# Patient Record
Sex: FEMALE | Race: WHITE | Employment: OTHER | URBAN - METROPOLITAN AREA
[De-identification: names, ages, dates, MRNs, and addresses within clinical notes are randomized per-mention and may not be internally consistent; named-entity substitution may affect disease eponyms.]

---

## 2017-01-18 ENCOUNTER — OFFICE VISIT (OUTPATIENT)
Dept: INTERNAL MEDICINE CLINIC | Age: 82
End: 2017-01-18

## 2017-01-18 VITALS
HEIGHT: 63 IN | DIASTOLIC BLOOD PRESSURE: 79 MMHG | HEART RATE: 82 BPM | BODY MASS INDEX: 30.26 KG/M2 | TEMPERATURE: 97.8 F | WEIGHT: 170.8 LBS | RESPIRATION RATE: 16 BRPM | SYSTOLIC BLOOD PRESSURE: 152 MMHG | OXYGEN SATURATION: 96 %

## 2017-01-18 DIAGNOSIS — I10 ESSENTIAL HYPERTENSION: ICD-10-CM

## 2017-01-18 DIAGNOSIS — M17.9 OSTEOARTHRITIS OF KNEE, UNSPECIFIED LATERALITY, UNSPECIFIED OSTEOARTHRITIS TYPE: ICD-10-CM

## 2017-01-18 DIAGNOSIS — K21.9 GASTROESOPHAGEAL REFLUX DISEASE WITHOUT ESOPHAGITIS: ICD-10-CM

## 2017-01-18 DIAGNOSIS — E78.5 HYPERLIPIDEMIA, UNSPECIFIED HYPERLIPIDEMIA TYPE: ICD-10-CM

## 2017-01-18 DIAGNOSIS — R73.02 IGT (IMPAIRED GLUCOSE TOLERANCE): ICD-10-CM

## 2017-01-18 DIAGNOSIS — J20.9 ACUTE BRONCHITIS, UNSPECIFIED ORGANISM: Primary | ICD-10-CM

## 2017-01-18 DIAGNOSIS — Z23 ENCOUNTER FOR IMMUNIZATION: ICD-10-CM

## 2017-01-18 DIAGNOSIS — J44.9 ASTHMA WITH COPD (CHRONIC OBSTRUCTIVE PULMONARY DISEASE) (HCC): ICD-10-CM

## 2017-01-18 RX ORDER — ACETAMINOPHEN 160 MG/5ML
200 SUSPENSION, ORAL (FINAL DOSE FORM) ORAL DAILY
Qty: 90 CAP | Refills: 3 | Status: ON HOLD | OUTPATIENT
Start: 2017-01-18 | End: 2017-08-01 | Stop reason: CLARIF

## 2017-01-18 RX ORDER — DICLOFENAC SODIUM 10 MG/G
2 GEL TOPICAL
Qty: 100 G | Refills: 5 | Status: ON HOLD | OUTPATIENT
Start: 2017-01-18 | End: 2017-08-01 | Stop reason: CLARIF

## 2017-01-18 RX ORDER — ROSUVASTATIN CALCIUM 5 MG/1
5 TABLET, COATED ORAL
Qty: 90 TAB | Refills: 1 | Status: SHIPPED | OUTPATIENT
Start: 2017-01-18 | End: 2022-04-22 | Stop reason: DRUGHIGH

## 2017-01-18 RX ORDER — ALBUTEROL SULFATE 90 UG/1
2 AEROSOL, METERED RESPIRATORY (INHALATION)
Qty: 1 INHALER | Refills: 2 | Status: SHIPPED | OUTPATIENT
Start: 2017-01-18

## 2017-01-18 RX ORDER — AMLODIPINE BESYLATE 10 MG/1
10 TABLET ORAL DAILY
Qty: 90 TAB | Refills: 1 | Status: SHIPPED | OUTPATIENT
Start: 2017-01-18 | End: 2018-10-09 | Stop reason: ALTCHOICE

## 2017-01-18 RX ORDER — PREDNISONE 20 MG/1
40 TABLET ORAL DAILY
Qty: 10 TAB | Refills: 0 | Status: SHIPPED | OUTPATIENT
Start: 2017-01-18 | End: 2017-01-23

## 2017-01-18 RX ORDER — AZITHROMYCIN 250 MG/1
TABLET, FILM COATED ORAL
Qty: 6 TAB | Refills: 0 | Status: SHIPPED | OUTPATIENT
Start: 2017-01-18 | End: 2017-01-23

## 2017-01-18 NOTE — PROGRESS NOTES
HISTORY OF PRESENT ILLNESS  Vicente Morillo is a 80 y.o. female. HPI  Presents for acute care    C/o weakness, fatigue with assoc URI sx x 1 week  Some use of albuterol x 1 day  \"I stay cold all of the time\"  But not fever or chills  +sputum production  +chest tightness without wheeze    Pt concerned that meds may be causing fatigue  Tramadol 1-2 per day    Pt is no longer taking crestor due to leg cramping  Pt reports feeling better off crestor  But, not taking Co Q10 with it    Past medical, Social, and Family history reviewed  Medications reviewed and updated. ROS  Complete ROS reviewed and negative or stable except as noted in HPI. Physical Exam   Constitutional: She is oriented to person, place, and time. She appears well-nourished. No distress. HENT:   Head: Normocephalic and atraumatic. Eyes: EOM are normal. Pupils are equal, round, and reactive to light. No scleral icterus. Neck: Normal range of motion. Neck supple. No JVD present. Cardiovascular: Normal rate, regular rhythm and normal heart sounds. Exam reveals no gallop and no friction rub. No murmur heard. Pulmonary/Chest: Effort normal and breath sounds normal. No respiratory distress. She has no wheezes. She has no rales. Prolonged exp phase   Abdominal: Soft. She exhibits no distension. There is no tenderness. Musculoskeletal: Normal range of motion. She exhibits no edema. Lymphadenopathy:     She has no cervical adenopathy. Neurological: She is alert and oriented to person, place, and time. She exhibits normal muscle tone. Skin: Skin is warm. No rash noted. Psychiatric: She has a normal mood and affect. Nursing note and vitals reviewed. Prior labs reviewed. ASSESSMENT and PLAN    ICD-10-CM ICD-9-CM    1.  Acute bronchitis, unspecified organism J20.9 466.0 predniSONE (DELTASONE) 20 mg tablet      azithromycin (ZITHROMAX) 250 mg tablet      albuterol (PROVENTIL HFA, VENTOLIN HFA, PROAIR HFA) 90 mcg/actuation inhaler   2. Hyperlipidemia, unspecified hyperlipidemia type E78.5 272.4 coenzyme Q-10 (CO Q-10) 200 mg capsule      rosuvastatin (CRESTOR) 5 mg tablet   3. 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 Gastroesophageal reflux disease without esophagitis K21.9 530.81    5. IGT (impaired glucose tolerance) R73.02 790.22    6. Essential hypertension I10 401.9 amLODIPine (NORVASC) 10 mg tablet   7. Osteoarthritis of knee, unspecified laterality, unspecified osteoarthritis type M17.9 715.36 diclofenac (VOLTAREN) 1 % gel   8. Encounter for immunization Z23 V03.89 INFLUENZA VIRUS VACCINE, HIGH DOSE SEASONAL, PRESERVATIVE FREE     Follow-up Disposition:  Return in about 2 months (around 3/18/2017), or if symptoms worsen or fail to improve, for cholesterol, blood pressure.   results and schedule of future studies reviewed with patient  reviewed diet  and weight    cardiovascular risk and specific lipid/LDL goals reviewed  reviewed medications and side effects in detail   Resume low dose crestor + be sure to take Co Q10 - pt agrees  pred burst  azithro  Increase norvasc to 10 mg daily  Trial of voltaren gel  If helpful, may be able to decrease tramadol or even oral diclofenac use

## 2017-01-18 NOTE — PROGRESS NOTES
RM 13  Pt states she feels tired and having trouble sleeping  Pt needs refill on neb solution    Chief Complaint   Patient presents with    Cough     nasal congestion,, chest tightness , fatigue, restless sleep       1. Have you been to the ER, urgent care clinic since your last visit? Hospitalized since your last visit? No    2. Have you seen or consulted any other health care providers outside of the [address] [place] [address] since your last visit? Include any pap smears or colon screening.  No    Health Maintenance Due   Topic Date Due    DTaP/Tdap/Td series (1 - Tdap) 08/09/1956    GLAUCOMA SCREENING Q2Y  08/09/2000    INFLUENZA AGE 9 TO ADULT  08/01/2016    Pneumococcal 65+ Low/Medium Risk (2 of 2 - PPSV23) 08/30/2016     Living will sent to pt AVS

## 2017-01-18 NOTE — MR AVS SNAPSHOT
Visit Information Date & Time Provider Department Dept. Phone Encounter #  
 1/18/2017  4:15 PM Aguila Lyn, [address] [place] [address] and Internal Medicine [phone] [id number] Follow-up Instructions Return in about 2 months (around 3/18/2017), or if symptoms worsen or fail to improve, for cholesterol, blood pressure. Upcoming Health Maintenance Date Due DTaP/Tdap/Td series (1 - Tdap) 8/9/1956 GLAUCOMA SCREENING Q2Y 8/9/2000 INFLUENZA AGE 9 TO ADULT 8/1/2016 Pneumococcal 65+ Low/Medium Risk (2 of 2 - PPSV23) 8/30/2016 MEDICARE YEARLY EXAM 3/5/2017 Allergies as of 1/18/2017  Review Complete On: 1/18/2017 By: Aguila Lyn MD  
  
 Severity Noted Reaction Type Reactions Arletha Dice  02/12/2016    Other (comments) Blood testing Cat Dander  02/12/2016    Other (comments) Blood testing Dog Dander  02/12/2016    Other (comments) Blood testing Elavil  03/04/2016    Other (comments)  
 imbalance Washburn  02/12/2016    Other (comments) Blood testing Current Immunizations  Reviewed on 1/18/2017 Name Date Influenza High Dose Vaccine PF  Incomplete Pneumococcal Conjugate (PCV-13) 8/30/2015, 4/9/2015 Zoster Vaccine, Live 7/2/2014 Reviewed by Aguila Lyn MD on 1/18/2017 at  5:31 PM  
You Were Diagnosed With   
  
 Codes Comments Acute bronchitis, unspecified organism    -  Primary ICD-10-CM: J20.9 ICD-9-CM: 466.0 Hyperlipidemia, unspecified hyperlipidemia type     ICD-10-CM: E78.5 ICD-9-CM: 272.4 Asthma with COPD (chronic obstructive pulmonary disease) [facility]     ICD-10-CM: J44.9, J45.909 ICD-9-CM: 493.20 Gastroesophageal reflux disease without esophagitis     ICD-10-CM: K21.9 ICD-9-CM: 530.81 IGT (impaired glucose tolerance)     ICD-10-CM: R73.02 
ICD-9-CM: 790.22 Essential hypertension     ICD-10-CM: I10 
ICD-9-CM: 401.9  Osteoarthritis of knee, unspecified laterality, unspecified osteoarthritis type     ICD-10-CM: M17.9 ICD-9-CM: 715.36 Encounter for immunization     ICD-10-CM: J67 ICD-9-CM: V03.89 Vitals BP Pulse Temp Resp Height(growth percentile) Weight(growth percentile) 152/79 82 97.8 °F (36.6 °C) (Oral) 16 5' 3\" (1.6 m) 170 lb 12.8 oz (77.5 kg) SpO2 BMI OB Status Smoking Status 96% 30.26 kg/m2 Postmenopausal Former Smoker BMI and BSA Data Body Mass Index Body Surface Area  
 30.26 kg/m 2 1.86 m 2 Preferred Pharmacy Pharmacy Name Phone [facility] [phone] [phone] Your Updated Medication List  
  
   
This list is accurate as of: 1/18/17  5:37 PM.  Always use your most recent med list.  
  
  
  
  
 * albuterol 2.5 mg /3 mL (0.083 %) nebulizer solution Commonly known as:  PROVENTIL VENTOLIN  
3 mL by Nebulization route every four (4) hours as needed for Wheezing. * albuterol 90 mcg/actuation inhaler Commonly known as:  PROVENTIL HFA, VENTOLIN HFA, PROAIR HFA Take 2 Puffs by inhalation every four (4) hours as needed for Wheezing. albuterol 90 mcg/actuation inhaler Commonly known as:  S[place]ula Bridges Take 2-4 Puffs by inhalation every four (4) hours as needed. * amLODIPine 5 mg tablet Commonly known as:  Arlyss Richland Take 1.5 Tabs by mouth daily. * amLODIPine 10 mg tablet Commonly known as:  Arlyss Richland Take 1 Tab by mouth daily. azithromycin 250 mg tablet Commonly known as:  [place] Missy Take 2 tablets today, then take 1 tablet daily CENTRUM 0.4-162-18 mg Tab Generic drug:  TN-QO-NG-Fe-Min-Lycopen-Lutein Take  by mouth. * clonazePAM 2 mg tablet Commonly known as:  Rhetta Bob Take 0.5 Tabs by mouth nightly as needed. Max Daily Amount: 1 mg.  
  
 * clonazePAM 0.5 mg tablet Commonly known as:  Rhetta Bob Take 1 Tab by mouth nightly as needed. coenzyme Q-10 200 mg capsule Commonly known as:  CO Q-10 Take 1 Cap by mouth daily. * diclofenac EC 75 mg EC tablet Commonly known as:  VOLTAREN Take  by mouth. * diclofenac 1 % Gel Commonly known as:  VOLTAREN Apply 2 g to affected area four (4) times daily as needed. diphenoxylate-atropine 2.5-0.025 mg per tablet Commonly known as:  LOMOTIL Take 1 Tab by mouth four (4) times daily as needed for Diarrhea. Max Daily Amount: 4 Tabs. fluticasone-salmeterol 500-50 mcg/dose diskus inhaler Commonly known as:  ADVAIR Take 1 Puff by inhalation two (2) times a day. HOMEOPATHIC (CA-BELLADONNA-CM) PO Take  by mouth. For diarrhea  
  
 lamoTRIgine 25 mg tablet Commonly known as: LaMICtal  
Take  by mouth daily. montelukast 10 mg tablet Commonly known as:  SINGULAIR Take 1 Tab by mouth daily. ondansetron 8 mg disintegrating tablet Commonly known as:  ZOFRAN ODT Take 1 Tab by mouth every eight (8) hours as needed for Nausea. polyethylene glycol 17 gram/dose powder Commonly known as:  Nu  Take 17 g by mouth daily. For constipation  
  
 predniSONE 20 mg tablet Commonly known as:  Alinda Althea Take 2 Tabs by mouth daily for 5 days. PROBIOTIC PO Take  by mouth. Takes one po daily. rosuvastatin 5 mg tablet Commonly known as:  CRESTOR Take 1 Tab by mouth nightly. tiotropium bromide 1.25 mcg/actuation inhaler Commonly known as:  Jovanni Lizandro Take 2 Puffs by inhalation daily. traMADol 50 mg tablet Commonly known as:  ULTRAM  
Take 1 Tab by mouth every eight (8) hours as needed for Pain. Max Daily Amount: 150 mg.  
  
 TUMS PO Take  by mouth as needed. valsartan 160 mg tablet Commonly known as:  DIOVAN Take 1 Tab by mouth daily. VITAMIN D3 1,000 unit Cap Generic drug:  cholecalciferol Take 2,000 Units by mouth.  
  
 vitamin E 400 unit capsule Commonly known as:  [address] Take 400 Units by mouth daily. * Notice: This list has 8 medication(s) that are the same as other medications prescribed for you. Read the directions carefully, and ask your doctor or other care provider to review them with you. Prescriptions Sent to Pharmacy Refills  
 coenzyme Q-10 (CO Q-10) 200 mg capsule 3 Sig: Take 1 Cap by mouth daily. Class: Normal  
 Pharmacy: [address] Ph #: [phone] Route: Oral  
 rosuvastatin (CRESTOR) 5 mg tablet 1 Sig: Take 1 Tab by mouth nightly. Class: Normal  
 Pharmacy: [address] Ph #: [phone] Route: Oral  
 diclofenac (VOLTAREN) 1 % gel 5 Sig: Apply 2 g to affected area four (4) times daily as needed. Class: Normal  
 Pharmacy: [address] Ph #: [phone] Route: Topical  
 amLODIPine (NORVASC) 10 mg tablet 1 Sig: Take 1 Tab by mouth daily. Class: Normal  
 Pharmacy: [address] Ph #: [phone] Route: Oral  
 predniSONE (DELTASONE) 20 mg tablet 0 Sig: Take 2 Tabs by mouth daily for 5 days. Class: Normal  
 Pharmacy: [address] Ph #: [phone] Route: Oral  
 azithromycin (ZITHROMAX) 250 mg tablet 0 Sig: Take 2 tablets today, then take 1 tablet daily Class: Normal  
 Pharmacy: [address] Ph #: [phone] We Performed the Following INFLUENZA VIRUS VACCINE, HIGH DOSE SEASONAL, PRESERVATIVE FREE [91419 CPT(R)] Follow-up Instructions Return in about 2 months (around 3/18/2017), or if symptoms worsen or fail to improve, for cholesterol, blood pressure. Patient Instructions Tito Malagon [address] What is a living will? A living will is a legal form you use to write down the kind of care you want at the end of your life. It is used by the health professionals who will treat you if you aren't able to decide for yourself. If you put your wishes in writing, your loved ones and others will know what kind of care you want. They won't need to guess. This can ease your mind and be helpful to others. A living will is not the same as an estate or property will. An estate will explains what you want to happen with your money and property after you die. Is a living will a legal document? A living will is a legal document. Each state has its own laws about living christensen. If you move to another state, make sure that your living will is legal in the state where you now live. Or you might use a universal form that has been approved by many states. This kind of form can sometimes be completed and stored online. Your electronic copy will then be available wherever you have a connection to the Internet. In most cases, doctors will respect your wishes even if you have a form from a different state. · You don't need an  to complete a living will. But legal advice can be helpful if your state's laws are unclear, your health history is complicated, or your family can't agree on what should be in your living will. · You can change your living will at any time. Some people find that their wishes about end-of-life care change as their health changes. · In addition to making a living will, think about completing a medical power of  form. This form lets you name the person you want to make end-of-life treatment decisions for you (your \"health care agent\") if you're not able to. Many hospitals and nursing homes will give you the forms you need to complete a living will and a medical power of .  
· Your living will is used only if you can't make or communicate decisions for yourself anymore. If you become able to make decisions again, you can accept or refuse any treatment, no matter what you wrote in your living will. · Your state may offer an online registry. This is a place where you can store your living will online so the doctors and nurses who need to treat you can find it right away. What should you think about when creating a living will? Talk about your end-of-life wishes with your family members and your doctor. Let them know what you want. That way the people making decisions for you won't be surprised by your choices. Think about these questions as you make your living will: · Do you know enough about life support methods that might be used? If not, talk to your doctor so you know what might be done if you can't breathe on your own, your heart stops, or you're unable to swallow. · What things would you still want to be able to do after you receive life-support methods? Would you want to be able to walk? To speak? To eat on your own? To live without the help of machines? · If you have a choice, where do you want to be cared for? In your home? At a hospital or nursing home? · Do you want certain Pentecostal practices performed if you become very ill? · If you have a choice at the end of your life, where would you prefer to die? At home? In a hospital or nursing home? Somewhere else? · Would you prefer to be buried or cremated? · Do you want your organs to be donated after you die? What should you do with your living will? · Make sure that your family members and your health care agent have copies of your living will. · Give your doctor a copy of your living will to keep in your medical record. If you have more than one doctor, make sure that each one has a copy. · You may want to put a copy of your living will where it can be easily found. Where can you learn more? Go to http://anastasiia-alejandro.info/. Enter L987 in the search box to learn more about \"Learning About Living Permariana. \" Current as of: February 24, 2016 Content Version: 11.1 © 4580-3916 [employer]. Care instructions adapted under license by [employer] (which disclaims liability or warranty for this information). If you have questions about a medical condition or this instruction, always ask your healthcare professional. [address] any warranty or liability for your use of this information. Introducing [facility]! Felix Chan introduces [employer] patient portal. Now you can access parts of your medical record, email your doctor's office, and request medication refills online. 1. In your internet browser, go to [email or web address] 2. Click on the First Time User? Click Here link in the Sign In box. You will see the New Member Sign Up page. 3. Enter your [employer] Access Code exactly as it appears below. You will not need to use this code after youve completed the sign-up process. If you do not sign up before the expiration date, you must request a new code. · [employer] Access Code: [email or web address] Expires: 4/18/2017  4:03 PM 
 
4. Enter the last four digits of your Social Security Number (xxxx) and Date of Birth (mm/dd/yyyy) as indicated and click Submit. You will be taken to the next sign-up page. 5. Create a [employer] ID. This will be your [employer] login ID and cannot be changed, so think of one that is secure and easy to remember. 6. Create a [employer] password. You can change your password at any time. 7. Enter your Password Reset Question and Answer. This can be used at a later time if you forget your password. 8. Enter your e-mail address. You will receive e-mail notification when new information is available in [address] 9. Click Sign Up. You can now view and download portions of your medical record. 10. Click the Download Summary menu link to download a portable copy of your medical information. If you have questions, please visit the Frequently Asked Questions section of the [employer] website. Remember, [employer] is NOT to be used for urgent needs. For medical emergencies, dial 911. Now available from your iPhone and Android! Please provide this summary of care documentation to your next provider. Your primary care clinician is listed as [address] If you have any questions after today's visit, please call [phone].

## 2017-01-18 NOTE — PATIENT INSTRUCTIONS
Learning About Living Kory  What is a living will? A living will is a legal form you use to write down the kind of care you want at the end of your life. It is used by the health professionals who will treat you if you aren't able to decide for yourself. If you put your wishes in writing, your loved ones and others will know what kind of care you want. They won't need to guess. This can ease your mind and be helpful to others. A living will is not the same as an estate or property will. An estate will explains what you want to happen with your money and property after you die. Is a living will a legal document? A living will is a legal document. Each state has its own laws about living christensen. If you move to another state, make sure that your living will is legal in the state where you now live. Or you might use a universal form that has been approved by many states. This kind of form can sometimes be completed and stored online. Your electronic copy will then be available wherever you have a connection to the Internet. In most cases, doctors will respect your wishes even if you have a form from a different state. · You don't need an  to complete a living will. But legal advice can be helpful if your state's laws are unclear, your health history is complicated, or your family can't agree on what should be in your living will. · You can change your living will at any time. Some people find that their wishes about end-of-life care change as their health changes. · In addition to making a living will, think about completing a medical power of  form. This form lets you name the person you want to make end-of-life treatment decisions for you (your \"health care agent\") if you're not able to. Many hospitals and nursing homes will give you the forms you need to complete a living will and a medical power of .   · Your living will is used only if you can't make or communicate decisions for yourself anymore. If you become able to make decisions again, you can accept or refuse any treatment, no matter what you wrote in your living will. · Your state may offer an online registry. This is a place where you can store your living will online so the doctors and nurses who need to treat you can find it right away. What should you think about when creating a living will? Talk about your end-of-life wishes with your family members and your doctor. Let them know what you want. That way the people making decisions for you won't be surprised by your choices. Think about these questions as you make your living will:  · Do you know enough about life support methods that might be used? If not, talk to your doctor so you know what might be done if you can't breathe on your own, your heart stops, or you're unable to swallow. · What things would you still want to be able to do after you receive life-support methods? Would you want to be able to walk? To speak? To eat on your own? To live without the help of machines? · If you have a choice, where do you want to be cared for? In your home? At a hospital or nursing home? · Do you want certain Christianity practices performed if you become very ill? · If you have a choice at the end of your life, where would you prefer to die? At home? In a hospital or nursing home? Somewhere else? · Would you prefer to be buried or cremated? · Do you want your organs to be donated after you die? What should you do with your living will? · Make sure that your family members and your health care agent have copies of your living will. · Give your doctor a copy of your living will to keep in your medical record. If you have more than one doctor, make sure that each one has a copy. · You may want to put a copy of your living will where it can be easily found. Where can you learn more? Go to http://anastasiia-alejandro.info/.   Enter X679 in the search box to learn more about \"Learning About Living Perroy. \"  Current as of: February 24, 2016  Content Version: 11.1  © 3116-2261 [employer], Inc[employer]. Care instructions adapted under license by [employer] (which disclaims liability or warranty for this information). If you have questions about a medical condition or this instruction, always ask your healthcare professional. [address] any warranty or liability for your use of this information.

## 2017-01-23 DIAGNOSIS — J20.9 ACUTE BRONCHITIS, UNSPECIFIED ORGANISM: ICD-10-CM

## 2017-01-23 NOTE — TELEPHONE ENCOUNTER
Patient called requesting for a refill request for prednisone and another prescription ( per pt she does not know the name). When I stated that we have to have the name of the medication in order to know exactly what to call in, patient yelled and was rude stating that she was not going to give the name of the prescription.      Please call back at # [phone]

## 2017-01-23 NOTE — TELEPHONE ENCOUNTER
----- Message from Matthew Baron sent at 1/23/2017  9:00 AM EST -----  Regarding: Dr. Seema Muniz: [phone]  Patient needs Rx refill Pradazone and ZPAK, please check medication on file. Patient request callback at  [phone].     Thank You

## 2017-01-24 RX ORDER — PREDNISONE 20 MG/1
40 TABLET ORAL DAILY
Qty: 10 TAB | Refills: 0 | Status: CANCELLED | OUTPATIENT
Start: 2017-01-24 | End: 2017-01-29

## 2017-01-24 NOTE — TELEPHONE ENCOUNTER
Patient called again requesting medications for her Prednisone and a refill on her Zpack. The patient would like a call back today to discuss theses medications. She feels like her messages are being ignored. Please contact patient at [phone]. Thanks!

## 2017-01-25 NOTE — TELEPHONE ENCOUNTER
The antibiotic is still working and she needs to be seen for re-eval if no better from prior Rx. Please encourage follow up for office visit appt.

## 2017-01-25 NOTE — TELEPHONE ENCOUNTER
Informed pt she needed to make another appt with  to be re-evaluated pt state's that she is not going to make an appt at this time. Pt also feel's it was very unprofessional that no one had called her to inform her to make a follow-up.

## 2017-01-25 NOTE — TELEPHONE ENCOUNTER
Spoke to patient. She voiced her concerns about not receiving a call back and initial call on the 23rd. .  Informed her that Dr. Sangeeta Bush reviewed her message and recommends that she return for OV if she is not feeling better. She wants to wait a day or two and will call back if no better.

## 2017-02-09 ENCOUNTER — TELEPHONE (OUTPATIENT)
Dept: INTERNAL MEDICINE CLINIC | Age: 82
End: 2017-02-09

## 2017-02-09 NOTE — TELEPHONE ENCOUNTER
Prednisone 20 MG Tablet   Qty: 10  SIG: take 2 tablets by mouth once daily for 5 days    Last seen 1-18-17

## 2017-02-10 NOTE — TELEPHONE ENCOUNTER
If recurrent or persistent respiratory symptoms, then she needs to be seen     Please encourage follow up for office visit appt or urgent care as indicated.

## 2017-02-13 NOTE — TELEPHONE ENCOUNTER
Pt states she has already been seen at \"[facility] inside of the [facility]. They put her on another round of steroids and abx. Pt states she is feeling better.

## 2017-02-13 NOTE — TELEPHONE ENCOUNTER
Per patient, a nurse called and left a message, she is returning a nurse call. Please Advise.  #[phone]

## 2017-02-13 NOTE — TELEPHONE ENCOUNTER
Pt states she wants to switch providers because Dr. Anthony Zapata is normally always busy and hardly ever has any appointments when she is sick. She does not want to switch practices because she has been coming here for so long. She would like to see KAREN Rangel for future visits. She also wanted to make it clear that she loves the care Dr. Anthony Zapata provides, but he is always busy. Advised no explanation needed and would be okay to switch PCP. She requested follow up appt for Wednesday with NP Marisela Rangel for respiratory sx.

## 2017-03-10 RX ORDER — FLUTICASONE PROPIONATE AND SALMETEROL 500; 50 UG/1; UG/1
1 POWDER RESPIRATORY (INHALATION) 2 TIMES DAILY
Qty: 3 INHALER | Refills: 1 | Status: SHIPPED | OUTPATIENT
Start: 2017-03-10

## 2017-03-30 ENCOUNTER — TELEPHONE (OUTPATIENT)
Dept: INTERNAL MEDICINE CLINIC | Age: 82
End: 2017-03-30

## 2017-03-30 DIAGNOSIS — M25.569 KNEE PAIN, UNSPECIFIED CHRONICITY, UNSPECIFIED LATERALITY: ICD-10-CM

## 2017-03-30 DIAGNOSIS — M17.9 OSTEOARTHRITIS OF KNEE, UNSPECIFIED LATERALITY, UNSPECIFIED OSTEOARTHRITIS TYPE: Primary | ICD-10-CM

## 2017-03-30 NOTE — TELEPHONE ENCOUNTER
Agree that if pt is having significant symptoms or has progression of her symptoms, then she should be seen and evaluated acutely, either in ER or UC, then follow up here for re-evaluation. Referral to orthopedics entered.

## 2017-03-30 NOTE — TELEPHONE ENCOUNTER
Left voicemail for patient, advised where I am calling from and that I am following up. Left office telephone number for return call.
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
## 2017-03-31 ENCOUNTER — DOCUMENTATION ONLY (OUTPATIENT)
Dept: INTERNAL MEDICINE CLINIC | Age: 82
End: 2017-03-31

## 2017-03-31 NOTE — PROGRESS NOTES
Pt called stating that she thought her wait time was too long on 3/30/17 and that she is thinking of leaving the practice. Patient will callback sometime to speak with the .

## 2017-04-12 DIAGNOSIS — R19.7 DIARRHEA, UNSPECIFIED TYPE: ICD-10-CM

## 2017-04-12 RX ORDER — DIPHENOXYLATE HYDROCHLORIDE AND ATROPINE SULFATE 2.5; .025 MG/1; MG/1
1 TABLET ORAL
Qty: 30 TAB | Refills: 0 | Status: ON HOLD | OUTPATIENT
Start: 2017-04-12 | End: 2017-08-01 | Stop reason: CLARIF

## 2017-04-12 NOTE — TELEPHONE ENCOUNTER
Patient called stated she is having Diarrhea for 3 days has tried over the counter not working,  She does have a script fot Lomolil 2.5-0.025 mg that she states does work. Called the pharmacy they stated she had refills but it just went out of date. Please send new order to pharmacy.   Patient states she has an appt Friday here    Please advise

## 2017-04-12 NOTE — TELEPHONE ENCOUNTER
Please advised patient she is at risk for dehydration due to diarrhea. She needs to increase fluids, avoid dairy, eat saltine crackers, bananas, toast, only bland foods.  Keep appointment

## 2017-04-14 ENCOUNTER — OFFICE VISIT (OUTPATIENT)
Dept: INTERNAL MEDICINE CLINIC | Age: 82
End: 2017-04-14

## 2017-04-14 ENCOUNTER — TELEPHONE (OUTPATIENT)
Dept: INTERNAL MEDICINE CLINIC | Age: 82
End: 2017-04-14

## 2017-04-14 VITALS
OXYGEN SATURATION: 93 % | WEIGHT: 172 LBS | SYSTOLIC BLOOD PRESSURE: 139 MMHG | DIASTOLIC BLOOD PRESSURE: 69 MMHG | RESPIRATION RATE: 18 BRPM | HEIGHT: 63 IN | HEART RATE: 75 BPM | TEMPERATURE: 97.9 F | BODY MASS INDEX: 30.48 KG/M2

## 2017-04-14 DIAGNOSIS — K58.0 IRRITABLE BOWEL SYNDROME WITH DIARRHEA: ICD-10-CM

## 2017-04-14 DIAGNOSIS — J44.9 ASTHMA WITH COPD (CHRONIC OBSTRUCTIVE PULMONARY DISEASE) (HCC): ICD-10-CM

## 2017-04-14 DIAGNOSIS — R82.90 ABNORMAL URINE FINDINGS: ICD-10-CM

## 2017-04-14 DIAGNOSIS — R53.82 CHRONIC FATIGUE: ICD-10-CM

## 2017-04-14 DIAGNOSIS — I10 ESSENTIAL HYPERTENSION: Primary | ICD-10-CM

## 2017-04-14 DIAGNOSIS — M79.89 LEFT LEG SWELLING: ICD-10-CM

## 2017-04-14 LAB
BILIRUB UR QL STRIP: NEGATIVE
GLUCOSE UR-MCNC: NEGATIVE MG/DL
KETONES P FAST UR STRIP-MCNC: NEGATIVE MG/DL
PH UR STRIP: 5.5 [PH] (ref 4.6–8)
PROT UR QL STRIP: NORMAL MG/DL
SP GR UR STRIP: 1.02 (ref 1–1.03)
UA UROBILINOGEN AMB POC: NORMAL (ref 0.2–1)
URINALYSIS CLARITY POC: CLEAR
URINALYSIS COLOR POC: YELLOW
URINE BLOOD POC: NORMAL
URINE LEUKOCYTES POC: NORMAL
URINE NITRITES POC: NEGATIVE

## 2017-04-14 NOTE — MR AVS SNAPSHOT
Visit Information Date & Time Provider Department Dept. Phone Encounter #  
 4/14/2017  9:00 AM Nathen Sears [id number] [facility] [phone] [id number] Follow-up Instructions Return in about 1 month (around 5/14/2017) for htn, medicare wellness. Upcoming Health Maintenance Date Due DTaP/Tdap/Td series (1 - Tdap) 8/9/1956 GLAUCOMA SCREENING Q2Y 8/9/2000 Pneumococcal 65+ Low/Medium Risk (2 of 2 - PPSV23) 8/30/2016 MEDICARE YEARLY EXAM 3/5/2017 Allergies as of 4/14/2017  Review Complete On: 4/14/2017 By: Clara Elmore NP Severity Noted Reaction Type Reactions Lewis Leach  02/12/2016    Other (comments) Blood testing Cat Dander  02/12/2016    Other (comments) Blood testing Dog Dander  02/12/2016    Other (comments) Blood testing Elavil  03/04/2016    Other (comments)  
 imbalance [place]  02/12/2016    Other (comments) Blood testing Current Immunizations  Reviewed on 1/18/2017 Name Date Influenza High Dose Vaccine PF 1/19/2017 Pneumococcal Conjugate (PCV-13) 8/30/2015, 4/9/2015 Zoster Vaccine, Live 7/2/2014 Not reviewed this visit You Were Diagnosed With   
  
 Codes Comments Diarrhea, unspecified type    -  Primary ICD-10-CM: R19.7 ICD-9-CM: 787.91 Essential hypertension     ICD-10-CM: I10 
ICD-9-CM: 401.9 Chronic fatigue     ICD-10-CM: R53.82 
ICD-9-CM: 780.79 Vitals BP Pulse Temp Resp Height(growth percentile) Weight(growth percentile) 139/69 (BP 1 Location: Right arm, BP Patient Position: Sitting) 75 97.9 °F (36.6 °C) (Oral) 18 5' 2.99\" (1.6 m) 172 lb (78 kg) SpO2 BMI OB Status Smoking Status 93% 30.48 kg/m2 Postmenopausal Former Smoker BMI and BSA Data Body Mass Index Body Surface Area  
 30.48 kg/m 2 1.86 m 2 Preferred Pharmacy Pharmacy Name Phone  [id number] [address] [phone] Your Updated Medication List  
  
   
This list is accurate as of: 4/14/17 10:04 AM.  Always use your most recent med list.  
  
  
  
  
 * albuterol 2.5 mg /3 mL (0.083 %) nebulizer solution Commonly known as:  PROVENTIL VENTOLIN  
3 mL by Nebulization route every four (4) hours as needed for Wheezing. * albuterol 90 mcg/actuation inhaler Commonly known as:  PROVENTIL HFA, VENTOLIN HFA, PROAIR HFA Take 2 Puffs by inhalation every four (4) hours as needed for Wheezing. albuterol 90 mcg/actuation inhaler Commonly known as:  Ruben Buras Take 2-4 Puffs by inhalation every four (4) hours as needed. * amLODIPine 5 mg tablet Commonly known as:  Rosa Marsh Take 1.5 Tabs by mouth daily. * amLODIPine 10 mg tablet Commonly known as:  Rosa  Take 1 Tab by mouth daily. CENTRUM 0.4-162-18 mg Tab Generic drug:  XF-DZ-VZ-Fe-Min-Lycopen-Lutein Take  by mouth. * clonazePAM 2 mg tablet Commonly known as:  Emily Medici Take 0.5 Tabs by mouth nightly as needed. Max Daily Amount: 1 mg.  
  
 * clonazePAM 0.5 mg tablet Commonly known as:  Emily Medici Take 1 Tab by mouth nightly as needed. coenzyme Q-10 200 mg capsule Commonly known as:  CO Q-10 Take 1 Cap by mouth daily. * diclofenac EC 75 mg EC tablet Commonly known as:  VOLTAREN Take  by mouth. * diclofenac 1 % Gel Commonly known as:  VOLTAREN Apply 2 g to affected area four (4) times daily as needed. diphenoxylate-atropine 2.5-0.025 mg per tablet Commonly known as:  LOMOTIL Take 1 Tab by mouth four (4) times daily as needed for Diarrhea. Max Daily Amount: 4 Tabs. fluticasone-salmeterol 500-50 mcg/dose diskus inhaler Commonly known as:  ADVAIR Take 1 Puff by inhalation two (2) times a day. HOMEOPATHIC (CA-BELLADONNA-CM) PO Take  by mouth. For diarrhea  
  
 lamoTRIgine 25 mg tablet Commonly known as: LaMICtal  
Take  by mouth daily. montelukast 10 mg tablet Commonly known as:  SINGULAIR Take 1 Tab by mouth daily. ondansetron 8 mg disintegrating tablet Commonly known as:  ZOFRAN ODT Take 1 Tab by mouth every eight (8) hours as needed for Nausea. polyethylene glycol 17 gram/dose powder Commonly known as:  Bonnita Amass Take 17 g by mouth daily. For constipation PROBIOTIC PO Take  by mouth. Takes one po daily. rosuvastatin 5 mg tablet Commonly known as:  CRESTOR Take 1 Tab by mouth nightly. tiotropium bromide 1.25 mcg/actuation inhaler Commonly known as:  Kulwinder Dally Take 2 Puffs by inhalation daily. traMADol 50 mg tablet Commonly known as:  ULTRAM  
Take 1 Tab by mouth every eight (8) hours as needed for Pain. Max Daily Amount: 150 mg.  
  
 TUMS PO Take  by mouth as needed. valsartan 160 mg tablet Commonly known as:  DIOVAN Take 1 Tab by mouth daily. VITAMIN D3 1,000 unit Cap Generic drug:  cholecalciferol Take 2,000 Units by mouth.  
  
 vitamin E 400 unit capsule Commonly known as:  [address] Take 400 Units by mouth daily. * Notice: This list has 8 medication(s) that are the same as other medications prescribed for you. Read the directions carefully, and ask your doctor or other care provider to review them with you. We Performed the Following AMB POC URINALYSIS DIP STICK AUTO W/O MICRO [23081 CPT(R)] CBC WITH AUTOMATED DIFF [49107 CPT(R)] METABOLIC PANEL, COMPREHENSIVE [03249 CPT(R)] Follow-up Instructions Return in about 1 month (around 5/14/2017) for htn, medicare wellness. Patient Instructions Diarrhea: Care Instructions Your Care Instructions Diarrhea is loose, watery stools (bowel movements). The exact cause is often hard to find. Sometimes diarrhea is your body's way of getting rid of what caused an upset stomach.  Viruses, food poisoning, and many medicines can cause diarrhea. Some people get diarrhea in response to emotional stress, anxiety, or certain foods. Almost everyone has diarrhea now and then. It usually isn't serious, and your stools will return to normal soon. The important thing to do is replace the fluids you have lost, so you can prevent dehydration. The doctor has checked you carefully, but problems can develop later. If you notice any problems or new symptoms, get medical treatment right away. Follow-up care is a key part of your treatment and safety. Be sure to make and go to all appointments, and call your doctor if you are having problems. It's also a good idea to know your test results and keep a list of the medicines you take. How can you care for yourself at home? · Watch for signs of dehydration, which means your body has lost too much water. Dehydration is a serious condition and should be treated right away. Signs of dehydration are: 
¨ Increasing thirst and dry eyes and mouth. ¨ Feeling faint or lightheaded. ¨ Darker urine, and a smaller amount of urine than normal. 
· To prevent dehydration, drink plenty of fluids, enough so that your urine is light yellow or clear like water. Choose water and other caffeine-free clear liquids until you feel better. If you have kidney, heart, or liver disease and have to limit fluids, talk with your doctor before you increase the amount of fluids you drink. · Begin eating small amounts of mild foods the next day, if you feel like it. ¨ Try yogurt that has live cultures of Lactobacillus. (Check the label.) ¨ Avoid spicy foods, fruits, alcohol, and caffeine until 48 hours after all symptoms are gone. ¨ Avoid chewing gum that contains sorbitol. ¨ Avoid dairy products (except for yogurt with Lactobacillus) while you have diarrhea and for 3 days after symptoms are gone.  
· The doctor may recommend that you take over-the-counter medicine, such as loperamide (Imodium), if you still have diarrhea after 6 hours. Read and follow all instructions on the label. Do not use this medicine if you have bloody diarrhea, a high fever, or other signs of serious illness. Call your doctor if you think you are having a problem with your medicine. When should you call for help? Call 911 anytime you think you may need emergency care. For example, call if: 
· You passed out (lost consciousness). · Your stools are maroon or very bloody. Call your doctor now or seek immediate medical care if: 
· You are dizzy or lightheaded, or you feel like you may faint. · Your stools are black and look like tar, or they have streaks of blood. · You have new or worse belly pain. · You have symptoms of dehydration, such as: ¨ Dry eyes and a dry mouth. ¨ Passing only a little dark urine. ¨ Feeling thirstier than usual. 
· You have a new or higher fever. Watch closely for changes in your health, and be sure to contact your doctor if: 
· Your diarrhea is getting worse. · You see pus in the diarrhea. · You are not getting better after 2 days (48 hours). Where can you learn more? Go to http://anastasiia-alejandro.info/. Enter E735 in the search box to learn more about \"Diarrhea: Care Instructions. \" Current as of: May 27, 2016 Content Version: 11.2 © 0985-5953 [employer]. Care instructions adapted under license by [employer] (which disclaims liability or warranty for this information). If you have questions about a medical condition or this instruction, always ask your healthcare professional. [place] [address] any warranty or liability for your use of this information. Introducing [facility]! Wood Rao introduces [employer] patient portal. Now you can access parts of your medical record, email your doctor's office, and request medication refills online.    
 
1. In your internet browser, go to [email or web address] 2. Click on the First Time User? Click Here link in the Sign In box. You will see the New Member Sign Up page. 3. Enter your Jolicloud Access Code exactly as it appears below. You will not need to use this code after youve completed the sign-up process. If you do not sign up before the expiration date, you must request a new code. · Jolicloud Access Code: [email or web address] Expires: 4/18/2017  5:03 PM 
 
4. Enter the last four digits of your Social Security Number (xxxx) and Date of Birth (mm/dd/yyyy) as indicated and click Submit. You will be taken to the next sign-up page. 5. Create a Fidbackst ID. This will be your Jolicloud login ID and cannot be changed, so think of one that is secure and easy to remember. 6. Create a Jolicloud password. You can change your password at any time. 7. Enter your Password Reset Question and Answer. This can be used at a later time if you forget your password. 8. Enter your e-mail address. You will receive e-mail notification when new information is available in [address] 9. Click Sign Up. You can now view and download portions of your medical record. 10. Click the Download Summary menu link to download a portable copy of your medical information. If you have questions, please visit the Frequently Asked Questions section of the Jolicloud website. Remember, Jolicloud is NOT to be used for urgent needs. For medical emergencies, dial 911. Now available from your iPhone and Android! Please provide this summary of care documentation to your next provider. Your primary care clinician is listed as Jimmy Guillen. If you have any questions after today's visit, please call [phone].

## 2017-04-14 NOTE — PATIENT INSTRUCTIONS
Diarrhea: Care Instructions  Your Care Instructions    Diarrhea is loose, watery stools (bowel movements). The exact cause is often hard to find. Sometimes diarrhea is your body's way of getting rid of what caused an upset stomach. Viruses, food poisoning, and many medicines can cause diarrhea. Some people get diarrhea in response to emotional stress, anxiety, or certain foods. Almost everyone has diarrhea now and then. It usually isn't serious, and your stools will return to normal soon. The important thing to do is replace the fluids you have lost, so you can prevent dehydration. The doctor has checked you carefully, but problems can develop later. If you notice any problems or new symptoms, get medical treatment right away. Follow-up care is a key part of your treatment and safety. Be sure to make and go to all appointments, and call your doctor if you are having problems. It's also a good idea to know your test results and keep a list of the medicines you take. How can you care for yourself at home? · Watch for signs of dehydration, which means your body has lost too much water. Dehydration is a serious condition and should be treated right away. Signs of dehydration are:  ¨ Increasing thirst and dry eyes and mouth. ¨ Feeling faint or lightheaded. ¨ Darker urine, and a smaller amount of urine than normal.  · To prevent dehydration, drink plenty of fluids, enough so that your urine is light yellow or clear like water. Choose water and other caffeine-free clear liquids until you feel better. If you have kidney, heart, or liver disease and have to limit fluids, talk with your doctor before you increase the amount of fluids you drink. · Begin eating small amounts of mild foods the next day, if you feel like it. ¨ Try yogurt that has live cultures of Lactobacillus. (Check the label.)  ¨ Avoid spicy foods, fruits, alcohol, and caffeine until 48 hours after all symptoms are gone.   ¨ Avoid chewing gum that contains sorbitol. ¨ Avoid dairy products (except for yogurt with Lactobacillus) while you have diarrhea and for 3 days after symptoms are gone. · The doctor may recommend that you take over-the-counter medicine, such as loperamide (Imodium), if you still have diarrhea after 6 hours. Read and follow all instructions on the label. Do not use this medicine if you have bloody diarrhea, a high fever, or other signs of serious illness. Call your doctor if you think you are having a problem with your medicine. When should you call for help? Call 911 anytime you think you may need emergency care. For example, call if:  · You passed out (lost consciousness). · Your stools are maroon or very bloody. Call your doctor now or seek immediate medical care if:  · You are dizzy or lightheaded, or you feel like you may faint. · Your stools are black and look like tar, or they have streaks of blood. · You have new or worse belly pain. · You have symptoms of dehydration, such as:  ¨ Dry eyes and a dry mouth. ¨ Passing only a little dark urine. ¨ Feeling thirstier than usual.  · You have a new or higher fever. Watch closely for changes in your health, and be sure to contact your doctor if:  · Your diarrhea is getting worse. · You see pus in the diarrhea. · You are not getting better after 2 days (48 hours). Where can you learn more? Go to http://anastasiia-alejandro.info/. Enter D443 in the search box to learn more about \"Diarrhea: Care Instructions. \"  Current as of: May 27, 2016  Content Version: 11.2  © 3124-9428 [employer]. Care instructions adapted under license by [employer] (which disclaims liability or warranty for this information). If you have questions about a medical condition or this instruction, always ask your healthcare professional. [address] any warranty or liability for your use of this information.
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
## 2017-04-14 NOTE — PROGRESS NOTES
HISTORY OF PRESENT ILLNESS  Liliana Simmons is a [facility] y.o. female presents for evaluation of the following  HPI   Got back from [place] Saturday     Had chest tightness and productive cough. Appointment her on 3/30, but could wait to be seen. Was seen by [facility] Pulmonary on March 31 started on Amoxicillin and Prednisone also started on diuretic for 5 days for left leg swelling and advised to see PCP       Saturday has more swelling  left leg, was seen by [facility] ED provide. Reports  Doppler, labs, EKG normal    Acute IBS-D symptoms, last episode of diarrhea was yesterday. Followed by Dr. Violetta Rankin    Two years ago was seen by Cardiology, systolic blood pressure was elevated Dr Karthikeyan Garcia retired. Will now see Dr. Ethan maxwell going down steps. Though it was coming from bad left knee    Past Medical History:   Diagnosis Date    Arthritis of knee, left     Asthma     Asthma with COPD (chronic obstructive pulmonary disease) ([facility])     FEV1 - 0.99, IgE high    Atherosclerosis of both carotid arteries     Bipolar affective disorder (HCC)     Cataract, left eye     cataract - left    Closed fracture of lateral portion of left tibial plateau     Depression with anxiety     depression/anxiety    Encephalopathy     encephalopathy    Esophageal ulcer     esophageal ulcer    Gastroparesis     GERD (gastroesophageal reflux disease)     Hepatic steatosis     History of fall     hx falls    Hyperlipidemia     increased cholesterol    Hyperlipidemia     Hypertension     IBS (irritable bowel syndrome)     IBS    IGT (impaired glucose tolerance) 9/15/2015    Liver disease     elevated liver enzymes - being evaluated/US done x 2 - inflammed liver/\"fat on liver\"    Neurological disorder     delayed thinking process? ??    OA (osteoarthritis) of knee     Osteoporosis     Seizures ([facility] [address]     Unspecified adverse effect of anesthesia     was told by neurologist not to use anesthesia unless needed due to encephalopathy    Unspecified adverse effect of anesthesia     hallucinations after MRI with sedation    Vertigo     vertigo       Current Outpatient Prescriptions on File Prior to Visit   Medication Sig Dispense Refill    diphenoxylate-atropine (LOMOTIL) 2.5-0.025 mg per tablet Take 1 Tab by mouth four (4) times daily as needed for Diarrhea. Max Daily Amount: 4 Tabs. 30 Tab 0    fluticasone-salmeterol (ADVAIR) 500-50 mcg/dose diskus inhaler Take 1 Puff by inhalation two (2) times a day. 3 Inhaler 1    rosuvastatin (CRESTOR) 5 mg tablet Take 1 Tab by mouth nightly. 90 Tab 1    diclofenac (VOLTAREN) 1 % gel Apply 2 g to affected area four (4) times daily as needed. 100 g 5    amLODIPine (NORVASC) 10 mg tablet Take 1 Tab by mouth daily. 90 Tab 1    albuterol (PROVENTIL HFA, VENTOLIN HFA, PROAIR HFA) 90 mcg/actuation inhaler Take 2 Puffs by inhalation every four (4) hours as needed for Wheezing. 1 Inhaler 2    amLODIPine (NORVASC) 5 mg tablet Take 1.5 Tabs by mouth daily. 135 Tab 1    montelukast (SINGULAIR) 10 mg tablet Take 1 Tab by mouth daily. 90 Tab 1    tiotropium bromide (SPIRIVA RESPIMAT) 1.25 mcg/actuation inhaler Take 2 Puffs by inhalation daily. 3 Inhaler 1    valsartan (DIOVAN) 160 mg tablet Take 1 Tab by mouth daily. 90 Tab 1    clonazePAM (KLONOPIN) 0.5 mg tablet Take 1 Tab by mouth nightly as needed.  albuterol (PROVENTIL VENTOLIN) 2.5 mg /3 mL (0.083 %) nebulizer solution 3 mL by Nebulization route every four (4) hours as needed for Wheezing. 1 Each 4    diclofenac EC (VOLTAREN) 75 mg EC tablet Take  by mouth.  lamoTRIgine (LAMICTAL) 25 mg tablet Take  by mouth daily.  clonazePAM (KLONOPIN) 2 mg tablet Take 0.5 Tabs by mouth nightly as needed. Max Daily Amount: 1 mg. (Patient taking differently: Take 0.5 mg by mouth nightly as needed.) 20 Tab 0    JOSAFAT PHOS/BELLAD ALK/BAILEY FLW (HOMEOPATHIC, CA-BELLADONNA-CM, PO) Take  by mouth.  For diarrhea      HW-QI-EQ-Fe-Min-Lycopen-Lutein (CENTRUM) 0.4-162-18 mg Tab Take  by mouth.  Cholecalciferol, Vitamin D3, (VITAMIN D3) 1,000 unit cap Take 2,000 Units by mouth.  vitamin E (AQUA GEMS) 400 unit capsule Take 400 Units by mouth daily.  LACTOBACILLUS ACIDOPHILUS (PROBIOTIC PO) Take  by mouth. Takes one po daily.  CALCIUM CARBONATE (TUMS PO) Take  by mouth as needed.  albuterol (PROVENTIL, VENTOLIN) 90 mcg/Actuation inhaler Take 2-4 Puffs by inhalation every four (4) hours as needed.  coenzyme Q-10 (CO Q-10) 200 mg capsule Take 1 Cap by mouth daily. 90 Cap 3    polyethylene glycol (MIRALAX) 17 gram/dose powder Take 17 g by mouth daily. For constipation 595 g 2    ondansetron (ZOFRAN ODT) 8 mg disintegrating tablet Take 1 Tab by mouth every eight (8) hours as needed for Nausea. 12 Tab 0    traMADol (ULTRAM) 50 mg tablet Take 1 Tab by mouth every eight (8) hours as needed for Pain. Max Daily Amount: 150 mg. 30 Tab 0     No current facility-administered medications on file prior to visit. Review of Systems   Constitutional: Positive for malaise/fatigue. Respiratory: Positive for cough and sputum production. Cardiovascular: Positive for leg swelling. Negative for chest pain. Gastrointestinal: Positive for diarrhea. Negative for abdominal pain, blood in stool, constipation, heartburn, melena, nausea and vomiting. Musculoskeletal: Positive for joint pain. Negative for falls. Neurological: Negative for dizziness and headaches. Physical Exam   Constitutional: She is oriented to person, place, and time. HENT:   Left Ear: External ear normal.   Neck: No JVD present. Carotid bruit is not present. Cardiovascular: Normal rate and regular rhythm. Pulmonary/Chest: Effort normal and breath sounds normal. No respiratory distress. She has no wheezes. She has no rales. She exhibits no tenderness. Abdominal: Soft. Bowel sounds are normal. She exhibits no distension and no mass. There is no tenderness. There is no rebound and no guarding. Musculoskeletal: She exhibits edema (mild left ankle). She exhibits no tenderness or deformity. Neurological: She is alert and oriented to person, place, and time. No cranial nerve deficit. Coordination normal.   Skin: Skin is warm and dry. Psychiatric: Judgment and thought content normal. Her mood appears anxious. Her speech is rapid and/or pressured and tangential. Cognition and memory are normal.       ASSESSMENT and PLAN    ICD-10-CM ICD-9-CM    1. Essential hypertension I81 640.0 METABOLIC PANEL, COMPREHENSIVE   2. Irritable bowel syndrome with diarrhea C62.0 368.4 METABOLIC PANEL, COMPREHENSIVE   3. Chronic fatigue R53.82 780.79 CBC WITH AUTOMATED DIFF      AMB POC URINALYSIS DIP STICK AUTO W/O MICRO   4. Abnormal urine findings R82.90 791.9 CULTURE, URINE   5. Left leg swelling M79.89 729.81 furosemide (LASIX) 20 mg tablet   6. [facility] with [facility]) ([facility] J44.9 493.20     J45.909       Follow-up Disposition:  Return in about 1 month (around 5/14/2017) for htn, medicare wellness. lab results and schedule of future lab studies reviewed with patient  reviewed diet, exercise and weight control  reviewed medications and side effects in detail    Request medical records from ED evaluation    Continue diuretic.  Encouraged adequate potasium intake    Encouraged follow up with specialists; GI and Pulmonary

## 2017-04-14 NOTE — TELEPHONE ENCOUNTER
Pt called stating that she would like a medication called in for her leg pain. [facility] [phone].

## 2017-04-15 LAB
ALBUMIN SERPL-MCNC: 4.5 G/DL (ref 3.5–4.7)
ALBUMIN/GLOB SERPL: 1.6 {RATIO} (ref 1.2–2.2)
ALP SERPL-CCNC: 83 IU/L (ref 39–117)
ALT SERPL-CCNC: 51 IU/L (ref 0–32)
AST SERPL-CCNC: 27 IU/L (ref 0–40)
BACTERIA UR CULT: NO GROWTH
BASOPHILS # BLD AUTO: 0 X10E3/UL (ref 0–0.2)
BASOPHILS NFR BLD AUTO: 0 %
BILIRUB SERPL-MCNC: 0.4 MG/DL (ref 0–1.2)
BUN SERPL-MCNC: 15 MG/DL (ref 8–27)
BUN/CREAT SERPL: 16 (ref 12–28)
CALCIUM SERPL-MCNC: 9.3 MG/DL (ref 8.7–10.3)
CHLORIDE SERPL-SCNC: 101 MMOL/L (ref 96–106)
CO2 SERPL-SCNC: 17 MMOL/L (ref 18–29)
CREAT SERPL-MCNC: 0.94 MG/DL (ref 0.57–1)
EOSINOPHIL # BLD AUTO: 0.2 X10E3/UL (ref 0–0.4)
EOSINOPHIL NFR BLD AUTO: 3 %
ERYTHROCYTE [DISTWIDTH] IN BLOOD BY AUTOMATED COUNT: 14.9 % (ref 12.3–15.4)
GLOBULIN SER CALC-MCNC: 2.8 G/DL (ref 1.5–4.5)
GLUCOSE SERPL-MCNC: 112 MG/DL (ref 65–99)
HCT VFR BLD AUTO: 39.3 % (ref 34–46.6)
HGB BLD-MCNC: 13.2 G/DL (ref 11.1–15.9)
IMM GRANULOCYTES # BLD: 0.1 X10E3/UL (ref 0–0.1)
IMM GRANULOCYTES NFR BLD: 1 %
LYMPHOCYTES # BLD AUTO: 2.1 X10E3/UL (ref 0.7–3.1)
LYMPHOCYTES NFR BLD AUTO: 26 %
MCH RBC QN AUTO: 28.3 PG (ref 26.6–33)
MCHC RBC AUTO-ENTMCNC: 33.6 G/DL (ref 31.5–35.7)
MCV RBC AUTO: 84 FL (ref 79–97)
MONOCYTES # BLD AUTO: 0.6 X10E3/UL (ref 0.1–0.9)
MONOCYTES NFR BLD AUTO: 7 %
NEUTROPHILS # BLD AUTO: 5 X10E3/UL (ref 1.4–7)
NEUTROPHILS NFR BLD AUTO: 63 %
PLATELET # BLD AUTO: 198 X10E3/UL (ref 150–379)
POTASSIUM SERPL-SCNC: 4 MMOL/L (ref 3.5–5.2)
PROT SERPL-MCNC: 7.3 G/DL (ref 6–8.5)
RBC # BLD AUTO: 4.66 X10E6/UL (ref 3.77–5.28)
SODIUM SERPL-SCNC: 139 MMOL/L (ref 134–144)
WBC # BLD AUTO: 7.8 X10E3/UL (ref 3.4–10.8)

## 2017-04-17 RX ORDER — FUROSEMIDE 20 MG/1
20 TABLET ORAL DAILY
Qty: 30 TAB | Refills: 0 | OUTPATIENT
Start: 2017-04-17 | End: 2018-10-09 | Stop reason: ALTCHOICE

## 2017-04-17 NOTE — TELEPHONE ENCOUNTER
Patient stated she called this morning that her legs are now very swollen ,  Stated she saw saw Excell Grad Friday and assumed she was going to send a medication in .   Patient stated she would like something called into her pharmacy for the fluid on her legs /  She stated she did not want to go to the ER

## 2017-04-17 NOTE — TELEPHONE ENCOUNTER
Patient called back regarding earlier message. Waiting for call back about medication for leg swelling. She tried to schedule Echo as recommended by Ms. Betina Sorensen (see office notes from 4/14) but was told that an order is needed.  Please call back @ [phone]

## 2017-04-17 NOTE — TELEPHONE ENCOUNTER
Pt called back stating that her leg is still swollen and would like a call from the nurse as to if NP Giselle Adler is going to call her in something for the swelling. Pt state's that she is going to call the Cardiologist office to get an appt for an Echocardiogram.

## 2017-04-17 NOTE — TELEPHONE ENCOUNTER
Returned call. Advised Furosemide 20 mg called to pharmacy today. Lab results discussed with patient    She would like echocardiogram for evaluation of LE edema ordered and not wait to see cardiology    Strongly encouraged to follow up with cardiology.  Will order echo

## 2017-04-18 ENCOUNTER — HOSPITAL ENCOUNTER (OUTPATIENT)
Dept: GENERAL RADIOLOGY | Age: 82
Discharge: HOME | End: 2017-04-18

## 2017-04-18 DIAGNOSIS — I50.9 HEART FAILURE, UNSPECIFIED (HCC): ICD-10-CM

## 2017-04-18 PROCEDURE — 71020 XR CHEST PA LAT: CPT

## 2017-04-25 RX ORDER — MONTELUKAST SODIUM 10 MG/1
10 TABLET ORAL DAILY
Qty: 90 TAB | Refills: 1 | Status: SHIPPED | OUTPATIENT
Start: 2017-04-25 | End: 2017-10-25 | Stop reason: SDUPTHER

## 2017-05-10 PROBLEM — G47.33 OSA (OBSTRUCTIVE SLEEP APNEA): Status: ACTIVE | Noted: 2017-05-10

## 2017-08-01 ENCOUNTER — ANESTHESIA (OUTPATIENT)
Dept: ENDOSCOPY | Age: 82
End: 2017-08-01
Payer: MEDICARE

## 2017-08-01 ENCOUNTER — ANESTHESIA EVENT (OUTPATIENT)
Dept: ENDOSCOPY | Age: 82
End: 2017-08-01
Payer: MEDICARE

## 2017-08-01 ENCOUNTER — HOSPITAL ENCOUNTER (OUTPATIENT)
Age: 82
Setting detail: OUTPATIENT SURGERY
Discharge: HOME | End: 2017-08-01
Attending: INTERNAL MEDICINE | Admitting: INTERNAL MEDICINE
Payer: MEDICARE

## 2017-08-01 VITALS
OXYGEN SATURATION: 99 % | SYSTOLIC BLOOD PRESSURE: 135 MMHG | TEMPERATURE: 97.5 F | RESPIRATION RATE: 15 BRPM | BODY MASS INDEX: 29.09 KG/M2 | WEIGHT: 164.19 LBS | HEIGHT: 63 IN | DIASTOLIC BLOOD PRESSURE: 69 MMHG | HEART RATE: 79 BPM

## 2017-08-01 LAB
H PYLORI FROM TISSUE: NEGATIVE
KIT LOT NO., HCLOLOT: NORMAL
NEGATIVE CONTROL: NEGATIVE
POSITIVE CONTROL: POSITIVE

## 2017-08-01 PROCEDURE — 74011250636 HC RX REV CODE- 250/636

## 2017-08-01 PROCEDURE — 76040000019: Performed by: INTERNAL MEDICINE

## 2017-08-01 PROCEDURE — 74011250636 HC RX REV CODE- 250/636: Performed by: INTERNAL MEDICINE

## 2017-08-01 PROCEDURE — 88305 TISSUE EXAM BY PATHOLOGIST: CPT | Performed by: INTERNAL MEDICINE

## 2017-08-01 PROCEDURE — 77030009426 HC FCPS BIOP ENDOSC BSC -B: Performed by: INTERNAL MEDICINE

## 2017-08-01 PROCEDURE — 74011000250 HC RX REV CODE- 250

## 2017-08-01 PROCEDURE — 76060000031 HC ANESTHESIA FIRST 0.5 HR: Performed by: INTERNAL MEDICINE

## 2017-08-01 PROCEDURE — 77030027957 HC TBNG IRR ENDOGTR BUSS -B: Performed by: INTERNAL MEDICINE

## 2017-08-01 PROCEDURE — 87077 CULTURE AEROBIC IDENTIFY: CPT | Performed by: INTERNAL MEDICINE

## 2017-08-01 RX ORDER — PROPOFOL 10 MG/ML
INJECTION, EMULSION INTRAVENOUS AS NEEDED
Status: DISCONTINUED | OUTPATIENT
Start: 2017-08-01 | End: 2017-08-01 | Stop reason: HOSPADM

## 2017-08-01 RX ORDER — NALOXONE HYDROCHLORIDE 0.4 MG/ML
0.4 INJECTION, SOLUTION INTRAMUSCULAR; INTRAVENOUS; SUBCUTANEOUS
Status: DISCONTINUED | OUTPATIENT
Start: 2017-08-01 | End: 2017-08-01 | Stop reason: HOSPADM

## 2017-08-01 RX ORDER — EPINEPHRINE 0.1 MG/ML
1 INJECTION INTRACARDIAC; INTRAVENOUS
Status: DISCONTINUED | OUTPATIENT
Start: 2017-08-01 | End: 2017-08-01 | Stop reason: HOSPADM

## 2017-08-01 RX ORDER — LIDOCAINE HYDROCHLORIDE 20 MG/ML
INJECTION, SOLUTION EPIDURAL; INFILTRATION; INTRACAUDAL; PERINEURAL AS NEEDED
Status: DISCONTINUED | OUTPATIENT
Start: 2017-08-01 | End: 2017-08-01 | Stop reason: HOSPADM

## 2017-08-01 RX ORDER — SODIUM CHLORIDE 9 MG/ML
50 INJECTION, SOLUTION INTRAVENOUS CONTINUOUS
Status: DISCONTINUED | OUTPATIENT
Start: 2017-08-01 | End: 2017-08-01 | Stop reason: HOSPADM

## 2017-08-01 RX ORDER — GLYCOPYRROLATE 0.2 MG/ML
INJECTION INTRAMUSCULAR; INTRAVENOUS AS NEEDED
Status: DISCONTINUED | OUTPATIENT
Start: 2017-08-01 | End: 2017-08-01 | Stop reason: HOSPADM

## 2017-08-01 RX ORDER — SODIUM CHLORIDE 0.9 % (FLUSH) 0.9 %
5-10 SYRINGE (ML) INJECTION AS NEEDED
Status: DISCONTINUED | OUTPATIENT
Start: 2017-08-01 | End: 2017-08-01 | Stop reason: HOSPADM

## 2017-08-01 RX ORDER — MIDAZOLAM HYDROCHLORIDE 1 MG/ML
.25-1 INJECTION, SOLUTION INTRAMUSCULAR; INTRAVENOUS
Status: DISCONTINUED | OUTPATIENT
Start: 2017-08-01 | End: 2017-08-01 | Stop reason: HOSPADM

## 2017-08-01 RX ORDER — ATROPINE SULFATE 0.1 MG/ML
0.5 INJECTION INTRAVENOUS
Status: DISCONTINUED | OUTPATIENT
Start: 2017-08-01 | End: 2017-08-01 | Stop reason: HOSPADM

## 2017-08-01 RX ORDER — DEXTROMETHORPHAN/PSEUDOEPHED 2.5-7.5/.8
1.2 DROPS ORAL
Status: DISCONTINUED | OUTPATIENT
Start: 2017-08-01 | End: 2017-08-01 | Stop reason: HOSPADM

## 2017-08-01 RX ORDER — SODIUM CHLORIDE 0.9 % (FLUSH) 0.9 %
5-10 SYRINGE (ML) INJECTION EVERY 8 HOURS
Status: DISCONTINUED | OUTPATIENT
Start: 2017-08-01 | End: 2017-08-01 | Stop reason: HOSPADM

## 2017-08-01 RX ORDER — SODIUM CHLORIDE 9 MG/ML
INJECTION, SOLUTION INTRAVENOUS
Status: DISCONTINUED | OUTPATIENT
Start: 2017-08-01 | End: 2017-08-01 | Stop reason: HOSPADM

## 2017-08-01 RX ORDER — FLUMAZENIL 0.1 MG/ML
0.2 INJECTION INTRAVENOUS
Status: DISCONTINUED | OUTPATIENT
Start: 2017-08-01 | End: 2017-08-01 | Stop reason: HOSPADM

## 2017-08-01 RX ORDER — FENTANYL CITRATE 50 UG/ML
200 INJECTION, SOLUTION INTRAMUSCULAR; INTRAVENOUS
Status: DISCONTINUED | OUTPATIENT
Start: 2017-08-01 | End: 2017-08-01 | Stop reason: HOSPADM

## 2017-08-01 RX ADMIN — PROPOFOL 30 MG: 10 INJECTION, EMULSION INTRAVENOUS at 10:09

## 2017-08-01 RX ADMIN — PROPOFOL 20 MG: 10 INJECTION, EMULSION INTRAVENOUS at 10:04

## 2017-08-01 RX ADMIN — PROPOFOL 10 MG: 10 INJECTION, EMULSION INTRAVENOUS at 10:11

## 2017-08-01 RX ADMIN — SODIUM CHLORIDE 500 ML: 900 INJECTION, SOLUTION INTRAVENOUS at 10:42

## 2017-08-01 RX ADMIN — SODIUM CHLORIDE: 9 INJECTION, SOLUTION INTRAVENOUS at 09:52

## 2017-08-01 RX ADMIN — PROPOFOL 20 MG: 10 INJECTION, EMULSION INTRAVENOUS at 10:01

## 2017-08-01 RX ADMIN — PROPOFOL 20 MG: 10 INJECTION, EMULSION INTRAVENOUS at 10:03

## 2017-08-01 RX ADMIN — PROPOFOL 80 MG: 10 INJECTION, EMULSION INTRAVENOUS at 10:00

## 2017-08-01 RX ADMIN — PROPOFOL 30 MG: 10 INJECTION, EMULSION INTRAVENOUS at 10:22

## 2017-08-01 RX ADMIN — SODIUM CHLORIDE 500 ML: 900 INJECTION, SOLUTION INTRAVENOUS at 09:45

## 2017-08-01 RX ADMIN — PROPOFOL 30 MG: 10 INJECTION, EMULSION INTRAVENOUS at 10:18

## 2017-08-01 RX ADMIN — PROPOFOL 30 MG: 10 INJECTION, EMULSION INTRAVENOUS at 10:20

## 2017-08-01 RX ADMIN — PROPOFOL 30 MG: 10 INJECTION, EMULSION INTRAVENOUS at 10:15

## 2017-08-01 RX ADMIN — PROPOFOL 40 MG: 10 INJECTION, EMULSION INTRAVENOUS at 10:16

## 2017-08-01 RX ADMIN — GLYCOPYRROLATE 0.2 MG: 0.2 INJECTION INTRAMUSCULAR; INTRAVENOUS at 10:00

## 2017-08-01 RX ADMIN — PROPOFOL 20 MG: 10 INJECTION, EMULSION INTRAVENOUS at 10:24

## 2017-08-01 RX ADMIN — PROPOFOL 30 MG: 10 INJECTION, EMULSION INTRAVENOUS at 10:07

## 2017-08-01 RX ADMIN — LIDOCAINE HYDROCHLORIDE 100 MG: 20 INJECTION, SOLUTION EPIDURAL; INFILTRATION; INTRACAUDAL; PERINEURAL at 10:00

## 2017-08-01 RX ADMIN — PROPOFOL 30 MG: 10 INJECTION, EMULSION INTRAVENOUS at 10:12

## 2017-08-01 RX ADMIN — PROPOFOL 20 MG: 10 INJECTION, EMULSION INTRAVENOUS at 10:02

## 2017-08-01 RX ADMIN — PROPOFOL 40 MG: 10 INJECTION, EMULSION INTRAVENOUS at 10:14

## 2017-08-01 NOTE — IP AVS SNAPSHOT
[address] [facility] [address] [facility] 
[phone] Patient: Chemo Ross MRN: [id number] CQM:0/5/6361 You are allergic to the following Allergen Reactions Ana Peasant Other (comments) Blood testing Cat Dander Other (comments) Blood testing Dog Dander Other (comments) Blood testing Elavil Other (comments)  
 imbalance Clayton Other (comments) Blood testing Recent Documentation Height Weight Breastfeeding? BMI OB Status Smoking Status 1.6 m 74.5 kg No 29.08 kg/m2 Postmenopausal Former Smoker Emergency Contacts Name Discharge Info Relation Home Work Mobile Anastasia Henderson [2] [phone] About your hospitalization You were admitted on:  August 1, 2017 [facility] You were discharged on:  August 1, 2017 Unit phone number:  [phone] Why you were hospitalized Your primary diagnosis was:  Not on File Providers Seen During Your Hospitalizations Provider Role Specialty Primary office phone Gilson Delatorre MD Attending Provider Gastroenterology [phone] Your Primary Care Physician (PCP) Primary Care Physician Office Phone Office Fax Cintia Bal [phone] Follow-up Information Follow up With Details Comments Contact Info Kehinde Huff MD   [address] [place] [address] 
[phone] Current Discharge Medication List  
  
CONTINUE these medications which have CHANGED Dose & Instructions Dispensing Information Comments Morning Noon Evening Bedtime * clonazePAM 2 mg tablet Commonly known as:  Milo Manzanilla What changed:  how much to take Your last dose was: Your next dose is:    
   
   
 Dose:  1 mg Take 0.5 Tabs by mouth nightly as needed. Max Daily Amount: 1 mg. Quantity:  20 Tab Refills:  0 * clonazePAM 0.5 mg tablet Commonly known as:  Wally Ferrell What changed:  Another medication with the same name was changed. Make sure you understand how and when to take each. Your last dose was: Your next dose is:    
   
   
 Dose:  1 Tab Take 1 Tab by mouth nightly as needed. Refills:  0  
     
   
   
   
  
 * Notice: This list has 2 medication(s) that are the same as other medications prescribed for you. Read the directions carefully, and ask your doctor or other care provider to review them with you. CONTINUE these medications which have NOT CHANGED Dose & Instructions Dispensing Information Comments Morning Noon Evening Bedtime * albuterol 2.5 mg /3 mL (0.083 %) nebulizer solution Commonly known as:  PROVENTIL VENTOLIN Your last dose was: Your next dose is:    
   
   
 Dose:  2.5 mg  
3 mL by Nebulization route every four (4) hours as needed for Wheezing. Quantity:  1 Each Refills:  4  
     
   
   
   
  
 * albuterol 90 mcg/actuation inhaler Commonly known as:  PROVENTIL HFA, VENTOLIN HFA, PROAIR HFA Your last dose was: Your next dose is:    
   
   
 Dose:  2 Puff Take 2 Puffs by inhalation every four (4) hours as needed for Wheezing. Quantity:  1 Inhaler Refills:  2  
     
   
   
   
  
 albuterol 90 mcg/actuation inhaler Commonly known as:  Sangita Johnson Your last dose was: Your next dose is:    
   
   
 Dose:  2-4 Puff Take 2-4 Puffs by inhalation every four (4) hours as needed. Refills:  0  
     
   
   
   
  
 * amLODIPine 5 mg tablet Commonly known as:  Grant Zelaya Your last dose was: Your next dose is:    
   
   
 Dose:  7.5 mg Take 1.5 Tabs by mouth daily. Quantity:  135 Tab Refills:  1  
     
   
   
   
  
 * amLODIPine 10 mg tablet Commonly known as:  Grant Zelaya Your last dose was: Your next dose is: Dose:  10 mg Take 1 Tab by mouth daily. Quantity:  90 Tab Refills:  1  
     
   
   
   
  
 fluticasone-salmeterol 500-50 mcg/dose diskus inhaler Commonly known as:  ADVAIR Your last dose was: Your next dose is:    
   
   
 Dose:  1 Puff Take 1 Puff by inhalation two (2) times a day. Quantity:  3 Inhaler Refills:  1  
     
   
   
   
  
 furosemide 20 mg tablet Commonly known as:  LASIX Your last dose was: Your next dose is:    
   
   
 Dose:  20 mg Take 1 Tab by mouth daily. Indications: Edema Quantity:  30 Tab Refills:  0  
     
   
   
   
  
 lamoTRIgine 25 mg tablet Commonly known as: LaMICtal  
   
Your last dose was: Your next dose is: Take  by mouth daily. Refills:  0  
     
   
   
   
  
 montelukast 10 mg tablet Commonly known as:  SINGULAIR Your last dose was: Your next dose is:    
   
   
 Dose:  10 mg Take 1 Tab by mouth daily. Quantity:  90 Tab Refills:  1 PROBIOTIC PO Your last dose was: Your next dose is: Take  by mouth. Takes one po daily. Refills:  0  
     
   
   
   
  
 rosuvastatin 5 mg tablet Commonly known as:  CRESTOR Your last dose was: Your next dose is:    
   
   
 Dose:  5 mg Take 1 Tab by mouth nightly. Quantity:  90 Tab Refills:  1  
     
   
   
   
  
 traMADol 50 mg tablet Commonly known as:  ULTRAM  
   
Your last dose was: Your next dose is:    
   
   
 Dose:  50 mg Take 1 Tab by mouth every eight (8) hours as needed for Pain. Max Daily Amount: 150 mg.  
 Quantity:  30 Tab Refills:  0  
     
   
   
   
  
 TUMS PO Your last dose was: Your next dose is: Take  by mouth as needed. Refills:  0  
     
   
   
   
  
 valsartan 160 mg tablet Commonly known as:  DIOVAN Your last dose was: Your next dose is: Dose:  160 mg Take 1 Tab by mouth daily. Quantity:  90 Tab Refills:  1 VITAMIN D3 1,000 unit Cap Generic drug:  cholecalciferol Your last dose was: Your next dose is:    
   
   
 Dose:  2000 Units Take 2,000 Units by mouth. Refills:  0  
     
   
   
   
  
 vitamin E 400 unit capsule Commonly known as:  [address] Your last dose was: Your next dose is:    
   
   
 Dose:  400 Units Take 400 Units by mouth daily. Refills:  0  
     
   
   
   
  
 * Notice: This list has 4 medication(s) that are the same as other medications prescribed for you. Read the directions carefully, and ask your doctor or other care provider to review them with you. Discharge Instructions Dr Anca Ortiz [facility] [address]. [address] [place] [id number] 
[phone] Felix Yee [id number] 
1935 COLONOSCOPY DISCHARGE INSTRUCTIONS Discomfort: 
Redness at IV site- apply warm compress to area; if redness or soreness persist- contact your physician There may be a slight amount of blood passed from the rectum Gaseous discomfort- walking, belching will help relieve any discomfort You may not operate a vehicle for 12 hours You may not engage in an occupation involving machinery or appliances for rest of today You may not drink alcoholic beverages for at least 12 hours Avoid making any critical decisions for at least 24 hour DIET: 
 Regular diet.  however -  remember your colon is empty and a heavy meal will produce gas. Avoid these foods:  vegetables, fried / greasy foods, carbonated drinks for today ACTIVITY: 
You may resume your normal daily activities it is recommended that you spend the remainder of the day resting -  avoid any strenuous activity. CALL M.D. ANY SIGN OF: Increasing pain, nausea, vomiting Abdominal distension (swelling) New increased bleeding (oral or rectal) Fever (chills) Pain in chest area Bloody discharge from nose or mouth Shortness of breath Follow-up Instructions: 
 Call Dr. Moe Prasad for any questions or problems. Telephone # [phone] Biopsy results will be available in  5 to 7 days Should have a repeat colonoscopy in 10 years Impression:  gastritis, duodenal ulcer, normal colon Discharge Orders None Introducing [facility]! Vignesh Christy introduces [employer] patient portal. Now you can access parts of your medical record, email your doctor's office, and request medication refills online. 1. In your internet browser, go to [email or web address] 2. Click on the First Time User? Click Here link in the Sign In box. You will see the New Member Sign Up page. 3. Enter your [employer] Access Code exactly as it appears below. You will not need to use this code after youve completed the sign-up process. If you do not sign up before the expiration date, you must request a new code. · [employer] Access Code: [email or web address] Expires: 10/30/2017 11:01 AM 
 
4. Enter the last four digits of your Social Security Number (xxxx) and Date of Birth (mm/dd/yyyy) as indicated and click Submit. You will be taken to the next sign-up page. 5. Create a [employer] ID. This will be your [employer] login ID and cannot be changed, so think of one that is secure and easy to remember. 6. Create a [employer] password. You can change your password at any time. 7. Enter your Password Reset Question and Answer. This can be used at a later time if you forget your password. 8. Enter your e-mail address. You will receive e-mail notification when new information is available in [address] 9. Click Sign Up. You can now view and download portions of your medical record. 10. Click the Download Summary menu link to download a portable copy of your medical information. If you have questions, please visit the Frequently Asked Questions section of the MyChart website. Remember, MyChart is NOT to be used for urgent needs. For medical emergencies, dial 911. Now available from your iPhone and Android! General Information Please provide this summary of care documentation to your next provider. Patient Signature:  ____________________________________________________________ Date:  ____________________________________________________________  
  
Connye Brod Provider Signature:  ____________________________________________________________ Date:  ____________________________________________________________

## 2017-08-01 NOTE — ANESTHESIA PREPROCEDURE EVALUATION
Anesthetic History   No history of anesthetic complications            Review of Systems / Medical History  Patient summary reviewed, nursing notes reviewed and pertinent labs reviewed    Pulmonary            Asthma        Neuro/Psych   Within defined limits           Cardiovascular    Hypertension: well controlled                   GI/Hepatic/Renal     GERD: well controlled      PUD     Endo/Other  Within defined limits           Other Findings              Physical Exam    Airway  Mallampati: II  TM Distance: > 6 cm  Neck ROM: normal range of motion   Mouth opening: Normal     Cardiovascular  Regular rate and rhythm,  S1 and S2 normal,  no murmur, click, rub, or gallop             Dental  No notable dental hx       Pulmonary  Breath sounds clear to auscultation               Abdominal  GI exam deferred       Other Findings            Anesthetic Plan    ASA: 2  Anesthesia type: MAC          Induction: Intravenous  Anesthetic plan and risks discussed with: Patient

## 2017-08-01 NOTE — ROUTINE PROCESS
Erika Whitley  1935  [id number]    Situation:  Verbal report received from: Sree Galvan RN  Procedure: Procedure(s):  ESOPHAGOGASTRODUODENOSCOPY (EGD), COLONOSCOPY  COLONOSCOPY  ESOPHAGOGASTRODUODENAL (EGD) BIOPSY    Background:    Preoperative diagnosis: ABDOMINAL PAIN  Postoperative diagnosis: gastritis, duodenal ulcer, normal colon    :  Dr. Adela Koroma  Assistant(s): Endoscopy Technician-1: Saray Khan  Endoscopy RN-1: Jacqueline Snell RN    Specimens:   ID Type Source Tests Collected by Time Destination   1 : pathology Preservative Duodenum  Romana Martínez MD 8/1/2017 1005 Pathology     H. Pylori  yes    Assessment:  Intra-procedure medications           Anesthesia gave intra-procedure sedation and medications, see anesthesia flow sheet yes    Intravenous fluids: NS@ KVO     Vital signs stable     Abdominal assessment: round and soft     Recommendation:  Discharge patient per MD order  Family or Friend  Instructions will be given to pt friend  Permission to share finding with family or friend yes

## 2017-08-01 NOTE — PROCEDURES
Dr Samia Colon [address] [facility]  [phone]      Miryam Brantley  [id number]  1935    Colonoscopy Operative Report    Procedure Type:   Colonoscopy --diagnostic     Indications:  Abdominal Pain     Pre-operative Diagnosis: see indication above    Post-operative Diagnosis:  See findings below    :  Oren Han MD    Referring Provider: Shubham Greer MD      Sedation:  MAC anesthesia Propofol    Procedure Details:  After informed consent was obtained with all risks and benefits of procedure explained and preoperative exam completed, the patient was taken to the endoscopy suite and placed in the left lateral decubitus position. Upon sequential sedation as per above, a digital rectal exam was performed demonstrating no hemorrhoids. The Olympus videocolonoscope  was inserted in the rectum and carefully advanced to the cecum, which was identified by the ileocecal valve. The cecum was identified by the ileocecal valve and appendiceal orifice. The quality of preparation was excellent. The colonoscope was slowly withdrawn with careful evaluation between folds. Retroflexion in the rectum was completed demonstrating no hemorrhoids. Findings:   Rectum: normal  Sigmoid: normal  Descending Colon: normal  Transverse Colon: normal  Ascending Colon: normal  Cecum: Well seen. Pictures taken  Terminal Ileum: nnot seen      Specimen Removed:  none    Complications: None. EBL:  None. Impression:    normal colonic mucosa throughout    Recommendations: --Follow up with me. Regular diet. Resume normal medication(s). Discharge Disposition:  Home in the company of a  when able to ambulate.

## 2017-08-01 NOTE — PROCEDURES
[facility]    Endoscopic Gastroduodenoscopy Procedure Note    Kwaku Sarah  1935  [id number]    Procedure: Endoscopic Gastroduodenoscopy --diagnostic    Indication: Epigastric Pain     Pre-operative Diagnosis: see indication above    Post-operative Diagnosis: see findings below    : Galindo Veronica MD    Referring Provider:  Jeffrey Peterson MD      Anesthesia/Sedation:  MAC    Airway assessment: No airway problems anticipated    Procedure Details     After infomed consent was obtained for the procedure, with all risks and benefits of procedure explained the patient was taken to the endoscopy suite and placed in the left lateral decubitus position. Following sequential administration of sedation as per above, the endoscope was inserted into the mouth and advanced under direct vision to second portion of the duodenum. A careful inspection was made as the gastroscope was withdrawn, including a retroflexed view of the proximal stomach; findings and interventions are described below. Findings:   Esophagus:normal  Stomach: Gastritis  Duodenum/jejunum: Large ulcer in the bulb with a sentinal clot. No active bleeding      Therapies:  none    Specimens: Gastric and duodenal biopsies           EBL:  None. Complications:   None; patient tolerated the procedure well. Impression:    -Gastritis. Duodenal  Ulcer    Pt. was Alert. Left the endoscopy suite in good general condition. Recommendations:  -Follow up with me.     Galindo Veronica MD

## 2017-08-01 NOTE — DISCHARGE INSTRUCTIONS
Dr Asuncion Schreiber [address], [address] [facility]  [phone]    Ashleigh Deleon  [id number]  1935    COLONOSCOPY DISCHARGE INSTRUCTIONS  Discomfort:  Redness at IV site- apply warm compress to area; if redness or soreness persist- contact your physician  There may be a slight amount of blood passed from the rectum  Gaseous discomfort- walking, belching will help relieve any discomfort  You may not operate a vehicle for 12 hours  You may not engage in an occupation involving machinery or appliances for rest of today  You may not drink alcoholic beverages for at least 12 hours  Avoid making any critical decisions for at least 24 hour  DIET:   Regular diet. - however -  remember your colon is empty and a heavy meal will produce gas. Avoid these foods:  vegetables, fried / greasy foods, carbonated drinks for today         ACTIVITY:  You may resume your normal daily activities it is recommended that you spend the remainder of the day resting -  avoid any strenuous activity. CALL M.D. ANY SIGN OF:   Increasing pain, nausea, vomiting  Abdominal distension (swelling)  New increased bleeding (oral or rectal)  Fever (chills)  Pain in chest area  Bloody discharge from nose or mouth  Shortness of breath     Follow-up Instructions:   Call Dr. Karlie Casper for any questions or problems.    Telephone # [phone]  Biopsy results will be available in  5 to 7 days  Should have a repeat colonoscopy in 10 years    Impression:  gastritis, duodenal ulcer, normal colon

## 2017-08-01 NOTE — ANESTHESIA POSTPROCEDURE EVALUATION
Post-Anesthesia Evaluation and Assessment    Patient: Nereida Ledezma MRN: [id number]  SSN: [id number]    YOB: 1935  Age: 80 y.o. Sex: female       Cardiovascular Function/Vital Signs  Visit Vitals    /69    Pulse 79    Temp 36.4 °C (97.5 °F)    Resp 15    Ht 5' 3\" (1.6 m)    Wt 74.5 kg (164 lb 3 oz)    SpO2 99%    Breastfeeding No    BMI 29.08 kg/m2       Patient is status post MAC anesthesia for Procedure(s):  ESOPHAGOGASTRODUODENOSCOPY (EGD), COLONOSCOPY  COLONOSCOPY  ESOPHAGOGASTRODUODENAL (EGD) BIOPSY. Nausea/Vomiting: None    Postoperative hydration reviewed and adequate. Pain:  Pain Scale 1: Numeric (0 - 10) (08/01/17 1113)  Pain Intensity 1: 0 (08/01/17 1113)   Managed    Neurological Status: At baseline    Mental Status and Level of Consciousness: Arousable    Pulmonary Status:   O2 Device: Room air (08/01/17 1113)   Adequate oxygenation and airway patent    Complications related to anesthesia: None    Post-anesthesia assessment completed.  No concerns    Signed By: Markos Carbajal MD     August 1, 2017

## 2017-08-30 ENCOUNTER — TELEPHONE (OUTPATIENT)
Dept: DERMATOLOGY | Facility: AMBULATORY SURGERY CENTER | Age: 82
End: 2017-08-30

## 2017-09-05 ENCOUNTER — OFFICE VISIT (OUTPATIENT)
Dept: DERMATOLOGY | Facility: AMBULATORY SURGERY CENTER | Age: 82
End: 2017-09-05

## 2017-09-05 VITALS
OXYGEN SATURATION: 98 % | SYSTOLIC BLOOD PRESSURE: 142 MMHG | TEMPERATURE: 98.1 F | BODY MASS INDEX: 29.06 KG/M2 | DIASTOLIC BLOOD PRESSURE: 88 MMHG | WEIGHT: 164 LBS | HEIGHT: 63 IN | HEART RATE: 80 BPM

## 2017-09-05 DIAGNOSIS — D49.2 NEOPLASM OF SKIN OF FOREHEAD: ICD-10-CM

## 2017-09-05 DIAGNOSIS — L82.1 OTHER SEBORRHEIC KERATOSIS: ICD-10-CM

## 2017-09-05 DIAGNOSIS — C44.311 BASAL CELL CARCINOMA OF LEFT NASAL SIDEWALL: Primary | ICD-10-CM

## 2017-09-05 RX ORDER — LIDOCAINE HYDROCHLORIDE AND EPINEPHRINE 20; 5 MG/ML; UG/ML
1.5 INJECTION, SOLUTION EPIDURAL; INFILTRATION; INTRACAUDAL; PERINEURAL ONCE
Qty: 1.5 ML | Refills: 0 | Status: SHIPPED | OUTPATIENT
Start: 2017-09-05 | End: 2017-09-05

## 2017-09-05 RX ORDER — BUPIVACAINE HYDROCHLORIDE AND EPINEPHRINE 2.5; 5 MG/ML; UG/ML
1.5 INJECTION, SOLUTION EPIDURAL; INFILTRATION; INTRACAUDAL; PERINEURAL ONCE
Qty: 1.5 ML | Refills: 0
Start: 2017-09-05 | End: 2017-09-05

## 2017-09-05 NOTE — PATIENT INSTRUCTIONS
WOUND CARE INSTRUCTIONS    1. Keep the dressing clean and dry and do not remove for 48 hours. 2. Then change the dressing once a day as follows:  a. Wash hands before and after each dressing change. b. Remove dressing and wash site gently with mild soap and water, rinse, and pat dry.  c. Apply an ointment (Bacitracin, Polysporin, Neosporin, Petroleum jelly or Aquaphor). d. Apply a non-stick (Telfa) dressing or Band-Aid to cover the wound. Remove pressure bandage Thursday, then wash gently and apply Vaseline and a band-aid to site daily for 1 week. 3. Watch for:  BLEEDING: A small amount of drainage may occur. If bleeding occurs, elevate and rest the surgery site. Apply gauze and steady pressure for 15 minutes. If bleeding continues, call this office. INFECTION: Signs of infection include increased redness, pain, warmth, drainage of pus, and fever. If this occurs, call this office. 4. Special Instructions (follow any that are checked):  · [] You have stitches that need to be removed in 0 days  · [x] Avoid bending at the waist and heavy lifting for two days. · [x] Sleep with your head elevated for the next two nights. · [x] Rest the surgery site and keep it elevated as much as possible for two days. · [x] You may apply an ice-pack for 10-15 minutes every waking hour for the rest of the day. · [] Eat a soft diet and avoid hot food and hot drinks for the rest of the day. · [] Other instructions: Follow up as directedd  Take Tylenol for pain as needed. Once the site is healed with no remaining bandages or open areas, protect your surgical site and scar from the sun, as this area will be more sensitive. Use a broad spectrum sunscreen SPF 30 or higher daily, and a chemical free product (one containing zinc oxide or titanium dioxide) is a good choice if the area is sensitive. You may begin to gently massage the surgical site in 2-3 weeks, rubbing in a circular motion along the scar.  This can help reduce swelling and thickness of a scar. A scar cream may be used beginnning 1 month after the surgery. If you have any questions or concerns, please call our office Monday through Friday at [phone].

## 2017-09-05 NOTE — MR AVS SNAPSHOT
Visit Information Date & Time Provider Department Dept. Phone Encounter #  
 9/5/2017  9:30 AM MD Elisha Wise 8057 [phone] [id number] Upcoming Health Maintenance Date Due DTaP/Tdap/Td series (1 - Tdap) 8/9/1956 GLAUCOMA SCREENING Q2Y 8/9/2000 Pneumococcal 65+ Low/Medium Risk (2 of 2 - PPSV23) 8/30/2016 MEDICARE YEARLY EXAM 3/5/2017 INFLUENZA AGE 9 TO ADULT 8/1/2017 Allergies as of 9/5/2017  Review Complete On: 9/5/2017 By: Virginia Rogers Severity Noted Reaction Type Reactions Pervis Burrs  02/12/2016    Other (comments) Blood testing Cat Dander  02/12/2016    Other (comments) Blood testing Dog Dander  02/12/2016    Other (comments) Blood testing Elavil  03/04/2016    Other (comments)  
 imbalance [place]  02/12/2016    Other (comments) Blood testing Current Immunizations  Reviewed on 1/18/2017 Name Date Influenza High Dose Vaccine PF 1/19/2017 Pneumococcal Conjugate (PCV-13) 8/30/2015, 4/9/2015 Zoster Vaccine, Live 7/2/2014 Not reviewed this visit You Were Diagnosed With   
  
 Codes Comments Basal cell carcinoma of left nasal sidewall    -  Primary ICD-10-CM: C44.311 ICD-9-CM: 173.31 Vitals BP Pulse Temp Height(growth percentile) Weight(growth percentile) SpO2  
 142/88 (BP 1 Location: Left arm, BP Patient Position: Sitting) 80 98.1 °F (36.7 °C) (Oral) 5' 3\" (1.6 m) 164 lb (74.4 kg) 98% BMI OB Status Smoking Status 29.05 kg/m2 Postmenopausal Former Smoker BMI and BSA Data Body Mass Index Body Surface Area 29.05 kg/m 2 1.82 m 2 Preferred Pharmacy Pharmacy Name Phone [facility], [address] [phone] Your Updated Medication List  
  
   
This list is accurate as of: 9/5/17 10:27 AM.  Always use your most recent med list.  
  
  
  
  
 * albuterol 2.5 mg /3 mL (0.083 %) nebulizer solution Commonly known as:  PROVENTIL VENTOLIN  
3 mL by Nebulization route every four (4) hours as needed for Wheezing. * albuterol 90 mcg/actuation inhaler Commonly known as:  PROVENTIL HFA, VENTOLIN HFA, PROAIR HFA Take 2 Puffs by inhalation every four (4) hours as needed for Wheezing. albuterol 90 mcg/actuation inhaler Commonly known as:  Brian Rattler Take 2-4 Puffs by inhalation every four (4) hours as needed. * amLODIPine 5 mg tablet Commonly known as:  Thuy Ellitot Take 1.5 Tabs by mouth daily. * amLODIPine 10 mg tablet Commonly known as:  Thuy Elliott Take 1 Tab by mouth daily. * clonazePAM 2 mg tablet Commonly known as:  Padmaja Whitewood Take 0.5 Tabs by mouth nightly as needed. Max Daily Amount: 1 mg.  
  
 * clonazePAM 0.5 mg tablet Commonly known as:  Padmaja Whitewood Take 1 Tab by mouth nightly as needed. fluticasone-salmeterol 500-50 mcg/dose diskus inhaler Commonly known as:  ADVAIR Take 1 Puff by inhalation two (2) times a day. furosemide 20 mg tablet Commonly known as:  LASIX Take 1 Tab by mouth daily. Indications: Edema  
  
 lamoTRIgine 25 mg tablet Commonly known as: LaMICtal  
Take  by mouth daily. montelukast 10 mg tablet Commonly known as:  SINGULAIR Take 1 Tab by mouth daily. PROBIOTIC PO Take  by mouth. Takes one po daily. rosuvastatin 5 mg tablet Commonly known as:  CRESTOR Take 1 Tab by mouth nightly. traMADol 50 mg tablet Commonly known as:  ULTRAM  
Take 1 Tab by mouth every eight (8) hours as needed for Pain. Max Daily Amount: 150 mg.  
  
 TUMS PO Take  by mouth as needed. valsartan 160 mg tablet Commonly known as:  DIOVAN Take 1 Tab by mouth daily. VITAMIN D3 1,000 unit Cap Generic drug:  cholecalciferol Take 2,000 Units by mouth.  
  
 vitamin E 400 unit capsule Commonly known as:  [address]  
 Take 400 Units by mouth daily. * Notice: This list has 6 medication(s) that are the same as other medications prescribed for you. Read the directions carefully, and ask your doctor or other care provider to review them with you. Patient Instructions WOUND CARE INSTRUCTIONS 1. Keep the dressing clean and dry and do not remove for 48 hours. 2. Then change the dressing once a day as follows: 
a. Wash hands before and after each dressing change. b. Remove dressing and wash site gently with mild soap and water, rinse, and pat dry. 
c. Apply an ointment (Bacitracin, Polysporin, Neosporin, Petroleum jelly or Aquaphor). d. Apply a non-stick (Telfa) dressing or Band-Aid to cover the wound. Remove pressure bandage Thursday, then wash gently and apply Vaseline and a band-aid to site daily for 1 week. 3. Watch for: BLEEDING: A small amount of drainage may occur. If bleeding occurs, elevate and rest the surgery site. Apply gauze and steady pressure for 15 minutes. If bleeding continues, call this office. INFECTION: Signs of infection include increased redness, pain, warmth, drainage of pus, and fever. If this occurs, call this office. 4. Special Instructions (follow any that are checked): ·  You have stitches that need to be removed in 0 days ·  Avoid bending at the waist and heavy lifting for two days. ·  Sleep with your head elevated for the next two nights. ·  Rest the surgery site and keep it elevated as much as possible for two days. ·  You may apply an ice-pack for 10-15 minutes every waking hour for the rest of the day. ·  Eat a soft diet and avoid hot food and hot drinks for the rest of the day. ·  Other instructions: Follow up as directedd Take Tylenol for pain as needed.  
 
 
Once the site is healed with no remaining bandages or open areas, protect your surgical site and scar from the sun, as this area will be more sensitive. Use a broad spectrum sunscreen SPF 30 or higher daily, and a chemical free product (one containing zinc oxide or titanium dioxide) is a good choice if the area is sensitive. You may begin to gently massage the surgical site in 2-3 weeks, rubbing in a circular motion along the scar. This can help reduce swelling and thickness of a scar. A scar cream may be used beginnning 1 month after the surgery. If you have any questions or concerns, please call our office Monday through Friday at [phone]. Introducing [facility] [place] [facility]! [facility] introduces [employer] patient portal. Now you can access parts of your medical record, email your doctor's office, and request medication refills online. 1. In your internet browser, go to [email or web address] 2. Click on the First Time User? Click Here link in the Sign In box. You will see the New Member Sign Up page. 3. Enter your [employer] Access Code exactly as it appears below. You will not need to use this code after youve completed the sign-up process. If you do not sign up before the expiration date, you must request a new code. · [employer] Access Code: [email or web address] Expires: 10/30/2017 11:01 AM 
 
4. Enter the last four digits of your Social Security Number (xxxx) and Date of Birth (mm/dd/yyyy) as indicated and click Submit. You will be taken to the next sign-up page. 5. Create a [employer] F[employer] ID. This will be your [employer] login ID and cannot be changed, so think of one that is secure and easy to remember. 6. Create a [employer] password. You can change your password at any time. 7. Enter your Password Reset Question and Answer. This can be used at a later time if you forget your password. 8. Enter your e-mail address. You will receive e-mail notification when new information is available in [address] 9. Click Sign Up. You can now view and download portions of your medical record. 10. Click the Download Summary menu link to download a portable copy of your medical information. If you have questions, please visit the Frequently Asked Questions section of the [employer] website. Remember, [employer] is NOT to be used for urgent needs. For medical emergencies, dial 911. Now available from your iPhone and Android! Please provide this summary of care documentation to your next provider. Your primary care clinician is listed as Anastacio Stanford. If you have any questions after today's visit, please call [phone].

## 2017-09-05 NOTE — PROGRESS NOTES
This note is written by Rubens Arguelles, as dictated by Ryan Dodd. Sally Frias MD.    CC: Basal cell carcinoma on the left nasal sidewall     History of present illness:     Eduardo Mckeon is a 80 y.o. female referred by Dr. Claudy Brian. She has a biopsy-proven nodular basal cell carcinoma on the left nasal sidewall. This is a new basal cell carcinoma present for more than six months described as a dry, bleeding lesion with no prior treatment. Biopsy confirmed the diagnosis of basal cell carcinoma, and I reviewed the written pathology. She is feeling well and in her usual state of health today. She has no pain, no current illnesses, no other skin concerns. Her allergies, medications, medical, and social history are reviewed by me today. Exam:     She is an awake, alert, and oriented 80 y.o. female who appears well and in no distress. There is no preauricular, submandibular, or cervical lymphadenopathy. I examined her nose. She has an 11 x 7 mm shiny pink patch on her left nasal sidewall. She confirms location. She has seborrheic keratoses on her facial perimeter and a gray/brown shiny papule on her right forehead. Assessment/plan:    1. Basal cell carcinoma, left nasal sidewall. I discussed the diagnosis of basal cell carcinoma and summarized the pathology report. Mohs surgery is indicated by site and size. The procedure was discussed, verbal and written consent were obtained. I performed the procedure. Two stages were required to reach a tumor free plane. The surgical defect was managed with complex repair. There were no complications. She will follow up as needed as the site heals. Indications, risks, and options were discussed with Eduardo Mckeon preoperatively. Risks including, but not limited to: pain, bleeding, infection, tumor recurrence, scarring and damage to motor and/or sensory nerves, were discussed. Eduardo Mckeon chose Mohs surgery.  Eduardo Mckeon was an acceptable surgery candidate. Delaney Littlejohn was placed in the appropriate position on the operating table in the Mohs surgery procedure room. The area was prepped and draped in the standard manner. Gentian violet was used to outline the clinical margins of the tumor. Local anesthesia was then obtained. The grossly visible tumor was then removed, an underlying layer was excised and mapped according to the Mohs technique, and the individual specimens examined microscopically. The process was repeated until microscopic examination of the tissue specimens confirmed a tumor-free plane. Hemostasis was obtained with electrosurgery and pressure. The wound was covered between stages with moist saline gauze. The wound management options of second intent healing, layered closure, local flap, and/or full thickness skin graft were discussed. Delaney Littlejohn understands the aims, risks, alternatives, and possible complications and elects to proceed with a complex layered closure. Wound margins were made vertical, edges undermined in the muscular plane, standing cones corrected at both poles followed by layered closure. The wound was closed with buried 6-0 polysorb suture in the muscle and deep subcutis to reduce width of the wound, a second layer in the dermis to reduce tension on the skin edges, and skin edges were approximated with 6-0 fast gut suture. The final closure length was 33 mm. The wound was bandaged with Vaseline, Telfa, gauze and Coverroll. Wound care instructions (written and verbal) and a follow up appointment were given to Delaney Littlejohn before discharge. Delaney Littlejohn was discharged in good condition. 2. History of non melanoma skin cancer. I discussed the diagnosis and recommend routine examinations with Dr. Hossein Ochoa for surveillance. 3. Seborrheic keratoses. She is reassured, no treatment needed. 4.  Neoplasm, right forehead. This could represent a new small BCC.   I advised she contact Dr. Chelita Johansen for her evaluation. The documentation recorded by the scribe accurately reflects the service I personally performed and the decisions made by me. [facility]   OFFICE PROCEDURE PROGRESS NOTE     Chart reviewed for the following:     Adrian Moralez MD, have reviewed the History, Physical and updated the Allergic reactions for Mary Jo Zhou performed immediately prior to start of procedure:     Adrian Moralez MD, have performed the following reviews on [place] prior to the start of the procedure:     * Patient was identified by name and date of birth   * Agreement on procedure being performed was verified   * Risks and Benefits explained to the patient   * Procedure site verified and marked as necessary   * Patient was positioned for comfort   * Consent was signed and verified     Time: 9:50 AM   Date of procedure: 9/5/2017  Procedure performed by: Henry Horvath.  Hao Moralez MD   Provider assisted by: LPN   Patient assisted by: self   How tolerated by patient: tolerated the procedure well with no complications   Comments: none

## 2017-09-08 ENCOUNTER — TELEPHONE (OUTPATIENT)
Dept: INTERNAL MEDICINE CLINIC | Age: 82
End: 2017-09-08

## 2017-09-08 NOTE — TELEPHONE ENCOUNTER
Spoke with patient after verifying name and  regarding post-op. Patient stated she's fine. Patient given an opportunity to ask questions, repeated information, and verbalized understanding.

## 2017-10-19 ENCOUNTER — ANESTHESIA (OUTPATIENT)
Dept: ENDOSCOPY | Age: 82
End: 2017-10-19
Payer: MEDICARE

## 2017-10-19 ENCOUNTER — ANESTHESIA EVENT (OUTPATIENT)
Dept: ENDOSCOPY | Age: 82
End: 2017-10-19
Payer: MEDICARE

## 2017-10-19 ENCOUNTER — HOSPITAL ENCOUNTER (OUTPATIENT)
Age: 82
Setting detail: OUTPATIENT SURGERY
Discharge: HOME | End: 2017-10-19
Attending: INTERNAL MEDICINE | Admitting: INTERNAL MEDICINE
Payer: MEDICARE

## 2017-10-19 VITALS
RESPIRATION RATE: 19 BRPM | HEART RATE: 67 BPM | DIASTOLIC BLOOD PRESSURE: 72 MMHG | HEIGHT: 63 IN | WEIGHT: 156 LBS | BODY MASS INDEX: 27.64 KG/M2 | TEMPERATURE: 97 F | OXYGEN SATURATION: 98 % | SYSTOLIC BLOOD PRESSURE: 150 MMHG

## 2017-10-19 PROCEDURE — 74011250636 HC RX REV CODE- 250/636

## 2017-10-19 PROCEDURE — 88305 TISSUE EXAM BY PATHOLOGIST: CPT | Performed by: INTERNAL MEDICINE

## 2017-10-19 PROCEDURE — 74011000250 HC RX REV CODE- 250

## 2017-10-19 PROCEDURE — 76040000019: Performed by: INTERNAL MEDICINE

## 2017-10-19 PROCEDURE — 77030009426 HC FCPS BIOP ENDOSC BSC -B: Performed by: INTERNAL MEDICINE

## 2017-10-19 PROCEDURE — 87077 CULTURE AEROBIC IDENTIFY: CPT | Performed by: INTERNAL MEDICINE

## 2017-10-19 PROCEDURE — 76060000031 HC ANESTHESIA FIRST 0.5 HR: Performed by: INTERNAL MEDICINE

## 2017-10-19 RX ORDER — EPINEPHRINE 0.1 MG/ML
1 INJECTION INTRACARDIAC; INTRAVENOUS
Status: DISCONTINUED | OUTPATIENT
Start: 2017-10-19 | End: 2017-10-19 | Stop reason: HOSPADM

## 2017-10-19 RX ORDER — SODIUM CHLORIDE 9 MG/ML
INJECTION, SOLUTION INTRAVENOUS
Status: DISCONTINUED | OUTPATIENT
Start: 2017-10-19 | End: 2017-10-19 | Stop reason: HOSPADM

## 2017-10-19 RX ORDER — SODIUM CHLORIDE 0.9 % (FLUSH) 0.9 %
5-10 SYRINGE (ML) INJECTION AS NEEDED
Status: DISCONTINUED | OUTPATIENT
Start: 2017-10-19 | End: 2017-10-19 | Stop reason: HOSPADM

## 2017-10-19 RX ORDER — SODIUM CHLORIDE 9 MG/ML
50 INJECTION, SOLUTION INTRAVENOUS CONTINUOUS
Status: DISCONTINUED | OUTPATIENT
Start: 2017-10-19 | End: 2017-10-19 | Stop reason: HOSPADM

## 2017-10-19 RX ORDER — SODIUM CHLORIDE 0.9 % (FLUSH) 0.9 %
5-10 SYRINGE (ML) INJECTION EVERY 8 HOURS
Status: DISCONTINUED | OUTPATIENT
Start: 2017-10-19 | End: 2017-10-19 | Stop reason: HOSPADM

## 2017-10-19 RX ORDER — NALOXONE HYDROCHLORIDE 0.4 MG/ML
0.4 INJECTION, SOLUTION INTRAMUSCULAR; INTRAVENOUS; SUBCUTANEOUS
Status: DISCONTINUED | OUTPATIENT
Start: 2017-10-19 | End: 2017-10-19 | Stop reason: HOSPADM

## 2017-10-19 RX ORDER — MIDAZOLAM HYDROCHLORIDE 1 MG/ML
.25-1 INJECTION, SOLUTION INTRAMUSCULAR; INTRAVENOUS
Status: DISCONTINUED | OUTPATIENT
Start: 2017-10-19 | End: 2017-10-19 | Stop reason: HOSPADM

## 2017-10-19 RX ORDER — DEXTROMETHORPHAN/PSEUDOEPHED 2.5-7.5/.8
1.2 DROPS ORAL
Status: DISCONTINUED | OUTPATIENT
Start: 2017-10-19 | End: 2017-10-19 | Stop reason: HOSPADM

## 2017-10-19 RX ORDER — FLUMAZENIL 0.1 MG/ML
0.2 INJECTION INTRAVENOUS
Status: DISCONTINUED | OUTPATIENT
Start: 2017-10-19 | End: 2017-10-19 | Stop reason: HOSPADM

## 2017-10-19 RX ORDER — ATROPINE SULFATE 0.1 MG/ML
0.5 INJECTION INTRAVENOUS
Status: DISCONTINUED | OUTPATIENT
Start: 2017-10-19 | End: 2017-10-19 | Stop reason: HOSPADM

## 2017-10-19 RX ORDER — LIDOCAINE HYDROCHLORIDE 20 MG/ML
INJECTION, SOLUTION EPIDURAL; INFILTRATION; INTRACAUDAL; PERINEURAL AS NEEDED
Status: DISCONTINUED | OUTPATIENT
Start: 2017-10-19 | End: 2017-10-19 | Stop reason: HOSPADM

## 2017-10-19 RX ORDER — PROPOFOL 10 MG/ML
INJECTION, EMULSION INTRAVENOUS AS NEEDED
Status: DISCONTINUED | OUTPATIENT
Start: 2017-10-19 | End: 2017-10-19 | Stop reason: HOSPADM

## 2017-10-19 RX ORDER — FENTANYL CITRATE 50 UG/ML
200 INJECTION, SOLUTION INTRAMUSCULAR; INTRAVENOUS
Status: DISCONTINUED | OUTPATIENT
Start: 2017-10-19 | End: 2017-10-19 | Stop reason: HOSPADM

## 2017-10-19 RX ADMIN — PROPOFOL 50 MG: 10 INJECTION, EMULSION INTRAVENOUS at 10:22

## 2017-10-19 RX ADMIN — PROPOFOL 50 MG: 10 INJECTION, EMULSION INTRAVENOUS at 10:25

## 2017-10-19 RX ADMIN — PROPOFOL 50 MG: 10 INJECTION, EMULSION INTRAVENOUS at 10:18

## 2017-10-19 RX ADMIN — LIDOCAINE HYDROCHLORIDE 40 MG: 20 INJECTION, SOLUTION EPIDURAL; INFILTRATION; INTRACAUDAL; PERINEURAL at 10:15

## 2017-10-19 RX ADMIN — SODIUM CHLORIDE: 9 INJECTION, SOLUTION INTRAVENOUS at 10:16

## 2017-10-19 NOTE — PROGRESS NOTES

## 2017-10-19 NOTE — DISCHARGE INSTRUCTIONS
Dr Meghan Phillips of [facility]. [address], [address] [facility]  [phone]    Leonard Sandhu  [id number]  1935    EGD DISCHARGE INSTRUCTIONS  Discomfort:  Sore throat- throat lozenges or warm salt water gargle  redness at IV site- apply warm compress to area; if redness or soreness persist- contact your physician  Gaseous discomfort- walking, belching will help relieve any discomfort  You may not operate a vehicle for 12 hours  You may not engage in an occupation involving machinery or appliances for rest of today  You may not drink alcoholic beverages for at least 12 hours  Avoid making any critical decisions for at least 24 hour  DIET  You may have anything by mouth- do not eat or drink for two hours. You may eat and drink after you leave. You may resume your regular diet - however -  remember your colon is empty and a heavy meal will produce gas. Avoid these foods:  vegetables, fried / greasy foods, carbonated drinks      ACTIVITY  You may resume your normal daily activities   Spend the remainder of the day resting -  avoid any strenuous activity. CALL M.D.  IF ANY SIGN OF   Increasing pain, nausea, vomiting  Abdominal distension (swelling)  New increased bleeding (oral or rectal)  Fever (chills)  Pain in chest area  Bloody discharge from nose or mouth  Shortness of breath    FOLLOW-UP INSTRUCTIONS:  Call Dr. Benita Colón for any questions or problems. Telephone # [phone]  Biopsy results available  in  5 to 7 days. We will see you in the office in 2 weeks. Continue same medications. ENDOSCOPY FINDINGS:   Your endoscopy showed a complete healing of Peptic Ulcer.

## 2017-10-19 NOTE — ANESTHESIA PREPROCEDURE EVALUATION
Anesthetic History   No history of anesthetic complications            Review of Systems / Medical History  Patient summary reviewed, nursing notes reviewed and pertinent labs reviewed    Pulmonary  Within defined limits  COPD    Sleep apnea           Neuro/Psych   Within defined limits  seizures        Comments: encephalopathy Cardiovascular  Within defined limits  Hypertension                   GI/Hepatic/Renal  Within defined limits   GERD      Liver disease     Endo/Other  Within defined limits      Arthritis     Other Findings              Physical Exam    Airway  Mallampati: II  TM Distance: > 6 cm  Neck ROM: normal range of motion   Mouth opening: Normal     Cardiovascular  Regular rate and rhythm,  S1 and S2 normal,  no murmur, click, rub, or gallop             Dental  No notable dental hx       Pulmonary  Breath sounds clear to auscultation               Abdominal  GI exam deferred       Other Findings            Anesthetic Plan    ASA: 3  Anesthesia type: MAC          Induction: Intravenous  Anesthetic plan and risks discussed with: Patient

## 2017-10-19 NOTE — PROCEDURES
[address]     Endoscopic Gastroduodenoscopy Procedure Note    Toshia Cabral  1935  [id number]    Procedure: Endoscopic Gastroduodenoscopy --diagnostic    Indication: Previous Duodenal ulcer. This a follow up     Pre-operative Diagnosis: see indication above    Post-operative Diagnosis: see findings below    : Brendan Schwartz MD    Referring Provider:  Olivia Duane, MD      Anesthesia/Sedation:  MAC anesthesia Propofol    Airway assessment: No airway problems anticipated    Procedure Details     After infomed consent was obtained for the procedure, with all risks and benefits of procedure explained the patient was taken to the endoscopy suite and placed in the left lateral decubitus position. Following sequential administration of sedation as per above, the endoscope was inserted into the mouth and advanced under direct vision to second portion of the duodenum. A careful inspection was made as the gastroscope was withdrawn, including a retroflexed view of the proximal stomach; findings and interventions are described below. Findings:   Esophagus:normal  Stomach: normal   Duodenum/jejunum: normal      Therapies:  none    Specimens: Gastric and duodenal biopsies taken           EBL:  None. Complications:   None; patient tolerated the procedure well. Impression:    -Complete Healing of Peptic Ulcer    Pt. was Alert. Left the endoscopy suite in good general condition. Recommendations:  -Follow up with me.     Brendan Schwartz MD

## 2017-10-19 NOTE — IP AVS SNAPSHOT
[address] 
[phone] Patient: Coco Berumen MRN: [id number]:[id number] You are allergic to the following Allergen Reactions Lizy Do Other (comments) Blood testing Cat Dander Other (comments) Blood testing Dog Dander Other (comments) Blood testing Elavil Other (comments)  
 imbalance [place] Other (comments) Blood testing Recent Documentation Height Weight BMI OB Status Smoking Status 1.6 m 70.8 kg 27.63 kg/m2 Postmenopausal Former Smoker Emergency Contacts Name Discharge Info Relation Home Work Mobile Carlyn Sanchez  Child [2] [phone] About your hospitalization You were admitted on:  October 19, 2017 You last received care in the:  [facility] ENDOSCOPY You were discharged on:  October 19, 2017 Unit phone number:  [phone] Why you were hospitalized Your primary diagnosis was:  Not on File Providers Seen During Your Hospitalizations Provider Role Specialty Primary office phone Fredo Turner MD Attending Provider Gastroenterology [phone] Your Primary Care Physician (PCP) Primary Care Physician Office Phone Office Fax [facility] [employer] [phone] Follow-up Information Follow up With Details Comments Contact Info Gilmer Balbuena MD   [address] [address] [phone] Current Discharge Medication List  
  
CONTINUE these medications which have CHANGED Dose & Instructions Dispensing Information Comments Morning Noon Evening Bedtime * clonazePAM 2 mg tablet Commonly known as:  Eva Blaze What changed:  how much to take Your last dose was: Your next dose is:    
   
   
 Dose:  1 mg Take 0.5 Tabs by mouth nightly as needed. Max Daily Amount: 1 mg. Quantity:  20 Tab Refills:  0 * clonazePAM 0.5 mg tablet Commonly known as:  Karma Patch What changed:  Another medication with the same name was changed. Make sure you understand how and when to take each. Your last dose was: Your next dose is:    
   
   
 Dose:  1 Tab Take 1 Tab by mouth nightly as needed. Refills:  0  
     
   
   
   
  
 * Notice: This list has 2 medication(s) that are the same as other medications prescribed for you. Read the directions carefully, and ask your doctor or other care provider to review them with you. CONTINUE these medications which have NOT CHANGED Dose & Instructions Dispensing Information Comments Morning Noon Evening Bedtime * albuterol 2.5 mg /3 mL (0.083 %) nebulizer solution Commonly known as:  PROVENTIL VENTOLIN Your last dose was: Your next dose is:    
   
   
 Dose:  2.5 mg  
3 mL by Nebulization route every four (4) hours as needed for Wheezing. Quantity:  1 Each Refills:  4  
     
   
   
   
  
 * albuterol 90 mcg/actuation inhaler Commonly known as:  PROVENTIL HFA, VENTOLIN HFA, PROAIR HFA Your last dose was: Your next dose is:    
   
   
 Dose:  2 Puff Take 2 Puffs by inhalation every four (4) hours as needed for Wheezing. Quantity:  1 Inhaler Refills:  2  
     
   
   
   
  
 albuterol 90 mcg/actuation inhaler Commonly known as:  Unique Sandoval Your last dose was: Your next dose is:    
   
   
 Dose:  2-4 Puff Take 2-4 Puffs by inhalation every four (4) hours as needed. Refills:  0  
     
   
   
   
  
 * amLODIPine 5 mg tablet Commonly known as:  Love [place]ach Your last dose was: Your next dose is:    
   
   
 Dose:  7.5 mg Take 1.5 Tabs by mouth daily. Quantity:  135 Tab Refills:  1  
     
   
   
   
  
 * amLODIPine 10 mg tablet Commonly known as:  Love [place]ach Your last dose was:     
   
Your next dose is:    
   
   
 Dose:  10 mg  
 Take 1 Tab by mouth daily. Quantity:  90 Tab Refills:  1  
     
   
   
   
  
 fluticasone-salmeterol 500-50 mcg/dose diskus inhaler Commonly known as:  ADVAIR Your last dose was: Your next dose is:    
   
   
 Dose:  1 Puff Take 1 Puff by inhalation two (2) times a day. Quantity:  3 Inhaler Refills:  1  
     
   
   
   
  
 furosemide 20 mg tablet Commonly known as:  LASIX Your last dose was: Your next dose is:    
   
   
 Dose:  20 mg Take 1 Tab by mouth daily. Indications: Edema Quantity:  30 Tab Refills:  0  
     
   
   
   
  
 lamoTRIgine 25 mg tablet Commonly known as: LaMICtal  
   
Your last dose was: Your next dose is: Take  by mouth daily. Refills:  0  
     
   
   
   
  
 montelukast 10 mg tablet Commonly known as:  SINGULAIR Your last dose was: Your next dose is:    
   
   
 Dose:  10 mg Take 1 Tab by mouth daily. Quantity:  90 Tab Refills:  1 PROBIOTIC PO Your last dose was: Your next dose is: Take  by mouth. Takes one po daily. Refills:  0  
     
   
   
   
  
 rosuvastatin 5 mg tablet Commonly known as:  CRESTOR Your last dose was: Your next dose is:    
   
   
 Dose:  5 mg Take 1 Tab by mouth nightly. Quantity:  90 Tab Refills:  1  
     
   
   
   
  
 traMADol 50 mg tablet Commonly known as:  ULTRAM  
   
Your last dose was: Your next dose is:    
   
   
 Dose:  50 mg Take 1 Tab by mouth every eight (8) hours as needed for Pain. Max Daily Amount: 150 mg.  
 Quantity:  30 Tab Refills:  0  
     
   
   
   
  
 TUMS PO Your last dose was: Your next dose is: Take  by mouth as needed. Refills:  0  
     
   
   
   
  
 valsartan 160 mg tablet Commonly known as:  DIOVAN Your last dose was:     
   
Your next dose is:    
   
   
 Dose:  160 mg  
 Take 1 Tab by mouth daily. Quantity:  90 Tab Refills:  1 VITAMIN D3 1,000 unit Cap Generic drug:  cholecalciferol Your last dose was: Your next dose is:    
   
   
 Dose:  2000 Units Take 2,000 Units by mouth. Refills:  0  
     
   
   
   
  
 vitamin E 400 unit capsule Commonly known as:  [address] Your last dose was: Your next dose is:    
   
   
 Dose:  400 Units Take 400 Units by mouth daily. Refills:  0  
     
   
   
   
  
 * Notice: This list has 4 medication(s) that are the same as other medications prescribed for you. Read the directions carefully, and ask your doctor or other care provider to review them with you. Discharge Instructions Dr Refugio Gowers [facility] [address]. [address] [place], [facility] 
[phone] Brayden Mundo [id number] 
1935 EGD DISCHARGE INSTRUCTIONS Discomfort: 
Sore throat- throat lozenges or warm salt water gargle 
redness at IV site- apply warm compress to area; if redness or soreness persist- contact your physician Gaseous discomfort- walking, belching will help relieve any discomfort You may not operate a vehicle for 12 hours You may not engage in an occupation involving machinery or appliances for rest of today You may not drink alcoholic beverages for at least 12 hours Avoid making any critical decisions for at least 24 hour DIET You may have anything by mouth- do not eat or drink for two hours. You may eat and drink after you leave. You may resume your regular diet  however -  remember your colon is empty and a heavy meal will produce gas. Avoid these foods:  vegetables, fried / greasy foods, carbonated drinks ACTIVITY You may resume your normal daily activities Spend the remainder of the day resting -  avoid any strenuous activity. CALL M.D.  IF ANY SIGN OF Increasing pain, nausea, vomiting Abdominal distension (swelling) New increased bleeding (oral or rectal) Fever (chills) Pain in chest area Bloody discharge from nose or mouth Shortness of breath FOLLOW-UP INSTRUCTIONS: 
Call Dr. Cruz Ip for any questions or problems. Telephone # [phone] Biopsy results available  in  5 to 7 days. We will see you in the office in 2 weeks. Continue same medications. ENDOSCOPY FINDINGS: 
 Your endoscopy showed a complete healing of Peptic Ulcer. Discharge Orders None Introducing [facility]! Tete Merritt introduces tapviva patient portal. Now you can access parts of your medical record, email your doctor's office, and request medication refills online. 1. In your internet browser, go to [email or web address] 2. Click on the First Time User? Click Here link in the Sign In box. You will see the New Member Sign Up page. 3. Enter your tapviva Access Code exactly as it appears below. You will not need to use this code after youve completed the sign-up process. If you do not sign up before the expiration date, you must request a new code. · tapviva Access Code: [email or web address] Expires: 10/30/2017 11:01 AM 
 
4. Enter the last four digits of your Social Security Number (xxxx) and Date of Birth (mm/dd/yyyy) as indicated and click Submit. You will be taken to the next sign-up page. 5. Create a tapviva ID. This will be your tapviva login ID and cannot be changed, so think of one that is secure and easy to remember. 6. Create a tapviva password. You can change your password at any time. 7. Enter your Password Reset Question and Answer. This can be used at a later time if you forget your password. 8. Enter your e-mail address. You will receive e-mail notification when new information is available in [address] 9. Click Sign Up. You can now view and download portions of your medical record. 10. Click the Download Summary menu link to download a portable copy of your medical information. If you have questions, please visit the Frequently Asked Questions section of the [employer] website. Remember, MyChart is NOT to be used for urgent needs. For medical emergencies, dial 911. Now available from your iPhone and Android! General Information Please provide this summary of care documentation to your next provider. Patient Signature:  ____________________________________________________________ Date:  ____________________________________________________________  
  
Shonna Sleight Provider Signature:  ____________________________________________________________ Date:  ____________________________________________________________

## 2017-10-19 NOTE — ANESTHESIA POSTPROCEDURE EVALUATION
Post-Anesthesia Evaluation and Assessment    Patient: Coco Berumen MRN: [id number]  SSN: [id number]    YOB: 1935  Age: 80 y.o. Sex: female       Cardiovascular Function/Vital Signs  Visit Vitals    /55    Pulse 63    Temp 36.6 °C (97.9 °F)    Resp 14    Ht 5' 3\" (1.6 m)    Wt 70.8 kg (156 lb)    SpO2 98%    BMI 27.63 kg/m2       Patient is status post MAC anesthesia for Procedure(s):  ESOPHAGOGASTRODUODENOSCOPY (EGD)  ESOPHAGOGASTRODUODENAL (EGD) BIOPSY. Nausea/Vomiting: None    Postoperative hydration reviewed and adequate. Pain:  Pain Scale 1: Numeric (0 - 10) (10/19/17 1005)  Pain Intensity 1: 0 (10/19/17 1005)   Managed    Neurological Status: At baseline    Mental Status and Level of Consciousness: Arousable    Pulmonary Status:   O2 Device: CO2 nasal cannula (10/19/17 1031)   Adequate oxygenation and airway patent    Complications related to anesthesia: None    Post-anesthesia assessment completed.  No concerns    Signed By: Jelani Posada MD     October 19, 2017

## 2017-10-19 NOTE — ROUTINE PROCESS
Neal Morales  1935  [id number]    Situation:  Verbal report received from: OSMANY Bojorquez  Procedure: Procedure(s) with comments:  ESOPHAGOGASTRODUODENOSCOPY (EGD) - egd  ESOPHAGOGASTRODUODENAL (EGD) BIOPSY    Background:    Preoperative diagnosis: egd  Postoperative diagnosis: 1.- Normal EGD, complete healing of Peptic Ulcer    :  Dr. Wing Trejo  Assistant(s): Endoscopy Technician-1: Jameel Saravia IV  Endoscopy RN-1: Hieu Solis RN    Specimens:   ID Type Source Tests Collected by Time Destination   1 : Duodenal Biopsy Preservative   Chante Johnson MD 10/19/2017 1027 Pathology     H. Pylori  no    Assessment:  Intra-procedure medications       Anesthesia gave intra-procedure sedation and medications, see anesthesia flow sheet yes    Intravenous fluids:  300 NS @ KVO     Vital signs stable yes    Abdominal assessment: round and soft yes    Recommendation:  Discharge patient per MD order yes.   Return to floor no  Family or Friend : friends  Permission to share finding with family or friend yes

## 2017-10-25 RX ORDER — MONTELUKAST SODIUM 10 MG/1
TABLET ORAL
Qty: 90 TAB | Refills: 1 | Status: SHIPPED | OUTPATIENT
Start: 2017-10-25 | End: 2019-11-19 | Stop reason: SDUPTHER

## 2018-04-07 ENCOUNTER — HOSPITAL ENCOUNTER (OUTPATIENT)
Dept: GENERAL RADIOLOGY | Age: 83
Discharge: HOME | End: 2018-04-07
Payer: MEDICARE

## 2018-04-07 DIAGNOSIS — M54.2 NECK PAIN: ICD-10-CM

## 2018-04-07 PROCEDURE — 72050 X-RAY EXAM NECK SPINE 4/5VWS: CPT

## 2018-04-16 ENCOUNTER — OFFICE VISIT (OUTPATIENT)
Dept: NEUROLOGY | Age: 83
End: 2018-04-16

## 2018-04-16 DIAGNOSIS — G44.329 CHRONIC POST-TRAUMATIC HEADACHE, NOT INTRACTABLE: Primary | ICD-10-CM

## 2018-04-16 RX ORDER — LAMOTRIGINE 50 MG/1
50 TABLET, ORALLY DISINTEGRATING ORAL
Qty: 90 TAB | Refills: 1 | Status: SHIPPED | OUTPATIENT
Start: 2018-04-16 | End: 2019-09-11 | Stop reason: SDUPTHER

## 2018-04-16 RX ORDER — SUCRALFATE 1 G/1
1 TABLET ORAL
COMMUNITY

## 2018-04-16 NOTE — COMMUNICATION BODY
Chief Complaint   Patient presents with    Headache       Referred by: Dr. Samantha Mora      HPI    Ms. Rehana Noland is an 80-year-old woman referred here for head injury. She was seen by our practice over 3 years ago. She is a new patient for me. She tells me about 3 weeks ago while walking up a very small flight of stairs carrying laundry she lost her balance and fell backwards landing on the floor approximately 4 steps down. She was flat on her back for a few minutes. She denies loss of consciousness. However, since then she has had a persistent fluctuating posterior dull headache that sometimes radiates frontally. No nausea or light sensitivity. She has been taking tramadol nearly nightly for headache control. The headaches sometimes escalates into a throbbing nature. No associated numbness weakness or vision changes. She had imaging done which was benign. Overall she thinks she is doing okay. She is followed by psychiatry for bipolar disorder and is in the midst of slowly tapering clonazepam.  She is on lamotrigine nightly low-dose only. She has sleep apnea but it is not treated currently. Review of Systems   Neurological: Positive for headaches. Psychiatric/Behavioral: The patient has insomnia. All other systems reviewed and are negative.       Past Medical History:   Diagnosis Date    Arthritis of knee, left     Asthma     Asthma with COPD (chronic obstructive pulmonary disease) (HCC)     FEV1 - 0.99, IgE high    Atherosclerosis of both carotid arteries     Bipolar affective disorder (HCC)     Cataract, left eye     cataract - left    Chronic obstructive pulmonary disease (HCC)     Closed fracture of lateral portion of left tibial plateau     Depression with anxiety     depression/anxiety    Encephalopathy     encephalopathy    Esophageal ulcer     esophageal ulcer    Gastroparesis     GERD (gastroesophageal reflux disease)     Hepatic steatosis     History of fall     hx falls    Hyperlipidemia     increased cholesterol    Hyperlipidemia     Hypertension     IBS (irritable bowel syndrome)     IBS    IGT (impaired glucose tolerance) 9/15/2015    Liver disease     elevated liver enzymes - being evaluated/US done x 2 - inflammed liver/\"fat on liver\"    Neurological disorder     delayed thinking process? ??    OA (osteoarthritis) of knee     Osteoporosis     Seizures (HCC)     Skin cancer     Sunburn, blistering     Unspecified adverse effect of anesthesia     was told by neurologist not to use anesthesia unless needed due to encephalopathy    Unspecified adverse effect of anesthesia     hallucinations after MRI with sedation    Vertigo     vertigo     Family History   Problem Relation Age of Onset    Stroke Father     Heart Disease Father      angina    No Known Problems Mother     Cancer Brother      lung    Cancer Brother      lung     Social History     Social History    Marital status: SINGLE     Spouse name: N/A    Number of children: N/A    Years of education: N/A     Occupational History    Not on file. Social History Main Topics    Smoking status: Former Smoker    Smokeless tobacco: Never Used      Comment: quit smoking cigarettes 30 yrs ago    Alcohol use No    Drug use: No    Sexual activity: Not Currently     Partners: Male     Other Topics Concern    Not on file     Social History Narrative     Current Outpatient Prescriptions   Medication Sig    sucralfate (CARAFATE) 1 gram tablet Take 1 g by mouth four (4) times daily.  lamoTRIgine (LAMICTAL) 50 mg disintegrating tablet Take 1 Tab by mouth nightly.  fluticasone-salmeterol (ADVAIR) 500-50 mcg/dose diskus inhaler Take 1 Puff by inhalation two (2) times a day.  albuterol (PROVENTIL HFA, VENTOLIN HFA, PROAIR HFA) 90 mcg/actuation inhaler Take 2 Puffs by inhalation every four (4) hours as needed for Wheezing.  amLODIPine (NORVASC) 5 mg tablet Take 1.5 Tabs by mouth daily.     valsartan (DIOVAN) 160 mg tablet Take 1 Tab by mouth daily.  albuterol (PROVENTIL VENTOLIN) 2.5 mg /3 mL (0.083 %) nebulizer solution 3 mL by Nebulization route every four (4) hours as needed for Wheezing.  Cholecalciferol, Vitamin D3, (VITAMIN D3) 1,000 unit cap Take 2,000 Units by mouth.  vitamin E (AQUA GEMS) 400 unit capsule Take 400 Units by mouth daily.  montelukast (SINGULAIR) 10 mg tablet take 1 tablet by mouth once daily    furosemide (LASIX) 20 mg tablet Take 1 Tab by mouth daily. Indications: Edema    rosuvastatin (CRESTOR) 5 mg tablet Take 1 Tab by mouth nightly.  amLODIPine (NORVASC) 10 mg tablet Take 1 Tab by mouth daily.  clonazePAM (KLONOPIN) 0.5 mg tablet Take 1 Tab by mouth nightly as needed.  clonazePAM (KLONOPIN) 2 mg tablet Take 0.5 Tabs by mouth nightly as needed. Max Daily Amount: 1 mg. (Patient taking differently: Take 0.5 mg by mouth nightly as needed.)    LACTOBACILLUS ACIDOPHILUS (PROBIOTIC PO) Take  by mouth. Takes one po daily.  CALCIUM CARBONATE (TUMS PO) Take  by mouth as needed.  albuterol (PROVENTIL, VENTOLIN) 90 mcg/Actuation inhaler Take 2-4 Puffs by inhalation every four (4) hours as needed. No current facility-administered medications for this visit. Allergies   Allergen Reactions   Medardo García Other (comments)     Blood testing    Cat Dander Other (comments)     Blood testing    Dog Dander Other (comments)     Blood testing    Elavil Other (comments)     imbalance    [employer] Other (comments)     Blood testing         Neurologic Exam     Mental Status   Oriented to person, place, and time. Very pleasant and conversant     Cranial Nerves   Cranial nerves II through XII intact. Right ptosis-chronic baseline according to her     Motor Exam   Muscle bulk: normal    Strength   Strength 5/5 throughout.      Sensory Exam   Light touch normal.     Gait, Coordination, and Reflexes     Gait  Gait: (Slightly wide but steady)    Physical Exam Constitutional: She is oriented to person, place, and time. She appears well-developed and well-nourished. Cardiovascular: Normal rate. Pulmonary/Chest: Effort normal.   Neurological: She is oriented to person, place, and time. She has normal strength. Skin: Skin is warm and dry. Psychiatric: She has a normal mood and affect. Her behavior is normal.   Vitals reviewed. There were no vitals taken for this visit. Lab Results  Component Value Date/Time   WBC 7.8 04/14/2017 10:29 AM   HGB 13.2 04/14/2017 10:29 AM   HCT 39.3 04/14/2017 10:29 AM   PLATELET 958 62/88/9720 10:29 AM   MCV 84 04/14/2017 10:29 AM     Lab Results  Component Value Date/Time   Hemoglobin A1c 6.0 (H) 08/10/2016 11:07 AM   Hemoglobin A1c 6.2 (H) 09/15/2015 01:14 PM   Hemoglobin A1c, External 6.1 01/13/2015 08:40 AM   Glucose 112 (H) 04/14/2017 10:29 AM   LDL, calculated 101 (H) 08/10/2016 11:07 AM   Creatinine 0.94 04/14/2017 10:29 AM      Lab Results  Component Value Date/Time   Cholesterol, total 195 08/10/2016 11:07 AM   HDL Cholesterol 62 08/10/2016 11:07 AM   LDL, calculated 101 (H) 08/10/2016 11:07 AM   LDL-C, External 210 01/13/2015 08:40 AM   Triglyceride 162 (H) 08/10/2016 11:07 AM   CHOL/HDL Ratio 5.7 (H) 07/29/2010 04:00 PM     Lab Results  Component Value Date/Time   ALT (SGPT) 51 (H) 04/14/2017 10:29 AM   AST (SGOT) 27 04/14/2017 10:29 AM   Alk. phosphatase 83 04/14/2017 10:29 AM   Bilirubin, total 0.4 04/14/2017 10:29 AM   Albumin 4.5 04/14/2017 10:29 AM   Protein, total 7.3 04/14/2017 10:29 AM   INR 1.0 01/15/2010 10:41 AM   Prothrombin time 10.3 01/15/2010 10:41 AM   PLATELET 238 24/49/2319 10:29 AM          CT Results (maximum last 3): Results from Abstract encounter on 10/30/15   CT HEAD WO CONT  Results from Hospital Encounter encounter on 02/11/11   CT HEAD WITHOUT CONTRAST   Narrative **Final Report**      ICD Codes / Adm. Diagnosis: 076667  331.0 / Dizziness    Examination:  CT HEAD WO CON  - [id number] - Feb 11 2011  4:01PM  Accession No:  [id number]  Reason:  Pain      REPORT:  EXAM:  CT HEAD WITHOUT CONTRAST    INDICATION: Dizziness and pain. COMPARISON: 12/02/2010. CONTRAST: None. TECHNIQUE: Unenhanced CT of the head was performed using 5 mm images. Brain   and bone windows were generated. FINDINGS:  The ventricles and sulci are normal in size, shape and configuration and   midline. There is no significant white matter disease. There is no   intracranial hemorrhage, extra-axial collection, mass, mass effect or   midline shift. The basilar cisterns are open. No acute infarct is   identified. The bone windows demonstrate no abnormalities. The visualized   portions of the paranasal sinuses and mastoid air cells are clear. IMPRESSION: Normal unenhanced CT examination of the head. Interpreting/Reading Doctor: Vilma Marcos ([id number])  Transcribed:  on 02/11/2011  Approved: Vilma Marcos ([id number])  02/11/2011          Distribution:  Attending Doctor: Adilia Perez              MRI Results (maximum last 3): No results found for this or any previous visit. PET Results (maximum last 3): No results found for this or any previous visit. Assessment and Plan   Diagnoses and all orders for this visit:    1. Chronic post-traumatic headache, not intractable    Other orders  -     lamoTRIgine (LAMICTAL) 50 mg disintegrating tablet; Take 1 Tab by mouth nightly. 30-year-old woman who had a fall about 3 weeks ago with a closed head injury. She is doing rather well given the severity of the fall. She is not endorsing much postconcussive symptoms aside from headache and maybe a little bit of imbalance. At this point I would like her to stop utilizing tramadol. This is high risk for rebound. Her headache may worsen in the next couple days which is not unexpected. If the headache is severe she can call me and I will give her steroids.   For now I would like to increase lamotrigine to 50 mg at bedtime which could help her headache as well as her sleep. We also talked about that untreated sleep apnea can increase her risk for dementia so she needs to reengage with her pulmonary or sleep doctor about this issue. Agree with tapering clonazepam followed by psychiatry. I would like to reassess her in about 2 months. A notice of this visit/encounter being completed has been sent electronically to the patient's PCP and/or referring provider.      [address] [place] [address], [facility] [address]  Diplomate FELIXN

## 2018-04-16 NOTE — LETTER
4/16/2018 Patient:  Delaney Littlejohn YOB: 1935 Date of Visit: 4/16/2018 Dear Agnes Fraser MD 
[address] [facility] [address] VIA Facsimile: [phone] 
 : 
 
 
I was requested by Eloy Gilford, MD to evaluate Ms. Audra Stroud  for Chief Complaint Patient presents with  
 Headache Sterling Dials I am recommending the following:  
 
Diagnoses and all orders for this visit: 
 
1. Chronic post-traumatic headache, not intractable Other orders -     lamoTRIgine (LAMICTAL) 50 mg disintegrating tablet; Take 1 Tab by mouth nightly. 
 
 
 
---------------------------------------------------------------------------------------------------------------------- Below is my encounter: Chief Complaint Patient presents with  
 Headache Referred by: Dr. Doralee Duverney HPI Ms. Audra Stroud is an 80-year-old woman referred here for head injury. She was seen by our practice over 3 years ago. She is a new patient for me. She tells me about 3 weeks ago while walking up a very small flight of stairs carrying laundry she lost her balance and fell backwards landing on the floor approximately 4 steps down. She was flat on her back for a few minutes. She denies loss of consciousness. However, since then she has had a persistent fluctuating posterior dull headache that sometimes radiates frontally. No nausea or light sensitivity. She has been taking tramadol nearly nightly for headache control. The headaches sometimes escalates into a throbbing nature. No associated numbness weakness or vision changes. She had imaging done which was benign. Overall she thinks she is doing okay. She is followed by psychiatry for bipolar disorder and is in the midst of slowly tapering clonazepam.  She is on lamotrigine nightly low-dose only. She has sleep apnea but it is not treated currently. Review of Systems Neurological: Positive for headaches. Psychiatric/Behavioral: The patient has insomnia. All other systems reviewed and are negative. Past Medical History:  
Diagnosis Date  Arthritis of knee, left  Asthma  Asthma with COPD (chronic obstructive pulmonary disease) ([address] FEV1 - 0.99, IgE high  Atherosclerosis of both carotid arteries  Bipolar affective disorder ([address]  Cataract, left eye   
 cataract - left  Chronic obstructive pulmonary disease ([address]  Closed fracture of lateral portion of left tibial plateau  Depression with anxiety   
 depression/anxiety  Encephalopathy   
 encephalopathy  Esophageal ulcer   
 esophageal ulcer  Gastroparesis  GERD (gastroesophageal reflux disease)  Hepatic steatosis  History of fall   
 hx falls  Hyperlipidemia   
 increased cholesterol  Hyperlipidemia  Hypertension  IBS (irritable bowel syndrome) IBS  
 IGT (impaired glucose tolerance) 9/15/2015  Liver disease   
 elevated liver enzymes - being evaluated/US done x 2 - inflammed liver/\"fat on liver\"  Neurological disorder   
 delayed thinking process? ??  
 OA (osteoarthritis) of knee  Osteoporosis  Seizures ([address]  Skin cancer  Sunburn, blistering  Unspecified adverse effect of anesthesia   
 was told by neurologist not to use anesthesia unless needed due to encephalopathy  Unspecified adverse effect of anesthesia   
 hallucinations after MRI with sedation  Vertigo   
 vertigo Family History Problem Relation Age of Onset  Stroke Father  Heart Disease Father   
  angina  No Known Problems Mother  Cancer Brother   
  lung  Cancer Brother   
  lung Social History Social History  Marital status: SINGLE Spouse name: N/A  
 Number of children: N/A  
 Years of education: N/A Occupational History  Not on file. Social History Main Topics  Smoking status: Former Smoker  Smokeless tobacco: Never Used Comment: quit smoking cigarettes 30 yrs ago  Alcohol use No  
 Drug use: No  
 Sexual activity: Not Currently Partners: Male Other Topics Concern  Not on file Social History Narrative Current Outpatient Prescriptions Medication Sig  
 sucralfate (CARAFATE) 1 gram tablet Take 1 g by mouth four (4) times daily.  lamoTRIgine (LAMICTAL) 50 mg disintegrating tablet Take 1 Tab by mouth nightly.  fluticasone-salmeterol (ADVAIR) 500-50 mcg/dose diskus inhaler Take 1 Puff by inhalation two (2) times a day.  albuterol (PROVENTIL HFA, VENTOLIN HFA, PROAIR HFA) 90 mcg/actuation inhaler Take 2 Puffs by inhalation every four (4) hours as needed for Wheezing.  amLODIPine (NORVASC) 5 mg tablet Take 1.5 Tabs by mouth daily.  valsartan (DIOVAN) 160 mg tablet Take 1 Tab by mouth daily.  albuterol (PROVENTIL VENTOLIN) 2.5 mg /3 mL (0.083 %) nebulizer solution 3 mL by Nebulization route every four (4) hours as needed for Wheezing.  Cholecalciferol, Vitamin D3, (VITAMIN D3) 1,000 unit cap Take 2,000 Units by mouth.  vitamin E (AQUA GEMS) 400 unit capsule Take 400 Units by mouth daily.  montelukast (SINGULAIR) 10 mg tablet take 1 tablet by mouth once daily  furosemide (LASIX) 20 mg tablet Take 1 Tab by mouth daily. Indications: Edema  rosuvastatin (CRESTOR) 5 mg tablet Take 1 Tab by mouth nightly.  amLODIPine (NORVASC) 10 mg tablet Take 1 Tab by mouth daily.  clonazePAM (KLONOPIN) 0.5 mg tablet Take 1 Tab by mouth nightly as needed.  clonazePAM (KLONOPIN) 2 mg tablet Take 0.5 Tabs by mouth nightly as needed. Max Daily Amount: 1 mg. (Patient taking differently: Take 0.5 mg by mouth nightly as needed.)  LACTOBACILLUS ACIDOPHILUS (PROBIOTIC PO) Take  by mouth. Takes one po daily.  CALCIUM CARBONATE (TUMS PO) Take  by mouth as needed.  albuterol (PROVENTIL, VENTOLIN) 90 mcg/Actuation inhaler Take 2-4 Puffs by inhalation every four (4) hours as needed. No current facility-administered medications for this visit. Allergies Allergen Reactions Blaine Bougie Other (comments) Blood testing  Cat Dander Other (comments) Blood testing  Dog Dander Other (comments) Blood testing  Elavil Other (comments)  
  imbalance  Oak Other (comments) Blood testing Neurologic Exam  
 
Mental Status Oriented to person, place, and time. Very pleasant and conversant Cranial Nerves Cranial nerves II through XII intact. Right ptosischronic baseline according to her Motor Exam  
Muscle bulk: normal 
 
Strength Strength 5/5 throughout. Sensory Exam  
Light touch normal.  
 
Gait, Coordination, and Reflexes Gait Gait: (Slightly wide but steady) Physical Exam  
Constitutional: She is oriented to person, place, and time. She appears well-developed and well-nourished. Cardiovascular: Normal rate. Pulmonary/Chest: Effort normal.  
Neurological: She is oriented to person, place, and time. She has normal strength. Skin: Skin is warm and dry. Psychiatric: She has a normal mood and affect. Her behavior is normal.  
Vitals reviewed. There were no vitals taken for this visit. Lab Results Component Value Date/Time WBC 7.8 04/14/2017 10:29 AM  
HGB 13.2 04/14/2017 10:29 AM  
HCT 39.3 04/14/2017 10:29 AM  
PLATELET 138 96/55/4422 10:29 AM  
MCV 84 04/14/2017 10:29 AM  
 
Lab Results Component Value Date/Time Hemoglobin A1c 6.0 (H) 08/10/2016 11:07 AM  
Hemoglobin A1c 6.2 (H) 09/15/2015 01:14 PM  
Hemoglobin A1c, External 6.1 01/13/2015 08:40 AM  
Glucose 112 (H) 04/14/2017 10:29 AM  
LDL, calculated 101 (H) 08/10/2016 11:07 AM  
Creatinine 0.94 04/14/2017 10:29 AM  
  
Lab Results Component Value Date/Time Cholesterol, total 195 08/10/2016 11:07 AM  
HDL Cholesterol 62 08/10/2016 11:07 AM  
LDL, calculated 101 (H) 08/10/2016 11:07 AM  
LDL-C, External 210 01/13/2015 08:40 AM  
 Triglyceride 162 (H) 08/10/2016 11:07 AM  
CHOL/HDL Ratio 5.7 (H) 07/29/2010 04:00 PM  
 
Lab Results Component Value Date/Time ALT (SGPT) 51 (H) 04/14/2017 10:29 AM  
AST (SGOT) 27 04/14/2017 10:29 AM  
Alk. phosphatase 83 04/14/2017 10:29 AM  
Bilirubin, total 0.4 04/14/2017 10:29 AM  
Albumin 4.5 04/14/2017 10:29 AM  
Protein, total 7.3 04/14/2017 10:29 AM  
INR 1.0 01/15/2010 10:41 AM  
Prothrombin time 10.3 01/15/2010 10:41 AM  
PLATELET 801 32/66/9857 10:29 AM  
 
  
 
CT Results (maximum last 3): Results from Abstract encounter on 10/30/15 CT HEAD WO CONT Results from [facility] Encounter encounter on 02/11/11 CT HEAD WITHOUT CONTRAST Narrative **Final Report** 
 
 
ICD Codes / Adm. Diagnosis: 009345  331.0 / Dizziness Examination:  CT HEAD WO CON  - [id number] - Feb 11 2011  4:01PM 
Accession No:  [id number] Reason:  Pain REPORT: 
EXAM:  CT HEAD WITHOUT CONTRAST INDICATION: Dizziness and pain. COMPARISON: 12/02/2010. CONTRAST: None. TECHNIQUE: Unenhanced CT of the head was performed using 5 mm images. Brain  
and bone windows were generated. FINDINGS: 
The ventricles and sulci are normal in size, shape and configuration and  
midline. There is no significant white matter disease. There is no  
intracranial hemorrhage, extra-axial collection, mass, mass effect or  
midline shift. The basilar cisterns are open. No acute infarct is  
identified. The bone windows demonstrate no abnormalities. The visualized  
portions of the paranasal sinuses and mastoid air cells are clear. IMPRESSION: Normal unenhanced CT examination of the head. Interpreting/Reading Doctor: Ana Lilia Sanchez ([id number]) Transcribed:  on 02/11/2011 Approved: Ana Lilia Sanchez ([id number])  02/11/2011 Distribution:  Attending Doctor: Dennie Nim 
 
 
   
 
 
MRI Results (maximum last 3): No results found for this or any previous visit. PET Results (maximum last 3): No results found for this or any previous visit. Assessment and Plan Diagnoses and all orders for this visit: 
 
1. Chronic post-traumatic headache, not intractable Other orders -     lamoTRIgine (LAMICTAL) 50 mg disintegrating tablet; Take 1 Tab by mouth nightly. 40-year-old woman who had a fall about 3 weeks ago with a closed head injury. She is doing rather well given the severity of the fall. She is not endorsing much postconcussive symptoms aside from headache and maybe a little bit of imbalance. At this point I would like her to stop utilizing tramadol. This is high risk for rebound. Her headache may worsen in the next couple days which is not unexpected. If the headache is severe she can call me and I will give her steroids. For now I would like to increase lamotrigine to 50 mg at bedtime which could help her headache as well as her sleep. We also talked about that untreated sleep apnea can increase her risk for dementia so she needs to reengage with her pulmonary or sleep doctor about this issue. Agree with tapering clonazepam followed by psychiatry. I would like to reassess her in about 2 months. Thank you for giving me the opportunity to assist in the care of Ms. David Ross. If you have questions, please do not hesitate to contact me. Sincerely, [address] [facility], DO Neurologist 
Diplomate ANGELINE

## 2018-04-16 NOTE — MR AVS SNAPSHOT
[place] [address] [address] [place] [address] 
[phone] Patient: Delaney Littlejohn MRN: [id number] YRS:[birth year] Visit Information Date & Time Provider Department Dept. Phone Encounter #  
 4/16/2018 10:00  [facility] at [address] [id number] Follow-up Instructions Return in about 2 months (around 6/16/2018). Routing History Your Appointments 5/24/2018  8:30 AM  
SURGERY with MD Elisha Medinata [address] [facility]) Appt Note: mohs bcc mid forehead  referred by Dr. Vasiliy Oh [facility] [id number] [place] [id number] Upcoming Health Maintenance Date Due DTaP/Tdap/Td series (1 - Tdap) 8/9/1956 GLAUCOMA SCREENING Q2Y 8/9/2000 Bone Densitometry (Dexa) Screening 8/9/2000 Pneumococcal 65+ Low/Medium Risk (2 of 2 - PPSV23) 8/30/2016 Influenza Age 5 to Adult 8/1/2017 MEDICARE YEARLY EXAM 3/14/2018 Allergies as of 4/16/2018  Review Complete On: 10/19/2017 By: Lucia Monge RN Severity Noted Reaction Type Reactions Fleurette Prime  02/12/2016    Other (comments) Blood testing Cat Dander  02/12/2016    Other (comments) Blood testing Dog Dander  02/12/2016    Other (comments) Blood testing Elavil  03/04/2016    Other (comments)  
 imbalance [place]  02/12/2016    Other (comments) Blood testing Current Immunizations  Reviewed on 1/18/2017 Name Date Influenza High Dose Vaccine PF 1/19/2017 Pneumococcal Conjugate (PCV-13) 8/30/2015, 4/9/2015 Zoster Vaccine, Live 7/2/2014 Not reviewed this visit You Were Diagnosed With   
  
 Codes Comments Chronic post-traumatic headache, not intractable    -  Primary ICD-10-CM: J61.566 ICD-9-CM: 339.22 Vitals OB Status Smoking Status Postmenopausal Former Smoker Preferred Pharmacy Pharmacy Name Phone [facility] [phone] Your Updated Medication List  
  
   
This list is accurate as of 4/16/18 10:39 AM.  Always use your most recent med list.  
  
  
  
  
 * albuterol 2.5 mg /3 mL (0.083 %) nebulizer solution Commonly known as:  PROVENTIL VENTOLIN  
3 mL by Nebulization route every four (4) hours as needed for Wheezing. * albuterol 90 mcg/actuation inhaler Commonly known as:  PROVENTIL HFA, VENTOLIN HFA, PROAIR HFA Take 2 Puffs by inhalation every four (4) hours as needed for Wheezing. albuterol 90 mcg/actuation inhaler Commonly known as:  Bedelia Shana Take 2-4 Puffs by inhalation every four (4) hours as needed. * amLODIPine 5 mg tablet Commonly known as:  Unknown [place] Take 1.5 Tabs by mouth daily. * amLODIPine 10 mg tablet Commonly known as:  Unknown [place] Take 1 Tab by mouth daily. CARAFATE 1 gram tablet Generic drug:  sucralfate Take 1 g by mouth four (4) times daily. * clonazePAM 2 mg tablet Commonly known as:  Waynard Azul Take 0.5 Tabs by mouth nightly as needed. Max Daily Amount: 1 mg.  
  
 * clonazePAM 0.5 mg tablet Commonly known as:  Waynard Azul Take 1 Tab by mouth nightly as needed. fluticasone-salmeterol 500-50 mcg/dose diskus inhaler Commonly known as:  ADVAIR Take 1 Puff by inhalation two (2) times a day. furosemide 20 mg tablet Commonly known as:  LASIX Take 1 Tab by mouth daily. Indications: Edema  
  
 lamoTRIgine 50 mg disintegrating tablet Commonly known as: LaMICtal  
Take 1 Tab by mouth nightly. montelukast 10 mg tablet Commonly known as:  SINGULAIR  
take 1 tablet by mouth once daily PROBIOTIC PO Take  by mouth. Takes one po daily. rosuvastatin 5 mg tablet Commonly known as:  CRESTOR  
 Take 1 Tab by mouth nightly. TUMS PO Take  by mouth as needed. valsartan 160 mg tablet Commonly known as:  DIOVAN Take 1 Tab by mouth daily. VITAMIN D3 1,000 unit Cap Generic drug:  cholecalciferol Take 2,000 Units by mouth.  
  
 vitamin E 400 unit capsule Commonly known as:  [address] Take 400 Units by mouth daily. * Notice: This list has 6 medication(s) that are the same as other medications prescribed for you. Read the directions carefully, and ask your doctor or other care provider to review them with you. Prescriptions Sent to Pharmacy Refills  
 lamoTRIgine (LAMICTAL) 50 mg disintegrating tablet 1 Sig: Take 1 Tab by mouth nightly. Class: Normal  
 [facility] [place] [address] [address], [address] [place] [address] #: [phone] Route: Oral  
  
Follow-up Instructions Return in about 2 months (around 6/16/2018). Patient Instructions A Healthy Lifestyle: Care Instructions Your Care Instructions A healthy lifestyle can help you feel good, stay at a healthy weight, and have plenty of energy for both work and play. A healthy lifestyle is something you can share with your whole family. A healthy lifestyle also can lower your risk for serious health problems, such as high blood pressure, heart disease, and diabetes. You can follow a few steps listed below to improve your health and the health of your family. Follow-up care is a key part of your treatment and safety. Be sure to make and go to all appointments, and call your doctor if you are having problems. It's also a good idea to know your test results and keep a list of the medicines you take. How can you care for yourself at home? · Do not eat too much sugar, fat, or fast foods. You can still have dessert and treats now and then. The goal is moderation. · Start small to improve your eating habits.  Pay attention to portion sizes, drink less juice and soda pop, and eat more fruits and vegetables. ¨ Eat a healthy amount of food. A 3-ounce serving of meat, for example, is about the size of a deck of cards. Fill the rest of your plate with vegetables and whole grains. ¨ Limit the amount of soda and sports drinks you have every day. Drink more water when you are thirsty. ¨ Eat at least 5 servings of fruits and vegetables every day. It may seem like a lot, but it is not hard to reach this goal. A serving or helping is 1 piece of fruit, 1 cup of vegetables, or 2 cups of leafy, raw vegetables. Have an apple or some carrot sticks as an afternoon snack instead of a candy bar. Try to have fruits and/or vegetables at every meal. 
· Make exercise part of your daily routine. You may want to start with simple activities, such as walking, bicycling, or slow swimming. Try to be active 30 to 60 minutes every day. You do not need to do all 30 to 60 minutes all at once. For example, you can exercise 3 times a day for 10 or 20 minutes. Moderate exercise is safe for most people, but it is always a good idea to talk to your doctor before starting an exercise program. 
· Keep moving. Dur[place] Boys the lawn, work in the garden, or Adduplex. Take the stairs instead of the elevator at work. · If you smoke, quit. People who smoke have an increased risk for heart attack, stroke, cancer, and other lung illnesses. Quitting is hard, but there are ways to boost your chance of quitting tobacco for good. ¨ Use nicotine gum, patches, or lozenges. ¨ Ask your doctor about stop-smoking programs and medicines. ¨ Keep trying. In addition to reducing your risk of diseases in the future, you will notice some benefits soon after you stop using tobacco. If you have shortness of breath or asthma symptoms, they will likely get better within a few weeks after you quit. · Limit how much alcohol you drink.  Moderate amounts of alcohol (up to 2 drinks a day for men, 1 drink a day for women) are okay. But drinking too much can lead to liver problems, high blood pressure, and other health problems. Family health If you have a family, there are many things you can do together to improve your health. · Eat meals together as a family as often as possible. · Eat healthy foods. This includes fruits, vegetables, lean meats and dairy, and whole grains. · Include your family in your fitness plan. Most people think of activities such as jogging or tennis as the way to fitness, but there are many ways you and your family can be more active. Anything that makes you breathe hard and gets your heart pumping is exercise. Here are some tips: 
¨ Walk to do errands or to take your child to school or the bus. ¨ Go for a family bike ride after dinner instead of watching TV. Where can you learn more? Go to http://anastasiiaKnowlarity Communicationsaljeandro.info/. Enter F961 in the search box to learn more about \"A Healthy Lifestyle: Care Instructions. \" Current as of: May 12, 2017 Content Version: 11.4 © 2654-4556 [employer]. Care instructions adapted under license by [employer] (which disclaims liability or warranty for this information). If you have questions about a medical condition or this instruction, always ask your healthcare professional. [address] any warranty or liability for your use of this information. Headache: Care Instructions Your Care Instructions Headaches have many possible causes. Most headaches aren't a sign of a more serious problem, and they will get better on their own. Home treatment may help you feel better faster. The doctor has checked you carefully, but problems can develop later. If you notice any problems or new symptoms, get medical treatment right away. Follow-up care is a key part of your treatment and safety.  Be sure to make and go to all appointments, and call your doctor if you are having problems. It's also a good idea to know your test results and keep a list of the medicines you take. How can you care for yourself at home? · Do not drive if you have taken a prescription pain medicine. · Rest in a quiet, dark room until your headache is gone. Close your eyes and try to relax or go to sleep. Don't watch TV or read. · Put a cold, moist cloth or cold pack on the painful area for 10 to 20 minutes at a time. Put a thin cloth between the cold pack and your skin. · Use a warm, moist towel or a heating pad set on low to relax tight shoulder and neck muscles. · Have someone gently massage your neck and shoulders. · Take pain medicines exactly as directed. ¨ If the doctor gave you a prescription medicine for pain, take it as prescribed. ¨ If you are not taking a prescription pain medicine, ask your doctor if you can take an over-the-counter medicine. · Be careful not to take pain medicine more often than the instructions allow, because you may get worse or more frequent headaches when the medicine wears off. · Do not ignore new symptoms that occur with a headache, such as a fever, weakness or numbness, vision changes, or confusion. These may be signs of a more serious problem. To prevent headaches · Keep a headache diary so you can figure out what triggers your headaches. Avoiding triggers may help you prevent headaches. Record when each headache began, how long it lasted, and what the pain was like (throbbing, aching, stabbing, or dull). Write down any other symptoms you had with the headache, such as nausea, flashing lights or dark spots, or sensitivity to bright light or loud noise. Note if the headache occurred near your period. List anything that might have triggered the headache, such as certain foods (chocolate, cheese, wine) or odors, smoke, bright light, stress, or lack of sleep. · Find healthy ways to deal with stress. Headaches are most common during or right after stressful times. Take time to relax before and after you do something that has caused a headache in the past. 
· Try to keep your muscles relaxed by keeping good posture. Check your jaw, face, neck, and shoulder muscles for tension, and try relaxing them. When sitting at a desk, change positions often, and stretch for 30 seconds each hour. · Get plenty of sleep and exercise. · Eat regularly and well. Long periods without food can trigger a headache. · Treat yourself to a massage. Some people find that regular massages are very helpful in relieving tension. · Limit caffeine by not drinking too much coffee, tea, or soda. But don't quit caffeine suddenly, because that can also give you headaches. · Reduce eyestrain from computers by blinking frequently and looking away from the computer screen every so often. Make sure you have proper eyewear and that your monitor is set up properly, about an arm's length away. · Seek help if you have depression or anxiety. Your headaches may be linked to these conditions. Treatment can both prevent headaches and help with symptoms of anxiety or depression. When should you call for help? Call 911 anytime you think you may need emergency care. For example, call if: 
? · You have signs of a stroke. These may include: 
¨ Sudden numbness, paralysis, or weakness in your face, arm, or leg, especially on only one side of your body. ¨ Sudden vision changes. ¨ Sudden trouble speaking. ¨ Sudden confusion or trouble understanding simple statements. ¨ Sudden problems with walking or balance. ¨ A sudden, severe headache that is different from past headaches. ?Call your doctor now or seek immediate medical care if: 
? · You have a new or worse headache. ? · Your headache gets much worse. Where can you learn more? Go to http://anastasiia-alejandro.info/. Enter M271 in the search box to learn more about \"Headache: Care Instructions. \" Current as of: October 14, 2016 Content Version: 11.4 © 5604-9197 [employer]. Care instructions adapted under license by [employer] (which disclaims liability or warranty for this information). If you have questions about a medical condition or this instruction, always ask your healthcare professional. [address] any warranty or liability for your use of this information. Introducing [facility]! [employer] introduces [employer] patient portal. Now you can access parts of your medical record, email your doctor's office, and request medication refills online. 1. In your internet browser, go to https://[employer]). WAKU WAKU ????/[employer]) 2. Click on the First Time User? Click Here link in the Sign In box. You will see the New Member Sign Up page. 3. Enter your [employer] Access Code exactly as it appears below. You will not need to use this code after youve completed the sign-up process. If you do not sign up before the expiration date, you must request a new code. · [employer] Access Code: [email or web address] Expires: 7/15/2018  9:51 AM 
 
4. Enter the last four digits of your Social Security Number (xxxx) and Date of Birth (mm/dd/yyyy) as indicated and click Submit. You will be taken to the next sign-up page. 5. Create a [employer] ID. This will be your [employer] login ID and cannot be changed, so think of one that is secure and easy to remember. 6. Create a [employer] password. You can change your password at any time. 7. Enter your Password Reset Question and Answer. This can be used at a later time if you forget your password. 8. Enter your e-mail address. You will receive e-mail notification when new information is available in [address] 9. Click Sign Up. You can now view and download portions of your medical record. 10. Click the Download Summary menu link to download a portable copy of your medical information. If you have questions, please visit the Frequently Asked Questions section of the [employer] website. Remember, [employer] is NOT to be used for urgent needs. For medical emergencies, dial 911. Now available from your iPhone and Android! Please provide this summary of care documentation to your next provider. Your primary care clinician is listed as Emma Spence. If you have any questions after today's visit, please call [phone].

## 2018-04-16 NOTE — PROGRESS NOTES
Chief Complaint   Patient presents with    Headache       Referred by: Dr. Yoli Tijerina      HPI    Ms. Ofelia Carmona is an 51-year-old woman referred here for head injury. She was seen by our practice over 3 years ago. She is a new patient for me. She tells me about 3 weeks ago while walking up a very small flight of stairs carrying laundry she lost her balance and fell backwards landing on the floor approximately 4 steps down. She was flat on her back for a few minutes. She denies loss of consciousness. However, since then she has had a persistent fluctuating posterior dull headache that sometimes radiates frontally. No nausea or light sensitivity. She has been taking tramadol nearly nightly for headache control. The headaches sometimes escalates into a throbbing nature. No associated numbness weakness or vision changes. She had imaging done which was benign. Overall she thinks she is doing okay. She is followed by psychiatry for bipolar disorder and is in the midst of slowly tapering clonazepam.  She is on lamotrigine nightly low-dose only. She has sleep apnea but it is not treated currently. Review of Systems   Neurological: Positive for headaches. Psychiatric/Behavioral: The patient has insomnia. All other systems reviewed and are negative.       Past Medical History:   Diagnosis Date    Arthritis of knee, left     Asthma     Asthma with COPD (chronic obstructive pulmonary disease) (HCC)     FEV1 - 0.99, IgE high    Atherosclerosis of both carotid arteries     Bipolar affective disorder (HCC)     Cataract, left eye     cataract - left    Chronic obstructive pulmonary disease (HCC)     Closed fracture of lateral portion of left tibial plateau     Depression with anxiety     depression/anxiety    Encephalopathy     encephalopathy    Esophageal ulcer     esophageal ulcer    Gastroparesis     GERD (gastroesophageal reflux disease)     Hepatic steatosis     History of fall     hx falls    Hyperlipidemia     increased cholesterol    Hyperlipidemia     Hypertension     IBS (irritable bowel syndrome)     IBS    IGT (impaired glucose tolerance) 9/15/2015    Liver disease     elevated liver enzymes - being evaluated/US done x 2 - inflammed liver/\"fat on liver\"    Neurological disorder     delayed thinking process? ??    OA (osteoarthritis) of knee     Osteoporosis     Seizures (HCC)     Skin cancer     Sunburn, blistering     Unspecified adverse effect of anesthesia     was told by neurologist not to use anesthesia unless needed due to encephalopathy    Unspecified adverse effect of anesthesia     hallucinations after MRI with sedation    Vertigo     vertigo     Family History   Problem Relation Age of Onset    Stroke Father     Heart Disease Father      angina    No Known Problems Mother     Cancer Brother      lung    Cancer Brother      lung     Social History     Social History    Marital status: SINGLE     Spouse name: N/A    Number of children: N/A    Years of education: N/A     Occupational History    Not on file. Social History Main Topics    Smoking status: Former Smoker    Smokeless tobacco: Never Used      Comment: quit smoking cigarettes 30 yrs ago    Alcohol use No    Drug use: No    Sexual activity: Not Currently     Partners: Male     Other Topics Concern    Not on file     Social History Narrative     Current Outpatient Prescriptions   Medication Sig    sucralfate (CARAFATE) 1 gram tablet Take 1 g by mouth four (4) times daily.  lamoTRIgine (LAMICTAL) 50 mg disintegrating tablet Take 1 Tab by mouth nightly.  fluticasone-salmeterol (ADVAIR) 500-50 mcg/dose diskus inhaler Take 1 Puff by inhalation two (2) times a day.  albuterol (PROVENTIL HFA, VENTOLIN HFA, PROAIR HFA) 90 mcg/actuation inhaler Take 2 Puffs by inhalation every four (4) hours as needed for Wheezing.  amLODIPine (NORVASC) 5 mg tablet Take 1.5 Tabs by mouth daily.     valsartan (DIOVAN) 160 mg tablet Take 1 Tab by mouth daily.  albuterol (PROVENTIL VENTOLIN) 2.5 mg /3 mL (0.083 %) nebulizer solution 3 mL by Nebulization route every four (4) hours as needed for Wheezing.  Cholecalciferol, Vitamin D3, (VITAMIN D3) 1,000 unit cap Take 2,000 Units by mouth.  vitamin E (AQUA GEMS) 400 unit capsule Take 400 Units by mouth daily.  montelukast (SINGULAIR) 10 mg tablet take 1 tablet by mouth once daily    furosemide (LASIX) 20 mg tablet Take 1 Tab by mouth daily. Indications: Edema    rosuvastatin (CRESTOR) 5 mg tablet Take 1 Tab by mouth nightly.  amLODIPine (NORVASC) 10 mg tablet Take 1 Tab by mouth daily.  clonazePAM (KLONOPIN) 0.5 mg tablet Take 1 Tab by mouth nightly as needed.  clonazePAM (KLONOPIN) 2 mg tablet Take 0.5 Tabs by mouth nightly as needed. Max Daily Amount: 1 mg. (Patient taking differently: Take 0.5 mg by mouth nightly as needed.)    LACTOBACILLUS ACIDOPHILUS (PROBIOTIC PO) Take  by mouth. Takes one po daily.  CALCIUM CARBONATE (TUMS PO) Take  by mouth as needed.  albuterol (PROVENTIL, VENTOLIN) 90 mcg/Actuation inhaler Take 2-4 Puffs by inhalation every four (4) hours as needed. No current facility-administered medications for this visit. Allergies   Allergen Reactions   Sea Coco Other (comments)     Blood testing    Cat Dander Other (comments)     Blood testing    Dog Dander Other (comments)     Blood testing    Elavil Other (comments)     imbalance    Woodson Other (comments)     Blood testing         Neurologic Exam     Mental Status   Oriented to person, place, and time. Very pleasant and conversant     Cranial Nerves   Cranial nerves II through XII intact. Right ptosis-chronic baseline according to her     Motor Exam   Muscle bulk: normal    Strength   Strength 5/5 throughout.      Sensory Exam   Light touch normal.     Gait, Coordination, and Reflexes     Gait  Gait: (Slightly wide but steady)    Physical Exam Constitutional: She is oriented to person, place, and time. She appears well-developed and well-nourished. Cardiovascular: Normal rate. Pulmonary/Chest: Effort normal.   Neurological: She is oriented to person, place, and time. She has normal strength. Skin: Skin is warm and dry. Psychiatric: She has a normal mood and affect. Her behavior is normal.   Vitals reviewed. There were no vitals taken for this visit. Lab Results  Component Value Date/Time   WBC 7.8 04/14/2017 10:29 AM   HGB 13.2 04/14/2017 10:29 AM   HCT 39.3 04/14/2017 10:29 AM   PLATELET 657 05/16/1026 10:29 AM   MCV 84 04/14/2017 10:29 AM     Lab Results  Component Value Date/Time   Hemoglobin A1c 6.0 (H) 08/10/2016 11:07 AM   Hemoglobin A1c 6.2 (H) 09/15/2015 01:14 PM   Hemoglobin A1c, External 6.1 01/13/2015 08:40 AM   Glucose 112 (H) 04/14/2017 10:29 AM   LDL, calculated 101 (H) 08/10/2016 11:07 AM   Creatinine 0.94 04/14/2017 10:29 AM      Lab Results  Component Value Date/Time   Cholesterol, total 195 08/10/2016 11:07 AM   HDL Cholesterol 62 08/10/2016 11:07 AM   LDL, calculated 101 (H) 08/10/2016 11:07 AM   LDL-C, External 210 01/13/2015 08:40 AM   Triglyceride 162 (H) 08/10/2016 11:07 AM   CHOL/HDL Ratio 5.7 (H) 07/29/2010 04:00 PM     Lab Results  Component Value Date/Time   ALT (SGPT) 51 (H) 04/14/2017 10:29 AM   AST (SGOT) 27 04/14/2017 10:29 AM   Alk. phosphatase 83 04/14/2017 10:29 AM   Bilirubin, total 0.4 04/14/2017 10:29 AM   Albumin 4.5 04/14/2017 10:29 AM   Protein, total 7.3 04/14/2017 10:29 AM   INR 1.0 01/15/2010 10:41 AM   Prothrombin time 10.3 01/15/2010 10:41 AM   PLATELET 565 57/82/8095 10:29 AM          CT Results (maximum last 3): Results from Abstract encounter on 10/30/15   CT HEAD WO CONT  Results from Hospital Encounter encounter on 02/11/11   CT HEAD WITHOUT CONTRAST   Narrative **Final Report**      ICD Codes / Adm. Diagnosis: 528687  331.0 / Dizziness    Examination:  CT HEAD WO CON  - [id number] - Feb 11 2011  4:01PM  Accession No:  [id number]  Reason:  Pain      REPORT:  EXAM:  CT HEAD WITHOUT CONTRAST    INDICATION: Dizziness and pain. COMPARISON: 12/02/2010. CONTRAST: None. TECHNIQUE: Unenhanced CT of the head was performed using 5 mm images. Brain   and bone windows were generated. FINDINGS:  The ventricles and sulci are normal in size, shape and configuration and   midline. There is no significant white matter disease. There is no   intracranial hemorrhage, extra-axial collection, mass, mass effect or   midline shift. The basilar cisterns are open. No acute infarct is   identified. The bone windows demonstrate no abnormalities. The visualized   portions of the paranasal sinuses and mastoid air cells are clear. IMPRESSION: Normal unenhanced CT examination of the head. Interpreting/Reading Doctor: Radha Baez ([id number])  Transcribed:  on 02/11/2011  Approved: Radha Baez ([id number])  02/11/2011          Distribution:  Attending Doctor: Luc De Oliveira              MRI Results (maximum last 3): No results found for this or any previous visit. PET Results (maximum last 3): No results found for this or any previous visit. Assessment and Plan   Diagnoses and all orders for this visit:    1. Chronic post-traumatic headache, not intractable    Other orders  -     lamoTRIgine (LAMICTAL) 50 mg disintegrating tablet; Take 1 Tab by mouth nightly. 80-year-old woman who had a fall about 3 weeks ago with a closed head injury. She is doing rather well given the severity of the fall. She is not endorsing much postconcussive symptoms aside from headache and maybe a little bit of imbalance. At this point I would like her to stop utilizing tramadol. This is high risk for rebound. Her headache may worsen in the next couple days which is not unexpected. If the headache is severe she can call me and I will give her steroids.   For now I would like to increase lamotrigine to 50 mg at bedtime which could help her headache as well as her sleep. We also talked about that untreated sleep apnea can increase her risk for dementia so she needs to reengage with her pulmonary or sleep doctor about this issue. Agree with tapering clonazepam followed by psychiatry. I would like to reassess her in about 2 months. A notice of this visit/encounter being completed has been sent electronically to the patient's PCP and/or referring provider.      [address] [place] [address], [facility] [address]  Diplomate FELIXN

## 2018-04-16 NOTE — PATIENT INSTRUCTIONS
A Healthy Lifestyle: Care Instructions  Your Care Instructions    A healthy lifestyle can help you feel good, stay at a healthy weight, and have plenty of energy for both work and play. A healthy lifestyle is something you can share with your whole family. A healthy lifestyle also can lower your risk for serious health problems, such as high blood pressure, heart disease, and diabetes. You can follow a few steps listed below to improve your health and the health of your family. Follow-up care is a key part of your treatment and safety. Be sure to make and go to all appointments, and call your doctor if you are having problems. It's also a good idea to know your test results and keep a list of the medicines you take. How can you care for yourself at home? · Do not eat too much sugar, fat, or fast foods. You can still have dessert and treats now and then. The goal is moderation. · Start small to improve your eating habits. Pay attention to portion sizes, drink less juice and soda pop, and eat more fruits and vegetables. ¨ Eat a healthy amount of food. A 3-ounce serving of meat, for example, is about the size of a deck of cards. Fill the rest of your plate with vegetables and whole grains. ¨ Limit the amount of soda and sports drinks you have every day. Drink more water when you are thirsty. ¨ Eat at least 5 servings of fruits and vegetables every day. It may seem like a lot, but it is not hard to reach this goal. A serving or helping is 1 piece of fruit, 1 cup of vegetables, or 2 cups of leafy, raw vegetables. Have an apple or some carrot sticks as an afternoon snack instead of a candy bar. Try to have fruits and/or vegetables at every meal.  · Make exercise part of your daily routine. You may want to start with simple activities, such as walking, bicycling, or slow swimming. Try to be active 30 to 60 minutes every day. You do not need to do all 30 to 60 minutes all at once.  For example, you can exercise 3 times a day for 10 or 20 minutes. Moderate exercise is safe for most people, but it is always a good idea to talk to your doctor before starting an exercise program.  · Keep moving. Alex Buddle the lawn, work in the garden, or [employer]. Take the stairs instead of the elevator at work. · If you smoke, quit. People who smoke have an increased risk for heart attack, stroke, cancer, and other lung illnesses. Quitting is hard, but there are ways to boost your chance of quitting tobacco for good. ¨ Use nicotine gum, patches, or lozenges. ¨ Ask your doctor about stop-smoking programs and medicines. ¨ Keep trying. In addition to reducing your risk of diseases in the future, you will notice some benefits soon after you stop using tobacco. If you have shortness of breath or asthma symptoms, they will likely get better within a few weeks after you quit. · Limit how much alcohol you drink. Moderate amounts of alcohol (up to 2 drinks a day for men, 1 drink a day for women) are okay. But drinking too much can lead to liver problems, high blood pressure, and other health problems. Family health  If you have a family, there are many things you can do together to improve your health. · Eat meals together as a family as often as possible. · Eat healthy foods. This includes fruits, vegetables, lean meats and dairy, and whole grains. · Include your family in your fitness plan. Most people think of activities such as jogging or tennis as the way to fitness, but there are many ways you and your family can be more active. Anything that makes you breathe hard and gets your heart pumping is exercise. Here are some tips:  ¨ Walk to do errands or to take your child to school or the bus. ¨ Go for a family bike ride after dinner instead of watching TV. Where can you learn more? Go to http://anastasiia-alejandro.info/. Enter N575 in the search box to learn more about \"A Healthy Lifestyle: Care Instructions. \"  Current as of: May 12, 2017  Content Version: 11.4  © 3018-7118 [employer]. Care instructions adapted under license by [employer] (which disclaims liability or warranty for this information). If you have questions about a medical condition or this instruction, always ask your healthcare professional. [address] any warranty or liability for your use of this information. Headache: Care Instructions  Your Care Instructions    Headaches have many possible causes. Most headaches aren't a sign of a more serious problem, and they will get better on their own. Home treatment may help you feel better faster. The doctor has checked you carefully, but problems can develop later. If you notice any problems or new symptoms, get medical treatment right away. Follow-up care is a key part of your treatment and safety. Be sure to make and go to all appointments, and call your doctor if you are having problems. It's also a good idea to know your test results and keep a list of the medicines you take. How can you care for yourself at home? · Do not drive if you have taken a prescription pain medicine. · Rest in a quiet, dark room until your headache is gone. Close your eyes and try to relax or go to sleep. Don't watch TV or read. · Put a cold, moist cloth or cold pack on the painful area for 10 to 20 minutes at a time. Put a thin cloth between the cold pack and your skin. · Use a warm, moist towel or a heating pad set on low to relax tight shoulder and neck muscles. · Have someone gently massage your neck and shoulders. · Take pain medicines exactly as directed. ¨ If the doctor gave you a prescription medicine for pain, take it as prescribed. ¨ If you are not taking a prescription pain medicine, ask your doctor if you can take an over-the-counter medicine.   · Be careful not to take pain medicine more often than the instructions allow, because you may get worse or more frequent headaches when the medicine wears off. · Do not ignore new symptoms that occur with a headache, such as a fever, weakness or numbness, vision changes, or confusion. These may be signs of a more serious problem. To prevent headaches  · Keep a headache diary so you can figure out what triggers your headaches. Avoiding triggers may help you prevent headaches. Record when each headache began, how long it lasted, and what the pain was like (throbbing, aching, stabbing, or dull). Write down any other symptoms you had with the headache, such as nausea, flashing lights or dark spots, or sensitivity to bright light or loud noise. Note if the headache occurred near your period. List anything that might have triggered the headache, such as certain foods (chocolate, cheese, wine) or odors, smoke, bright light, stress, or lack of sleep. · Find healthy ways to deal with stress. Headaches are most common during or right after stressful times. Take time to relax before and after you do something that has caused a headache in the past.  · Try to keep your muscles relaxed by keeping good posture. Check your jaw, face, neck, and shoulder muscles for tension, and try relaxing them. When sitting at a desk, change positions often, and stretch for 30 seconds each hour. · Get plenty of sleep and exercise. · Eat regularly and well. Long periods without food can trigger a headache. · Treat yourself to a massage. Some people find that regular massages are very helpful in relieving tension. · Limit caffeine by not drinking too much coffee, tea, or soda. But don't quit caffeine suddenly, because that can also give you headaches. · Reduce eyestrain from computers by blinking frequently and looking away from the computer screen every so often. Make sure you have proper eyewear and that your monitor is set up properly, about an arm's length away. · Seek help if you have depression or anxiety.  Your headaches may be linked to these conditions. Treatment can both prevent headaches and help with symptoms of anxiety or depression. When should you call for help? Call 911 anytime you think you may need emergency care. For example, call if:  ? · You have signs of a stroke. These may include:  ¨ Sudden numbness, paralysis, or weakness in your face, arm, or leg, especially on only one side of your body. ¨ Sudden vision changes. ¨ Sudden trouble speaking. ¨ Sudden confusion or trouble understanding simple statements. ¨ Sudden problems with walking or balance. ¨ A sudden, severe headache that is different from past headaches. ?Call your doctor now or seek immediate medical care if:  ? · You have a new or worse headache. ? · Your headache gets much worse. Where can you learn more? Go to http://anastasiia-alejandro.info/. Enter M271 in the search box to learn more about \"Headache: Care Instructions. \"  Current as of: October 14, 2016  Content Version: 11.4  © 0757-2048 [employer]. Care instructions adapted under license by [employer] (which disclaims liability or warranty for this information). If you have questions about a medical condition or this instruction, always ask your healthcare professional. [place] [address] any warranty or liability for your use of this information.

## 2018-05-02 ENCOUNTER — TELEPHONE (OUTPATIENT)
Dept: NEUROLOGY | Age: 83
End: 2018-05-02

## 2018-05-02 NOTE — TELEPHONE ENCOUNTER
Pt calling with questions re medication. She said she has Tramadol at home and would like to talk to someone about it. She said she is not taking it.  Please call back

## 2018-05-07 ENCOUNTER — HOSPITAL ENCOUNTER (EMERGENCY)
Age: 83
Discharge: HOME | End: 2018-05-07
Attending: EMERGENCY MEDICINE
Payer: MEDICARE

## 2018-05-07 ENCOUNTER — APPOINTMENT (OUTPATIENT)
Dept: CT IMAGING | Age: 83
End: 2018-05-07
Attending: EMERGENCY MEDICINE
Payer: MEDICARE

## 2018-05-07 VITALS
RESPIRATION RATE: 16 BRPM | DIASTOLIC BLOOD PRESSURE: 73 MMHG | BODY MASS INDEX: 26.58 KG/M2 | HEIGHT: 63 IN | OXYGEN SATURATION: 96 % | HEART RATE: 78 BPM | WEIGHT: 150 LBS | SYSTOLIC BLOOD PRESSURE: 149 MMHG | TEMPERATURE: 98.4 F

## 2018-05-07 DIAGNOSIS — M79.18 MUSCULOSKELETAL PAIN: Primary | ICD-10-CM

## 2018-05-07 LAB
ALBUMIN SERPL-MCNC: 4 G/DL (ref 3.5–5)
ALBUMIN/GLOB SERPL: 0.9 {RATIO} (ref 1.1–2.2)
ALP SERPL-CCNC: 130 U/L (ref 45–117)
ALT SERPL-CCNC: 32 U/L (ref 12–78)
ANION GAP SERPL CALC-SCNC: 8 MMOL/L (ref 5–15)
APPEARANCE UR: CLEAR
AST SERPL-CCNC: 29 U/L (ref 15–37)
BACTERIA URNS QL MICRO: NEGATIVE /HPF
BASOPHILS # BLD: 0 K/UL (ref 0–0.1)
BASOPHILS NFR BLD: 0 % (ref 0–1)
BILIRUB SERPL-MCNC: 0.6 MG/DL (ref 0.2–1)
BILIRUB UR QL: NEGATIVE
BUN SERPL-MCNC: 19 MG/DL (ref 6–20)
BUN/CREAT SERPL: 16 (ref 12–20)
CALCIUM SERPL-MCNC: 9.4 MG/DL (ref 8.5–10.1)
CAOX CRY URNS QL MICRO: ABNORMAL
CHLORIDE SERPL-SCNC: 103 MMOL/L (ref 97–108)
CO2 SERPL-SCNC: 24 MMOL/L (ref 21–32)
COLOR UR: ABNORMAL
CREAT SERPL-MCNC: 1.17 MG/DL (ref 0.55–1.02)
DIFFERENTIAL METHOD BLD: ABNORMAL
EOSINOPHIL # BLD: 0.1 K/UL (ref 0–0.4)
EOSINOPHIL NFR BLD: 2 % (ref 0–7)
EPITH CASTS URNS QL MICRO: ABNORMAL /LPF
ERYTHROCYTE [DISTWIDTH] IN BLOOD BY AUTOMATED COUNT: 14.7 % (ref 11.5–14.5)
GLOBULIN SER CALC-MCNC: 4.5 G/DL (ref 2–4)
GLUCOSE SERPL-MCNC: 109 MG/DL (ref 65–100)
GLUCOSE UR STRIP.AUTO-MCNC: NEGATIVE MG/DL
HCT VFR BLD AUTO: 45.2 % (ref 35–47)
HGB BLD-MCNC: 14.7 G/DL (ref 11.5–16)
HGB UR QL STRIP: NEGATIVE
IMM GRANULOCYTES # BLD: 0 K/UL (ref 0–0.04)
IMM GRANULOCYTES NFR BLD AUTO: 0 % (ref 0–0.5)
KETONES UR QL STRIP.AUTO: NEGATIVE MG/DL
LEUKOCYTE ESTERASE UR QL STRIP.AUTO: ABNORMAL
LYMPHOCYTES # BLD: 1.4 K/UL (ref 0.8–3.5)
LYMPHOCYTES NFR BLD: 18 % (ref 12–49)
MCH RBC QN AUTO: 28.2 PG (ref 26–34)
MCHC RBC AUTO-ENTMCNC: 32.5 G/DL (ref 30–36.5)
MCV RBC AUTO: 86.6 FL (ref 80–99)
MONOCYTES # BLD: 0.5 K/UL (ref 0–1)
MONOCYTES NFR BLD: 7 % (ref 5–13)
NEUTS SEG # BLD: 5.5 K/UL (ref 1.8–8)
NEUTS SEG NFR BLD: 73 % (ref 32–75)
NITRITE UR QL STRIP.AUTO: NEGATIVE
NRBC # BLD: 0 K/UL (ref 0–0.01)
NRBC BLD-RTO: 0 PER 100 WBC
PH UR STRIP: 6 [PH] (ref 5–8)
PLATELET # BLD AUTO: 199 K/UL (ref 150–400)
PMV BLD AUTO: 11.8 FL (ref 8.9–12.9)
POTASSIUM SERPL-SCNC: 4.6 MMOL/L (ref 3.5–5.1)
PROT SERPL-MCNC: 8.5 G/DL (ref 6.4–8.2)
PROT UR STRIP-MCNC: NEGATIVE MG/DL
RBC # BLD AUTO: 5.22 M/UL (ref 3.8–5.2)
RBC #/AREA URNS HPF: ABNORMAL /HPF (ref 0–5)
SODIUM SERPL-SCNC: 135 MMOL/L (ref 136–145)
SP GR UR REFRACTOMETRY: 1.03 (ref 1–1.03)
UROBILINOGEN UR QL STRIP.AUTO: 0.2 EU/DL (ref 0.2–1)
WBC # BLD AUTO: 7.5 K/UL (ref 3.6–11)
WBC URNS QL MICRO: ABNORMAL /HPF (ref 0–4)

## 2018-05-07 PROCEDURE — 99285 EMERGENCY DEPT VISIT HI MDM: CPT

## 2018-05-07 PROCEDURE — 81001 URINALYSIS AUTO W/SCOPE: CPT | Performed by: EMERGENCY MEDICINE

## 2018-05-07 PROCEDURE — 85025 COMPLETE CBC W/AUTO DIFF WBC: CPT | Performed by: EMERGENCY MEDICINE

## 2018-05-07 PROCEDURE — 36415 COLL VENOUS BLD VENIPUNCTURE: CPT | Performed by: EMERGENCY MEDICINE

## 2018-05-07 PROCEDURE — 74176 CT ABD & PELVIS W/O CONTRAST: CPT

## 2018-05-07 PROCEDURE — 80053 COMPREHEN METABOLIC PANEL: CPT | Performed by: EMERGENCY MEDICINE

## 2018-05-07 RX ORDER — LIDOCAINE 50 MG/G
PATCH TOPICAL
Qty: 30 EACH | Refills: 0 | Status: SHIPPED | OUTPATIENT
Start: 2018-05-07 | End: 2019-03-06

## 2018-05-07 NOTE — ED NOTES
Pt wheeled out of ED with discharge instructions and prescriptions in hand given by Dr. Mynor Robles; pt verbalized understanding of discharge paperwork and time allotted for questions. VSS. Pt alert and oriented.

## 2018-05-07 NOTE — ED TRIAGE NOTES
TRIAGE NOTE: patient arrived from home with c/o RIGHT lower abd pain that started over the weekend, radiates to the back. Patient has a chronic ulcer but the pain has worsened. GI doctor is Dr. Shantal Chaparro. Denies N/V/D. Denies blood in stool. +frequency with urination.  Patient took a tramadol at 8:45am

## 2018-05-07 NOTE — ED PROVIDER NOTES
HPI Comments: 80 y.o. female with past medical history significant for HTN, IBS, GERD, seizures, encephalopathy, gastroparesis, hepatic steatosis, COPD, and bipolar disorder who presents via EMS from home with chief complaint of abdominal pain. Patient reports 3-day history of right-sided abdominal pain, flank pain and increased urinary frequency. Patient denies precipitating factors, but notes pain is somewhat improved with movement when episodes onset. Patient took Tramadol at 0845 this morning. Patient finished abx therapy to treat UTI approximately 1 week ago. Of note, patient recalls pulling weeds approximately 1 week ago with subsequent right-sided back pain since. Patient denies history of cholecystectomy. Patient specifically denies dysuria, urinary retention, fevers, chills, melena and hematochezia. There are no other acute medical concerns at this time. Old Chart Review:  Patient underwent normal endoscopic gastroduodenoscopy on 10/19/17 by Dr. Daniela Wooten GI. Social hx: former tobacco smoker; denies EtOH use; denies illicit drug use  PCP: Kedar Armenta MD  GI: Caryle Fitz, MD    Note written by Yumiko Monsalve, as dictated by Emelia Boone MD 11:20 AM        The history is provided by the patient and medical records. No  was used.         Past Medical History:   Diagnosis Date    Arthritis of knee, left     Asthma     Asthma with COPD (chronic obstructive pulmonary disease) (HCC)     FEV1 - 0.99, IgE high    Atherosclerosis of both carotid arteries     Bipolar affective disorder (HCC)     Cataract, left eye     cataract - left    Chronic obstructive pulmonary disease (HCC)     Closed fracture of lateral portion of left tibial plateau     Depression with anxiety     depression/anxiety    Encephalopathy     encephalopathy    Esophageal ulcer     esophageal ulcer    Gastroparesis     GERD (gastroesophageal reflux disease)     Hepatic steatosis     History of fall     hx falls    Hyperlipidemia     increased cholesterol    Hyperlipidemia     Hypertension     IBS (irritable bowel syndrome)     IBS    IGT (impaired glucose tolerance) 9/15/2015    Liver disease     elevated liver enzymes - being evaluated/US done x 2 - inflammed liver/\"fat on liver\"    Neurological disorder     delayed thinking process? ??    OA (osteoarthritis) of knee     Osteoporosis     Seizures (HCC)     Skin cancer     Sunburn, blistering     Unspecified adverse effect of anesthesia     was told by neurologist not to use anesthesia unless needed due to encephalopathy    Unspecified adverse effect of anesthesia     hallucinations after MRI with sedation    Vertigo     vertigo       Past Surgical History:   Procedure Laterality Date    ABDOMEN SURGERY PROC UNLISTED      GERD reflux    COLONOSCOPY N/A 8/1/2017    COLONOSCOPY performed by Funmilayo Grove MD at [address] [facility]      laser surgery for reflux and hernia repair    HX MOHS PROCEDURES  09/05/2017    BCC L nasal sidewall by Dr. Sauer Cousin          Family History:   Problem Relation Age of Onset    Stroke Father     Heart Disease Father      angina    No Known Problems Mother     Cancer Brother      lung    Cancer Brother      lung       Social History     Social History    Marital status: SINGLE     Spouse name: N/A    Number of children: N/A    Years of education: N/A     Occupational History    Not on file. Social History Main Topics    Smoking status: Former Smoker    Smokeless tobacco: Never Used      Comment: quit smoking cigarettes 30 yrs ago    Alcohol use No    Drug use: No    Sexual activity: Not Currently     Partners: Male     Other Topics Concern    Not on file     Social History Narrative         ALLERGIES: Elinor Prey; Cat dander; Dog dander; Elavil; and Charlotte    Review of Systems   Constitutional: Negative for chills, diaphoresis and fever.    HENT: Negative for congestion, postnasal drip, rhinorrhea and sore throat. Eyes: Negative for photophobia, discharge, redness and visual disturbance. Respiratory: Negative for cough, chest tightness, shortness of breath and wheezing. Cardiovascular: Negative for chest pain, palpitations and leg swelling. Gastrointestinal: Negative for abdominal distention, abdominal pain, blood in stool, constipation, diarrhea, nausea and vomiting. Genitourinary: Positive for flank pain (right) and frequency. Negative for difficulty urinating, dysuria, hematuria and urgency. Musculoskeletal: Negative for arthralgias, back pain, joint swelling and myalgias. Skin: Negative for color change and rash. Neurological: Negative for dizziness, speech difficulty, weakness, light-headedness, numbness and headaches. Psychiatric/Behavioral: Negative for confusion. The patient is not nervous/anxious. All other systems reviewed and are negative. Vitals:    05/07/18 1048   BP: 146/58   Pulse: 86   Resp: 18   Temp: 98 °F (36.7 °C)   SpO2: 94%   Weight: 68 kg (150 lb)   Height: 5' 3\" (1.6 m)            Physical Exam   Constitutional: She is oriented to person, place, and time. She appears well-developed and well-nourished. No distress. HENT:   Head: Normocephalic and atraumatic. Right Ear: External ear normal.   Left Ear: External ear normal.   Nose: Nose normal.   Mouth/Throat: Oropharynx is clear and moist.   Eyes: Conjunctivae and EOM are normal. Pupils are equal, round, and reactive to light. No scleral icterus. Neck: Normal range of motion. Neck supple. No JVD present. No tracheal deviation present. No thyromegaly present. Cardiovascular: Normal rate, regular rhythm and normal heart sounds. Exam reveals no gallop and no friction rub. No murmur heard. Pulmonary/Chest: Effort normal and breath sounds normal. No respiratory distress. She has no wheezes. She has no rales. She exhibits no tenderness. Abdominal: Soft.  Bowel sounds are normal. She exhibits no distension and no mass. There is no rebound and no guarding. Right flank pain. No RUQ, epigastric or suprapubic tenderness noted. Musculoskeletal: Normal range of motion. She exhibits no edema or tenderness. Lymphadenopathy:     She has no cervical adenopathy. Neurological: She is alert and oriented to person, place, and time. She has normal strength. She displays no atrophy and no tremor. No cranial nerve deficit. She exhibits normal muscle tone. Coordination and gait normal.   Skin: Skin is warm and dry. No rash noted. She is not diaphoretic. No erythema. Psychiatric: She has a normal mood and affect. Her behavior is normal. Judgment and thought content normal.   Nursing note and vitals reviewed. Note written by Yumiko Franklin, as dictated by Virginia Espinal MD 11:20 AM    MDM  Number of Diagnoses or Management Options  Musculoskeletal pain:   Diagnosis management comments: MALDONADO  Impression: A 58-year-old female presenting to the emergency department with onset of right-sided abdominal pain radiating into her flank. Differential include UTI, nephrolithiasis, consider musculoskeletal strain. Plan of care we baseline labs, CT scan of the abdomen we'll treat accordingly. ED Course       Procedures    12:45 PM  CBC, CMP unremarkable. CT abd/pelv demonstrates no renal or ureteral stone or evidence of hydronephrosis. No acute abnormality is identified. 1:38 PM  UA negative. Pain likely musculoskeletal in nature, related to yard work last week. Discussed available lab and imaging results with patient. She verbalizes understanding and agrees with care plan. Will discharge patient home with specific return precautions; continue Tramadol as needed for pain; f/u with PCP, [facility] ED as needed. Patient's results have been reviewed with them.   Patient and/or family have verbally conveyed their understanding and agreement of the patient's signs, symptoms, diagnosis, treatment and prognosis and additionally agree to follow up as recommended or return to the Emergency Room should their condition change prior to follow-up. Discharge instructions have also been provided to the patient with some educational information regarding their diagnosis as well a list of reasons why they would want to return to the ER prior to their follow-up appointment should their condition change.

## 2018-05-07 NOTE — DISCHARGE INSTRUCTIONS
Musculoskeletal Pain: Care Instructions  Your Care Instructions    Different problems with the bones, muscles, nerves, ligaments, and tendons in the body can cause pain. One or more areas of your body may ache or burn. Or they may feel tired, stiff, or sore. The medical term for this type of pain is musculoskeletal pain. It can have many different causes. Sometimes the pain is caused by an injury such as a strain or sprain. Or you might have pain from using one part of your body in the same way over and over again. This is called overuse. In some cases, the cause of the pain is another health problem such as arthritis or fibromyalgia. The doctor will examine you and ask you questions about your health to help find the cause of your pain. Blood tests or imaging tests like an X-ray may also be helpful. But sometimes doctors can't find a cause of the pain. Treatment depends on your symptoms and the cause of the pain, if known. The doctor has checked you carefully, but problems can develop later. If you notice any problems or new symptoms, get medical treatment right away. Follow-up care is a key part of your treatment and safety. Be sure to make and go to all appointments, and call your doctor if you are having problems. It's also a good idea to know your test results and keep a list of the medicines you take. How can you care for yourself at home? · Rest until you feel better. · Do not do anything that makes the pain worse. Return to exercise gradually if you feel better and your doctor says it's okay. · Be safe with medicines. Read and follow all instructions on the label. ¨ If the doctor gave you a prescription medicine for pain, take it as prescribed. ¨ If you are not taking a prescription pain medicine, ask your doctor if you can take an over-the-counter medicine. · Put ice or a cold pack on the area for 10 to 20 minutes at a time to ease pain.  Put a thin cloth between the ice and your skin.  When should you call for help? Call your doctor now or seek immediate medical care if:  ? · You have new pain, or your pain gets worse. ? · You have new symptoms such as a fever, a rash, or chills. ? Watch closely for changes in your health, and be sure to contact your doctor if:  ? · You do not get better as expected. Where can you learn more? Go to http://anastasiia-alejandro.info/. Enter O941 in the search box to learn more about \"Musculoskeletal Pain: Care Instructions. \"  Current as of: October 14, 2016  Content Version: 11.4  © 7567-6997 [employer]. Care instructions adapted under license by [employer] (which disclaims liability or warranty for this information). If you have questions about a medical condition or this instruction, always ask your healthcare professional. [address] any warranty or liability for your use of this information.

## 2018-05-08 ENCOUNTER — TELEPHONE (OUTPATIENT)
Dept: NEUROLOGY | Age: 83
End: 2018-05-08

## 2018-05-08 NOTE — TELEPHONE ENCOUNTER
----- Message from [facility] sent at 5/8/2018  9:25 AM EDT -----  Regarding: Dr. Fay Maddox  Pt [phone] Says that she is having pain behind her R ear and in her back and is requesting a call back from Dr. Segundo Sawyer today.    Pt declined appt--wants to talk to doctor first.

## 2018-05-09 NOTE — TELEPHONE ENCOUNTER
Attempted to call the pt and get a busy signal. Forwarding to you if you want to try her before I get back to her.

## 2018-05-09 NOTE — TELEPHONE ENCOUNTER
Called pt and got through. She wants you to know she has come off the Tramadol and her headaches have cleared up. But she is now having pain down her R side and across her back. She states that you had mentioned there was something else you would consider putting her own once she was off the tramadol. She does have an appoitment tomorrow with a pcp here at [facility].

## 2018-05-10 NOTE — TELEPHONE ENCOUNTER
She has a different pain issue going on now is affecting her back and side.  I do not have medication for that because I was considering additional HEADACHE medication if needed. I am glad she stopped tramadol because that can cause headaches long-term. She needs to discuss this with her primary care tomorrow specifically. Attempted to contact this pt again and phone just rang and rang. No answering machine. Will try again.

## 2018-06-04 ENCOUNTER — OFFICE VISIT (OUTPATIENT)
Dept: DERMATOLOGY | Facility: AMBULATORY SURGERY CENTER | Age: 83
End: 2018-06-04

## 2018-06-04 VITALS
SYSTOLIC BLOOD PRESSURE: 150 MMHG | DIASTOLIC BLOOD PRESSURE: 84 MMHG | WEIGHT: 150 LBS | HEART RATE: 91 BPM | HEIGHT: 63 IN | BODY MASS INDEX: 26.58 KG/M2 | OXYGEN SATURATION: 94 % | RESPIRATION RATE: 18 BRPM

## 2018-06-04 DIAGNOSIS — C44.319 BASAL CELL CARCINOMA OF RIGHT FOREHEAD: Primary | ICD-10-CM

## 2018-06-04 NOTE — MR AVS SNAPSHOT
[address] [facility] [address] 
[phone] Patient: Gris Rebollar MRN: [id number] Visit Information Date & Time Provider Department Dept. Phone Encounter #  
 2018  1:00 PM MD Juan David Klein [id number] [phone] [id number] Your Appointments 6/15/2018  1:40 PM  
Follow Up with [facility], DO [facility] at [address] [place] [address]) Appt Note: 2 month f/u headache  
 [address] [facility] [address] [facility] [address]  
[phone]  
  
   
 [address] [address] Upcoming Health Maintenance Date Due DTaP/Tdap/Td series (1 - Tdap) 1956 GLAUCOMA SCREENING Q2Y 2000 Bone Densitometry (Dexa) Screening 2000 Pneumococcal 65+ Low/Medium Risk (2 of 2 - PPSV23) 2016 MEDICARE YEARLY EXAM 3/14/2018 Influenza Age 5 to Adult 2018 Allergies as of 2018  Review Complete On: 2018 By: Jana Wynn RN Severity Noted Reaction Type Reactions Sherra Anna  2016    Other (comments) Blood testing Cat Dander  2016    Other (comments) Blood testing Dog Dander  2016    Other (comments) Blood testing Elavil  2016    Other (comments)  
 imbalance Walls  2016    Other (comments) Blood testing Current Immunizations  Reviewed on 2017 Name Date Influenza High Dose Vaccine PF 2017 Pneumococcal Conjugate (PCV-13) 2015, 2015 Zoster Vaccine, Live 2014 Not reviewed this visit You Were Diagnosed With   
  
 Codes Comments Basal cell carcinoma of right forehead    -  Primary ICD-10-CM: C44.319 ICD-9-CM: 173.31 Vitals BP Pulse Resp Height(growth percentile) Weight(growth percentile) SpO2 150/84 (BP 1 Location: Left arm, BP Patient Position: Sitting) 91 18 5' 3\" (1.6 m) 150 lb (68 kg) 94% BMI OB Status Smoking Status 26.57 kg/m2 Postmenopausal Former Smoker BMI and BSA Data Body Mass Index Body Surface Area  
 26.57 kg/m 2 1.74 m 2 Preferred Pharmacy Pharmacy Name Phone [facility], [address] [phone] Your Updated Medication List  
  
   
This list is accurate as of 6/4/18  1:34 PM.  Always use your most recent med list.  
  
  
  
  
 * albuterol 2.5 mg /3 mL (0.083 %) nebulizer solution Commonly known as:  PROVENTIL VENTOLIN  
3 mL by Nebulization route every four (4) hours as needed for Wheezing. * albuterol 90 mcg/actuation inhaler Commonly known as:  PROVENTIL HFA, VENTOLIN HFA, PROAIR HFA Take 2 Puffs by inhalation every four (4) hours as needed for Wheezing. albuterol 90 mcg/actuation inhaler Commonly known as:  Evan Esme Take 2-4 Puffs by inhalation every four (4) hours as needed. * amLODIPine 5 mg tablet Commonly known as:  Amelie Pop Take 1.5 Tabs by mouth daily. * amLODIPine 10 mg tablet Commonly known as:  Amelie Pop Take 1 Tab by mouth daily. CARAFATE 1 gram tablet Generic drug:  sucralfate Take 1 g by mouth four (4) times daily. * clonazePAM 2 mg tablet Commonly known as:  Luz Marina Panda Take 0.5 Tabs by mouth nightly as needed. Max Daily Amount: 1 mg.  
  
 * clonazePAM 0.5 mg tablet Commonly known as:  Luz Marina Panda Take 1 Tab by mouth nightly as needed. fluticasone-salmeterol 500-50 mcg/dose diskus inhaler Commonly known as:  ADVAIR Take 1 Puff by inhalation two (2) times a day. furosemide 20 mg tablet Commonly known as:  LASIX Take 1 Tab by mouth daily. Indications: Edema  
  
 lamoTRIgine 50 mg disintegrating tablet Commonly known as: LaMICtal  
Take 1 Tab by mouth nightly. lidocaine 5 % Commonly known as:  Karla Doty Apply patch to the affected area for 12 hours a day and remove for 12 hours a day. montelukast 10 mg tablet Commonly known as:  SINGULAIR  
take 1 tablet by mouth once daily PROBIOTIC PO Take  by mouth. Takes one po daily. rosuvastatin 5 mg tablet Commonly known as:  CRESTOR Take 1 Tab by mouth nightly. TUMS PO Take  by mouth as needed. valsartan 160 mg tablet Commonly known as:  DIOVAN Take 1 Tab by mouth daily. VITAMIN D3 1,000 unit Cap Generic drug:  cholecalciferol Take 2,000 Units by mouth.  
  
 vitamin E 400 unit capsule Commonly known as:  [address] Take 400 Units by mouth daily. * Notice: This list has 6 medication(s) that are the same as other medications prescribed for you. Read the directions carefully, and ask your doctor or other care provider to review them with you. Patient Instructions WOUND CARE INSTRUCTIONS 1. Keep the dressing clean and dry and do not remove for 48 hours. 2. Then change the dressing once a day as follows: 
a. Wash hands before and after each dressing change. b. Remove dressing and wash site gently with mild soap and water, rinse, and pat dry. 
c. Apply an ointment (Bacitracin, Polysporin, Neosporin, Petroleum jelly or Aquaphor). d. Apply a non-stick (Telfa) dressing or Band-Aid to cover the wound. Remove pressure bandage on Wednesday, then wash gently and apply a thin layer Vaseline and a band-aid to site daily for 1 week. 3. Watch for: BLEEDING: A small amount of drainage may occur. If bleeding occurs, elevate and rest the surgery site. Apply gauze and steady pressure for 15 minutes. If bleeding continues, call this office. INFECTION: Signs of infection include increased redness, pain, warmth, drainage of pus, and fever. If this occurs, call this office.  
 
4. Special Instructions (follow any that are checked): 
 · [x] You have stitches that DO NOT need to be removed. · [x] Avoid bending at the waist and heavy lifting for two days. · [x] Sleep with your head elevated for the next two nights. · [x] Rest the surgery site and keep it elevated as much as possible for two days. · [x] You may apply an ice-pack for 10-15 minutes every waking hour for the rest of the day. · [] Eat a soft diet and avoid hot food and hot drinks for the rest of the day. · [] Other instructions: Follow up as directed. Take Tylenol or Ibuprofen for pain as needed. Once the site is healed with no remaining bandages or open areas, protect your surgical site and scar from the sun, as this area will be more sensitive. Use a broad spectrum sunscreen SPF 30 or higher daily, and a chemical free product (one containing zinc oxide or titanium dioxide) is a good choice if the area is sensitive. You may begin to gently massage the surgical site in 2-3 weeks, rubbing in a circular motion along the scar. This can help reduce swelling and thickness of a scar. A scar cream may be used beginnning 1 month after the surgery. If you have any questions or concerns, please call our office Monday through Friday at [phone]. Introducing [facility]! Antonio Larios introduces [employer] patient portal. Now you can access parts of your medical record, email your doctor's office, and request medication refills online. 1. In your internet browser, go to [email or web address] 2. Click on the First Time User? Click Here link in the Sign In box. You will see the New Member Sign Up page. 3. Enter your [employer] Access Code exactly as it appears below. You will not need to use this code after youve completed the sign-up process. If you do not sign up before the expiration date, you must request a new code. · [employer] Access Code: [email or web address] Expires: 7/15/2018  9:51 AM 
 
 4. Enter the last four digits of your Social Security Number (xxxx) and Date of Birth (mm/dd/yyyy) as indicated and click Submit. You will be taken to the next sign-up page. 5. Create a [employer] ID. This will be your [employer] login ID and cannot be changed, so think of one that is secure and easy to remember. 6. Create a [employer] password. You can change your password at any time. 7. Enter your Password Reset Question and Answer. This can be used at a later time if you forget your password. 8. Enter your e-mail address. You will receive e-mail notification when new information is available in [address] 9. Click Sign Up. You can now view and download portions of your medical record. 10. Click the Download Summary menu link to download a portable copy of your medical information. If you have questions, please visit the Frequently Asked Questions section of the [employer] website. Remember, [employer] is NOT to be used for urgent needs. For medical emergencies, dial 911. Now available from your iPhone and Android! Please provide this summary of care documentation to your next provider. Your primary care clinician is listed as Dinah Fontaine. If you have any questions after today's visit, please call [phone].

## 2018-06-04 NOTE — PATIENT INSTRUCTIONS
WOUND CARE INSTRUCTIONS    1. Keep the dressing clean and dry and do not remove for 48 hours. 2. Then change the dressing once a day as follows:  a. Wash hands before and after each dressing change. b. Remove dressing and wash site gently with mild soap and water, rinse, and pat dry.  c. Apply an ointment (Bacitracin, Polysporin, Neosporin, Petroleum jelly or Aquaphor). d. Apply a non-stick (Telfa) dressing or Band-Aid to cover the wound. Remove pressure bandage on Wednesday, then wash gently and apply a thin layer Vaseline and a band-aid to site daily for 1 week. 3. Watch for:  BLEEDING: A small amount of drainage may occur. If bleeding occurs, elevate and rest the surgery site. Apply gauze and steady pressure for 15 minutes. If bleeding continues, call this office. INFECTION: Signs of infection include increased redness, pain, warmth, drainage of pus, and fever. If this occurs, call this office. 4. Special Instructions (follow any that are checked):  · [x] You have stitches that DO NOT need to be removed. · [x] Avoid bending at the waist and heavy lifting for two days. · [x] Sleep with your head elevated for the next two nights. · [x] Rest the surgery site and keep it elevated as much as possible for two days. · [x] You may apply an ice-pack for 10-15 minutes every waking hour for the rest of the day. · [] Eat a soft diet and avoid hot food and hot drinks for the rest of the day. · [] Other instructions: Follow up as directed. Take Tylenol or Ibuprofen for pain as needed. Once the site is healed with no remaining bandages or open areas, protect your surgical site and scar from the sun, as this area will be more sensitive. Use a broad spectrum sunscreen SPF 30 or higher daily, and a chemical free product (one containing zinc oxide or titanium dioxide) is a good choice if the area is sensitive.     You may begin to gently massage the surgical site in 2-3 weeks, rubbing in a circular motion along the scar. This can help reduce swelling and thickness of a scar. A scar cream may be used beginnning 1 month after the surgery. If you have any questions or concerns, please call our office Monday through Friday at [phone].

## 2018-06-04 NOTE — PROGRESS NOTES
This note is written by Blayne Ingram, as dictated by Jamie Bledsoe. Марина Flores MD.    CC: Basal cell carcinoma on the right mid forehead     History of present illness:     Ashleigh Deleon is a 80 y.o. female referred by Dr. Estela Whitaker. She has a biopsy-proven basal cell carcinoma on the right mid forehead. This is a new basal cell carcinoma present for months described as a lesion that concerned Dr. Estela Whitaker with no prior treatment. Biopsy confirmed the diagnosis of basal cell carcinoma, and I reviewed the written pathology. She is feeling well and in her usual state of health today. She has no pain, no current illnesses, no other skin concerns. Her allergies, medications, medical, and social history are reviewed by me today. Exam:     She is an awake, alert, and oriented 80 y.o. female who appears well and in no distress. There is no preauricular, submandibular, or cervical lymphadenopathy. I examined her face. She has a 6 x 4 mm scar with pearly center on her right mid forehead. She confirms location. Assessment/plan:    1. Basal cell carcinoma, right mid forehead. I discussed the diagnosis of basal cell carcinoma and summarized the pathology report. Mohs surgery is indicated by site and size. The procedure was discussed, verbal and written consent were obtained. I performed the procedure. One stage was required to reach a tumor free plane. The surgical defect was managed with intermediate repair. There were no complications. She will follow up as needed as the site heals. Indications, risks, and options were discussed with Ashleigh Deleon preoperatively. Risks including, but not limited to: pain, bleeding, infection, tumor recurrence, scarring and damage to motor and/or sensory nerves, were discussed. Ashleigh Deleon chose Mohs surgery. Ashleigh Deleon was an acceptable surgery candidate.     Ashleigh Deleon was placed in the appropriate position on the operating table in the Mohs surgery procedure room. The area was prepped and draped in the standard manner. Gentian violet was used to outline the clinical margins of the tumor. Local anesthesia was then obtained. The grossly visible tumor was then removed, an underlying layer was excised and mapped according to the Mohs technique, and the individual specimens examined microscopically. The process was repeated until microscopic examination of the tissue specimens confirmed a tumor-free plane. Hemostasis was obtained with electrosurgery and pressure. The wound was covered between stages with moist saline gauze. The wound management options of second intent healing, layered closure, local flap, and/or full thickness skin graft were discussed. Gris Rebollar understands the aims, risks, alternatives, and possible complications and elects to proceed with an intermediate layered closure. Wound margins were made vertical, edges undermined in the muscular plane, standing cones corrected at both poles followed by layered closure. The wound was closed with buried 6-0 polysorb suture in the subcutis to reduce tension on the skin edges, and skin edges were approximated with 6-0 fast gut suture in the dermis to reduce tension on the epidermis. The final closure length was 28 mm. The wound was bandaged with Vaseline, Telfa, gauze and Coverroll. Wound care instructions (written and verbal) and a follow up appointment were given to Gris Rebollar before discharge. Gris Rebollar was discharged in good condition. 2. History of nonmelanoma skin cancer. I discussed the diagnosis and recommend routine examinations with Dr. Edd Guzmán for surveillance. The documentation recorded by the scribe accurately reflects the service I personally performed and the decisions made by me. [facility]   OFFICE PROCEDURE PROGRESS NOTE     Chart reviewed for the following:   Angelo Ortiz MD, have reviewed the History, Physical and updated the Allergic reactions for [address] performed immediately prior to start of procedure:     Deepti Dacosta MD, have performed the following reviews on Juanis Walsh prior to the start of the procedure:     * Patient was identified by name and date of birth   * Agreement on procedure being performed was verified   * Risks and Benefits explained to the patient   * Procedure site verified and marked as necessary   * Patient was positioned for comfort   * Consent was signed and verified     Time: 1:25 PM   Date of procedure: 6/4/2018  Procedure performed by: Angélica Willingham.  Mel Dacosta MD   Provider assisted by: RN   Patient assisted by: self   How tolerated by patient: tolerated the procedure well with no complications   Comments: none

## 2018-07-18 ENCOUNTER — HOSPITAL ENCOUNTER (OUTPATIENT)
Dept: GENERAL RADIOLOGY | Age: 83
Discharge: HOME | End: 2018-07-18
Payer: MEDICARE

## 2018-07-18 DIAGNOSIS — M25.551 RIGHT HIP PAIN: ICD-10-CM

## 2018-07-18 PROCEDURE — 73502 X-RAY EXAM HIP UNI 2-3 VIEWS: CPT

## 2018-07-26 ENCOUNTER — HOSPITAL ENCOUNTER (OUTPATIENT)
Dept: ULTRASOUND IMAGING | Age: 83
Discharge: HOME | End: 2018-07-26
Payer: MEDICARE

## 2018-07-26 ENCOUNTER — HOSPITAL ENCOUNTER (OUTPATIENT)
Dept: ULTRASOUND IMAGING | Age: 83
Discharge: HOME | End: 2018-07-26

## 2018-07-26 DIAGNOSIS — R10.31 ABDOMINAL PAIN, RIGHT LOWER QUADRANT: ICD-10-CM

## 2018-07-26 PROCEDURE — 76700 US EXAM ABDOM COMPLETE: CPT

## 2018-07-26 PROCEDURE — 76856 US EXAM PELVIC COMPLETE: CPT

## 2018-08-23 ENCOUNTER — HOSPITAL ENCOUNTER (OUTPATIENT)
Dept: MAMMOGRAPHY | Age: 83
Discharge: HOME | End: 2018-08-23

## 2018-08-23 DIAGNOSIS — Z12.39 SCREENING FOR BREAST CANCER: ICD-10-CM

## 2018-08-23 DIAGNOSIS — Z78.0 MENOPAUSE: ICD-10-CM

## 2018-08-23 PROCEDURE — 77067 SCR MAMMO BI INCL CAD: CPT

## 2018-08-23 PROCEDURE — 77080 DXA BONE DENSITY AXIAL: CPT

## 2018-10-08 ENCOUNTER — OFFICE VISIT (OUTPATIENT)
Dept: INTERNAL MEDICINE CLINIC | Age: 83
End: 2018-10-08

## 2018-10-08 VITALS
SYSTOLIC BLOOD PRESSURE: 176 MMHG | OXYGEN SATURATION: 94 % | HEART RATE: 67 BPM | BODY MASS INDEX: 28.39 KG/M2 | WEIGHT: 160.2 LBS | RESPIRATION RATE: 14 BRPM | DIASTOLIC BLOOD PRESSURE: 80 MMHG | TEMPERATURE: 98.7 F | HEIGHT: 63 IN

## 2018-10-08 DIAGNOSIS — R42 DIZZINESS: ICD-10-CM

## 2018-10-08 DIAGNOSIS — I10 ESSENTIAL HYPERTENSION: Primary | ICD-10-CM

## 2018-10-08 RX ORDER — HYDROCHLOROTHIAZIDE 12.5 MG/1
12.5 TABLET ORAL DAILY
Qty: 90 TAB | Refills: 0 | Status: SHIPPED | OUTPATIENT
Start: 2018-10-08 | End: 2019-03-06

## 2018-10-08 RX ORDER — METOPROLOL SUCCINATE 25 MG/1
TABLET, EXTENDED RELEASE ORAL
Refills: 1 | COMMUNITY
Start: 2018-09-19 | End: 2018-11-02

## 2018-10-08 RX ORDER — UMECLIDINIUM 62.5 UG/1
AEROSOL, POWDER ORAL
Refills: 0 | COMMUNITY
Start: 2018-10-01 | End: 2022-04-22 | Stop reason: ALTCHOICE

## 2018-10-08 RX ORDER — LOSARTAN POTASSIUM 100 MG/1
TABLET ORAL
Refills: 0 | Status: ON HOLD | COMMUNITY
Start: 2018-08-13 | End: 2021-01-04

## 2018-10-08 RX ORDER — OMEPRAZOLE 40 MG/1
CAPSULE, DELAYED RELEASE ORAL
Refills: 0 | COMMUNITY
Start: 2018-07-25 | End: 2018-11-02 | Stop reason: ALTCHOICE

## 2018-10-08 RX ORDER — ZOLPIDEM TARTRATE 5 MG/1
TABLET ORAL
Refills: 0 | Status: ON HOLD | COMMUNITY
Start: 2018-09-10 | End: 2021-01-07

## 2018-10-08 NOTE — PROGRESS NOTES
HISTORY OF PRESENT ILLNESS  Myrna Lomeli is a 80 y.o. female with history of hypertension, COPD, LELE, post traumatic headache, GERD, GI bleed,  for hypertension evaluation   HPI  Patient was seen by PCP Dr. Kimi Starkey two weeks ago, blood pressure elevated on Metoprolol, hctz and losartan. Had  Concern medication was not adjusted and decided to return here     Stopped HCTZ 1 week ago because she was dizzy     Got dizzy when she sat up up to get out of bed     Dizziness now milder and she had headaches    Admits to very litter water intake    Managed for post traumatic headaches by neurology. Managed for bleeding ulcer by Dr. Julee Pérez.  Takes Sucrafaate and Omeprazole    Dr. Julee Pérez stated her on gabapentin for neuropathy    Dr Julio Cesar Ferrari orthopaedist increased Gabapentin to bid    Dr. Jon Alcazar, Pulmonary specialist     Dr. Tonia Rucker, started Lomotigine for bipolar disorder, weaned off benzodiazepine     Sees cardiologist annually, Dr. Jeffrey Lopez,     Reports 50-65% carotid artery blockage, concern about this    Appointment with Dr. Franck Huynh, vascular surgery, later this week  for left leg pain    LELE untreated    She lives a lone    Daughter a pharm D, she reviews medications, but does not make direct recommendations on medical management    Past Medical History:   Diagnosis Date    Arthritis     Arthritis of knee, left     Asthma     Asthma with COPD (chronic obstructive pulmonary disease) ([address]     FEV1 - 0.99, IgE high    Atherosclerosis of both carotid arteries     Bipolar affective disorder ([address]     Cataract, left eye     cataract - left    Chronic obstructive pulmonary disease ([address]     Closed fracture of lateral portion of left tibial plateau     Depression with anxiety     depression/anxiety    Encephalopathy     encephalopathy    Esophageal ulcer     esophageal ulcer    Gastroparesis     GERD (gastroesophageal reflux disease)     Hepatic steatosis     History of fall     hx falls    Hyperlipidemia     increased cholesterol    Hyperlipidemia     Hypertension     IBS (irritable bowel syndrome)     IBS    IGT (impaired glucose tolerance) 9/15/2015    Liver disease     elevated liver enzymes - being evaluated/US done x 2 - inflammed liver/\"fat on liver\"    Menopause 1987    Neurological disorder     delayed thinking process? ??    OA (osteoarthritis) of knee     Osteoporosis     Seizures (HCC)     Sunburn, blistering     Unspecified adverse effect of anesthesia     was told by neurologist not to use anesthesia unless needed due to encephalopathy    Unspecified adverse effect of anesthesia     hallucinations after MRI with sedation    Vertigo     vertigo       Current Outpatient Prescriptions on File Prior to Visit   Medication Sig Dispense Refill    sucralfate (CARAFATE) 1 gram tablet Take 1 g by mouth four (4) times daily.  lamoTRIgine (LAMICTAL) 50 mg disintegrating tablet Take 1 Tab by mouth nightly. 90 Tab 1    montelukast (SINGULAIR) 10 mg tablet take 1 tablet by mouth once daily 90 Tab 1    fluticasone-salmeterol (ADVAIR) 500-50 mcg/dose diskus inhaler Take 1 Puff by inhalation two (2) times a day. 3 Inhaler 1    rosuvastatin (CRESTOR) 5 mg tablet Take 1 Tab by mouth nightly. 90 Tab 1    albuterol (PROVENTIL HFA, VENTOLIN HFA, PROAIR HFA) 90 mcg/actuation inhaler Take 2 Puffs by inhalation every four (4) hours as needed for Wheezing. 1 Inhaler 2    albuterol (PROVENTIL VENTOLIN) 2.5 mg /3 mL (0.083 %) nebulizer solution 3 mL by Nebulization route every four (4) hours as needed for Wheezing. 1 Each 4    Cholecalciferol, Vitamin D3, (VITAMIN D3) 1,000 unit cap Take 2,000 Units by mouth.  vitamin E (AQUA GEMS) 400 unit capsule Take 400 Units by mouth daily.  LACTOBACILLUS ACIDOPHILUS (PROBIOTIC PO) Take  by mouth. Takes one po daily.  CALCIUM CARBONATE (TUMS PO) Take  by mouth as needed.       albuterol (PROVENTIL, VENTOLIN) 90 mcg/Actuation inhaler Take 2-4 Puffs by inhalation every four (4) hours as needed.  lidocaine (LIDODERM) 5 % Apply patch to the affected area for 12 hours a day and remove for 12 hours a day. (Patient not taking: Reported on 10/8/2018) 30 Each 0    furosemide (LASIX) 20 mg tablet Take 1 Tab by mouth daily. Indications: Edema (Patient not taking: Reported on 10/8/2018) 30 Tab 0    amLODIPine (NORVASC) 10 mg tablet Take 1 Tab by mouth daily. (Patient not taking: Reported on 10/8/2018) 90 Tab 1    amLODIPine (NORVASC) 5 mg tablet Take 1.5 Tabs by mouth daily. (Patient not taking: Reported on 10/8/2018) 135 Tab 1    valsartan (DIOVAN) 160 mg tablet Take 1 Tab by mouth daily. (Patient not taking: Reported on 10/8/2018) 90 Tab 1    clonazePAM (KLONOPIN) 0.5 mg tablet Take 1 Tab by mouth nightly as needed.  clonazePAM (KLONOPIN) 2 mg tablet Take 0.5 Tabs by mouth nightly as needed. Max Daily Amount: 1 mg. (Patient not taking: Reported on 10/8/2018) 20 Tab 0     No current facility-administered medications on file prior to visit. Review of Systems   Constitutional: Positive for malaise/fatigue. Respiratory: Negative. Cardiovascular: Negative for chest pain and palpitations. Gastrointestinal: Negative. Genitourinary: Negative for dysuria and frequency. Neurological: Positive for dizziness and headaches. Visit Vitals    /80    Pulse 67    Temp 98.7 °F (37.1 °C) (Oral)    Resp 14    Ht 5' 3\" (1.6 m)    Wt 160 lb 3.2 oz (72.7 kg)    SpO2 94%    BMI 28.38 kg/m2     Physical Exam   Constitutional: She appears well-developed and well-nourished. No distress. HENT:   Head: Normocephalic and atraumatic. Neck: No JVD present. Carotid bruit is present (bilateral ). Cardiovascular: Normal rate, regular rhythm and normal heart sounds. Pulmonary/Chest: Effort normal and breath sounds normal. No accessory muscle usage. No respiratory distress. Musculoskeletal: She exhibits deformity (bilateral pes planus). She exhibits no edema or tenderness. Neurological: She is alert. No cranial nerve deficit. Skin: Skin is warm and dry. She is not diaphoretic. Psychiatric: Thought content normal. Her mood appears anxious. Her speech is tangential. Cognition and memory are normal.       ASSESSMENT and PLAN    ICD-10-CM ICD-9-CM    1. Essential hypertension I10 401.9 AMB POC URINALYSIS DIP STICK AUTO W/O MICRO      METABOLIC PANEL, COMPREHENSIVE      CBC WITH AUTOMATED DIFF      hydroCHLOROthiazide (HYDRODIURIL) 12.5 mg tablet   2. Dizziness R42 780.4 AMB POC URINALYSIS DIP STICK AUTO W/O MICRO      METABOLIC PANEL, COMPREHENSIVE      CBC WITH AUTOMATED DIFF     Follow-up Disposition:  Return in about 2 weeks (around 10/22/2018). reviewed medications and side effects in detail    1. Uncontrolled. Instructed to take 1/2 tablet HCTZ, continue other medications, start blood pressure monitoring, increase hydration    Rule out secondary cause of dizziness, labs, urinalysis today     Strongly encouraged follow up with PCP, Dr Jarrett Cruz. Discussed concern for gaps in care, polypharmacy d/t multiple prescribers    Encouraged to keep appointment with vascular surgeon, discuss concerns regarding carotid stenosis    Patient voices understanding and acceptance of this advice and will call back if any further questions or concerns. An After Visit Summary was printed and given to the patient.

## 2018-10-08 NOTE — MR AVS SNAPSHOT
[address]  [address] [place] [address] 
[phone] Patient: Kate Wellington MRN: [id number] TDM:[birth year] Visit Information Date & Time Provider Department Dept. Phone Encounter #  
 10/8/2018 10:30 AM [facility] [phone] [id number] Follow-up Instructions Return in about 2 weeks (around 10/22/2018). Follow-up and Disposition History Your Appointments 11/2/2018 11:00 AM  
ROUTINE CARE with [facility] ([address]) Appt Note: 2 wk f/u  
 [address] [facility] [address] [facility] [address]  
[facility] [address] [facility] [address] Upcoming Health Maintenance Date Due DTaP/Tdap/Td series (1 - Tdap) 8[birth year]9/1956 Shingrix Vaccine Age 50> (1 of 2) 8/9/1985 GLAUCOMA SCREENING Q2Y 8/9/2000 Pneumococcal 65+ Low/Medium Risk (2 of 2 - PPSV23) 8/30/2016 MEDICARE YEARLY EXAM 3/14/2018 Influenza Age 5 to Adult 8/1/2018 Allergies as of 10/8/2018  Review Complete On: 10/8/2018 By: Sarah Beth Duval Severity Noted Reaction Type Reactions Giacomo Huitron  02/12/2016    Other (comments) Blood testing Cat Dander  02/12/2016    Other (comments) Blood testing Dog Dander  02/12/2016    Other (comments) Blood testing Elavil  03/04/2016    Other (comments)  
 imbalance [place]  02/12/2016    Other (comments) Blood testing Current Immunizations  Reviewed on 1/18/2017 Name Date Influenza High Dose Vaccine PF 1/19/2017 Pneumococcal Conjugate (PCV-13) 8/30/2015, 4/9/2015 Zoster Vaccine, Live 7/2/2014 Not reviewed this visit You Were Diagnosed With   
  
 Codes Comments Essential hypertension    -  Primary ICD-10-CM: I10 
ICD-9-CM: 401.9 Dizziness     ICD-10-CM: W17 ICD-9-CM: 780.4 Vitals BP Pulse Temp Resp Height(growth percentile) Weight(growth percentile) 176/80 67 98.7 °F (37.1 °C) (Oral) 14 5' 3\" (1.6 m) 160 lb 3.2 oz (72.7 kg) SpO2 BMI OB Status Smoking Status 94% 28.38 kg/m2 Postmenopausal Former Smoker Vitals History BMI and BSA Data Body Mass Index Body Surface Area  
 28.38 kg/m 2 1.8 m 2 Preferred Pharmacy Pharmacy Name Phone [facility], [address] [phone] Your Updated Medication List  
  
   
This list is accurate as of 10/8/18 11:59 PM.  Always use your most recent med list.  
  
  
  
  
 * albuterol 2.5 mg /3 mL (0.083 %) nebulizer solution Commonly known as:  PROVENTIL VENTOLIN  
3 mL by Nebulization route every four (4) hours as needed for Wheezing. * albuterol 90 mcg/actuation inhaler Commonly known as:  PROVENTIL HFA, VENTOLIN HFA, PROAIR HFA Take 2 Puffs by inhalation every four (4) hours as needed for Wheezing. albuterol 90 mcg/actuation inhaler Commonly known as:  Marybelle  Take 2-4 Puffs by inhalation every four (4) hours as needed. amLODIPine 5 mg tablet Commonly known as:  Candelaria Rising Take 1.5 Tabs by mouth daily. CARAFATE 1 gram tablet Generic drug:  sucralfate Take 1 g by mouth four (4) times daily. clonazePAM 0.5 mg tablet Commonly known as:  Zelpha Brunette Take 1 Tab by mouth nightly as needed. fluticasone-salmeterol 500-50 mcg/dose diskus inhaler Commonly known as:  ADVAIR Take 1 Puff by inhalation two (2) times a day. hydroCHLOROthiazide 12.5 mg tablet Commonly known as:  HYDRODIURIL Take 1 Tab by mouth daily. INCRUSE ELLIPTA 62.5 mcg/actuation inhaler Generic drug:  umeclidinium  
  
 lamoTRIgine 50 mg disintegrating tablet Commonly known as: LaMICtal  
Take 1 Tab by mouth nightly. lidocaine 5 % Commonly known as:  Nilson Giles  
 Apply patch to the affected area for 12 hours a day and remove for 12 hours a day. losartan 100 mg tablet Commonly known as:  COZAAR  
take 1 tablet by mouth once daily  
  
 metoprolol succinate 25 mg XL tablet Commonly known as:  TOPROL-XL  
take 1 tablet by mouth once daily for high blood pressure  
  
 montelukast 10 mg tablet Commonly known as:  SINGULAIR  
take 1 tablet by mouth once daily  
  
 omeprazole 40 mg capsule Commonly known as:  PRILOSEC  
take 1 tablet by mouth once daily PROBIOTIC PO Take  by mouth. Takes one po daily. rosuvastatin 5 mg tablet Commonly known as:  CRESTOR Take 1 Tab by mouth nightly. TUMS PO Take  by mouth as needed. valsartan 160 mg tablet Commonly known as:  DIOVAN Take 1 Tab by mouth daily. VITAMIN D3 1,000 unit Cap Generic drug:  cholecalciferol Take 2,000 Units by mouth.  
  
 vitamin E 400 unit capsule Commonly known as:  [address] Take 400 Units by mouth daily. zolpidem 5 mg tablet Commonly known as:  AMBIEN  
  
 * Notice: This list has 2 medication(s) that are the same as other medications prescribed for you. Read the directions carefully, and ask your doctor or other care provider to review them with you. Prescriptions Sent to Pharmacy Refills  
 hydroCHLOROthiazide (HYDRODIURIL) 12.5 mg tablet 0 Sig: Take 1 Tab by mouth daily. Class: Normal  
 [facility] [address] [place] [address],[address] #: [phone] Route: Oral  
  
We Performed the Following AMB POC URINALYSIS DIP STICK AUTO W/O MICRO [38658 CPT(R)] CBC WITH AUTOMATED DIFF [04299 CPT(R)] METABOLIC PANEL, COMPREHENSIVE [44008 CPT(R)] Follow-up Instructions Return in about 2 weeks (around 10/22/2018). Patient Instructions Take 1/2 tablet ( 12.5 mg ) hydrochlorothiazide Increase water intake to at least 48 ounces daily Get blood pressure monitor from pharmacy Follow up 2 weeks for blood pressure evaluation Patient Instructions History Introducing [facility]! [facility] introduces [employer] patient portal. Now you can access parts of your medical record, email your doctor's office, and request medication refills online. 1. In your internet browser, go to [email or web address] 2. Click on the First Time User? Click Here link in the Sign In box. You will see the New Member Sign Up page. 3. Enter your [employer] Access Code exactly as it appears below. You will not need to use this code after youve completed the sign-up process. If you do not sign up before the expiration date, you must request a new code. · [employer] Access Code: [email or web address] Expires: 10/21/2018 11:59 AM 
 
4. Enter the last four digits of your Social Security Number (xxxx) and Date of Birth (mm/dd/yyyy) as indicated and click Submit. You will be taken to the next sign-up page. 5. Create a [employer] ID. This will be your [employer] login ID and cannot be changed, so think of one that is secure and easy to remember. 6. Create a [employer] password. You can change your password at any time. 7. Enter your Password Reset Question and Answer. This can be used at a later time if you forget your password. 8. Enter your e-mail address. You will receive e-mail notification when new information is available in [address] 9. Click Sign Up. You can now view and download portions of your medical record. 10. Click the Download Summary menu link to download a portable copy of your medical information. If you have questions, please visit the Frequently Asked Questions section of the [employer] website. Remember, [employer] is NOT to be used for urgent needs. For medical emergencies, dial 911. Now available from your iPhone and Android! Please provide this summary of care documentation to your next provider. Your primary care clinician is listed as Deysi Irving. If you have any questions after today's visit, please call [phone].

## 2018-10-08 NOTE — PATIENT INSTRUCTIONS
Take 1/2 tablet ( 12.5 mg ) hydrochlorothiazide     Increase water intake to at least 48 ounces daily    Get blood pressure monitor from pharmacy    Follow up 2 weeks for blood pressure evaluation
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
## 2018-10-09 LAB
ALBUMIN SERPL-MCNC: 4.7 G/DL (ref 3.5–4.7)
ALBUMIN/GLOB SERPL: 1.9 {RATIO} (ref 1.2–2.2)
ALP SERPL-CCNC: 104 IU/L (ref 39–117)
ALT SERPL-CCNC: 13 IU/L (ref 0–32)
AST SERPL-CCNC: 16 IU/L (ref 0–40)
BASOPHILS # BLD AUTO: 0 X10E3/UL (ref 0–0.2)
BASOPHILS NFR BLD AUTO: 0 %
BILIRUB SERPL-MCNC: 0.5 MG/DL (ref 0–1.2)
BUN SERPL-MCNC: 17 MG/DL (ref 8–27)
BUN/CREAT SERPL: 18 (ref 12–28)
CALCIUM SERPL-MCNC: 9.9 MG/DL (ref 8.7–10.3)
CHLORIDE SERPL-SCNC: 101 MMOL/L (ref 96–106)
CO2 SERPL-SCNC: 18 MMOL/L (ref 20–29)
CREAT SERPL-MCNC: 0.97 MG/DL (ref 0.57–1)
EOSINOPHIL # BLD AUTO: 0.1 X10E3/UL (ref 0–0.4)
EOSINOPHIL NFR BLD AUTO: 1 %
ERYTHROCYTE [DISTWIDTH] IN BLOOD BY AUTOMATED COUNT: 14.8 % (ref 12.3–15.4)
GLOBULIN SER CALC-MCNC: 2.5 G/DL (ref 1.5–4.5)
GLUCOSE SERPL-MCNC: 109 MG/DL (ref 65–99)
HCT VFR BLD AUTO: 41.6 % (ref 34–46.6)
HGB BLD-MCNC: 13.9 G/DL (ref 11.1–15.9)
IMM GRANULOCYTES # BLD: 0 X10E3/UL (ref 0–0.1)
IMM GRANULOCYTES NFR BLD: 0 %
LYMPHOCYTES # BLD AUTO: 2 X10E3/UL (ref 0.7–3.1)
LYMPHOCYTES NFR BLD AUTO: 28 %
MCH RBC QN AUTO: 28.3 PG (ref 26.6–33)
MCHC RBC AUTO-ENTMCNC: 33.4 G/DL (ref 31.5–35.7)
MCV RBC AUTO: 85 FL (ref 79–97)
MONOCYTES # BLD AUTO: 0.6 X10E3/UL (ref 0.1–0.9)
MONOCYTES NFR BLD AUTO: 8 %
NEUTROPHILS # BLD AUTO: 4.6 X10E3/UL (ref 1.4–7)
NEUTROPHILS NFR BLD AUTO: 63 %
PLATELET # BLD AUTO: 186 X10E3/UL (ref 150–379)
POTASSIUM SERPL-SCNC: 5 MMOL/L (ref 3.5–5.2)
PROT SERPL-MCNC: 7.2 G/DL (ref 6–8.5)
RBC # BLD AUTO: 4.92 X10E6/UL (ref 3.77–5.28)
SODIUM SERPL-SCNC: 141 MMOL/L (ref 134–144)
WBC # BLD AUTO: 7.3 X10E3/UL (ref 3.4–10.8)

## 2018-11-02 ENCOUNTER — OFFICE VISIT (OUTPATIENT)
Dept: INTERNAL MEDICINE CLINIC | Age: 83
End: 2018-11-02

## 2018-11-02 VITALS
TEMPERATURE: 97.8 F | BODY MASS INDEX: 27.93 KG/M2 | HEART RATE: 57 BPM | WEIGHT: 157.6 LBS | SYSTOLIC BLOOD PRESSURE: 148 MMHG | DIASTOLIC BLOOD PRESSURE: 56 MMHG | OXYGEN SATURATION: 94 % | RESPIRATION RATE: 15 BRPM | HEIGHT: 63 IN

## 2018-11-02 DIAGNOSIS — F03.A0 MILD DEMENTIA: ICD-10-CM

## 2018-11-02 DIAGNOSIS — R42 DIZZINESS: Primary | ICD-10-CM

## 2018-11-02 DIAGNOSIS — R51.9 HEADACHE DISORDER: ICD-10-CM

## 2018-11-02 DIAGNOSIS — Z23 ENCOUNTER FOR IMMUNIZATION: ICD-10-CM

## 2018-11-02 DIAGNOSIS — F31.60 BIPOLAR AFFECTIVE DISORDER, CURRENT EPISODE MIXED, CURRENT EPISODE SEVERITY UNSPECIFIED (HCC): ICD-10-CM

## 2018-11-02 DIAGNOSIS — M85.80 OSTEOPENIA, UNSPECIFIED LOCATION: ICD-10-CM

## 2018-11-02 DIAGNOSIS — Z99.89 OSA ON CPAP: ICD-10-CM

## 2018-11-02 DIAGNOSIS — G47.33 OSA ON CPAP: ICD-10-CM

## 2018-11-02 DIAGNOSIS — R73.02 IGT (IMPAIRED GLUCOSE TOLERANCE): ICD-10-CM

## 2018-11-02 DIAGNOSIS — I10 ESSENTIAL HYPERTENSION: ICD-10-CM

## 2018-11-02 LAB
BILIRUB UR QL STRIP: NEGATIVE
GLUCOSE UR-MCNC: NEGATIVE MG/DL
KETONES P FAST UR STRIP-MCNC: NEGATIVE MG/DL
PH UR STRIP: 5.5 [PH] (ref 4.6–8)
PROT UR QL STRIP: NEGATIVE
SP GR UR STRIP: 1.03 (ref 1–1.03)
UA UROBILINOGEN AMB POC: NORMAL (ref 0.2–1)
URINALYSIS CLARITY POC: CLEAR
URINALYSIS COLOR POC: YELLOW
URINE BLOOD POC: NEGATIVE
URINE LEUKOCYTES POC: NEGATIVE
URINE NITRITES POC: NEGATIVE

## 2018-11-02 RX ORDER — NEBIVOLOL 5 MG/1
TABLET ORAL 2 TIMES DAILY
Status: ON HOLD | COMMUNITY
End: 2021-01-03 | Stop reason: DRUGHIGH

## 2018-11-02 NOTE — PROGRESS NOTES
HISTORY OF PRESENT ILLNESS  Kate Wellington is a 80 y.o. female with history of bipolar disorder, hypertension,mild dementia, LELE, COPD,  CAD, headache disorder presents for short interval follow up  HPI     She no longer has dizziness  She has terrible headaches; managed by neurologist. Occipital headache radiates to crown Dr. Alvino Kerns increased lomotigine, discontinued Tramadol      Saw cardiologist for  elevated blood pressure, 170/100. Metoprolol discontinued. Bystolic started    Dr Edgar Chan provider, Dr. Mickey Osorio, prescribed Buzz Tejinder last week. Off benzo for 6 months. Discontinued medication because she feared it was cause of  Headache, still had headache off medication . Restarted mediation at lower dose as recommended by pahrmacist     Reports she was diagnosed with osteoporosis, by gyn provider.  No Tx discussed     Bleed ulcer acting up again, using prilosec denies GI bleed  Managed by GI specialist    Past Medical History:   Diagnosis Date    Arthritis     Arthritis of knee, left     Asthma     Asthma with COPD (chronic obstructive pulmonary disease) ([facility])     FEV1 - 0.99, IgE high    Atherosclerosis of both carotid arteries     Bipolar affective disorder ([address]     Cataract, left eye     cataract - left    Chronic obstructive pulmonary disease ([address]     Closed fracture of lateral portion of left tibial plateau     Depression with anxiety     depression/anxiety    Encephalopathy     encephalopathy    Esophageal ulcer     esophageal ulcer    Gastroparesis     GERD (gastroesophageal reflux disease)     Hepatic steatosis     History of fall     hx falls    Hyperlipidemia     increased cholesterol    Hyperlipidemia     Hypertension     IBS (irritable bowel syndrome)     IBS    IGT (impaired glucose tolerance) 9/15/2015    Liver disease     elevated liver enzymes - being evaluated/US done x 2 - inflammed liver/\"fat on liver\"    Menopause 1987    Neurological disorder delayed thinking process? ??    OA (osteoarthritis) of knee     Osteoporosis     Seizures (HCC)     Sunburn, blistering     Unspecified adverse effect of anesthesia     was told by neurologist not to use anesthesia unless needed due to encephalopathy    Unspecified adverse effect of anesthesia     hallucinations after MRI with sedation    Vertigo     vertigo     Review of Systems   Constitutional: Negative. HENT: Positive for hearing loss. Negative for tinnitus. Eyes: Negative for blurred vision. Respiratory: Negative. Cardiovascular: Negative for chest pain, palpitations and leg swelling. Gastrointestinal: Positive for abdominal pain. Negative for constipation and diarrhea. Genitourinary: Negative. Musculoskeletal: Positive for back pain and joint pain. Skin: Negative. Visit Vitals  /56 (BP 1 Location: Left arm, BP Patient Position: Sitting)   Pulse (!) 57   Temp 97.8 °F (36.6 °C) (Oral)   Resp 15   Ht 5' 3\" (1.6 m)   Wt 157 lb 9.6 oz (71.5 kg)   SpO2 94%   BMI 27.92 kg/m²     Visit Vitals  /56 (BP 1 Location: Left arm, BP Patient Position: Sitting)   Pulse (!) 57   Temp 97.8 °F (36.6 °C) (Oral)   Resp 15   Ht 5' 3\" (1.6 m)   Wt 157 lb 9.6 oz (71.5 kg)   SpO2 94%   BMI 27.92 kg/m²     Physical Exam   Constitutional: She appears well-developed and well-nourished. No distress. Cardiovascular: Normal rate and regular rhythm. Pulmonary/Chest: Effort normal.   Musculoskeletal: She exhibits no edema, tenderness or deformity. Neurological: She is alert. Skin: Skin is warm and dry. She is not diaphoretic. Psychiatric: Thought content normal. Her mood appears anxious. Her speech is tangential. Cognition and memory are impaired. ASSESSMENT and PLAN    ICD-10-CM ICD-9-CM    1. Dizziness R42 780.4    2. Essential hypertension I10 401.9    3.  IGT (impaired glucose tolerance) R73.02 790.22 AMB POC URINALYSIS DIP STICK AUTO W/O MICRO      CANCELED: AMB POC URINALYSIS DIP STICK AUTO W/O MICRO   4. Headache disorder R51 784.0    5. Encounter for immunization Z23 V03.89 PNEUMOCOCCAL POLYSACCHARIDE VACCINE, 23-VALENT, ADULT OR IMMUNOSUPPRESSED PT DOSE,      ADMIN PNEUMOCOCCAL VACCINE      CANCELED: ADMIN INFLUENZA VIRUS VAC   6. Osteopenia, unspecified location M85.80 733.90    7. LELE on CPAP G47.33 327.23     Z99.89 V46.8    8. Mild dementia F03.90 294.20    9. Bipolar affective disorder, current episode mixed, current episode severity unspecified ([facility] F31.60 296.60      Follow-up Disposition:  Return in about 3 months (around 2/2/2019) for htn.  lab results and schedule of future lab studies reviewed with patient  reviewed diet, exercise and weight control  cardiovascular risk and specific lipid/LDL goals reviewed  reviewed medications and side effects in detail    Dizziness resolved  Urine dip negative  Dexa Scan August shows osteopenia not osteoporosis as reported by patient    Hypertension improved, encouraged to keep follow up appointment with Cardiology     Headache disorder, uncontrolled. Continue  current medications and follow up with neurology          Patient voices understanding and acceptance of this advice and will call back if any further questions or concerns. An After Visit Summary was printed and given to the patient.

## 2018-11-02 NOTE — PROGRESS NOTES
Exam room 7  Efrem Pathak is a 80 y.o. female   Pt has received flu vaccine from regular pcp, pt wants to switch PCP if possible   Visit Vitals  /56 (BP 1 Location: Left arm, BP Patient Position: Sitting)   Pulse (!) 57   Temp 97.8 °F (36.6 °C) (Oral)   Resp 15   Ht 5' 3\" (1.6 m)   Wt 157 lb 9.6 oz (71.5 kg)   SpO2 94%   BMI 27.92 kg/m²     Chief Complaint   Patient presents with    Blood Pressure Check    Other     bipolar     1. Have you been to the ER, urgent care clinic since your last visit? Hospitalized since your last visit? No    2. Have you seen or consulted any other health care providers outside of the [address] [place] [address] since your last visit? Include any pap smears or colon screening. Yes , cardiologist, [place].   Health Maintenance Due   Topic Date Due    DTaP/Tdap/Td series (1 - Tdap) 08/09/1956    Shingrix Vaccine Age 50> (1 of 2) 08/09/1985    GLAUCOMA SCREENING Q2Y  08/09/2000    Pneumococcal 65+ Low/Medium Risk (2 of 2 - PPSV23) 08/30/2016    MEDICARE YEARLY EXAM  03/14/2018    Influenza Age 9 to Adult  08/01/2018     Learning Assessment 4/16/2018   PRIMARY LEARNER Patient   HIGHEST LEVEL OF EDUCATION - PRIMARY LEARNER  -   BARRIERS PRIMARY LEARNER -   CO-LEARNER CAREGIVER -   PRIMARY LANGUAGE ENGLISH   LEARNER PREFERENCE PRIMARY READING   ANSWERED BY self   RELATIONSHIP SELF

## 2018-11-02 NOTE — PATIENT INSTRUCTIONS
Home Blood Pressure Test: About This Test  What is it? A home blood pressure test allows you to keep track of your blood pressure at home. Blood pressure is a measure of the force of blood against the walls of your arteries. Blood pressure readings include two numbers, such as 130/80 (say \"130 over 80\"). The first number is the systolic pressure. The second number is the diastolic pressure. Why is this test done? You may do this test at home to:  · Find out if you have high blood pressure. · Track your blood pressure if you have high blood pressure. · Track how well medicine is working to reduce high blood pressure. · Check how lifestyle changes, such as weight loss and exercise, are affecting blood pressure. How can you prepare for the test?  · Do not use caffeine, tobacco, or medicines known to raise blood pressure (such as nasal decongestant sprays) for at least 30 minutes before taking your blood pressure. · Do not exercise for at least 30 minutes before taking your blood pressure. What happens before the test?  Take your blood pressure while you feel comfortable and relaxed. Sit quietly with both feet on the floor for at least 5 minutes before the test.  What happens during the test?  · Sit with your arm slightly bent and resting on a table so that your upper arm is at the same level as your heart. · Roll up your sleeve or take off your shirt to expose your upper arm. · Wrap the blood pressure cuff around your upper arm so that the lower edge of the cuff is about 1 inch above the bend of your elbow. Proceed with the following steps depending on if you are using an automatic or manual pressure monitor. Automatic blood pressure monitors  · Press the on/off button on the automatic monitor and wait until the ready-to-measure \"heart\" symbol appears next to zero in the display window. · Press the start button. The cuff will inflate and deflate by itself.   · Your blood pressure numbers will appear on the screen. · Write your numbers in your log book, along with the date and time. Manual blood pressure monitors  · Place the earpieces of a stethoscope in your ears, and place the bell of the stethoscope over the artery, just below the cuff. · Close the valve on the rubber inflating bulb. · Squeeze the bulb rapidly with your opposite hand to inflate the cuff until the dial or column of mercury reads about 30 mm Hg higher than your usual systolic pressure. If you do not know your usual pressure, inflate the cuff to 210 mm Hg or until the pulse at your wrist disappears. · Open the pressure valve just slightly by twisting or pressing the valve on the bulb. · As you watch the pressure slowly fall, note the level on the dial at which you first start to hear a pulsing or tapping sound through the stethoscope. This is your systolic blood pressure. · Continue letting the air out slowly. The sounds will become muffled and will finally disappear. Note the pressure when the sounds completely disappear. This is your diastolic blood pressure. Let out all the remaining air. · Write your numbers in your log book, along with the date and time. What else should you know about the test?  Here are the categories of blood pressure for adults:  Ideal blood pressure. Systolic is less than 652, and diastolic is less than 80. Elevated blood pressure. Systolic is 379 to 575, and diastolic is less than 80. High blood pressure (hypertension). Systolic is 001 or above. Diastolic is 80 or above. One or both numbers may be high. It is more accurate to take the average of several readings made throughout the day than to rely on a single reading. Follow-up care is a key part of your treatment and safety. Be sure to make and go to all appointments, and call your doctor if you are having problems. It's also a good idea to keep a list of the medicines you take. Where can you learn more?   Go to http://anastasiia-alejandro.info/. Enter C427 in the search box to learn more about \"Home Blood Pressure Test: About This Test.\"  Current as of: December 6, 2017  Content Version: 11.8  © 8341-2941 [employer]. Care instructions adapted under license by [employer] (which disclaims liability or warranty for this information). If you have questions about a medical condition or this instruction, always ask your healthcare professional. [address] any warranty or liability for your use of this information. DASH Diet: Care Instructions  Your Care Instructions    The DASH diet is an eating plan that can help lower your blood pressure. DASH stands for Dietary Approaches to Stop Hypertension. Hypertension is high blood pressure. The DASH diet focuses on eating foods that are high in calcium, potassium, and magnesium. These nutrients can lower blood pressure. The foods that are highest in these nutrients are fruits, vegetables, low-fat dairy products, nuts, seeds, and legumes. But taking calcium, potassium, and magnesium supplements instead of eating foods that are high in those nutrients does not have the same effect. The DASH diet also includes whole grains, fish, and poultry. The DASH diet is one of several lifestyle changes your doctor may recommend to lower your high blood pressure. Your doctor may also want you to decrease the amount of sodium in your diet. Lowering sodium while following the DASH diet can lower blood pressure even further than just the DASH diet alone. Follow-up care is a key part of your treatment and safety. Be sure to make and go to all appointments, and call your doctor if you are having problems. It's also a good idea to know your test results and keep a list of the medicines you take. How can you care for yourself at home? Following the DASH diet  · Eat 4 to 5 servings of fruit each day.  A serving is 1 medium-sized piece of fruit, ½ cup chopped or canned fruit, 1/4 cup dried fruit, or 4 ounces (½ cup) of fruit juice. Choose fruit more often than fruit juice. · Eat 4 to 5 servings of vegetables each day. A serving is 1 cup of lettuce or raw leafy vegetables, ½ cup of chopped or cooked vegetables, or 4 ounces (½ cup) of vegetable juice. Choose vegetables more often than vegetable juice. · Get 2 to 3 servings of low-fat and fat-free dairy each day. A serving is 8 ounces of milk, 1 cup of yogurt, or 1 ½ ounces of cheese. · Eat 6 to 8 servings of grains each day. A serving is 1 slice of bread, 1 ounce of dry cereal, or ½ cup of cooked rice, pasta, or cooked cereal. Try to choose whole-grain products as much as possible. · Limit lean meat, poultry, and fish to 2 servings each day. A serving is 3 ounces, about the size of a deck of cards. · Eat 4 to 5 servings of nuts, seeds, and legumes (cooked dried beans, lentils, and split peas) each week. A serving is 1/3 cup of nuts, 2 tablespoons of seeds, or ½ cup of cooked beans or peas. · Limit fats and oils to 2 to 3 servings each day. A serving is 1 teaspoon of vegetable oil or 2 tablespoons of salad dressing. · Limit sweets and added sugars to 5 servings or less a week. A serving is 1 tablespoon jelly or jam, ½ cup sorbet, or 1 cup of lemonade. · Eat less than 2,300 milligrams (mg) of sodium a day. If you limit your sodium to 1,500 mg a day, you can lower your blood pressure even more. Tips for success  · Start small. Do not try to make dramatic changes to your diet all at once. You might feel that you are missing out on your favorite foods and then be more likely to not follow the plan. Make small changes, and stick with them. Once those changes become habit, add a few more changes. · Try some of the following:  ? Make it a goal to eat a fruit or vegetable at every meal and at snacks. This will make it easy to get the recommended amount of fruits and vegetables each day.   ? Try yogurt topped with fruit and nuts for a snack or healthy dessert. ? Add lettuce, tomato, cucumber, and onion to sandwiches. ? Combine a ready-made pizza crust with low-fat mozzarella cheese and lots of vegetable toppings. Try using tomatoes, squash, spinach, broccoli, carrots, cauliflower, and onions. ? Have a variety of cut-up vegetables with a low-fat dip as an appetizer instead of chips and dip. ? Sprinkle sunflower seeds or chopped almonds over salads. Or try adding chopped walnuts or almonds to cooked vegetables. ? Try some vegetarian meals using beans and peas. Add garbanzo or kidney beans to salads. Make burritos and tacos with mashed vogel beans or black beans. Where can you learn more? Go to http://anastasiiaVivoTextalejandro.info/. Enter S948 in the search box to learn more about \"DASH Diet: Care Instructions. \"  Current as of: December 6, 2017  Content Version: 11.8  © 9665-6957 [employer]. Care instructions adapted under license by [employer] (which disclaims liability or warranty for this information). If you have questions about a medical condition or this instruction, always ask your healthcare professional. [address] any warranty or liability for your use of this information. Headache: Care Instructions  Your Care Instructions    Headaches have many possible causes. Most headaches aren't a sign of a more serious problem, and they will get better on their own. Home treatment may help you feel better faster. The doctor has checked you carefully, but problems can develop later. If you notice any problems or new symptoms, get medical treatment right away. Follow-up care is a key part of your treatment and safety. Be sure to make and go to all appointments, and call your doctor if you are having problems. It's also a good idea to know your test results and keep a list of the medicines you take.   How can you care for yourself at home?  · Do not drive if you have taken a prescription pain medicine. · Rest in a quiet, dark room until your headache is gone. Close your eyes and try to relax or go to sleep. Don't watch TV or read. · Put a cold, moist cloth or cold pack on the painful area for 10 to 20 minutes at a time. Put a thin cloth between the cold pack and your skin. · Use a warm, moist towel or a heating pad set on low to relax tight shoulder and neck muscles. · Have someone gently massage your neck and shoulders. · Take pain medicines exactly as directed. ? If the doctor gave you a prescription medicine for pain, take it as prescribed. ? If you are not taking a prescription pain medicine, ask your doctor if you can take an over-the-counter medicine. · Be careful not to take pain medicine more often than the instructions allow, because you may get worse or more frequent headaches when the medicine wears off. · Do not ignore new symptoms that occur with a headache, such as a fever, weakness or numbness, vision changes, or confusion. These may be signs of a more serious problem. To prevent headaches  · Keep a headache diary so you can figure out what triggers your headaches. Avoiding triggers may help you prevent headaches. Record when each headache began, how long it lasted, and what the pain was like (throbbing, aching, stabbing, or dull). Write down any other symptoms you had with the headache, such as nausea, flashing lights or dark spots, or sensitivity to bright light or loud noise. Note if the headache occurred near your period. List anything that might have triggered the headache, such as certain foods (chocolate, cheese, wine) or odors, smoke, bright light, stress, or lack of sleep. · Find healthy ways to deal with stress. Headaches are most common during or right after stressful times.  Take time to relax before and after you do something that has caused a headache in the past.  · Try to keep your muscles relaxed by keeping good posture. Check your jaw, face, neck, and shoulder muscles for tension, and try relaxing them. When sitting at a desk, change positions often, and stretch for 30 seconds each hour. · Get plenty of sleep and exercise. · Eat regularly and well. Long periods without food can trigger a headache. · Treat yourself to a massage. Some people find that regular massages are very helpful in relieving tension. · Limit caffeine by not drinking too much coffee, tea, or soda. But don't quit caffeine suddenly, because that can also give you headaches. · Reduce eyestrain from computers by blinking frequently and looking away from the computer screen every so often. Make sure you have proper eyewear and that your monitor is set up properly, about an arm's length away. · Seek help if you have depression or anxiety. Your headaches may be linked to these conditions. Treatment can both prevent headaches and help with symptoms of anxiety or depression. When should you call for help? Call 911 anytime you think you may need emergency care. For example, call if:    · You have signs of a stroke. These may include:  ? Sudden numbness, paralysis, or weakness in your face, arm, or leg, especially on only one side of your body. ? Sudden vision changes. ? Sudden trouble speaking. ? Sudden confusion or trouble understanding simple statements. ? Sudden problems with walking or balance. ? A sudden, severe headache that is different from past headaches.    Call your doctor now or seek immediate medical care if:    · You have a new or worse headache.     · Your headache gets much worse. Where can you learn more? Go to http://anastasiia-alejandro.info/. Enter M271 in the search box to learn more about \"Headache: Care Instructions. \"  Current as of: June 4, 2018  Content Version: 11.8  © 2174-8835 [employer].  Care instructions adapted under license by [employer] (which disclaims liability or warranty for this information). If you have questions about a medical condition or this instruction, always ask your healthcare professional. [place] [address] any warranty or liability for your use of this information.

## 2018-11-05 DIAGNOSIS — R73.01 IFG (IMPAIRED FASTING GLUCOSE): Primary | ICD-10-CM

## 2019-01-21 ENCOUNTER — DOCUMENTATION ONLY (OUTPATIENT)
Dept: INTERNAL MEDICINE CLINIC | Age: 84
End: 2019-01-21

## 2019-03-06 ENCOUNTER — PATIENT OUTREACH (OUTPATIENT)
Dept: INTERNAL MEDICINE CLINIC | Age: 84
End: 2019-03-06

## 2019-03-06 ENCOUNTER — OFFICE VISIT (OUTPATIENT)
Dept: INTERNAL MEDICINE CLINIC | Age: 84
End: 2019-03-06

## 2019-03-06 VITALS
HEART RATE: 61 BPM | DIASTOLIC BLOOD PRESSURE: 47 MMHG | RESPIRATION RATE: 18 BRPM | OXYGEN SATURATION: 96 % | HEIGHT: 63 IN | SYSTOLIC BLOOD PRESSURE: 132 MMHG | WEIGHT: 156 LBS | TEMPERATURE: 98.4 F | BODY MASS INDEX: 27.64 KG/M2

## 2019-03-06 DIAGNOSIS — J40 BRONCHITIS: ICD-10-CM

## 2019-03-06 DIAGNOSIS — I10 ESSENTIAL HYPERTENSION: ICD-10-CM

## 2019-03-06 DIAGNOSIS — N39.0 URINARY TRACT INFECTION WITHOUT HEMATURIA, SITE UNSPECIFIED: ICD-10-CM

## 2019-03-06 DIAGNOSIS — G47.33 OBSTRUCTIVE SLEEP APNEA: ICD-10-CM

## 2019-03-06 DIAGNOSIS — Z00.00 MEDICARE ANNUAL WELLNESS VISIT, SUBSEQUENT: Primary | ICD-10-CM

## 2019-03-06 DIAGNOSIS — H91.93 BILATERAL HEARING LOSS, UNSPECIFIED HEARING LOSS TYPE: ICD-10-CM

## 2019-03-06 RX ORDER — CEFDINIR 300 MG/1
300 CAPSULE ORAL 2 TIMES DAILY
Qty: 20 CAP | Refills: 0 | Status: SHIPPED | OUTPATIENT
Start: 2019-03-06 | End: 2019-03-16

## 2019-03-06 RX ORDER — CEFDINIR 300 MG/1
300 CAPSULE ORAL 2 TIMES DAILY
Qty: 20 CAP | Refills: 0 | Status: SHIPPED | OUTPATIENT
Start: 2019-03-06 | End: 2019-03-06 | Stop reason: SDUPTHER

## 2019-03-06 NOTE — PROGRESS NOTES
Exam room 9  Rhonda Lizarraga is a 80 y.o. female  Chief Complaint   Patient presents with    Hypertension     Visit Vitals  /47 (BP 1 Location: Left arm, BP Patient Position: Sitting)   Pulse 61   Temp 98.4 °F (36.9 °C) (Oral)   Resp 18   Ht 5' 3\" (1.6 m)   Wt 156 lb (70.8 kg)   SpO2 96%   BMI 27.63 kg/m²     1. Have you been to the ER, urgent care clinic since your last visit? Hospitalized since your last visit? Yes [facility], Hypertension, 3 seperate times    2. Have you seen or consulted any other health care providers outside of the [address] [place] [address] since your last visit? Include any pap smears or colon screening.  No  Health Maintenance Due   Topic Date Due    DTaP/Tdap/Td series (1 - Tdap) 08/09/1956    Shingrix Vaccine Age 50> (1 of 2) 08/09/1985    GLAUCOMA SCREENING Q2Y  08/09/2000    MEDICARE YEARLY EXAM  03/14/2018    Influenza Age 9 to Adult  08/01/2018     Learning Assessment 4/16/2018   PRIMARY LEARNER Patient   HIGHEST LEVEL OF EDUCATION - PRIMARY LEARNER  -   BARRIERS PRIMARY LEARNER -   CO-LEARNER CAREGIVER -   PRIMARY LANGUAGE ENGLISH   LEARNER PREFERENCE PRIMARY READING   ANSWERED BY self   RELATIONSHIP SELF

## 2019-03-06 NOTE — PROGRESS NOTES
HISTORY OF PRESENT ILLNESS  Bharathi Fagan is a 80 y.o. female presents for routine care   HPI  Since LOV she was seen by [facility] 3 times for elevated blood pressure     Blood pressure elevated in morning and sometimes before bed     Also  seen by her cardiologist 1 month ago. Daughter a pharmacist  takes readings to specialist. Diuretic discontinued by card     Now takes Clonidine, Losartan, Bystolic all BID    She will return with  Full medication list    Left Baker's cyst.  Dr. Swapnil Mendes did not want to surgery     Dermatology prefers that she see general surgeon for Baker's cyst    She has left knee pain       Dr. Kellen Escobar diagnosed her with dementia, subsequent  evaluation  By another neurologist did not support his findings     Psychiatrist, Dr. Samina Luciano prescribed Trazodone for sleep, she takes  1/2 tablet qhs prn    Sleeps well with Clonidine     Takes Clonazepam 1/2 tablet prn     Will try to use CPAP tonight for the first time.       pulmonary specialist Dr Agustin Nap    Two days she has had wheezing, cough and chest congestion     Hearing loss, however she does not want to have hearing evaluated now      Psychosocial:  Lives alone  Three children  No alcohol or tobacco  She has friends and is socially active      No recent fall          Past Medical History:   Diagnosis Date    Arthritis     Arthritis of knee, left     Asthma     Asthma with COPD (chronic obstructive pulmonary disease) ([facility])     FEV1 - 0.99, IgE high    Atherosclerosis of both carotid arteries     Bipolar affective disorder ([address]     Cataract, left eye     cataract - left    Chronic obstructive pulmonary disease ([address]     Closed fracture of lateral portion of left tibial plateau     Depression with anxiety     depression/anxiety    Encephalopathy     encephalopathy    Esophageal ulcer     esophageal ulcer    Gastroparesis     GERD (gastroesophageal reflux disease)     Hepatic steatosis     History of fall     hx falls    Hyperlipidemia     increased cholesterol    Hyperlipidemia     Hypertension     IBS (irritable bowel syndrome)     IBS    IGT (impaired glucose tolerance) 9/15/2015    Liver disease     elevated liver enzymes - being evaluated/US done x 2 - inflammed liver/\"fat on liver\"    Menopause 1987    Neurological disorder     delayed thinking process? ??    OA (osteoarthritis) of knee     Osteoporosis     Seizures (HCC)     Sunburn, blistering     Unspecified adverse effect of anesthesia     was told by neurologist not to use anesthesia unless needed due to encephalopathy    Unspecified adverse effect of anesthesia     hallucinations after MRI with sedation    Vertigo     vertigo       Current Outpatient Medications on File Prior to Visit   Medication Sig Dispense Refill    nebivolol (BYSTOLIC) 5 mg tablet Take  by mouth daily.  losartan (COZAAR) 100 mg tablet take 1 tablet by mouth once daily  0    zolpidem (AMBIEN) 5 mg tablet   0    INCRUSE ELLIPTA 62.5 mcg/actuation inhaler   0    sucralfate (CARAFATE) 1 gram tablet Take 1 g by mouth four (4) times daily.  lamoTRIgine (LAMICTAL) 50 mg disintegrating tablet Take 1 Tab by mouth nightly. 90 Tab 1    montelukast (SINGULAIR) 10 mg tablet take 1 tablet by mouth once daily 90 Tab 1    fluticasone-salmeterol (ADVAIR) 500-50 mcg/dose diskus inhaler Take 1 Puff by inhalation two (2) times a day. 3 Inhaler 1    rosuvastatin (CRESTOR) 5 mg tablet Take 1 Tab by mouth nightly. 90 Tab 1    albuterol (PROVENTIL HFA, VENTOLIN HFA, PROAIR HFA) 90 mcg/actuation inhaler Take 2 Puffs by inhalation every four (4) hours as needed for Wheezing. 1 Inhaler 2    albuterol (PROVENTIL VENTOLIN) 2.5 mg /3 mL (0.083 %) nebulizer solution 3 mL by Nebulization route every four (4) hours as needed for Wheezing. 1 Each 4    Cholecalciferol, Vitamin D3, (VITAMIN D3) 1,000 unit cap Take 2,000 Units by mouth.       vitamin E (AQUA GEMS) 400 unit capsule Take 400 Units by mouth daily.  LACTOBACILLUS ACIDOPHILUS (PROBIOTIC PO) Take  by mouth. Takes one po daily.  CALCIUM CARBONATE (TUMS PO) Take  by mouth as needed.  albuterol (PROVENTIL, VENTOLIN) 90 mcg/Actuation inhaler Take 2-4 Puffs by inhalation every four (4) hours as needed. No current facility-administered medications on file prior to visit. Review of Systems   Constitutional: Positive for malaise/fatigue. HENT: Positive for hearing loss. Eyes: Negative. Respiratory: Positive for cough, shortness of breath and wheezing. Cardiovascular: Negative for chest pain, palpitations and leg swelling. Gastrointestinal: Negative. Negative for constipation. Genitourinary: Negative for dysuria, flank pain, hematuria and urgency. Musculoskeletal: Positive for joint pain (left knee). Skin: Negative. Neurological: Negative. Negative for focal weakness. Psychiatric/Behavioral: Negative for depression and memory loss. The patient is nervous/anxious. Visit Vitals  /47 (BP 1 Location: Left arm, BP Patient Position: Sitting)   Pulse 61   Temp 98.4 °F (36.9 °C) (Oral)   Resp 18   Ht 5' 3\" (1.6 m)   Wt 156 lb (70.8 kg)   SpO2 96%   BMI 27.63 kg/m²     Physical Exam   Constitutional: She is oriented to person, place, and time. She appears well-developed and well-nourished. No distress. Cardiovascular: Normal rate, regular rhythm and normal heart sounds. Pulmonary/Chest: Effort normal and breath sounds normal. No respiratory distress. She has no wheezes. She has no rales. She exhibits no tenderness. Musculoskeletal: She exhibits no edema or tenderness. Neurological: She is alert and oriented to person, place, and time. No cranial nerve deficit. Skin: Skin is warm and dry. No rash noted. She is not diaphoretic. No erythema. Psychiatric: She has a normal mood and affect.  Her behavior is normal. Judgment and thought content normal. Her speech is tangential. Cognition and memory are not impaired. She exhibits normal recent memory and normal remote memory. ASSESSMENT and PLAN    ICD-10-CM ICD-9-CM    1. Medicare annual wellness visit, subsequent Z00.00 V70.0    2. Essential hypertension I10 401.9    3. Obstructive sleep apnea G47.33 327.23    4. Bilateral hearing loss, unspecified hearing loss type H91.93 389.9 REFERRAL TO ENT-OTOLARYNGOLOGY   5. Bronchitis J40 490 cefdinir (OMNICEF) 300 mg capsule      DISCONTINUED: cefdinir (OMNICEF) 300 mg capsule   6. Urinary tract infection without hematuria, site unspecified N39.0 599.0 URINALYSIS W/ RFLX MICROSCOPIC     Follow-up Disposition:  Return in about 3 months (around 6/6/2019) for htn. current treatment plan is effective, no change in therapy  lab results and schedule of future lab studies reviewed with patient  reviewed diet, exercise and weight control  reviewed medications and side effects in detail      Encouraged hearing evaluation, follow up with specialists as scheduled and prn    Reviewed fall prevention     Refer to pharmacy for shingles vaccine      Patient voices understanding and acceptance of this advice and will call back if any further questions or concerns. An After Visit Summary was printed and given to the patient.

## 2019-03-06 NOTE — PATIENT INSTRUCTIONS
Advance Directives: Care Instructions  Your Care Instructions  An advance directive is a legal way to state your wishes at the end of your life. It tells your family and your doctor what to do if you can no longer say what you want. There are two main types of advance directives. You can change them any time that your wishes change. · A living will tells your family and your doctor your wishes about life support and other treatment. · A durable power of  for health care lets you name a person to make treatment decisions for you when you can't speak for yourself. This person is called a health care agent. If you do not have an advance directive, decisions about your medical care may be made by a doctor or a  who doesn't know you. It may help to think of an advance directive as a gift to the people who care for you. If you have one, they won't have to make tough decisions by themselves. Follow-up care is a key part of your treatment and safety. Be sure to make and go to all appointments, and call your doctor if you are having problems. It's also a good idea to know your test results and keep a list of the medicines you take. How can you care for yourself at home? · Discuss your wishes with your loved ones and your doctor. This way, there are no surprises. · Many states have a unique form. Or you might use a universal form that has been approved by many states. This kind of form can sometimes be completed and stored online. Your electronic copy will then be available wherever you have a connection to the Internet. In most cases, doctors will respect your wishes even if you have a form from a different state. · You don't need a  to do an advance directive. But you may want to get legal advice. · Think about these questions when you prepare an advance directive:  ? Who do you want to make decisions about your medical care if you are not able to?  Many people choose a family member or close friend. ? Do you know enough about life support methods that might be used? If not, talk to your doctor so you understand. ? What are you most afraid of that might happen? You might be afraid of having pain, losing your independence, or being kept alive by machines. ? Where would you prefer to die? Choices include your home, a hospital, or a nursing home. ? Would you like to have information about hospice care to support you and your family? ? Do you want to donate organs when you die? ? Do you want certain Restorationist practices performed before you die? If so, put your wishes in the advance directive. · Read your advance directive every year, and make changes as needed. When should you call for help? Be sure to contact your doctor if you have any questions. Where can you learn more? Go to http://anastasiia-alejandro.info/. Enter R264 in the search box to learn more about \"Advance Directives: Care Instructions. \"  Current as of: April 18, 2018  Content Version: 11.9  © 6645-4146 [employer]. Care instructions adapted under license by [employer] (which disclaims liability or warranty for this information). If you have questions about a medical condition or this instruction, always ask your healthcare professional. [place] [address] any warranty or liability for your use of this information. Learning About Living [place]  What is a living will? A living will is a legal form you use to write down the kind of care you want at the end of your life. It is used by the health professionals who will treat you if you aren't able to decide for yourself. If you put your wishes in writing, your loved ones and others will know what kind of care you want. They won't need to guess. This can ease your mind and be helpful to others. A living will is not the same as an estate or property will.  An estate will explains what you want to happen with your money and property after you die. Is a living will a legal document? A living will is a legal document. Each state has its own laws about living [place]. If you move to another state, make sure that your living will is legal in the state where you now live. Or you might use a universal form that has been approved by many states. This kind of form can sometimes be completed and stored online. Your electronic copy will then be available wherever you have a connection to the Internet. In most cases, doctors will respect your wishes even if you have a form from a different state. · You don't need an  to complete a living will. But legal advice can be helpful if your state's laws are unclear, your health history is complicated, or your family can't agree on what should be in your living will. · You can change your living will at any time. Some people find that their wishes about end-of-life care change as their health changes. · In addition to making a living will, think about completing a medical power of  form. This form lets you name the person you want to make end-of-life treatment decisions for you (your \"health care agent\") if you're not able to. Many hospitals and nursing homes will give you the forms you need to complete a living will and a medical power of . · Your living will is used only if you can't make or communicate decisions for yourself anymore. If you become able to make decisions again, you can accept or refuse any treatment, no matter what you wrote in your living will. · Your state may offer an online registry. This is a place where you can store your living will online so the doctors and nurses who need to treat you can find it right away. What should you think about when creating a living will? Talk about your end-of-life wishes with your family members and your doctor. Let them know what you want.  That way the people making decisions for you won't be surprised by your choices. Think about these questions as you make your living will:  · Do you know enough about life support methods that might be used? If not, talk to your doctor so you know what might be done if you can't breathe on your own, your heart stops, or you're unable to swallow. · What things would you still want to be able to do after you receive life-support methods? Would you want to be able to walk? To speak? To eat on your own? To live without the help of machines? · If you have a choice, where do you want to be cared for? In your home? At a hospital or nursing home? · Do you want certain Mormonism practices performed if you become very ill? · If you have a choice at the end of your life, where would you prefer to die? At home? In a hospital or nursing home? Somewhere else? · Would you prefer to be buried or cremated? · Do you want your organs to be donated after you die? What should you do with your living will? · Make sure that your family members and your health care agent have copies of your living will. · Give your doctor a copy of your living will to keep in your medical record. If you have more than one doctor, make sure that each one has a copy. · You may want to put a copy of your living will where it can be easily found. Where can you learn more? Go to http://anastasiia-alejandro.info/. Enter K523 in the search box to learn more about \"Learning About Living [place]. \"  Current as of: April 18, 2018  Content Version: 11.9  © 9699-0204 [employer]. Care instructions adapted under license by [employer] (which disclaims liability or warranty for this information). If you have questions about a medical condition or this instruction, always ask your healthcare professional. [address] any warranty or liability for your use of this information. Learning About High Blood Pressure  What is high blood pressure?     Blood pressure is a measure of how hard the blood pushes against the walls of your arteries. It's normal for blood pressure to go up and down throughout the day, but if it stays up, you have high blood pressure. Another name for high blood pressure is hypertension. Two numbers tell you your blood pressure. The first number is the systolic pressure. It shows how hard the blood pushes when your heart is pumping. The second number is the diastolic pressure. It shows how hard the blood pushes between heartbeats, when your heart is relaxed and filling with blood. Your doctor will give you a goal for your blood pressure. Your goal will be based on your health and your age. High blood pressure (hypertension) means that the top number stays high, or the bottom number stays high, or both. High blood pressure increases the risk of stroke, heart attack, and other problems. You and your doctor will talk about your risks of these problems based on your blood pressure. What happens when you have high blood pressure? · Blood flows through your arteries with too much force. Over time, this damages the walls of your arteries. But you can't feel it. High blood pressure usually doesn't cause symptoms. · Fat and calcium start to build up in your arteries. This buildup is called plaque. Plaque makes your arteries narrower and stiffer. Blood can't flow through them as easily. · This lack of good blood flow starts to damage some of the organs in your body. This can lead to problems such as coronary artery disease and heart attack, heart failure, stroke, kidney failure, and eye damage. How can you prevent high blood pressure? · Stay at a healthy weight. · Try to limit how much sodium you eat to less than 2,300 milligrams (mg) a day. If you limit your sodium to 1,500 mg a day, you can lower your blood pressure even more. ? Buy foods that are labeled \"unsalted,\" \"sodium-free,\" or \"low-sodium. \" Foods labeled \"reduced-sodium\" and \"light sodium\" may still have too much sodium. ? Flavor your food with garlic, lemon juice, onion, vinegar, herbs, and spices instead of salt. Do not use soy sauce, steak sauce, onion salt, garlic salt, mustard, or ketchup on your food. ? Use less salt (or none) when recipes call for it. You can often use half the salt a recipe calls for without losing flavor. · Be physically active. Get at least 30 minutes of exercise on most days of the week. Walking is a good choice. You also may want to do other activities, such as running, swimming, cycling, or playing tennis or team sports. · Limit alcohol to 2 drinks a day for men and 1 drink a day for women. · Eat plenty of fruits, vegetables, and low-fat dairy products. Eat less saturated and total fats. How is high blood pressure treated? · Your doctor will suggest making lifestyle changes. For example, your doctor may ask you to eat healthy foods, quit smoking, lose extra weight, and be more active. · If lifestyle changes don't help enough, your doctor may recommend that you take medicine. · When blood pressure is very high, medicines are needed to lower it. Follow-up care is a key part of your treatment and safety. Be sure to make and go to all appointments, and call your doctor if you are having problems. It's also a good idea to know your test results and keep a list of the medicines you take. Where can you learn more? Go to http://anastasiia-alejandro.info/. Enter P501 in the search box to learn more about \"Learning About High Blood Pressure. \"  Current as of: July 22, 2018  Content Version: 11.9  © 0496-3749 [employer]. Care instructions adapted under license by [employer] (which disclaims liability or warranty for this information).  If you have questions about a medical condition or this instruction, always ask your healthcare professional. [address] any warranty or liability for your use of this information.

## 2019-03-06 NOTE — PROGRESS NOTES
KAREN Godoy requested records because patient was been to the ED and hospital since last office visit. Records obtained from  and given to KAREN Godoy.

## 2019-03-11 NOTE — ACP (ADVANCE CARE PLANNING)
Advance Care Planning (ACP) Provider Conversation Snapshot    Date of ACP Conversation: 03/11/19  Persons included in Conversation:  patient  Length of ACP Conversation in minutes:  <16 minutes (Non-Billable)    Authorized Decision Maker (if patient is incapable of making informed decisions):    This person is:   n/a            For Patients with Decision Making Capacity:   Values/Goals: Exploration of values, goals, and preferences if recovery is not expected, even with continued medical treatment in the event of:  Imminent death    Conversation Outcomes / Follow-Up Plan:   Recommended completion of Advance Directive form after review of ACP materials and conversation with prospective healthcare agent

## 2019-06-18 ENCOUNTER — TELEPHONE (OUTPATIENT)
Dept: INTERNAL MEDICINE CLINIC | Age: 84
End: 2019-06-18

## 2019-06-18 NOTE — TELEPHONE ENCOUNTER
Patient walked into office 20 mins late for her 11:45am appointment to see provider Melissa Resendiz NP. She stated she needed to be seen for her COPD flare up. Nurse assessed resident and observed no shortness of breath or distress. Patient was verbally speaking with nurse with no difficulty. O2 sats 97%, Respirations 16 and pulse rate 76. Patient stated to nurse that she will go to her Pulmonary Specialist because they could see her today. Nurse tried to explain to patient that she can be worked into schedule tomorrow but patient declined offer.

## 2019-09-11 ENCOUNTER — OFFICE VISIT (OUTPATIENT)
Dept: INTERNAL MEDICINE CLINIC | Age: 84
End: 2019-09-11

## 2019-09-11 VITALS
HEIGHT: 63 IN | SYSTOLIC BLOOD PRESSURE: 138 MMHG | DIASTOLIC BLOOD PRESSURE: 70 MMHG | WEIGHT: 165 LBS | BODY MASS INDEX: 29.23 KG/M2 | TEMPERATURE: 98.2 F | RESPIRATION RATE: 18 BRPM | HEART RATE: 60 BPM | OXYGEN SATURATION: 94 %

## 2019-09-11 DIAGNOSIS — J40 BRONCHITIS: ICD-10-CM

## 2019-09-11 DIAGNOSIS — R53.83 FATIGUE, UNSPECIFIED TYPE: ICD-10-CM

## 2019-09-11 DIAGNOSIS — I10 ESSENTIAL HYPERTENSION: Primary | ICD-10-CM

## 2019-09-11 DIAGNOSIS — R53.1 WEAKNESS GENERALIZED: ICD-10-CM

## 2019-09-11 DIAGNOSIS — Z23 ENCOUNTER FOR IMMUNIZATION: ICD-10-CM

## 2019-09-11 DIAGNOSIS — J44.9 CHRONIC OBSTRUCTIVE PULMONARY DISEASE, UNSPECIFIED COPD TYPE (HCC): ICD-10-CM

## 2019-09-11 RX ORDER — CLONAZEPAM 0.5 MG/1
0.5 TABLET ORAL
Refills: 0 | COMMUNITY
Start: 2019-08-20 | End: 2022-04-22 | Stop reason: ALTCHOICE

## 2019-09-11 RX ORDER — CELECOXIB 100 MG/1
100 CAPSULE ORAL
Status: ON HOLD | COMMUNITY
Start: 2019-09-03 | End: 2021-01-04

## 2019-09-11 RX ORDER — CHLORTHALIDONE 25 MG/1
25 TABLET ORAL DAILY
Refills: 2 | Status: ON HOLD | COMMUNITY
Start: 2019-08-13 | End: 2021-01-04

## 2019-09-11 RX ORDER — PREDNISONE 20 MG/1
40 TABLET ORAL
Qty: 10 TAB | Refills: 0 | Status: SHIPPED | OUTPATIENT
Start: 2019-09-11 | End: 2019-09-16

## 2019-09-11 RX ORDER — VALSARTAN 160 MG/1
160 TABLET ORAL DAILY
Refills: 5 | COMMUNITY
Start: 2019-08-22 | End: 2021-08-09

## 2019-09-11 RX ORDER — NEBIVOLOL HYDROCHLORIDE 10 MG/1
10 TABLET ORAL 2 TIMES DAILY
Refills: 2 | COMMUNITY
Start: 2019-07-17

## 2019-09-11 RX ORDER — DIPHENOXYLATE HYDROCHLORIDE AND ATROPINE SULFATE 2.5; .025 MG/1; MG/1
TABLET ORAL AS NEEDED
Refills: 0 | Status: ON HOLD | COMMUNITY
Start: 2019-07-12 | End: 2021-01-07

## 2019-09-11 RX ORDER — AZITHROMYCIN 250 MG/1
TABLET, FILM COATED ORAL
Qty: 6 TAB | Refills: 0 | Status: SHIPPED | OUTPATIENT
Start: 2019-09-11 | End: 2019-09-16

## 2019-09-11 RX ORDER — LOPERAMIDE HYDROCHLORIDE 2 MG/1
4 CAPSULE ORAL AS NEEDED
COMMUNITY

## 2019-09-11 RX ORDER — LAMOTRIGINE 50 MG/1
50 TABLET, ORALLY DISINTEGRATING ORAL
Qty: 90 TAB | Refills: 0 | Status: SHIPPED | OUTPATIENT
Start: 2019-09-11 | End: 2019-12-07 | Stop reason: SDUPTHER

## 2019-09-11 RX ORDER — GABAPENTIN 100 MG/1
100 CAPSULE ORAL
COMMUNITY
End: 2021-08-09 | Stop reason: SDUPTHER

## 2019-09-11 RX ORDER — NITROFURANTOIN 25; 75 MG/1; MG/1
100 CAPSULE ORAL 2 TIMES DAILY
Status: ON HOLD | COMMUNITY
Start: 2019-09-09 | End: 2021-01-07

## 2019-09-11 NOTE — PATIENT INSTRUCTIONS
Bronchitis: Care Instructions  Your Care Instructions    Bronchitis is inflammation of the bronchial tubes, which carry air to the lungs. The tubes swell and produce mucus, or phlegm. The mucus and inflamed bronchial tubes make you cough. You may have trouble breathing. Most cases of bronchitis are caused by viruses like those that cause colds. Antibiotics usually do not help and they may be harmful. Bronchitis usually develops rapidly and lasts about 2 to 3 weeks in otherwise healthy people. Follow-up care is a key part of your treatment and safety. Be sure to make and go to all appointments, and call your doctor if you are having problems. It's also a good idea to know your test results and keep a list of the medicines you take. How can you care for yourself at home? · Take all medicines exactly as prescribed. Call your doctor if you think you are having a problem with your medicine. · Get some extra rest.  · Take an over-the-counter pain medicine, such as acetaminophen (Tylenol), ibuprofen (Advil, Motrin), or naproxen (Aleve) to reduce fever and relieve body aches. Read and follow all instructions on the label. · Do not take two or more pain medicines at the same time unless the doctor told you to. Many pain medicines have acetaminophen, which is Tylenol. Too much acetaminophen (Tylenol) can be harmful. · Take an over-the-counter cough medicine that contains dextromethorphan to help quiet a dry, hacking cough so that you can sleep. Avoid cough medicines that have more than one active ingredient. Read and follow all instructions on the label. · Breathe moist air from a humidifier, hot shower, or sink filled with hot water. The heat and moisture will thin mucus so you can cough it out. · Do not smoke. Smoking can make bronchitis worse. If you need help quitting, talk to your doctor about stop-smoking programs and medicines. These can increase your chances of quitting for good.   When should you call for help? Call 911 anytime you think you may need emergency care. For example, call if:    · You have severe trouble breathing.    Call your doctor now or seek immediate medical care if:    · You have new or worse trouble breathing.     · You cough up dark brown or bloody mucus (sputum).     · You have a new or higher fever.     · You have a new rash.    Watch closely for changes in your health, and be sure to contact your doctor if:    · You cough more deeply or more often, especially if you notice more mucus or a change in the color of your mucus.     · You are not getting better as expected. Where can you learn more? Go to http://anastasiia-alejandro.info/. Enter H333 in the search box to learn more about \"Bronchitis: Care Instructions. \"  Current as of: September 5, 2018  Content Version: 12.1  © 7021-2414 [employer]. Care instructions adapted under license by [employer] (which disclaims liability or warranty for this information). If you have questions about a medical condition or this instruction, always ask your healthcare professional. [address] any warranty or liability for your use of this information. Chronic Obstructive Pulmonary Disease (COPD): Care Instructions  Your Care Instructions    Chronic obstructive pulmonary disease (COPD) is a general term for a group of lung diseases, including emphysema and chronic bronchitis. People with COPD have decreased airflow in and out of the lungs, which makes it hard to breathe. The airways also can get clogged with thick mucus. Cigarette smoking is a major cause of COPD. Although there is no cure for COPD, you can slow its progress. Following your treatment plan and taking care of yourself can help you feel better and live longer. Follow-up care is a key part of your treatment and safety. Be sure to make and go to all appointments, and call your doctor if you are having problems.  It's also a good idea to know your test results and keep a list of the medicines you take. How can you care for yourself at home?   Staying healthy    · Do not smoke. This is the most important step you can take to prevent more damage to your lungs. If you need help quitting, talk to your doctor about stop-smoking programs and medicines. These can increase your chances of quitting for good.     · Avoid colds and flu. Get a pneumococcal vaccine shot. If you have had one before, ask your doctor whether you need a second dose. Get the flu vaccine every fall. If you must be around people with colds or the flu, wash your hands often.     · Avoid secondhand smoke, air pollution, and high altitudes. Also avoid cold, dry air and hot, humid air. Stay at home with your windows closed when air pollution is bad.    Medicines and oxygen therapy    · Take your medicines exactly as prescribed. Call your doctor if you think you are having a problem with your medicine.     · You may be taking medicines such as:  ? Bronchodilators. These help open your airways and make breathing easier. Bronchodilators are either short-acting (work for 6 to 9 hours) or long-acting (work for 24 hours). You inhale most bronchodilators, so they start to act quickly. Always carry your quick-relief inhaler with you in case you need it while you are away from home. ? Corticosteroids (prednisone, budesonide). These reduce airway inflammation. They come in pill or inhaled form. You must take these medicines every day for them to work well.     · A spacer may help you get more inhaled medicine to your lungs. Ask your doctor or pharmacist if a spacer is right for you. If it is, ask how to use it properly.     · Do not take any vitamins, over-the-counter medicine, or herbal products without talking to your doctor first.     · If your doctor prescribed antibiotics, take them as directed. Do not stop taking them just because you feel better.  You need to take the full course of antibiotics.     · Oxygen therapy boosts the amount of oxygen in your blood and helps you breathe easier. Use the flow rate your doctor has recommended, and do not change it without talking to your doctor first.   Activity    · Get regular exercise. Walking is an easy way to get exercise. Start out slowly, and walk a little more each day.     · Pay attention to your breathing. You are exercising too hard if you cannot talk while you are exercising.     · Take short rest breaks when doing household chores and other activities.     · Learn breathing methods--such as breathing through pursed lips--to help you become less short of breath.     · If your doctor has not set you up with a pulmonary rehabilitation program, talk to him or her about whether rehab is right for you. Rehab includes exercise programs, education about your disease and how to manage it, help with diet and other changes, and emotional support. Diet    · Eat regular, healthy meals. Use bronchodilators about 1 hour before you eat to make it easier to eat. Eat several small meals instead of three large ones. Drink beverages at the end of the meal. Avoid foods that are hard to chew.     · Eat foods that contain protein so that you do not lose muscle mass.     · Talk with your doctor if you gain too much weight or if you lose weight without trying.    Mental health    · Talk to your family, friends, or a therapist about your feelings. It is normal to feel frightened, angry, hopeless, helpless, and even guilty. Talking openly about bad feelings can help you cope. If these feelings last, talk to your doctor. When should you call for help? Call 911 anytime you think you may need emergency care.  For example, call if:    · You have severe trouble breathing.    Call your doctor now or seek immediate medical care if:    · You have new or worse trouble breathing.     · You cough up blood.     · You have a fever.    Watch closely for changes in your health, and be sure to contact your doctor if:    · You cough more deeply or more often, especially if you notice more mucus or a change in the color of your mucus.     · You have new or worse swelling in your legs or belly.     · You are not getting better as expected. Where can you learn more? Go to http://anastasiia-alejandro.info/. Mireille Hensley in the search box to learn more about \"Chronic Obstructive Pulmonary Disease (COPD): Care Instructions. \"  Current as of: September 5, 2018  Content Version: 12.1  © 6760-0890 [employer]. Care instructions adapted under license by [employer] (which disclaims liability or warranty for this information). If you have questions about a medical condition or this instruction, always ask your healthcare professional. [address] any warranty or liability for your use of this information.

## 2019-09-11 NOTE — PROGRESS NOTES
HISTORY OF PRESENT ILLNESS  Angie Islas is a 80 y.o. female presents for acute care       She was seen [address] twice and [facility] last Saturday for evaluation of dizziness     [address] ER provider  treated her for UTI    She continues to take Macrobid     Dizziness resolved     She feels weak, no fall     ER provider also started Valsartan for elevated blood pressure     She does not have pain or fever    She requests an antibiotic and prednisone for lower  respiratory symptoms  Chest congested but cough nonproductive     Using nebulizer most nights for several months  CPAP machine  not working, plans to get  Another machine through [facility], Dr Ross Bruce    She continues to see psychiatrist       Past Medical History:   Diagnosis Date    Arthritis     Arthritis of knee, left     Asthma     Asthma with COPD (chronic obstructive pulmonary disease) [facility])     FEV1 - 0.99, IgE high    Atherosclerosis of both carotid arteries     Bipolar affective disorder ([address]     Cataract, left eye     cataract - left    Chronic obstructive pulmonary disease ([address]     Closed fracture of lateral portion of left tibial plateau     Depression with anxiety     depression/anxiety    Encephalopathy     encephalopathy    Esophageal ulcer     esophageal ulcer    Gastroparesis     GERD (gastroesophageal reflux disease)     Hepatic steatosis     History of fall     hx falls    Hyperlipidemia     increased cholesterol    Hyperlipidemia     Hypertension     IBS (irritable bowel syndrome)     IBS    IGT (impaired glucose tolerance) 9/15/2015    Liver disease     elevated liver enzymes - being evaluated/US done x 2 - inflammed liver/\"fat on liver\"    Menopause 1987    Neurological disorder     delayed thinking process? ??    OA (osteoarthritis) of knee     Osteoporosis     Seizures (HCC)     Sunburn, blistering     Unspecified adverse effect of anesthesia     was told by neurologist not to use anesthesia unless needed due to encephalopathy    Unspecified adverse effect of anesthesia     hallucinations after MRI with sedation    Vertigo     vertigo       Current Outpatient Medications   Medication Sig    celecoxib (CELEBREX) 100 mg capsule Take 100 mg by mouth.  nitrofurantoin, macrocrystal-monohydrate, (MACROBID) 100 mg capsule Take 100 mg by mouth two (2) times a day.  valsartan (DIOVAN) 160 mg tablet Take 160 mg by mouth daily.  loperamide (IMODIUM) 2 mg capsule 4 mg as needed.  gabapentin (NEURONTIN) 100 mg capsule Take 100 mg by mouth nightly.  clonazePAM (KLONOPIN) 0.5 mg tablet Take 0.5 mg by mouth nightly as needed.  chlorthalidone (HYGROTEN) 25 mg tablet Take 25 mg by mouth daily.  diphenoxylate-atropine (LOMOTIL) 2.5-0.025 mg per tablet as needed.  BYSTOLIC 10 mg tablet Take 10 mg by mouth two (2) times a day.  lamoTRIgine (LAMICTAL) 50 mg disintegrating tablet Take 1 Tab by mouth nightly.  INCRUSE ELLIPTA 62.5 mcg/actuation inhaler     sucralfate (CARAFATE) 1 gram tablet Take 1 g by mouth Before breakfast and dinner.  montelukast (SINGULAIR) 10 mg tablet take 1 tablet by mouth once daily    fluticasone-salmeterol (ADVAIR) 500-50 mcg/dose diskus inhaler Take 1 Puff by inhalation two (2) times a day.  rosuvastatin (CRESTOR) 5 mg tablet Take 1 Tab by mouth nightly. (Patient taking differently: Take 5 mg by mouth nightly. Indications: every other night)    albuterol (PROVENTIL HFA, VENTOLIN HFA, PROAIR HFA) 90 mcg/actuation inhaler Take 2 Puffs by inhalation every four (4) hours as needed for Wheezing.  albuterol (PROVENTIL VENTOLIN) 2.5 mg /3 mL (0.083 %) nebulizer solution 3 mL by Nebulization route every four (4) hours as needed for Wheezing.  Cholecalciferol, Vitamin D3, (VITAMIN D3) 1,000 unit cap Take 2,000 Units by mouth.  vitamin E (AQUA GEMS) 400 unit capsule Take 400 Units by mouth daily.  LACTOBACILLUS ACIDOPHILUS (PROBIOTIC PO) Take  by mouth.  Takes one po daily.  CALCIUM CARBONATE (TUMS PO) Take  by mouth as needed.  albuterol (PROVENTIL, VENTOLIN) 90 mcg/Actuation inhaler Take 2-4 Puffs by inhalation every four (4) hours as needed.  nebivolol (BYSTOLIC) 5 mg tablet Take  by mouth two (2) times a day.  losartan (COZAAR) 100 mg tablet take 1 tablet by mouth once daily    zolpidem (AMBIEN) 5 mg tablet      No current facility-administered medications for this visit. Allergies   Allergen Reactions   Dorothyann Sandifer Other (comments)     Blood testing    Cat Dander Other (comments)     Blood testing    Dog Dander Other (comments)     Blood testing    Elavil Other (comments)     imbalance    [place] Other (comments)     Blood testing    Sulfa (Sulfonamide Antibiotics) Shortness of Breath         Past Surgical History:   Procedure Laterality Date    ABDOMEN SURGERY PROC UNLISTED      GERD reflux    COLONOSCOPY N/A 8/1/2017    [facility] ENDOSCOPY    HX BREAST BIOPSY Left     Surgical    HX GI      laser surgery for reflux and hernia repair    HX MOHS PROCEDURES  09/05/2017    [facility] L nasal sidewall by Dr. Karla Greenfield  06/04/2018    [facility] R mid forehead by Dr. Clare Posada      Review of Systems   Constitutional: Positive for malaise/fatigue. HENT: Negative. Eyes: Negative. Respiratory: Positive for cough and wheezing. Negative for sputum production and shortness of breath. Cardiovascular: Negative for chest pain, palpitations and leg swelling. Gastrointestinal: Negative. Genitourinary: Negative. Musculoskeletal: Negative. Neurological: Positive for weakness. Psychiatric/Behavioral: Negative for depression. The patient is nervous/anxious.       Visit Vitals  /70 (BP 1 Location: Right arm, BP Patient Position: Sitting)   Pulse 60   Temp 98.2 °F (36.8 °C) (Oral)   Resp 18   Ht 5' 3\" (1.6 m)   Wt 165 lb (74.8 kg)   SpO2 94%   BMI 29.23 kg/m²     Physical Exam   Constitutional: She appears well-developed and well-nourished. No distress. Cardiovascular: Normal rate and regular rhythm. Pulmonary/Chest: Accessory muscle usage present. No respiratory distress. She has decreased breath sounds. She has wheezes. She has no rhonchi. She has no rales. Skin: She is not diaphoretic. ASSESSMENT and PLAN    ICD-10-CM ICD-9-CM    1. Essential hypertension I63 442.2 METABOLIC PANEL, COMPREHENSIVE   2. Chronic obstructive pulmonary disease, unspecified COPD type ([facility] J44.9 496    3. Bronchitis J40 490 predniSONE (DELTASONE) 20 mg tablet      azithromycin (ZITHROMAX) 250 mg tablet   4. Weakness generalized R53.1 780.79 VITAMIN B12      CBC WITH AUTOMATED DIFF      METABOLIC PANEL, COMPREHENSIVE      TSH AND FREE T4   5. Fatigue, unspecified type R53.83 780.79 VITAMIN B12      CBC WITH AUTOMATED DIFF      METABOLIC PANEL, COMPREHENSIVE      TSH AND FREE T4   6. Encounter for immunization Z23 V03.89 INFLUENZA VACCINE INACTIVATED (IIV), SUBUNIT, ADJUVANTED, IM      IL IMMUNIZ ADMIN,1 SINGLE/COMB VAC/TOXOID     Follow-up and Dispositions    · Return if symptoms worsen or fail to improve. Hypertension controlled. Request ED visit notes    Instructed to complete antibiotic, increase hydration. Follow up with specialists as scheduled    current treatment plan is effective, no change in therapy  reviewed diet, exercise and weight control  reviewed medications and side effects in detail    Patient voices understanding and acceptance of this advice and will call back if any further questions or concerns. An After Visit Summary was printed and given to the patient.

## 2019-09-11 NOTE — PROGRESS NOTES
Rm#9    Pt isnt taking lamictal     Please update med list per pt    Pt states that she has a blockage in carotid artery. Testing done by cardio %60 blocked     Pt States her sugar levels were good at [facility] last week     Chief Complaint   Patient presents with   [facility] Follow Up     UTI last week     Elevated Blood Pressure     1. Have you been to the ER, urgent care clinic since your last visit? Hospitalized since your last visit? [facility], [facility]. [place] [facility] twice last week for dizzyness/UTI     2. Have you seen or consulted any other health care providers outside of the [address] [place] [address] since your last visit? Include any pap smears or colon screening. [facility]      Health Maintenance Due   Topic Date Due    DTaP/Tdap/Td series (1 - Tdap) 08/09/1956    Shingrix Vaccine Age 50> (1 of 2) 08/09/1985    GLAUCOMA SCREENING Q2Y  08/09/2000    Influenza Age 9 to Adult  08/01/2019     Fall Risk Assessment, last 12 mths 9/11/2019   Able to walk? Yes   Fall in past 12 months? Yes   Fall with injury?  No   Number of falls in past 12 months 1   Fall Risk Score 1

## 2019-09-12 LAB
ALBUMIN SERPL-MCNC: 4.6 G/DL (ref 3.5–4.7)
ALBUMIN/GLOB SERPL: 1.8 {RATIO} (ref 1.2–2.2)
ALP SERPL-CCNC: 80 IU/L (ref 39–117)
ALT SERPL-CCNC: 14 IU/L (ref 0–32)
AST SERPL-CCNC: 16 IU/L (ref 0–40)
BASOPHILS # BLD AUTO: 0 X10E3/UL (ref 0–0.2)
BASOPHILS NFR BLD AUTO: 0 %
BILIRUB SERPL-MCNC: 0.6 MG/DL (ref 0–1.2)
BUN SERPL-MCNC: 21 MG/DL (ref 8–27)
BUN/CREAT SERPL: 20 (ref 12–28)
CALCIUM SERPL-MCNC: 9.6 MG/DL (ref 8.7–10.3)
CHLORIDE SERPL-SCNC: 102 MMOL/L (ref 96–106)
CO2 SERPL-SCNC: 22 MMOL/L (ref 20–29)
CREAT SERPL-MCNC: 1.04 MG/DL (ref 0.57–1)
EOSINOPHIL # BLD AUTO: 0.1 X10E3/UL (ref 0–0.4)
EOSINOPHIL NFR BLD AUTO: 3 %
ERYTHROCYTE [DISTWIDTH] IN BLOOD BY AUTOMATED COUNT: 14.4 % (ref 12.3–15.4)
GLOBULIN SER CALC-MCNC: 2.6 G/DL (ref 1.5–4.5)
GLUCOSE SERPL-MCNC: 104 MG/DL (ref 65–99)
HCT VFR BLD AUTO: 40 % (ref 34–46.6)
HGB BLD-MCNC: 13.3 G/DL (ref 11.1–15.9)
IMM GRANULOCYTES # BLD AUTO: 0 X10E3/UL (ref 0–0.1)
IMM GRANULOCYTES NFR BLD AUTO: 1 %
LYMPHOCYTES # BLD AUTO: 1.4 X10E3/UL (ref 0.7–3.1)
LYMPHOCYTES NFR BLD AUTO: 25 %
MCH RBC QN AUTO: 29.4 PG (ref 26.6–33)
MCHC RBC AUTO-ENTMCNC: 33.3 G/DL (ref 31.5–35.7)
MCV RBC AUTO: 89 FL (ref 79–97)
MONOCYTES # BLD AUTO: 0.4 X10E3/UL (ref 0.1–0.9)
MONOCYTES NFR BLD AUTO: 7 %
NEUTROPHILS # BLD AUTO: 3.7 X10E3/UL (ref 1.4–7)
NEUTROPHILS NFR BLD AUTO: 64 %
PLATELET # BLD AUTO: 163 X10E3/UL (ref 150–450)
POTASSIUM SERPL-SCNC: 4.6 MMOL/L (ref 3.5–5.2)
PROT SERPL-MCNC: 7.2 G/DL (ref 6–8.5)
RBC # BLD AUTO: 4.52 X10E6/UL (ref 3.77–5.28)
SODIUM SERPL-SCNC: 141 MMOL/L (ref 134–144)
VIT B12 SERPL-MCNC: 318 PG/ML (ref 232–1245)
WBC # BLD AUTO: 5.7 X10E3/UL (ref 3.4–10.8)

## 2019-11-19 RX ORDER — MONTELUKAST SODIUM 10 MG/1
TABLET ORAL
Qty: 90 TAB | Refills: 1 | Status: SHIPPED | OUTPATIENT
Start: 2019-11-19 | End: 2019-11-22 | Stop reason: SDUPTHER

## 2019-11-19 NOTE — TELEPHONE ENCOUNTER
Medication refill request:    Last Office Visit: 9/11/19  Next Office Visit:  No future appointments. Pharmacy verified.  gloria

## 2019-11-19 NOTE — TELEPHONE ENCOUNTER
----- Message from [address] sent at 11/18/2019  2:12 PM EST -----  Regarding: Np Meli/telephone  Medication Refill    Caller (if not patient):  Relationship of caller (if not patient):  Best contact number(s): [phone]  Name of medication and dosage if known: Singular  Is patient out of this medication (yes/no): Yes  Pharmacy name: [address] [place] [address] [place] listed in chart? (yes/no): Yes  Pharmacy phone number:  Details to clarify the request:    [address]

## 2019-11-22 ENCOUNTER — OFFICE VISIT (OUTPATIENT)
Dept: INTERNAL MEDICINE CLINIC | Age: 84
End: 2019-11-22

## 2019-11-22 VITALS
HEART RATE: 66 BPM | WEIGHT: 167.11 LBS | DIASTOLIC BLOOD PRESSURE: 69 MMHG | BODY MASS INDEX: 29.61 KG/M2 | SYSTOLIC BLOOD PRESSURE: 102 MMHG | RESPIRATION RATE: 16 BRPM | TEMPERATURE: 98.1 F | OXYGEN SATURATION: 96 % | HEIGHT: 63 IN

## 2019-11-22 DIAGNOSIS — J44.0 COPD WITH ACUTE BRONCHITIS (HCC): Primary | ICD-10-CM

## 2019-11-22 DIAGNOSIS — J44.9 CHRONIC OBSTRUCTIVE PULMONARY DISEASE, UNSPECIFIED COPD TYPE (HCC): ICD-10-CM

## 2019-11-22 DIAGNOSIS — J20.9 COPD WITH ACUTE BRONCHITIS (HCC): Primary | ICD-10-CM

## 2019-11-22 DIAGNOSIS — G56.91 NEUROPATHY OF RIGHT HAND: ICD-10-CM

## 2019-11-22 RX ORDER — PREDNISONE 20 MG/1
40 TABLET ORAL DAILY
Qty: 10 TAB | Refills: 0 | Status: SHIPPED | OUTPATIENT
Start: 2019-11-22 | End: 2019-11-29 | Stop reason: SDUPTHER

## 2019-11-22 RX ORDER — MONTELUKAST SODIUM 10 MG/1
TABLET ORAL
Qty: 90 TAB | Refills: 1 | Status: SHIPPED | OUTPATIENT
Start: 2019-11-22

## 2019-11-22 NOTE — PROGRESS NOTES
HISTORY OF PRESENT ILLNESS  Rehan Anaya is a 80 y.o. female presents for acute care   HPI  Past Medical History:   Diagnosis Date    Arthritis     Arthritis of knee, left     Asthma     Asthma with COPD (chronic obstructive pulmonary disease) (HCC)     FEV1 - 0.99, IgE high    Atherosclerosis of both carotid arteries     Bipolar affective disorder ([address]     Cataract, left eye     cataract - left    Chronic obstructive pulmonary disease (HCC)     Closed fracture of lateral portion of left tibial plateau     Depression with anxiety     depression/anxiety    Encephalopathy     encephalopathy    Esophageal ulcer     esophageal ulcer    Gastroparesis     GERD (gastroesophageal reflux disease)     Hepatic steatosis     History of fall     hx falls    Hyperlipidemia     increased cholesterol    Hyperlipidemia     Hypertension     IBS (irritable bowel syndrome)     IBS    IGT (impaired glucose tolerance) 9/15/2015    Liver disease     elevated liver enzymes - being evaluated/US done x 2 - inflammed liver/\"fat on liver\"    Menopause 1987    Neurological disorder     delayed thinking process? ??    OA (osteoarthritis) of knee     Osteoporosis     Seizures (HCC)     Sunburn, blistering     Unspecified adverse effect of anesthesia     was told by neurologist not to use anesthesia unless needed due to encephalopathy    Unspecified adverse effect of anesthesia     hallucinations after MRI with sedation    Vertigo     vertigo       Current Outpatient Medications   Medication Sig    montelukast (SINGULAIR) 10 mg tablet take 1 tablet by mouth once daily    predniSONE (DELTASONE) 20 mg tablet Take 40 mg by mouth daily for 5 days. Indications: worsening chronic obstructive pulmonary disease    celecoxib (CELEBREX) 100 mg capsule Take 100 mg by mouth.  valsartan (DIOVAN) 160 mg tablet Take 160 mg by mouth daily.  loperamide (IMODIUM) 2 mg capsule 4 mg as needed.     gabapentin (NEURONTIN) 100 mg capsule Take 100 mg by mouth nightly.  clonazePAM (KLONOPIN) 0.5 mg tablet Take 0.5 mg by mouth nightly as needed.  chlorthalidone (HYGROTEN) 25 mg tablet Take 25 mg by mouth daily.  diphenoxylate-atropine (LOMOTIL) 2.5-0.025 mg per tablet as needed.  BYSTOLIC 10 mg tablet Take 10 mg by mouth two (2) times a day.  lamoTRIgine (LAMICTAL) 50 mg disintegrating tablet Take 1 Tab by mouth nightly.  INCRUSE ELLIPTA 62.5 mcg/actuation inhaler     sucralfate (CARAFATE) 1 gram tablet Take 1 g by mouth Before breakfast and dinner.  fluticasone-salmeterol (ADVAIR) 500-50 mcg/dose diskus inhaler Take 1 Puff by inhalation two (2) times a day.  rosuvastatin (CRESTOR) 5 mg tablet Take 1 Tab by mouth nightly. (Patient taking differently: Take 5 mg by mouth nightly. Indications: every other night)    albuterol (PROVENTIL HFA, VENTOLIN HFA, PROAIR HFA) 90 mcg/actuation inhaler Take 2 Puffs by inhalation every four (4) hours as needed for Wheezing.  albuterol (PROVENTIL VENTOLIN) 2.5 mg /3 mL (0.083 %) nebulizer solution 3 mL by Nebulization route every four (4) hours as needed for Wheezing.  Cholecalciferol, Vitamin D3, (VITAMIN D3) 1,000 unit cap Take 2,000 Units by mouth.  vitamin E (AQUA GEMS) 400 unit capsule Take 400 Units by mouth daily.  CALCIUM CARBONATE (TUMS PO) Take  by mouth as needed.  albuterol (PROVENTIL, VENTOLIN) 90 mcg/Actuation inhaler Take 2-4 Puffs by inhalation every four (4) hours as needed.  nitrofurantoin, macrocrystal-monohydrate, (MACROBID) 100 mg capsule Take 100 mg by mouth two (2) times a day.  nebivolol (BYSTOLIC) 5 mg tablet Take  by mouth two (2) times a day.  losartan (COZAAR) 100 mg tablet take 1 tablet by mouth once daily    zolpidem (AMBIEN) 5 mg tablet     LACTOBACILLUS ACIDOPHILUS (PROBIOTIC PO) Take  by mouth. Takes one po daily. No current facility-administered medications for this visit.         Allergies   Allergen Reactions   [place] R[place] Other (comments)     Blood testing    Cat Dander Other (comments)     Blood testing    Dog Dander Other (comments)     Blood testing    Elavil Other (comments)     imbalance    [place] Other (comments)     Blood testing    Sulfa (Sulfonamide Antibiotics) Shortness of Breath       She reports cough for 2 weeks mostly non productive, white sputum, chest feels tight   Compliant with respiratory medications     Drinks at least 6 glasses of water daily to loosen  phlegm     Uses CPAP machine daily    Pulmonary specialist, Dr Marcelo Whitehead     sleep specialist, Dr. Prabha Jameson    Right  hand pain with  stinging, burning, denies injury or unusual activity     Review of Systems   Constitutional: Negative for chills and fever. HENT: Negative. Respiratory: Positive for cough and sputum production. Negative for shortness of breath and wheezing. Cardiovascular: Negative for chest pain. Musculoskeletal: Positive for back pain, joint pain and myalgias. Skin: Negative. Neurological: Positive for sensory change (right hand). Psychiatric/Behavioral: The patient is nervous/anxious. Visit Vitals  /69 (BP 1 Location: Right arm, BP Patient Position: Sitting)   Pulse 66   Temp 98.1 °F (36.7 °C) (Oral)   Resp 16   Ht 5' 3\" (1.6 m)   Wt 167 lb 1.7 oz (75.8 kg)   SpO2 96%   BMI 29.60 kg/m²     Physical Exam  Constitutional:       Appearance: She is normal weight. Cardiovascular:      Rate and Rhythm: Normal rate and regular rhythm. Pulmonary:      Effort: Pulmonary effort is normal.      Breath sounds: No wheezing, rhonchi or rales. Comments: Breath sounds decreased   Musculoskeletal:      Comments: Normal neurovascular exam right hand    Skin:     General: Skin is warm and dry. Neurological:      Mental Status: She is alert. Mental status is at baseline. Psychiatric:         Mood and Affect: Mood is anxious.          Speech: Speech is tangential.         Behavior: Behavior normal.         Cognition and Memory: Cognition normal.         ASSESSMENT and PLAN    ICD-10-CM ICD-9-CM    1. COPD with acute bronchitis ([facility] J44.0 491.22 predniSONE (DELTASONE) 20 mg tablet    J20.9     2. Chronic obstructive pulmonary disease, unspecified COPD type (HCC) J44.9 496 montelukast (SINGULAIR) 10 mg tablet   3. Neuropathy of right hand G56.91 354.9 REFERRAL TO NEUROLOGY     Follow-up and Dispositions    · Return if symptoms worsen or fail to improve. current treatment plan is effective, no change in therapy  reviewed medications and side effects in detail    Patient voices understanding and acceptance of this advice and will call back if any further questions or concerns. An After Visit Summary was printed and given to the patient.

## 2019-11-22 NOTE — PATIENT INSTRUCTIONS
Chronic Obstructive Pulmonary Disease (COPD): Care Instructions  Your Care Instructions    Chronic obstructive pulmonary disease (COPD) is a general term for a group of lung diseases, including emphysema and chronic bronchitis. People with COPD have decreased airflow in and out of the lungs, which makes it hard to breathe. The airways also can get clogged with thick mucus. Cigarette smoking is a major cause of COPD. Although there is no cure for COPD, you can slow its progress. Following your treatment plan and taking care of yourself can help you feel better and live longer. Follow-up care is a key part of your treatment and safety. Be sure to make and go to all appointments, and call your doctor if you are having problems. It's also a good idea to know your test results and keep a list of the medicines you take. How can you care for yourself at home?   Staying healthy    · Do not smoke. This is the most important step you can take to prevent more damage to your lungs. If you need help quitting, talk to your doctor about stop-smoking programs and medicines. These can increase your chances of quitting for good.     · Avoid colds and flu. Get a pneumococcal vaccine shot. If you have had one before, ask your doctor whether you need a second dose. Get the flu vaccine every fall. If you must be around people with colds or the flu, wash your hands often.     · Avoid secondhand smoke, air pollution, and high altitudes. Also avoid cold, dry air and hot, humid air. Stay at home with your windows closed when air pollution is bad.    Medicines and oxygen therapy    · Take your medicines exactly as prescribed. Call your doctor if you think you are having a problem with your medicine.     · You may be taking medicines such as:  ? Bronchodilators. These help open your airways and make breathing easier. Bronchodilators are either short-acting (work for 6 to 9 hours) or long-acting (work for 24 hours).  You inhale most bronchodilators, so they start to act quickly. Always carry your quick-relief inhaler with you in case you need it while you are away from home. ? Corticosteroids (prednisone, budesonide). These reduce airway inflammation. They come in pill or inhaled form. You must take these medicines every day for them to work well.     · A spacer may help you get more inhaled medicine to your lungs. Ask your doctor or pharmacist if a spacer is right for you. If it is, ask how to use it properly.     · Do not take any vitamins, over-the-counter medicine, or herbal products without talking to your doctor first.     · If your doctor prescribed antibiotics, take them as directed. Do not stop taking them just because you feel better. You need to take the full course of antibiotics.     · Oxygen therapy boosts the amount of oxygen in your blood and helps you breathe easier. Use the flow rate your doctor has recommended, and do not change it without talking to your doctor first.   Activity    · Get regular exercise. Walking is an easy way to get exercise. Start out slowly, and walk a little more each day.     · Pay attention to your breathing. You are exercising too hard if you cannot talk while you are exercising.     · Take short rest breaks when doing household chores and other activities.     · Learn breathing methods--such as breathing through pursed lips--to help you become less short of breath.     · If your doctor has not set you up with a pulmonary rehabilitation program, talk to him or her about whether rehab is right for you. Rehab includes exercise programs, education about your disease and how to manage it, help with diet and other changes, and emotional support. Diet    · Eat regular, healthy meals. Use bronchodilators about 1 hour before you eat to make it easier to eat. Eat several small meals instead of three large ones.  Drink beverages at the end of the meal. Avoid foods that are hard to chew.     · Eat foods that contain protein so that you do not lose muscle mass.     · Talk with your doctor if you gain too much weight or if you lose weight without trying.    Mental health    · Talk to your family, friends, or a therapist about your feelings. It is normal to feel frightened, angry, hopeless, helpless, and even guilty. Talking openly about bad feelings can help you cope. If these feelings last, talk to your doctor. When should you call for help? Call 911 anytime you think you may need emergency care. For example, call if:    · You have severe trouble breathing.    Call your doctor now or seek immediate medical care if:    · You have new or worse trouble breathing.     · You cough up blood.     · You have a fever.    Watch closely for changes in your health, and be sure to contact your doctor if:    · You cough more deeply or more often, especially if you notice more mucus or a change in the color of your mucus.     · You have new or worse swelling in your legs or belly.     · You are not getting better as expected. Where can you learn more? Go to http://anastasiia-alejandro.info/. Merary Salgado in the search box to learn more about \"Chronic Obstructive Pulmonary Disease (COPD): Care Instructions. \"  Current as of: June 9, 2019  Content Version: 12.2  © 7299-6213 [employer]. Care instructions adapted under license by [employer] (which disclaims liability or warranty for this information). If you have questions about a medical condition or this instruction, always ask your healthcare professional. [place] [address] any warranty or liability for your use of this information. COPD and Asthma: Care Instructions  Your Care Instructions    Some people who have chronic obstructive pulmonary disease (COPD) also have asthma. Both of these problems can damage your lungs. This makes it very important to control them.   Asthma causes the airways that lead to the lungs to swell and become narrow. This makes it hard to breathe. You may wheeze or cough. If you have a bad attack, you may need emergency care. There are two parts to treating asthma. · Controlling asthma over the long term. · Treating attacks when they occur. You and your doctor can make an asthma treatment plan that will help. This plan tells you the medicines you take every day to reduce the swelling in your airways and prevent attacks. It also tells you what to do if you have an asthma attack. Follow-up care is a key part of your treatment and safety. Be sure to make and go to all appointments, and call your doctor if you are having problems. It's also a good idea to know your test results and keep a list of the medicines you take. How can you care for yourself at home? To control asthma over the long term  Medicines  Controller medicines reduce swelling in your lungs. They also prevent asthma attacks. Take your controller medicine exactly as prescribed. Talk to your doctor if you have any problems with your medicine. · Inhaled corticosteroid is a common and effective controller medicine. Using it the right way can prevent or reduce most side effects. · Take your controller medicine every day, not just when you have symptoms. This helps prevent problems before they occur. · Always bring your asthma medicine with you when you travel. · Your doctor may prescribe long-acting medicine that combines a corticosteroid with a beta2-agonist. Follow your doctor's instructions exactly about how to take a long-acting medicine. Examples include:  ? Fluticasone and salmeterol (Advair). ? Budesonide and formoterol (Symbicort). · Do not depend on your controller medicines to stop an asthma attack that has already started. They do not work fast enough to help. · Your doctor may also prescribe anticholinergic inhalers. These include ipratropium (Atrovent) and tiotropium (Spiriva).   Education  · Learn what sets off an asthma attack. Avoid these triggers when you can. Common triggers include smoke, air pollution, pollen, animal dander, colds, stress, and cold air. · Do not smoke. Smoking can make COPD and asthma worse. If you need help quitting, talk to your doctor about stop-smoking programs and medicines. These can increase your chances of quitting for good. · Check yourself for symptoms to know which step to follow in your action plan. Watch for things like being short of breath, having chest tightness, coughing, and wheezing. Also notice if symptoms wake you up at night or if you get tired quickly when you exercise. · You may want to learn how to use a peak flow meter. This measures how open your airways are. It may help you know when you will have an asthma attack. To treat attacks when they occur  Use your asthma action plan when you have an attack. Your quick-relief medicine, such as albuterol, will stop an asthma attack. It relaxes the muscles that get tight around the airways. · Take your quick-relief medicine exactly as prescribed. Talk with your doctor if you have any problems with your medicine. · Keep this medicine with you at all times. · You may need to use this medicine before you exercise. If your doctor prescribed corticosteroid pills to use during an attack, take them as directed. They may take hours to work, but they may shorten the attack and help you breathe better. When should you call for help? Call 911 anytime you think you may need emergency care.  For example, call if:    · You have severe trouble breathing.    Call your doctor now or seek immediate medical care if:    · You have new or worse shortness of breath.     · You are coughing more deeply or more often, especially if you notice more mucus or a change in the color of your mucus.     · You cough up blood.     · You have new or increased swelling in your legs or belly.     · You have a fever.     · You have used your quick-relief medicine but you are still short of breath.    Watch closely for changes in your health, and be sure to contact your doctor if you have any problems. Where can you learn more? Go to http://anastasiia-alejandro.info/. Enter A350 in the search box to learn more about \"COPD and Asthma: Care Instructions. \"  Current as of: June 9, 2019  Content Version: 12.2  © 6890-2569 [employer]. Care instructions adapted under license by [employer] (which disclaims liability or warranty for this information). If you have questions about a medical condition or this instruction, always ask your healthcare professional. [place] [address] any warranty or liability for your use of this information. Neuropathic Pain: Care Instructions  Your Care Instructions    Neuropathic pain is caused by pressure on or damage to your nerves. It's often simply called nerve pain. Some people feel this type of pain all the time. For others, it comes and goes. Diabetes, shingles, or an injury can cause nerve pain. Many people say the pain feels sharp, burning, or stabbing. But some people feel it as a dull ache. In some cases, it makes your skin very sensitive. So touch, pressure, and other sensations that did not hurt before may now cause pain. It's important to know that this kind of pain is real and can affect your quality of life. It's also important to know that treatment can help. Treatment includes pain medicines, exercise, and physical therapy. Medicines can help reduce the number of pain signals that travel over the nerves. This can make the painful areas less sensitive. It can also help you sleep better and improve your mood. But medicines are only one part of successful treatment. Most people do best with more than one kind of treatment. Your doctor may recommend that you try cognitive-behavioral therapy and stress management.  Or, if needed, you may decide to try to quit smoking, lower your blood pressure, or better control blood sugar. These kinds of healthy changes can also make a difference. If you feel that your treatment is not working, talk to your doctor. And be sure to tell your doctor if you think you might be depressed or anxious. These are common problems that can also be treated. Follow-up care is a key part of your treatment and safety. Be sure to make and go to all appointments, and call your doctor if you are having problems. It's also a good idea to know your test results and keep a list of the medicines you take. How can you care for yourself at home? · Be safe with medicines. Read and follow all instructions on the label. ? If the doctor gave you a prescription medicine for pain, take it as prescribed. ? If you are not taking a prescription pain medicine, ask your doctor if you can take an over-the-counter medicine. · Save hard tasks for days when you have less pain. Follow a hard task with an easy task. And remember to take breaks. · Relax, and reduce stress. You may want to try deep breathing or meditation. These can help. · Keep moving. Gentle, daily exercise can help reduce pain. Your doctor or physical therapist can tell you what type of exercise is best for you. This may include walking, swimming, and stationary biking. It may also include stretches and range-of-motion exercises. · Try heat, cold packs, and massage. · Get enough sleep. Constant pain can make you more tired. If the pain makes it hard to sleep, talk with your doctor. · Think positively. Your thoughts can affect your pain. Do fun things to distract yourself from the pain. See a movie, read a book, listen to music, or spend time with a friend. · Keep a pain diary. Try to write down how strong your pain is and what it feels like. Also try to notice and write down how your moods, thoughts, sleep, activities, and medicine affect your pain.  These notes can help you and your doctor find the best ways to treat your pain. Reducing constipation caused by pain medicine  Pain medicines often cause constipation. To reduce constipation:  · Include fruits, vegetables, beans, and whole grains in your diet each day. These foods are high in fiber. · Drink plenty of fluids, enough so that your urine is light yellow or clear like water. If you have kidney, heart, or liver disease and have to limit fluids, talk with your doctor before you increase the amount of fluids you drink. · Get some exercise every day. Build up slowly to 30 to 60 minutes a day on 5 or more days of the week. · Take a fiber supplement, such as Citrucel or Metamucil, every day if needed. Read and follow all instructions on the label. · Schedule time each day for a bowel movement. Having a daily routine may help. Take your time and do not strain when having a bowel movement. · Ask your doctor about a laxative. The goal is to have one easy bowel movement every 1 to 2 days. Do not let constipation go untreated for more than 3 days. When should you call for help? Call your doctor now or seek immediate medical care if:    · You feel sad, anxious, or hopeless for more than a few days. This could mean you are depressed. Depression is common in people who have a lot of pain. But it can be treated.     · You have trouble with bowel movements, such as:  ? No bowel movement in 3 days. ? Blood in the anal area, in your stool, or on the toilet paper. ? Diarrhea for more than 24 hours.    Watch closely for changes in your health, and be sure to contact your doctor if:    · Your pain is getting worse.     · You can't sleep because of pain.     · You are very worried or anxious about your pain.     · You have trouble taking your pain medicine.     · You have any concerns about your pain medicine or its side effects.     · You have vomiting or cramps for more than 2 hours. Where can you learn more? Go to http://anastasiia-alejandro.info/.   Enter L902 in the search box to learn more about \"Neuropathic Pain: Care Instructions. \"  Current as of: March 28, 2019  Content Version: 12.2  © 1261-4366 [employer]. Care instructions adapted under license by [employer] (which disclaims liability or warranty for this information). If you have questions about a medical condition or this instruction, always ask your healthcare professional. [place] [address] any warranty or liability for your use of this information.

## 2019-11-29 DIAGNOSIS — J20.9 COPD WITH ACUTE BRONCHITIS (HCC): ICD-10-CM

## 2019-11-29 DIAGNOSIS — J44.0 COPD WITH ACUTE BRONCHITIS (HCC): ICD-10-CM

## 2019-11-29 RX ORDER — PREDNISONE 20 MG/1
TABLET ORAL
Qty: 6 TAB | Refills: 0 | Status: SHIPPED | OUTPATIENT
Start: 2019-11-29 | End: 2019-12-03

## 2019-11-29 NOTE — TELEPHONE ENCOUNTER
On-Call Note  Received call from patient. Reports that she has COPD/asthma. Notes that she is not feeling improved at this time. Was feeling better while on prednisone, but ran out a few days ago. Denies chest pain, denies swelling, denies fevers. Using adviar 2 times daily, increase, and ventolin last used < 4 hours ago. Notes that ventolin helps for ~ 1 hour. Is very strongly against going to ER, or urgent care for evaluation. At this time reasonable to complete additional 2 days fo 40 mg pre and 2 days at 20. But if worsening or still symptomatic on Monday, advise to be re-evaluated.      Steven Gibbs MD

## 2019-11-29 NOTE — TELEPHONE ENCOUNTER
Informed pt that she may need to come in to be evaluated or go to UC.  Informed that I would check with provider

## 2019-11-29 NOTE — TELEPHONE ENCOUNTER
Pt thinks the Prednisone may have been working; however, the pt has finished the Prednisone (took meds for 5 days). Pt states she is not better and was instructed by provider to call if not better. Please send refill to [address] on file.   Pt ph#: [phone]

## 2019-12-02 ENCOUNTER — TELEPHONE (OUTPATIENT)
Dept: INTERNAL MEDICINE CLINIC | Age: 84
End: 2019-12-02

## 2019-12-02 NOTE — TELEPHONE ENCOUNTER
Made outgoing call to pt. Pt states that the Prednisone is only providing her temporary relief. She states that she will take it and feel better but in an hour or two she will be really sick again. Please advise.

## 2019-12-02 NOTE — TELEPHONE ENCOUNTER
----- Message from Jan Ferguson sent at 11/29/2019  4:50 PM EST -----  Regarding: NP [place]/Refill  Medication Refill    Caller (if not patient):      Relationship of caller (if not patient):      Best contact number(s):[phone]      Name of medication and dosage if known:Prednisone      Is patient out of this medication (yes/no): yes      Pharmacy name: [facility] listed in chart? (yes/no): yes  Pharmacy phone number:      Details to clarify the request: Pt states that KAREN Mayo told her to contact her if the first round of medication did not work.        Jan Perezo

## 2019-12-02 NOTE — TELEPHONE ENCOUNTER
Patient advise patient to go to the ED for further evaluation.  Needs to rule out other causes of respiratory symptoms i.e. pneumonia

## 2019-12-02 NOTE — TELEPHONE ENCOUNTER
I talked to patient and notified her of Mrs. Garrison's recommendations. She stated \" I am not going to the ER to pay no $180 and I am not getting out of my night gown to get dressed and go somewhere\" she then said, \"I feel better today and I would be better off seeing my lung specialist\" I asked her if she has an appt with them. She stated \"nope im not going to see them either\"    I notified her I will let Mrs. Howe Good know. She said okay thank you.

## 2019-12-09 RX ORDER — LAMOTRIGINE 50 MG/1
TABLET, ORALLY DISINTEGRATING ORAL
Qty: 90 TAB | Refills: 0 | Status: SHIPPED | OUTPATIENT
Start: 2019-12-09 | End: 2020-03-10

## 2020-02-04 ENCOUNTER — TELEPHONE (OUTPATIENT)
Dept: INTERNAL MEDICINE CLINIC | Age: 85
End: 2020-02-04

## 2020-02-04 NOTE — TELEPHONE ENCOUNTER
Caller's first and last name and relationship to patient (if not the patient): N/A   Best contact number: [phone]   Preferred date and time: N/A   Scheduled appointment date and time: N/A   Reason for appointment: Details to clarify the request:  Requesting for a callback from Graeme Nguyen regarding  3 month appt. Patient wants to be seen sooner because she is not feeling well. (Offered appt for tomorrow per pt she may be too sick). Spoke w/ pt re: appt, pt stated she did not feel well enough to come into the office; offered pt [facility] ph#, pt stated she did not want anyone coming to her home. Advised pt she could call office to schedule an appt w/ the provider or we can give pt info for [facility] if she chooses. Pt voiced understanding.

## 2020-03-10 RX ORDER — LAMOTRIGINE 50 MG/1
TABLET, ORALLY DISINTEGRATING ORAL
Qty: 90 TAB | Refills: 0 | Status: SHIPPED | OUTPATIENT
Start: 2020-03-10 | End: 2021-08-09 | Stop reason: SDUPTHER

## 2020-03-11 ENCOUNTER — OFFICE VISIT (OUTPATIENT)
Dept: NEUROLOGY | Age: 85
End: 2020-03-11

## 2020-03-11 VITALS
OXYGEN SATURATION: 96 % | WEIGHT: 166 LBS | HEART RATE: 63 BPM | BODY MASS INDEX: 29.41 KG/M2 | SYSTOLIC BLOOD PRESSURE: 128 MMHG | DIASTOLIC BLOOD PRESSURE: 64 MMHG | RESPIRATION RATE: 18 BRPM | HEIGHT: 63 IN

## 2020-03-11 DIAGNOSIS — R20.2 NUMBNESS AND TINGLING OF LEFT UPPER AND LOWER EXTREMITY: Primary | ICD-10-CM

## 2020-03-11 DIAGNOSIS — R73.09 OTHER ABNORMAL GLUCOSE: ICD-10-CM

## 2020-03-11 DIAGNOSIS — R20.0 NUMBNESS AND TINGLING OF LEFT UPPER AND LOWER EXTREMITY: Primary | ICD-10-CM

## 2020-03-11 NOTE — PROGRESS NOTES
Date:  20     Name:  Miguel A Jarrett  :  1935  MRN:  [id number]     PCP:  Claire Victoria NP    Chief Complaint   Patient presents with    Numbness       HISTORY OF PRESENT ILLNESS: 70-year-old female who presents with numbness in tingling in bilateral hands and feet. It started several years ago. Her numbness was intermittent first.   She used to be able to massage her feet and it would go away. She is taking gabapentin 100 mg at night, which she felt helped. She notes her balance is off. Denies dropping objects from the hand. She notes pain during the day but when she stops and goes to bed she notices it more. This has gotten worse over the past month. Denies back pain   Current Outpatient Medications   Medication Sig    lamoTRIgine (LAMICTAL) 50 mg disintegrating tablet DISSOLVE 1 TABLET ON THE TONGUE EVERY NIGHT    montelukast (SINGULAIR) 10 mg tablet take 1 tablet by mouth once daily    celecoxib (CELEBREX) 100 mg capsule Take 100 mg by mouth.  valsartan (DIOVAN) 160 mg tablet Take 160 mg by mouth daily.  loperamide (IMODIUM) 2 mg capsule 4 mg as needed.  gabapentin (NEURONTIN) 100 mg capsule Take 100 mg by mouth nightly.  clonazePAM (KLONOPIN) 0.5 mg tablet Take 0.5 mg by mouth nightly as needed.  chlorthalidone (HYGROTEN) 25 mg tablet Take 25 mg by mouth daily.  diphenoxylate-atropine (LOMOTIL) 2.5-0.025 mg per tablet as needed.  BYSTOLIC 10 mg tablet Take 10 mg by mouth two (2) times a day.  nebivolol (BYSTOLIC) 5 mg tablet Take  by mouth two (2) times a day.  INCRUSE ELLIPTA 62.5 mcg/actuation inhaler     sucralfate (CARAFATE) 1 gram tablet Take 1 g by mouth Before breakfast and dinner.  fluticasone-salmeterol (ADVAIR) 500-50 mcg/dose diskus inhaler Take 1 Puff by inhalation two (2) times a day.  albuterol (PROVENTIL HFA, VENTOLIN HFA, PROAIR HFA) 90 mcg/actuation inhaler Take 2 Puffs by inhalation every four (4) hours as needed for Wheezing.     albuterol (PROVENTIL VENTOLIN) 2.5 mg /3 mL (0.083 %) nebulizer solution 3 mL by Nebulization route every four (4) hours as needed for Wheezing.  Cholecalciferol, Vitamin D3, (VITAMIN D3) 1,000 unit cap Take 2,000 Units by mouth.  vitamin E (AQUA GEMS) 400 unit capsule Take 400 Units by mouth daily.  LACTOBACILLUS ACIDOPHILUS (PROBIOTIC PO) Take  by mouth. Takes one po daily.  CALCIUM CARBONATE (TUMS PO) Take  by mouth as needed.  albuterol (PROVENTIL, VENTOLIN) 90 mcg/Actuation inhaler Take 2-4 Puffs by inhalation every four (4) hours as needed.  nitrofurantoin, macrocrystal-monohydrate, (MACROBID) 100 mg capsule Take 100 mg by mouth two (2) times a day.  losartan (COZAAR) 100 mg tablet take 1 tablet by mouth once daily    zolpidem (AMBIEN) 5 mg tablet     rosuvastatin (CRESTOR) 5 mg tablet Take 1 Tab by mouth nightly. (Patient taking differently: Take 5 mg by mouth nightly. Indications: every other night)     No current facility-administered medications for this visit.       Allergies   Allergen Reactions   Peter Luis Other (comments)     Blood testing    Cat Dander Other (comments)     Blood testing    Dog Dander Other (comments)     Blood testing    Elavil Other (comments)     imbalance    [place] Other (comments)     Blood testing    Sulfa (Sulfonamide Antibiotics) Shortness of Breath     Past Medical History:   Diagnosis Date    Arthritis     Arthritis of knee, left     Asthma     Asthma with COPD (chronic obstructive pulmonary disease) ([facility]     FEV1 - 0.99, IgE high    Atherosclerosis of both carotid arteries     Bipolar affective disorder ([facility])     Cataract, left eye     cataract - left    Chronic obstructive pulmonary disease ([facility])     Closed fracture of lateral portion of left tibial plateau     Depression with anxiety     depression/anxiety    Encephalopathy     encephalopathy    Esophageal ulcer     esophageal ulcer    Gastroparesis     GERD (gastroesophageal reflux disease)     Hepatic steatosis     History of fall     hx falls    Hyperlipidemia     increased cholesterol    Hyperlipidemia     Hypertension     IBS (irritable bowel syndrome)     IBS    IGT (impaired glucose tolerance) 9/15/2015    Liver disease     elevated liver enzymes - being evaluated/US done x 2 - inflammed liver/\"fat on liver\"    Menopause 1987    Neurological disorder     delayed thinking process? ??    OA (osteoarthritis) of knee     Osteoporosis     Seizures (HCC)     Sunburn, blistering     Unspecified adverse effect of anesthesia     was told by neurologist not to use anesthesia unless needed due to encephalopathy    Unspecified adverse effect of anesthesia     hallucinations after MRI with sedation    Vertigo     vertigo     Past Surgical History:   Procedure Laterality Date    ABDOMEN SURGERY PROC UNLISTED      GERD reflux    COLONOSCOPY N/A 8/1/2017    COL[facility] ENDOSCOPY    HX BREAST BIOPSY Left     Surgical    HX GI      laser surgery for reflux and hernia repair    HX MOHS PROCEDURES  09/05/2017    BCC L nasal sidewall by Dr. Sindi Montague  06/04/2018    [facility] R mid forehead by Dr. Jamie Morgan History     Socioeconomic History    Marital status: SINGLE     Spouse name: Not on file    Number of children: Not on file    Years of education: Not on file    Highest education level: Not on file   Occupational History    Not on file   Social Needs    Financial resource strain: Not on file    Food insecurity     Worry: Not on file     Inability: Not on file   [employer] needs     Medical: Not on file     Non-medical: Not on file   Tobacco Use    Smoking status: Former Smoker    Smokeless tobacco: Never Used    Tobacco comment: quit smoking cigarettes 30 yrs ago   Substance and Sexual Activity    Alcohol use: No    Drug use: No    Sexual activity: Not Currently     Partners: Male   Lifestyle    Physical activity     Days per week: Not on file     Minutes per session: Not on file    Stress: Not on file   Relationships    Social connections     Talks on phone: Not on file     Gets together: Not on file     Attends [employer] service: Not on file     Active member of club or organization: Not on file     Attends meetings of clubs or organizations: Not on file     Relationship status: Not on file    Intimate partner violence     Fear of current or ex partner: Not on file     Emotionally abused: Not on file     Physically abused: Not on file     Forced sexual activity: Not on file   Other Topics Concern    Not on file   Social History Narrative    Not on file     Family History   Problem Relation Age of Onset    Stroke Father     Heart Disease Father         angina    No Known Problems Mother     Cancer Brother         lung    Cancer Brother         lung       PHYSICAL EXAMINATION:    Visit Vitals  /64   Pulse 63   Resp 18   Ht 5' 3\" (1.6 m)   Wt 75.3 kg (166 lb)   SpO2 96%   BMI 29.41 kg/m²     General:  Well defined, nourished, and groomed individual in no acute distress. Neck: Supple, nontender, no bruits, no pain with resistance to active range of motion. Heart: Regular rate and rhythm, no murmurs, rub, or gallop. Normal S1S2. Lungs:  Clear to auscultation bilaterally with equal chest expansion, no cough, no wheeze  Musculoskeletal:  Extremities revealed no edema and had full range of motion of joints. Psych:  Good mood and bright affect    NEUROLOGICAL EXAMINATION:     Mental Status:   Alert and oriented to person, place, and time with recent and remote memory intact. Attention span and concentration are normal. Speech is fluent with a full fund of knowledge. Cranial Nerves:    II, III, IV, VI:  Visual acuity grossly intact. Visual fields are normal.    Pupils are equal, round, and reactive to light and accommodation. Extra-ocular movements are full and fluid. Fundoscopic exam was benign, no ptosis or nystagmus. V-XII: Hearing is grossly intact. Facial features are symmetric, with normal sensation and strength. The palate rises symmetrically and the tongue protrudes midline. Sternocleidomastoids 5/5. Motor Examination: Normal tone, bulk, and strength, 5/5 muscle strength throughout. No cogwheel rigidity or clonus present. Sensory exam: stocking and glove distribution of sensory loss temperature, and vibration sense. Hypersensitive to pinprick in bilateral lower/upper extremities. .Normal proprioception. Coordination:  Heel-to-shin was smooth and symmetrical bilaterally. Finger to nose and rapid arm movement testing was normal.   No resting or intention tremor    Gait and Station:  Steady while walking on toes, heels, and with tandem walking. Normal arm swing. No Rhomberg or pronator drift. No muscle wasting or fasiculations noted. Reflexes:  DTRs 2+ throughout. Toes downgoing. ASSESSMENT AND PLAN    ICD-10-CM ICD-9-CM    1. Numbness and tingling of left upper and lower extremity R20.0 782.0 HEMOGLOBIN A1C W/O EAG    R20.2  EMG LIMITED   2. Other abnormal glucose  R73.09 790.29 HEMOGLOBIN A1C W/O EAG   80year old female who presents with paresthesia in bilateral hands and feet. On exam., she has decrease sensation to vibration, temperature in bilateral feet. No abnormal sensation in hands. I will get and EMG on  BUE/BLE to evaluate for neuropathy. Will  Also check a1c. B12,TSH labs,normal  This note will not be viewable in MyChart.   Leonid Gerber NP

## 2020-03-12 LAB — HBA1C MFR BLD: 5.7 % (ref 4.8–5.6)

## 2020-06-02 ENCOUNTER — OFFICE VISIT (OUTPATIENT)
Dept: NEUROLOGY | Age: 85
End: 2020-06-02

## 2020-06-02 VITALS
DIASTOLIC BLOOD PRESSURE: 74 MMHG | HEART RATE: 78 BPM | SYSTOLIC BLOOD PRESSURE: 110 MMHG | WEIGHT: 170 LBS | HEIGHT: 63 IN | OXYGEN SATURATION: 98 % | RESPIRATION RATE: 16 BRPM | BODY MASS INDEX: 30.12 KG/M2

## 2020-06-02 DIAGNOSIS — G56.01 CARPAL TUNNEL SYNDROME OF RIGHT WRIST: Primary | ICD-10-CM

## 2020-06-05 ENCOUNTER — TELEPHONE (OUTPATIENT)
Dept: NEUROLOGY | Age: 85
End: 2020-06-05

## 2020-06-05 NOTE — TELEPHONE ENCOUNTER
Pt wondering if she can get her EMG results over the phone instead of coming back for an appt. Pt had her EMG on 6/2.  Please call

## 2020-06-08 NOTE — TELEPHONE ENCOUNTER
Called, left  for pt that EMG report has been mailed last week.  If doesn't received this week, call back to office

## 2020-06-12 ENCOUNTER — NURSE TRIAGE (OUTPATIENT)
Dept: OTHER | Facility: CLINIC | Age: 85
End: 2020-06-12

## 2020-06-12 NOTE — TELEPHONE ENCOUNTER
Reason for Disposition   MILD difficulty breathing (e.g., minimal/no SOB at rest, SOB with walking, pulse < 100) of new onset or worse than normal    Answer Assessment - Initial Assessment Questions  1. RESPIRATORY STATUS: \"Describe your breathing? \" (e.g., wheezing, shortness of breath, unable to speak, severe coughing)       Sob is with exertion, cleaning house  2. ONSET: \"When did this breathing problem begin? \"   Sob all the time since a young age  1. PATTERN \"Does the difficult breathing come and go, or has it been constant since it started? \"       Intermittent with exertion  4. SEVERITY: \"How bad is your breathing? \" (e.g., mild, moderate, severe)     - MILD: No SOB at rest, mild SOB with walking, speaks normally in sentences, can lay down, no retractions, pulse < 100.     - MODERATE: SOB at rest, SOB with minimal exertion and prefers to sit, cannot lie down flat, speaks in phrases, mild retractions, audible wheezing, pulse 100-120.     - SEVERE: Very SOB at rest, speaks in single words, struggling to breathe, sitting hunched forward, retractions, pulse > 120    sob with exertion  5. RECURRENT SYMPTOM: \"Have you had difficulty breathing before? \" If so, ask: \"When was the last time? \" and \"What happened that time? \"    has sob all the time due to COPD, Asthma  6. CARDIAC HISTORY: \"Do you have any history of heart disease? \" (e.g., heart attack, angina, bypass surgery, angioplasty)      7. LUNG HISTORY: \"Do you have any history of lung disease? \"  (e.g., pulmonary embolus, asthma, emphysema)  COPD, asthma, using inhalers  8. CAUSE: \"What do you think is causing the breathing problem? \"   COPD, asthma  9. OTHER SYMPTOMS: \"Do you have any other symptoms? (e.g., dizziness, runny nose, cough, chest pain, fever)     No cough, no fever, no chest pain  10. PREGNANCY: \"Is there any chance you are pregnant? \" \"When was your last menstrual period? \"         11. TRAVEL: \"Have you traveled out of the country in the last month? \" (e.g., travel history, exposures)     no    Protocols used: BREATHING DIFFICULTY-ADULT-[place]  Patient called pre-service center [facility]) to schedule appointment, with red flag complaint, transferred to RN access for triage. Pt calling with intermittent sob with exertion. Has COPD and asthma and was told to follow up with her PCP. NO chest pain, no fever, no cough. No COVID exposure. Call soft transferred to Kaye White to schedule appt. Will also call Pulmonary if unable to see PCP.

## 2020-06-16 ENCOUNTER — TELEPHONE (OUTPATIENT)
Dept: NEUROLOGY | Age: 85
End: 2020-06-16

## 2020-06-16 NOTE — TELEPHONE ENCOUNTER
----- Message from Mariaa Goodwin sent at 6/16/2020 12:24 PM EDT -----  Regarding: /Telephone  General Message/Vendor Calls    Caller's first and last name:  pt    Reason for call:  Requesting to speak with the nurse    Callback required yes/no and why:  yes    Best contact number(s):  [phone]    Details to clarify the request:  Pt stated, she received a letter with no information.  Pt is still awaiting the results from the test. Pt has issues with her legs so request when the office calls if they could just be aware she is trying to get to the phone      Mariaa Goodwin

## 2020-06-22 DIAGNOSIS — G56.03 CARPAL TUNNEL SYNDROME ON BOTH SIDES: Primary | ICD-10-CM

## 2020-06-22 RX ORDER — ARM BRACE
EACH MISCELLANEOUS
Qty: 2 EACH | Refills: 0 | Status: ON HOLD | OUTPATIENT
Start: 2020-06-22 | End: 2021-01-03

## 2020-06-23 RX ORDER — HYDRALAZINE HYDROCHLORIDE 100 MG/1
100 TABLET, FILM COATED ORAL 2 TIMES DAILY
Qty: 180 TAB | Refills: 0 | Status: SHIPPED | OUTPATIENT
Start: 2020-06-23 | End: 2020-06-26 | Stop reason: SDUPTHER

## 2020-06-26 ENCOUNTER — OFFICE VISIT (OUTPATIENT)
Dept: INTERNAL MEDICINE CLINIC | Age: 85
End: 2020-06-26

## 2020-06-26 VITALS
RESPIRATION RATE: 20 BRPM | HEART RATE: 68 BPM | SYSTOLIC BLOOD PRESSURE: 130 MMHG | DIASTOLIC BLOOD PRESSURE: 60 MMHG | WEIGHT: 168 LBS | TEMPERATURE: 99 F | HEIGHT: 63 IN | OXYGEN SATURATION: 96 % | BODY MASS INDEX: 29.77 KG/M2

## 2020-06-26 DIAGNOSIS — Z00.00 MEDICARE ANNUAL WELLNESS VISIT, SUBSEQUENT: Primary | ICD-10-CM

## 2020-06-26 DIAGNOSIS — I10 ESSENTIAL HYPERTENSION: ICD-10-CM

## 2020-06-26 DIAGNOSIS — Z91.81 AT MAXIMUM RISK FOR FALL: ICD-10-CM

## 2020-06-26 DIAGNOSIS — R82.90 ABNORMAL URINALYSIS: ICD-10-CM

## 2020-06-26 DIAGNOSIS — N39.0 RECURRENT UTI: ICD-10-CM

## 2020-06-26 DIAGNOSIS — J44.0 COPD WITH ACUTE BRONCHITIS (HCC): ICD-10-CM

## 2020-06-26 DIAGNOSIS — Z99.89 OSA ON CPAP: ICD-10-CM

## 2020-06-26 DIAGNOSIS — J20.9 COPD WITH ACUTE BRONCHITIS (HCC): ICD-10-CM

## 2020-06-26 DIAGNOSIS — G47.33 OSA ON CPAP: ICD-10-CM

## 2020-06-26 DIAGNOSIS — H91.93 BILATERAL HEARING LOSS, UNSPECIFIED HEARING LOSS TYPE: ICD-10-CM

## 2020-06-26 DIAGNOSIS — F31.60 BIPOLAR AFFECTIVE DISORDER, CURRENT EPISODE MIXED, CURRENT EPISODE SEVERITY UNSPECIFIED (HCC): ICD-10-CM

## 2020-06-26 DIAGNOSIS — Z71.89 ADVANCE DIRECTIVE DISCUSSED WITH PATIENT: ICD-10-CM

## 2020-06-26 LAB
BILIRUB UR QL STRIP: NEGATIVE
GLUCOSE UR-MCNC: NEGATIVE MG/DL
KETONES P FAST UR STRIP-MCNC: NEGATIVE MG/DL
PH UR STRIP: 6.5 [PH] (ref 4.6–8)
PROT UR QL STRIP: NEGATIVE
SP GR UR STRIP: 1.01 (ref 1–1.03)
UA UROBILINOGEN AMB POC: ABNORMAL (ref 0.2–1)
URINALYSIS CLARITY POC: ABNORMAL
URINALYSIS COLOR POC: YELLOW
URINE BLOOD POC: NEGATIVE
URINE LEUKOCYTES POC: ABNORMAL
URINE NITRITES POC: POSITIVE

## 2020-06-26 RX ORDER — HYDRALAZINE HYDROCHLORIDE 100 MG/1
100 TABLET, FILM COATED ORAL 2 TIMES DAILY
Qty: 180 TAB | Refills: 0 | Status: ON HOLD | OUTPATIENT
Start: 2020-06-26 | End: 2021-01-07

## 2020-06-26 RX ORDER — NEBIVOLOL 10 MG/1
10 TABLET ORAL DAILY
Status: ON HOLD | COMMUNITY
Start: 2018-12-26 | End: 2021-01-03 | Stop reason: DRUGHIGH

## 2020-06-26 NOTE — PROGRESS NOTES
Rm#[id number]    Pt states please update med list       Chief Complaint   Patient presents with   Renay Her Annual Wellness Visit     1. Have you been to the ER, urgent care clinic since your last visit? Hospitalized since your last visit? [facility] UTI/bladder infec. another time in 5-2020 for UTI     2. Have you seen or consulted any other health care providers outside of the [address] [place] [address] since your last visit? Include any pap smears or colon screening. Yes pulm. Dr. Michele Dakin [facility] follow ups. Health Maintenance Due   Topic Date Due    DTaP/Tdap/Td series (1 - Tdap) 08/09/1956    Shingrix Vaccine Age 50> (1 of 2) 08/09/1985    GLAUCOMA SCREENING Q2Y  08/09/2000    Lipid Screen  08/10/2017    Medicare Yearly Exam  03/06/2020     3 most recent PHQ Screens 6/26/2020   Little interest or pleasure in doing things Not at all   Feeling down, depressed, irritable, or hopeless More than half the days   Total Score PHQ 2 2   Trouble falling or staying asleep, or sleeping too much -   Feeling tired or having little energy -   Poor appetite, weight loss, or overeating -   Feeling bad about yourself - or that you are a failure or have let yourself or your family down -   Trouble concentrating on things such as school, work, reading, or watching TV -   Moving or speaking so slowly that other people could have noticed; or the opposite being so fidgety that others notice -   Thoughts of being better off dead, or hurting yourself in some way -   PHQ 9 Score -   How difficult have these problems made it for you to do your work, take care of your home and get along with others -     Fall Risk Assessment, last 12 mths 6/26/2020   Able to walk? Yes   Fall in past 12 months? Yes   Fall with injury? No   Number of falls in past 12 months 1   Fall Risk Score 1       Abuse Screening Questionnaire 6/26/2020   Do you ever feel afraid of your partner?  N   Are you in a relationship with someone who physically or mentally threatens you? N   Is it safe for you to go home? Y     ADL Assessment 6/26/2020   Feeding yourself No Help Needed   Getting from bed to chair No Help Needed   Getting dressed No Help Needed   Bathing or showering No Help Needed   Walk across the room (includes cane/walker) No Help Needed   Using the telphone No Help Needed   Taking your medications No Help Needed   Preparing meals No Help Needed   Managing money (expenses/bills) No Help Needed   Moderately strenuous housework (laundry) No Help Needed   Shopping for personal items (toiletries/medicines) No Help Needed   Shopping for groceries No Help Needed   Driving No Help Needed   Climbing a flight of stairs No Help Needed   Getting to places beyond walking distances No Help Needed     Recent Travel Screening and Travel History documentation     Travel Screening       Question Response     In the last month, have you been in contact with someone who was confirmed or suspected to have Coronavirus / COVID-19? No / Unsure     Do you have any of the following symptoms? Shortness of breath     Have you traveled internationally in the last month?  No      Travel History   Travel since 05/26/20     No documented travel since 05/26/20

## 2020-06-26 NOTE — PROGRESS NOTES
This is the Subsequent Medicare Annual Wellness Exam, performed 12 months or more after the Initial AWV or the last Subsequent AWV    I have reviewed the patient's medical history in detail and updated the computerized patient record. Recurrent UTI. 5/7 seen by ED provider. Diagnosed with  UTI did not take antibiotic prescribed. Bottom of feet hurt, massage with lotion and Gabapentin calms pain     Chronic left knee pain, needs replacement, provider Dr. Reva correia , not a surgical candidate, would like injection     Had recent virtual visit with Dr. Juan Carlos Jimenez, pulmonary specialist     \"Can't stand\" Zhane Odor    Dr. Bradley Siddiqui encouraged her to use it      Seeing  Dr Penny Weiss, GI specialist for 36 years     [employer] Whe[employer], daughter also brings her groceries     Strict social distancing and sheltering in place since pandemic. Needs Hydralazine refill,no medication for 2 days     She lost balance and fell on carpet in house, no injury. Recent home PT.      History     Patient Active Problem List   Diagnosis Code    Postconcussion syndrome F07.81    HBP (high blood pressure) I10    Asthma J45.909    GERD (gastroesophageal reflux disease) K21.9    Depressed F32.9    Hyperlipemia E78.5    IBS (irritable bowel syndrome) K58.9    Occlusion and stenosis of carotid artery without mention of cerebral infarction I65.29    Dizziness and giddiness R42    Arthritis of knee, left M17.12    IGT (impaired glucose tolerance) R73.02    Hyperlipidemia E78.5    Essential hypertension I10    Bipolar affective disorder ([facility] [address] F31.9    Advanced directives, counseling/discussion Z71.89    Asthma with COPD (chronic obstructive pulmonary disease) ([facility]) J44.9    Closed fracture of lateral portion of left tibial plateau G85.541N    OA (osteoarthritis) of knee M17.10    LELE (obstructive sleep apnea) G47.33     Past Medical History:   Diagnosis Date    Arthritis     Arthritis of knee, left     Asthma     Asthma with COPD (chronic obstructive pulmonary disease) (HCC)     FEV1 - 0.99, IgE high    Atherosclerosis of both carotid arteries     Bipolar affective disorder (HCC)     Cataract, left eye     cataract - left    Chronic obstructive pulmonary disease (HCC)     Closed fracture of lateral portion of left tibial plateau     Depression with anxiety     depression/anxiety    Encephalopathy     encephalopathy    Esophageal ulcer     esophageal ulcer    Gastroparesis     GERD (gastroesophageal reflux disease)     Hepatic steatosis     History of fall     hx falls    Hyperlipidemia     increased cholesterol    Hyperlipidemia     Hypertension     IBS (irritable bowel syndrome)     IBS    IGT (impaired glucose tolerance) 9/15/2015    Liver disease     elevated liver enzymes - being evaluated/US done x 2 - inflammed liver/\"fat on liver\"    Menopause 1987    Neurological disorder     delayed thinking process? ??    OA (osteoarthritis) of knee     Osteoporosis     Seizures (HCC)     Sunburn, blistering     Unspecified adverse effect of anesthesia     was told by neurologist not to use anesthesia unless needed due to encephalopathy    Unspecified adverse effect of anesthesia     hallucinations after MRI with sedation    Vertigo     vertigo      Past Surgical History:   Procedure Laterality Date    ABDOMEN SURGERY PROC UNLISTED      GERD reflux    COLONOSCOPY N/A 8/1/2017    COLONOSCOPY performed by Ni Hammer MD at [facility] ENDOSCOPY    HX BREAST BIOPSY Left     Surgical    HX GI      laser surgery for reflux and hernia repair    HX MOHS PROCEDURES  09/05/2017    BCC L nasal sidewall by Dr. Read Zoë  06/04/2018    [address] forehead by Dr. Oleg Villa      Current Outpatient Medications   Medication Sig Dispense Refill    hydrALAZINE (APRESOLINE) 100 mg tablet Take 1 Tab by mouth two (2) times a day.  180 Tab 0    valsartan (DIOVAN) 160 mg tablet Take 160 mg by mouth daily.  5    gabapentin (NEURONTIN) 100 mg capsule Take 100 mg by mouth nightly.  chlorthalidone (HYGROTEN) 25 mg tablet Take 25 mg by mouth daily. 2    diphenoxylate-atropine (LOMOTIL) 2.5-0.025 mg per tablet as needed. 0    BYSTOLIC 10 mg tablet Take 10 mg by mouth two (2) times a day. 2    INCRUSE ELLIPTA 62.5 mcg/actuation inhaler   0    sucralfate (CARAFATE) 1 gram tablet Take 1 g by mouth Before breakfast and dinner.  fluticasone-salmeterol (ADVAIR) 500-50 mcg/dose diskus inhaler Take 1 Puff by inhalation two (2) times a day. 3 Inhaler 1    albuterol (PROVENTIL HFA, VENTOLIN HFA, PROAIR HFA) 90 mcg/actuation inhaler Take 2 Puffs by inhalation every four (4) hours as needed for Wheezing. 1 Inhaler 2    albuterol (PROVENTIL VENTOLIN) 2.5 mg /3 mL (0.083 %) nebulizer solution 3 mL by Nebulization route every four (4) hours as needed for Wheezing. 1 Each 4    Cholecalciferol, Vitamin D3, (VITAMIN D3) 1,000 unit cap Take 2,000 Units by mouth.  vitamin E (AQUA GEMS) 400 unit capsule Take 400 Units by mouth daily.  LACTOBACILLUS ACIDOPHILUS (PROBIOTIC PO) Take  by mouth. Takes one po daily.  CALCIUM CARBONATE (TUMS PO) Take  by mouth as needed.  nebivoloL (BYSTOLIC) 10 mg tablet Take 10 mg by mouth daily.  Arm Brace (Wrist Brace) [facility] 12hour on/ 12hours off 2 Each 0    lamoTRIgine (LAMICTAL) 50 mg disintegrating tablet DISSOLVE 1 TABLET ON THE TONGUE EVERY NIGHT 90 Tab 0    montelukast (SINGULAIR) 10 mg tablet take 1 tablet by mouth once daily 90 Tab 1    celecoxib (CELEBREX) 100 mg capsule Take 100 mg by mouth.  nitrofurantoin, macrocrystal-monohydrate, (MACROBID) 100 mg capsule Take 100 mg by mouth two (2) times a day.  loperamide (IMODIUM) 2 mg capsule 4 mg as needed.  clonazePAM (KLONOPIN) 0.5 mg tablet Take 0.5 mg by mouth nightly as needed. 0    nebivolol (BYSTOLIC) 5 mg tablet Take  by mouth two (2) times a day.       losartan (COZAAR) 100 mg tablet take 1 tablet by mouth once daily  0    zolpidem (AMBIEN) 5 mg tablet   0    rosuvastatin (CRESTOR) 5 mg tablet Take 1 Tab by mouth nightly. (Patient taking differently: Take 5 mg by mouth nightly. Indications: every other night) 90 Tab 1    albuterol (PROVENTIL, VENTOLIN) 90 mcg/Actuation inhaler Take 2-4 Puffs by inhalation every four (4) hours as needed.        Allergies   Allergen Reactions   Dunia Coca Other (comments)     Blood testing    Cat Dander Other (comments)     Blood testing    Dog Dander Other (comments)     Blood testing    Elavil Other (comments)     imbalance    [place] Other (comments)     Blood testing    Sulfa (Sulfonamide Antibiotics) Shortness of Breath       Family History   Problem Relation Age of Onset    Stroke Father     Heart Disease Father         angina    No Known Problems Mother     Cancer Brother         lung    Cancer Brother         lung     Social History     Tobacco Use    Smoking status: Former Smoker     Years: 20.00     Last attempt to quit:      Years since quittin.5    Smokeless tobacco: Never Used   Substance Use Topics    Alcohol use: No       Depression Risk Factor Screening:     3 most recent PHQ Screens 2020   Little interest or pleasure in doing things Not at all   Feeling down, depressed, irritable, or hopeless More than half the days   Total Score PHQ 2 2   Trouble falling or staying asleep, or sleeping too much -   Feeling tired or having little energy -   Poor appetite, weight loss, or overeating -   Feeling bad about yourself - or that you are a failure or have let yourself or your family down -   Trouble concentrating on things such as school, work, reading, or watching TV -   Moving or speaking so slowly that other people could have noticed; or the opposite being so fidgety that others notice -   Thoughts of being better off dead, or hurting yourself in some way -   PHQ 9 Score -   How difficult have these problems made it for you to do your work, take care of your home and get along with others -       Alcohol Risk Factor Screening:   Do you average 1 drink per night or more than 7 drinks a week:  No    On any one occasion in the past three months have you have had more than 3 drinks containing alcohol:  No      Functional Ability and Level of Safety:   Hearing: wears hearing aids      Activities of Daily Living: The home contains: handrails and grab bars  Patient does total self care     Ambulation: with difficulty, uses a cane sometimes      Fall Risk:  Fall Risk Assessment, last 12 mths 6/26/2020   Able to walk? Yes   Fall in past 12 months? Yes   Fall with injury? No   Number of falls in past 12 months 1   Fall Risk Score 1     Abuse Screen:  Patient is not abused       Cognitive Screening   Has your family/caregiver stated any concerns about your memory: no     Focus and attention not conducive to screening. Reports prior formal screening ruled dementia   Conversation indications good remote and short term memory    Patient Care Team   Patient Care Team:  Guerline Johnson NP as PCP - General (Family Practice)  Guerline Johnson NP as PCP - [facility] Empaneled Provider  Call, Kelli Levine MD (Allergy)  Danelle Barrera MD (Gastroenterology)  Shelley Angeles MD (Inactive) (Cardiology)  Maryam Vincent MD (Neurology)  Huseyin Haines MD (Ophthalmology)  Tammie Ortiz MD (Psychiatry)  Kevin Lin MD (Rheumatology)  Elieser Solo MD (Neurology)    Assessment/Plan   Education and counseling provided:  Are appropriate based on today's review and evaluation    Diagnoses and all orders for this visit:    1. Medicare annual wellness visit, subsequent    2. Recurrent UTI  -     AMB POC URINALYSIS DIP STICK AUTO W/O MICRO  -     CULTURE, URINE  -     URINALYSIS W/ RFLX MICROSCOPIC    3. Advance directive discussed with patient    4. Abnormal urinalysis  -     CULTURE, URINE  -     URINALYSIS W/ RFLX MICROSCOPIC    5.  Essential hypertension    6. COPD with acute bronchitis ([address]    7. LELE on CPAP    8. Bipolar affective disorder, current episode mixed, current episode severity unspecified ([address]    9. Bilateral hearing loss, unspecified hearing loss type    10. At maximum risk for fall    Other orders  -     hydrALAZINE (APRESOLINE) 100 mg tablet; Take 1 Tab by mouth two (2) times a day. -     MICROSCOPIC EXAMINATION        Health Maintenance Due   Topic Date Due    DTaP/Tdap/Td series (1 - Tdap) 08/09/1956    Shingrix Vaccine Age 50> (1 of 2) 08/09/1985    GLAUCOMA SCREENING Q2Y  08/09/2000    Lipid Screen  08/10/2017     Strongly encouraged to use hearing aids, cane   Urine dip 2+ leukocytes, asymptomatic for UTI. Culture urine, will call with result  Patient advised of abnormal urine dip. Patient voices understanding and acceptance of this advice and will call back if any further questions or concerns.

## 2020-06-26 NOTE — PATIENT INSTRUCTIONS
Advance Directives: Care Instructions  Overview  An advance directive is a legal way to state your wishes at the end of your life. It tells your family and your doctor what to do if you can't say what you want. There are two main types of advance directives. You can change them any time your wishes change. Living will. This form tells your family and your doctor your wishes about life support and other treatment. The form is also called a declaration. Medical power of . This form lets you name a person to make treatment decisions for you when you can't speak for yourself. This person is called a health care agent (health care proxy, health care surrogate). The form is also called a durable power of  for health care. If you do not have an advance directive, decisions about your medical care may be made by a family member, or by a doctor or a  who doesn't know you. It may help to think of an advance directive as a gift to the people who care for you. If you have one, they won't have to make tough decisions by themselves. Follow-up care is a key part of your treatment and safety. Be sure to make and go to all appointments, and call your doctor if you are having problems. It's also a good idea to know your test results and keep a list of the medicines you take. What should you include in an advance directive? Many states have a unique advance directive form. (It may ask you to address specific issues.) Or you might use a universal form that's approved by many states. If your form doesn't tell you what to address, it may be hard to know what to include in your advance directive. Use the questions below to help you get started. · Who do you want to make decisions about your medical care if you are not able to? · What life-support measures do you want if you have a serious illness that gets worse over time or can't be cured? · What are you most afraid of that might happen?  (Maybe you're afraid of having pain, losing your independence, or being kept alive by machines.)  · Where would you prefer to die? (Your home? A hospital? A nursing home?)  · Do you want to donate your organs when you die? · Do you want certain Voodoo practices performed before you die? When should you call for help? Be sure to contact your doctor if you have any questions. Where can you learn more? Go to http://anastasiia-alejandro.info/  Enter R264 in the search box to learn more about \"Advance Directives: Care Instructions. \"  Current as of: December 9, 2019               Content Version: 12.5  © 8644-2308 [employer]. Care instructions adapted under license by [employer] (which disclaims liability or warranty for this information). If you have questions about a medical condition or this instruction, always ask your healthcare professional. [place] [address] any warranty or liability for your use of this information. Well Visit, Over 72: Care Instructions  Your Care Instructions     Physical exams can help you stay healthy. Your doctor has checked your overall health and may have suggested ways to take good care of yourself. He or she also may have recommended tests. At home, you can help prevent illness with healthy eating, regular exercise, and other steps. Follow-up care is a key part of your treatment and safety. Be sure to make and go to all appointments, and call your doctor if you are having problems. It's also a good idea to know your test results and keep a list of the medicines you take. How can you care for yourself at home? · Reach and stay at a healthy weight. This will lower your risk for many problems, such as obesity, diabetes, heart disease, and high blood pressure. · Get at least 30 minutes of exercise on most days of the week. Walking is a good choice.  You also may want to do other activities, such as running, swimming, cycling, or playing tennis or team sports. · Do not smoke. Smoking can make health problems worse. If you need help quitting, talk to your doctor about stop-smoking programs and medicines. These can increase your chances of quitting for good. · Protect your skin from too much sun. When you're outdoors from 10 a.m. to 4 p.m., stay in the shade or cover up with clothing and a hat with a wide brim. Wear sunglasses that block UV rays. Even when it's cloudy, put broad-spectrum sunscreen (SPF 30 or higher) on any exposed skin. · See a dentist one or two times a year for checkups and to have your teeth cleaned. · Wear a seat belt in the car. Follow your doctor's advice about when to have certain tests. These tests can spot problems early. For men and women  · Cholesterol. Your doctor will tell you how often to have this done based on your overall health and other things that can increase your risk for heart attack and stroke. · Blood pressure. Have your blood pressure checked during a routine doctor visit. Your doctor will tell you how often to check your blood pressure based on your age, your blood pressure results, and other factors. · Diabetes. Ask your doctor whether you should have tests for diabetes. · Vision. Experts recommend that you have yearly exams for glaucoma and other age-related eye problems. · Hearing. Tell your doctor if you notice any change in your hearing. You can have tests to find out how well you hear. · Colon cancer tests. Keep having colon cancer tests as your doctor recommends. You can have one of several types of tests. · Heart attack and stroke risk. At least every 4 to 6 years, you should have your risk for heart attack and stroke assessed. Your doctor uses factors such as your age, blood pressure, cholesterol, and whether you smoke or have diabetes to show what your risk for a heart attack or stroke is over the next 10 years. · Osteoporosis.  Talk to your doctor about whether you should have a bone density test to find out whether you have thinning bones. Ask your doctor if you need to take a calcium plus vitamin D supplement. You may be able to get enough calcium and vitamin D through your diet. For women  · Pap test and pelvic exam. You may no longer need a Pap test. Talk with your doctor about whether to stop or continue to have Pap tests. · Breast exam and mammogram. Ask how often you should have a mammogram, which is an X-ray of your breasts. A mammogram can spot breast cancer before it can be felt and when it is easiest to treat. · Thyroid disease. Talk to your doctor about whether to have your thyroid checked as part of a regular physical exam. Women have an increased chance of a thyroid problem. For men  · Prostate exam. Talk to your doctor about whether you should have a blood test (called a PSA test) for prostate cancer. Experts recommend that you discuss the benefits and risks of the test with your doctor before you decide whether to have this test. Some experts say that men ages 79 and older no longer need testing. · Abdominal aortic aneurysm. Ask your doctor whether you should have a test to check for an aneurysm. You may need a test if you ever smoked or if your parent, brother, sister, or child has had an aneurysm. When should you call for help? Watch closely for changes in your health, and be sure to contact your doctor if you have any problems or symptoms that concern you. Where can you learn more? Go to http://anastasiia-alejandro.info/  Enter [id number] in the search box to learn more about \"Well Visit, Over 65: Care Instructions. \"  Current as of: August 22, 2019               Content Version: 12.5  © 1597-0250 [employer]. Care instructions adapted under license by [employer] (which disclaims liability or warranty for this information).  If you have questions about a medical condition or this instruction, always ask your healthcare professional. [address] any warranty or liability for your use of this information. Well Visit, Over 72: Care Instructions  Your Care Instructions     Physical exams can help you stay healthy. Your doctor has checked your overall health and may have suggested ways to take good care of yourself. He or she also may have recommended tests. At home, you can help prevent illness with healthy eating, regular exercise, and other steps. Follow-up care is a key part of your treatment and safety. Be sure to make and go to all appointments, and call your doctor if you are having problems. It's also a good idea to know your test results and keep a list of the medicines you take. How can you care for yourself at home? · Reach and stay at a healthy weight. This will lower your risk for many problems, such as obesity, diabetes, heart disease, and high blood pressure. · Get at least 30 minutes of exercise on most days of the week. Walking is a good choice. You also may want to do other activities, such as running, swimming, cycling, or playing tennis or team sports. · Do not smoke. Smoking can make health problems worse. If you need help quitting, talk to your doctor about stop-smoking programs and medicines. These can increase your chances of quitting for good. · Protect your skin from too much sun. When you're outdoors from 10 a.m. to 4 p.m., stay in the shade or cover up with clothing and a hat with a wide brim. Wear sunglasses that block UV rays. Even when it's cloudy, put broad-spectrum sunscreen (SPF 30 or higher) on any exposed skin. · See a dentist one or two times a year for checkups and to have your teeth cleaned. · Wear a seat belt in the car. Follow your doctor's advice about when to have certain tests. These tests can spot problems early. For men and women  · Cholesterol.  Your doctor will tell you how often to have this done based on your overall health and other things that can increase your risk for heart attack and stroke. · Blood pressure. Have your blood pressure checked during a routine doctor visit. Your doctor will tell you how often to check your blood pressure based on your age, your blood pressure results, and other factors. · Diabetes. Ask your doctor whether you should have tests for diabetes. · Vision. Experts recommend that you have yearly exams for glaucoma and other age-related eye problems. · Hearing. Tell your doctor if you notice any change in your hearing. You can have tests to find out how well you hear. · Colon cancer tests. Keep having colon cancer tests as your doctor recommends. You can have one of several types of tests. · Heart attack and stroke risk. At least every 4 to 6 years, you should have your risk for heart attack and stroke assessed. Your doctor uses factors such as your age, blood pressure, cholesterol, and whether you smoke or have diabetes to show what your risk for a heart attack or stroke is over the next 10 years. · Osteoporosis. Talk to your doctor about whether you should have a bone density test to find out whether you have thinning bones. Ask your doctor if you need to take a calcium plus vitamin D supplement. You may be able to get enough calcium and vitamin D through your diet. For women  · Pap test and pelvic exam. You may no longer need a Pap test. Talk with your doctor about whether to stop or continue to have Pap tests. · Breast exam and mammogram. Ask how often you should have a mammogram, which is an X-ray of your breasts. A mammogram can spot breast cancer before it can be felt and when it is easiest to treat. · Thyroid disease. Talk to your doctor about whether to have your thyroid checked as part of a regular physical exam. Women have an increased chance of a thyroid problem. For men  · Prostate exam. Talk to your doctor about whether you should have a blood test (called a PSA test) for prostate cancer.  Experts recommend that you discuss the benefits and risks of the test with your doctor before you decide whether to have this test. Some experts say that men ages 79 and older no longer need testing. · Abdominal aortic aneurysm. Ask your doctor whether you should have a test to check for an aneurysm. You may need a test if you ever smoked or if your parent, brother, sister, or child has had an aneurysm. When should you call for help? Watch closely for changes in your health, and be sure to contact your doctor if you have any problems or symptoms that concern you. Where can you learn more? Go to http://anastasiia-alejandro.info/  Enter [id number] in the search box to learn more about \"Well Visit, Over 65: Care Instructions. \"  Current as of: August 22, 2019               Content Version: 12.5  © 1779-7451 Healthwise, Incorporated. Care instructions adapted under license by [employer] (which disclaims liability or warranty for this information). If you have questions about a medical condition or this instruction, always ask your healthcare professional. [place] [address] any warranty or liability for your use of this information. Medicare Wellness Visit, Female     The best way to live healthy is to have a lifestyle where you eat a well-balanced diet, exercise regularly, limit alcohol use, and quit all forms of tobacco/nicotine, if applicable. Regular preventive services are another way to keep healthy. Preventive services (vaccines, screening tests, monitoring & exams) can help personalize your care plan, which helps you manage your own care. Screening tests can find health problems at the earliest stages, when they are easiest to treat. [facility] follows the current, evidence-based guidelines published by the [facility] States Moise Alonso (USPSTF) when recommending preventive services for our patients.  Because we follow these guidelines, sometimes recommendations change over time as research supports it. (For example, mammograms used to be recommended annually. Even though Medicare will still pay for an annual mammogram, the newer guidelines recommend a mammogram every two years for women of average risk). Of course, you and your doctor may decide to screen more often for some diseases, based on your risk and your co-morbidities (chronic disease you are already diagnosed with). Preventive services for you include:  - Medicare offers their members a free annual wellness visit, which is time for you and your primary care provider to discuss and plan for your preventive service needs. Take advantage of this benefit every year!  -All adults over the age of 72 should receive the recommended pneumonia vaccines. Current USPSTF guidelines recommend a series of two vaccines for the best pneumonia protection.   -All adults should have a flu vaccine yearly and a tetanus vaccine every 10 years.   -All adults age 48 and older should receive the shingles vaccines (series of two vaccines). -All adults age 38-68 who are overweight should have a diabetes screening test once every three years.   -All adults born between [birth year] and 1965 should be screened once for Hepatitis C.  -Other screening tests and preventive services for persons with diabetes include: an eye exam to screen for diabetic retinopathy, a kidney function test, a foot exam, and stricter control over your cholesterol.   -Cardiovascular screening for adults with routine risk involves an electrocardiogram (ECG) at intervals determined by your doctor.   -Colorectal cancer screenings should be done for adults age 54-65 with no increased risk factors for colorectal cancer. There are a number of acceptable methods of screening for this type of cancer. Each test has its own benefits and drawbacks. Discuss with your doctor what is most appropriate for you during your annual wellness visit.  The different tests include: colonoscopy (considered the best screening method), a fecal occult blood test, a fecal DNA test, and sigmoidoscopy.    -A bone mass density test is recommended when a woman turns 65 to screen for osteoporosis. This test is only recommended one time, as a screening. Some providers will use this same test as a disease monitoring tool if you already have osteoporosis. -Breast cancer screenings are recommended every other year for women of normal risk, age 54-69.  -Cervical cancer screenings for women over age 72 are only recommended with certain risk factors.      Here is a list of your current Health Maintenance items (your personalized list of preventive services) with a due date:  Health Maintenance Due   Topic Date Due    DTaP/Tdap/Td  (1 - Tdap) [birth year]/1956    Shingles Vaccine (1 of 2) 08/09/1985    Glaucoma Screening   08/09/2000    Cholesterol Test   08/10/2017

## 2020-06-29 LAB
APPEARANCE UR: CLEAR
BACTERIA #/AREA URNS HPF: ABNORMAL /[HPF]
BACTERIA UR CULT: ABNORMAL
BILIRUB UR QL STRIP: NEGATIVE
CASTS URNS QL MICRO: ABNORMAL /LPF
COLOR UR: YELLOW
EPI CELLS #/AREA URNS HPF: ABNORMAL /HPF (ref 0–10)
GLUCOSE UR QL: NEGATIVE
HGB UR QL STRIP: NEGATIVE
KETONES UR QL STRIP: NEGATIVE
LEUKOCYTE ESTERASE UR QL STRIP: ABNORMAL
MICRO URNS: ABNORMAL
MUCOUS THREADS URNS QL MICRO: PRESENT
NITRITE UR QL STRIP: POSITIVE
PH UR STRIP: 6.5 [PH] (ref 5–7.5)
PROT UR QL STRIP: NEGATIVE
RBC #/AREA URNS HPF: ABNORMAL /HPF (ref 0–2)
SP GR UR: 1.02 (ref 1–1.03)
UROBILINOGEN UR STRIP-MCNC: 0.2 MG/DL (ref 0.2–1)
WBC #/AREA URNS HPF: >30 /HPF (ref 0–5)

## 2020-06-29 RX ORDER — CIPROFLOXACIN 500 MG/1
500 TABLET ORAL 2 TIMES DAILY
Qty: 14 TAB | Refills: 0 | Status: SHIPPED | OUTPATIENT
Start: 2020-06-29 | End: 2020-07-06

## 2020-07-01 NOTE — PROGRESS NOTES
Called to inform pt of lab results and providers recommendations. Understanding voiced by pt. No further actions required at this time.

## 2020-09-29 ENCOUNTER — TELEPHONE (OUTPATIENT)
Dept: INTERNAL MEDICINE CLINIC | Age: 85
End: 2020-09-29

## 2020-09-29 NOTE — TELEPHONE ENCOUNTER
Email received from Patient Advocacy that patient trying to reach practice for two days. Called and apologized to patient that no message was received. Per patient Dr. Aleksander Ashford from [facility] gave patient a shot 3 weeks ago and put patient on 75mg Diclofenac 2x daily. about two weeks ago for the ankle and knee pain. Patient has had feet swelling for past 10 days. Offered in office appointment for Thursday but patient is seeing Dr. Aleksander Ashford tomorrow. Advised patient to call if any further assistance is needed.

## 2021-01-02 ENCOUNTER — APPOINTMENT (OUTPATIENT)
Dept: GENERAL RADIOLOGY | Age: 86
End: 2021-01-02
Attending: STUDENT IN AN ORGANIZED HEALTH CARE EDUCATION/TRAINING PROGRAM
Payer: MEDICARE

## 2021-01-02 ENCOUNTER — HOSPITAL ENCOUNTER (OUTPATIENT)
Age: 86
Setting detail: OBSERVATION
Discharge: NURSING, REHAB OR LONG-TERM CARE | End: 2021-01-08
Attending: STUDENT IN AN ORGANIZED HEALTH CARE EDUCATION/TRAINING PROGRAM | Admitting: FAMILY MEDICINE
Payer: MEDICARE

## 2021-01-02 ENCOUNTER — APPOINTMENT (OUTPATIENT)
Dept: CT IMAGING | Age: 86
End: 2021-01-02
Attending: STUDENT IN AN ORGANIZED HEALTH CARE EDUCATION/TRAINING PROGRAM
Payer: MEDICARE

## 2021-01-02 DIAGNOSIS — R79.89 PRERENAL AZOTEMIA: ICD-10-CM

## 2021-01-02 DIAGNOSIS — N30.00 ACUTE CYSTITIS WITHOUT HEMATURIA: ICD-10-CM

## 2021-01-02 DIAGNOSIS — E87.5 ACUTE HYPERKALEMIA: Primary | ICD-10-CM

## 2021-01-02 DIAGNOSIS — R41.0 DISORIENTATION: ICD-10-CM

## 2021-01-02 DIAGNOSIS — R74.8 ELEVATED CK: ICD-10-CM

## 2021-01-02 DIAGNOSIS — E86.0 DEHYDRATION: ICD-10-CM

## 2021-01-02 LAB
ALBUMIN SERPL-MCNC: 3.2 G/DL (ref 3.5–5)
ALBUMIN/GLOB SERPL: 0.6 {RATIO} (ref 1.1–2.2)
ALP SERPL-CCNC: 94 U/L (ref 45–117)
ALT SERPL-CCNC: 51 U/L (ref 12–78)
ANION GAP SERPL CALC-SCNC: 8 MMOL/L (ref 5–15)
APPEARANCE UR: ABNORMAL
AST SERPL-CCNC: 77 U/L (ref 15–37)
BACTERIA URNS QL MICRO: ABNORMAL /HPF
BASOPHILS # BLD: 0.1 K/UL (ref 0–0.1)
BASOPHILS NFR BLD: 1 % (ref 0–1)
BILIRUB SERPL-MCNC: 0.8 MG/DL (ref 0.2–1)
BILIRUB UR QL: NEGATIVE
BUN SERPL-MCNC: 54 MG/DL (ref 6–20)
BUN/CREAT SERPL: 54 (ref 12–20)
CALCIUM SERPL-MCNC: 10.4 MG/DL (ref 8.5–10.1)
CHLORIDE SERPL-SCNC: 114 MMOL/L (ref 97–108)
CK SERPL-CCNC: 741 U/L (ref 26–192)
CO2 SERPL-SCNC: 20 MMOL/L (ref 21–32)
COLOR UR: ABNORMAL
COMMENT, HOLDF: NORMAL
CREAT SERPL-MCNC: 1 MG/DL (ref 0.55–1.02)
DIFFERENTIAL METHOD BLD: ABNORMAL
EOSINOPHIL # BLD: 0 K/UL (ref 0–0.4)
EOSINOPHIL NFR BLD: 0 % (ref 0–7)
EPITH CASTS URNS QL MICRO: ABNORMAL /LPF
ERYTHROCYTE [DISTWIDTH] IN BLOOD BY AUTOMATED COUNT: 14.9 % (ref 11.5–14.5)
GLOBULIN SER CALC-MCNC: 5 G/DL (ref 2–4)
GLUCOSE SERPL-MCNC: 121 MG/DL (ref 65–100)
GLUCOSE UR STRIP.AUTO-MCNC: NEGATIVE MG/DL
HCT VFR BLD AUTO: 52.6 % (ref 35–47)
HGB BLD-MCNC: 16.2 G/DL (ref 11.5–16)
HGB UR QL STRIP: ABNORMAL
HYALINE CASTS URNS QL MICRO: ABNORMAL /LPF (ref 0–5)
IMM GRANULOCYTES # BLD AUTO: 0 K/UL (ref 0–0.04)
IMM GRANULOCYTES NFR BLD AUTO: 0 % (ref 0–0.5)
INR PPP: 1.1 (ref 0.9–1.1)
KETONES UR QL STRIP.AUTO: 40 MG/DL
LEUKOCYTE ESTERASE UR QL STRIP.AUTO: ABNORMAL
LYMPHOCYTES # BLD: 1 K/UL (ref 0.8–3.5)
LYMPHOCYTES NFR BLD: 9 % (ref 12–49)
MCH RBC QN AUTO: 28.5 PG (ref 26–34)
MCHC RBC AUTO-ENTMCNC: 30.8 G/DL (ref 30–36.5)
MCV RBC AUTO: 92.6 FL (ref 80–99)
MONOCYTES # BLD: 1.6 K/UL (ref 0–1)
MONOCYTES NFR BLD: 15 % (ref 5–13)
NEUTS SEG # BLD: 7.7 K/UL (ref 1.8–8)
NEUTS SEG NFR BLD: 75 % (ref 32–75)
NITRITE UR QL STRIP.AUTO: POSITIVE
NRBC # BLD: 0 K/UL (ref 0–0.01)
NRBC BLD-RTO: 0 PER 100 WBC
PH UR STRIP: 5.5 [PH] (ref 5–8)
PLATELET # BLD AUTO: 289 K/UL (ref 150–400)
PMV BLD AUTO: 12.4 FL (ref 8.9–12.9)
POTASSIUM SERPL-SCNC: 6.3 MMOL/L (ref 3.5–5.1)
PROT SERPL-MCNC: 8.2 G/DL (ref 6.4–8.2)
PROT UR STRIP-MCNC: 30 MG/DL
PROTHROMBIN TIME: 11.3 SEC (ref 9–11.1)
RBC # BLD AUTO: 5.68 M/UL (ref 3.8–5.2)
RBC #/AREA URNS HPF: ABNORMAL /HPF (ref 0–5)
SAMPLES BEING HELD,HOLD: NORMAL
SODIUM SERPL-SCNC: 142 MMOL/L (ref 136–145)
SP GR UR REFRACTOMETRY: 1.02 (ref 1–1.03)
UR CULT HOLD, URHOLD: NORMAL
UROBILINOGEN UR QL STRIP.AUTO: 0.2 EU/DL (ref 0.2–1)
WBC # BLD AUTO: 10.5 K/UL (ref 3.6–11)
WBC URNS QL MICRO: >100 /HPF (ref 0–4)

## 2021-01-02 PROCEDURE — 36415 COLL VENOUS BLD VENIPUNCTURE: CPT

## 2021-01-02 PROCEDURE — 81001 URINALYSIS AUTO W/SCOPE: CPT

## 2021-01-02 PROCEDURE — 77030019905 HC CATH URETH INTMIT MDII -A

## 2021-01-02 PROCEDURE — 74011000258 HC RX REV CODE- 258: Performed by: STUDENT IN AN ORGANIZED HEALTH CARE EDUCATION/TRAINING PROGRAM

## 2021-01-02 PROCEDURE — 96365 THER/PROPH/DIAG IV INF INIT: CPT

## 2021-01-02 PROCEDURE — 85025 COMPLETE CBC W/AUTO DIFF WBC: CPT

## 2021-01-02 PROCEDURE — 70450 CT HEAD/BRAIN W/O DYE: CPT

## 2021-01-02 PROCEDURE — 99285 EMERGENCY DEPT VISIT HI MDM: CPT

## 2021-01-02 PROCEDURE — 80053 COMPREHEN METABOLIC PANEL: CPT

## 2021-01-02 PROCEDURE — 94761 N-INVAS EAR/PLS OXIMETRY MLT: CPT

## 2021-01-02 PROCEDURE — 82550 ASSAY OF CK (CPK): CPT

## 2021-01-02 PROCEDURE — 74011250636 HC RX REV CODE- 250/636: Performed by: STUDENT IN AN ORGANIZED HEALTH CARE EDUCATION/TRAINING PROGRAM

## 2021-01-02 PROCEDURE — 85610 PROTHROMBIN TIME: CPT

## 2021-01-02 PROCEDURE — 93005 ELECTROCARDIOGRAM TRACING: CPT

## 2021-01-02 PROCEDURE — 71045 X-RAY EXAM CHEST 1 VIEW: CPT

## 2021-01-02 RX ADMIN — SODIUM CHLORIDE 1000 ML: 900 INJECTION, SOLUTION INTRAVENOUS at 23:36

## 2021-01-02 RX ADMIN — SODIUM CHLORIDE 1000 ML: 9 INJECTION, SOLUTION INTRAVENOUS at 23:42

## 2021-01-02 RX ADMIN — CALCIUM GLUCONATE 1 G: 98 INJECTION, SOLUTION INTRAVENOUS at 23:57

## 2021-01-03 PROBLEM — E87.5 HYPERKALEMIA: Status: ACTIVE | Noted: 2021-01-03

## 2021-01-03 PROBLEM — R41.82 ALTERED MENTAL STATUS: Status: ACTIVE | Noted: 2021-01-03

## 2021-01-03 LAB
ALBUMIN SERPL-MCNC: 2.6 G/DL (ref 3.5–5)
ALBUMIN/GLOB SERPL: 0.7 {RATIO} (ref 1.1–2.2)
ALP SERPL-CCNC: 71 U/L (ref 45–117)
ALT SERPL-CCNC: 36 U/L (ref 12–78)
ANION GAP SERPL CALC-SCNC: 4 MMOL/L (ref 5–15)
ANION GAP SERPL CALC-SCNC: 5 MMOL/L (ref 5–15)
ANION GAP SERPL CALC-SCNC: 6 MMOL/L (ref 5–15)
AST SERPL-CCNC: 36 U/L (ref 15–37)
ATRIAL RATE: 101 BPM
BILIRUB SERPL-MCNC: 0.5 MG/DL (ref 0.2–1)
BUN SERPL-MCNC: 43 MG/DL (ref 6–20)
BUN SERPL-MCNC: 48 MG/DL (ref 6–20)
BUN SERPL-MCNC: 52 MG/DL (ref 6–20)
BUN/CREAT SERPL: 43 (ref 12–20)
BUN/CREAT SERPL: 48 (ref 12–20)
BUN/CREAT SERPL: 62 (ref 12–20)
CALCIUM SERPL-MCNC: 9 MG/DL (ref 8.5–10.1)
CALCIUM SERPL-MCNC: 9.1 MG/DL (ref 8.5–10.1)
CALCIUM SERPL-MCNC: 9.1 MG/DL (ref 8.5–10.1)
CALCULATED P AXIS, ECG09: 73 DEGREES
CALCULATED R AXIS, ECG10: 8 DEGREES
CALCULATED T AXIS, ECG11: 122 DEGREES
CHLORIDE SERPL-SCNC: 112 MMOL/L (ref 97–108)
CHLORIDE SERPL-SCNC: 115 MMOL/L (ref 97–108)
CHLORIDE SERPL-SCNC: 116 MMOL/L (ref 97–108)
CO2 SERPL-SCNC: 23 MMOL/L (ref 21–32)
CO2 SERPL-SCNC: 24 MMOL/L (ref 21–32)
CO2 SERPL-SCNC: 24 MMOL/L (ref 21–32)
CREAT SERPL-MCNC: 0.84 MG/DL (ref 0.55–1.02)
CREAT SERPL-MCNC: 1 MG/DL (ref 0.55–1.02)
CREAT SERPL-MCNC: 1 MG/DL (ref 0.55–1.02)
DIAGNOSIS, 93000: NORMAL
GLOBULIN SER CALC-MCNC: 3.6 G/DL (ref 2–4)
GLUCOSE SERPL-MCNC: 119 MG/DL (ref 65–100)
GLUCOSE SERPL-MCNC: 165 MG/DL (ref 65–100)
GLUCOSE SERPL-MCNC: 93 MG/DL (ref 65–100)
P-R INTERVAL, ECG05: 142 MS
POTASSIUM SERPL-SCNC: 3.3 MMOL/L (ref 3.5–5.1)
POTASSIUM SERPL-SCNC: 3.7 MMOL/L (ref 3.5–5.1)
POTASSIUM SERPL-SCNC: 3.7 MMOL/L (ref 3.5–5.1)
PROT SERPL-MCNC: 6.2 G/DL (ref 6.4–8.2)
Q-T INTERVAL, ECG07: 356 MS
QRS DURATION, ECG06: 72 MS
QTC CALCULATION (BEZET), ECG08: 461 MS
SODIUM SERPL-SCNC: 140 MMOL/L (ref 136–145)
SODIUM SERPL-SCNC: 144 MMOL/L (ref 136–145)
SODIUM SERPL-SCNC: 145 MMOL/L (ref 136–145)
VENTRICULAR RATE, ECG03: 101 BPM

## 2021-01-03 PROCEDURE — 36415 COLL VENOUS BLD VENIPUNCTURE: CPT

## 2021-01-03 PROCEDURE — 99218 HC RM OBSERVATION: CPT

## 2021-01-03 PROCEDURE — 96366 THER/PROPH/DIAG IV INF ADDON: CPT

## 2021-01-03 PROCEDURE — 74011250636 HC RX REV CODE- 250/636: Performed by: STUDENT IN AN ORGANIZED HEALTH CARE EDUCATION/TRAINING PROGRAM

## 2021-01-03 PROCEDURE — 80053 COMPREHEN METABOLIC PANEL: CPT

## 2021-01-03 PROCEDURE — 96361 HYDRATE IV INFUSION ADD-ON: CPT

## 2021-01-03 PROCEDURE — 74011250637 HC RX REV CODE- 250/637: Performed by: INTERNAL MEDICINE

## 2021-01-03 PROCEDURE — 74011250637 HC RX REV CODE- 250/637: Performed by: FAMILY MEDICINE

## 2021-01-03 PROCEDURE — 74011250636 HC RX REV CODE- 250/636: Performed by: FAMILY MEDICINE

## 2021-01-03 PROCEDURE — 87086 URINE CULTURE/COLONY COUNT: CPT

## 2021-01-03 PROCEDURE — 74011000250 HC RX REV CODE- 250: Performed by: FAMILY MEDICINE

## 2021-01-03 PROCEDURE — 96372 THER/PROPH/DIAG INJ SC/IM: CPT

## 2021-01-03 PROCEDURE — 80048 BASIC METABOLIC PNL TOTAL CA: CPT

## 2021-01-03 PROCEDURE — 87186 SC STD MICRODIL/AGAR DIL: CPT

## 2021-01-03 PROCEDURE — 87077 CULTURE AEROBIC IDENTIFY: CPT

## 2021-01-03 PROCEDURE — 96367 TX/PROPH/DG ADDL SEQ IV INF: CPT

## 2021-01-03 PROCEDURE — 94640 AIRWAY INHALATION TREATMENT: CPT

## 2021-01-03 PROCEDURE — 74011000258 HC RX REV CODE- 258: Performed by: STUDENT IN AN ORGANIZED HEALTH CARE EDUCATION/TRAINING PROGRAM

## 2021-01-03 RX ORDER — CARVEDILOL 3.12 MG/1
3.12 TABLET ORAL 2 TIMES DAILY WITH MEALS
Status: DISCONTINUED | OUTPATIENT
Start: 2021-01-03 | End: 2021-01-03 | Stop reason: ALTCHOICE

## 2021-01-03 RX ORDER — BUDESONIDE 0.5 MG/2ML
500 INHALANT ORAL
Status: DISCONTINUED | OUTPATIENT
Start: 2021-01-03 | End: 2021-01-08 | Stop reason: HOSPADM

## 2021-01-03 RX ORDER — IPRATROPIUM BROMIDE 0.5 MG/2.5ML
0.5 SOLUTION RESPIRATORY (INHALATION) 2 TIMES DAILY
Status: DISCONTINUED | OUTPATIENT
Start: 2021-01-03 | End: 2021-01-03

## 2021-01-03 RX ORDER — SODIUM CHLORIDE 0.9 % (FLUSH) 0.9 %
5-40 SYRINGE (ML) INJECTION EVERY 8 HOURS
Status: DISCONTINUED | OUTPATIENT
Start: 2021-01-03 | End: 2021-01-08 | Stop reason: HOSPADM

## 2021-01-03 RX ORDER — METOPROLOL SUCCINATE 50 MG/1
200 TABLET, EXTENDED RELEASE ORAL DAILY
Status: DISCONTINUED | OUTPATIENT
Start: 2021-01-03 | End: 2021-01-08 | Stop reason: HOSPADM

## 2021-01-03 RX ORDER — ACETAMINOPHEN 325 MG/1
650 TABLET ORAL
Status: DISCONTINUED | OUTPATIENT
Start: 2021-01-03 | End: 2021-01-08 | Stop reason: HOSPADM

## 2021-01-03 RX ORDER — IPRATROPIUM BROMIDE 0.5 MG/2.5ML
0.5 SOLUTION RESPIRATORY (INHALATION) 2 TIMES DAILY
Status: DISCONTINUED | OUTPATIENT
Start: 2021-01-03 | End: 2021-01-08 | Stop reason: HOSPADM

## 2021-01-03 RX ORDER — ARFORMOTEROL TARTRATE 15 UG/2ML
15 SOLUTION RESPIRATORY (INHALATION)
Status: DISCONTINUED | OUTPATIENT
Start: 2021-01-03 | End: 2021-01-08 | Stop reason: HOSPADM

## 2021-01-03 RX ORDER — ENOXAPARIN SODIUM 100 MG/ML
40 INJECTION SUBCUTANEOUS DAILY
Status: DISCONTINUED | OUTPATIENT
Start: 2021-01-03 | End: 2021-01-08 | Stop reason: HOSPADM

## 2021-01-03 RX ORDER — ACETAMINOPHEN 650 MG/1
650 SUPPOSITORY RECTAL
Status: DISCONTINUED | OUTPATIENT
Start: 2021-01-03 | End: 2021-01-08 | Stop reason: HOSPADM

## 2021-01-03 RX ORDER — SODIUM CHLORIDE 0.9 % (FLUSH) 0.9 %
5-40 SYRINGE (ML) INJECTION AS NEEDED
Status: DISCONTINUED | OUTPATIENT
Start: 2021-01-03 | End: 2021-01-08 | Stop reason: HOSPADM

## 2021-01-03 RX ORDER — SODIUM POLYSTYRENE SULFONATE 15 G/60ML
15 SUSPENSION ORAL; RECTAL
Status: COMPLETED | OUTPATIENT
Start: 2021-01-03 | End: 2021-01-03

## 2021-01-03 RX ADMIN — CEFTRIAXONE SODIUM 1 G: 1 INJECTION, POWDER, FOR SOLUTION INTRAMUSCULAR; INTRAVENOUS at 01:56

## 2021-01-03 RX ADMIN — IPRATROPIUM BROMIDE 0.5 MG: 0.5 SOLUTION RESPIRATORY (INHALATION) at 21:00

## 2021-01-03 RX ADMIN — IPRATROPIUM BROMIDE 0.5 MG: 0.5 SOLUTION RESPIRATORY (INHALATION) at 09:43

## 2021-01-03 RX ADMIN — BUDESONIDE 500 MCG: 0.5 INHALANT RESPIRATORY (INHALATION) at 09:38

## 2021-01-03 RX ADMIN — ENOXAPARIN SODIUM 40 MG: 100 INJECTION SUBCUTANEOUS at 09:20

## 2021-01-03 RX ADMIN — BUDESONIDE 500 MCG: 0.5 INHALANT RESPIRATORY (INHALATION) at 21:00

## 2021-01-03 RX ADMIN — SODIUM POLYSTYRENE SULFONATE 15 G: 15 SUSPENSION ORAL; RECTAL at 03:24

## 2021-01-03 RX ADMIN — Medication 10 ML: at 14:13

## 2021-01-03 RX ADMIN — METOPROLOL SUCCINATE 200 MG: 50 TABLET, EXTENDED RELEASE ORAL at 14:24

## 2021-01-03 RX ADMIN — ACETAMINOPHEN 650 MG: 325 TABLET ORAL at 15:25

## 2021-01-03 RX ADMIN — ARFORMOTEROL TARTRATE 15 MCG: 15 SOLUTION RESPIRATORY (INHALATION) at 09:38

## 2021-01-03 RX ADMIN — ARFORMOTEROL TARTRATE 15 MCG: 15 SOLUTION RESPIRATORY (INHALATION) at 21:00

## 2021-01-03 NOTE — PROGRESS NOTES
Care Management Interventions  PCP Verified by CM: Yes(Dr Barfield )  Last Visit to PCP: 11/04/20  Palliative Care Criteria Met (RRAT>21 & CHF Dx)?: No  Mode of Transport at Discharge: (son or dil or roomate )  Transition of Care Consult (CM Consult): (tbd)  Discharge Durable Medical Equipment: No(patient has a cane at home)  Physical Therapy Consult: No  Occupational Therapy Consult: No  Current Support Network: Own Home(has a roomate and kamryn Kay)  The Plan for Transition of Care is Related to the Following Treatment Goals : d/w with patient  The Patient and/or Patient Representative was Provided with a Choice of Provider and Agrees with the Discharge Plan?: No  Discharge Location  Discharge Placement: Home   CRM met with patient who is on a leave of absence from her job at the [employer]. She states she still gets a small % of her salary a the present time. She is currently exploring disability. Patient does not have an ACP at this time and was given information regarding it. She does state she has a good support system and does have a [address] phone [phone] who helps her with MD appts, groceries, and pharmacy visits and pick ups. Patient has remained isolated at home due to her immunosuppresed state. She also is having difficulty with ingesting solid food but is okay taking liquids. She is aware of the problems she has at this time due to controlling her abdominal pain. Patient uses the [facility] pharmacy on [address] in [place]. She is hopeful she could be discharged in the next day or so. Care management will follow for transition of care needs.

## 2021-01-03 NOTE — PROGRESS NOTES
Care Management Interventions PCP Verified by CM: (Dr Nani Gardner) Last Visit to PCP: 09/08/20 Palliative Care Criteria Met (RRAT>21 & CHF Dx)?: No 
Mode of Transport at Discharge: (tbd  garcía Mello ph [phone]) Transition of Care Consult (CM Consult): (tbd) Discharge Durable Medical Equipment: No(patient has a cane at home) Physical Therapy Consult: Yes Occupational Therapy Consult: Yes Speech Therapy Consult: Yes Current Support Network: (dgt Carmelina Mello lives here  [phone]) The Plan for Transition of Care is Related to the Following Treatment Goals : d/w with dgt Alicia Talavera [phone] The Patient and/or Patient Representative was Provided with a Choice of Provider and Agrees with the Discharge Plan?: No 
Discharge Location Discharge Placement: (tbd) CRM attempted to meet with patient who did not verbalize when I spoke with her. She was very lethargic and remained non-verbal.  I called daughter Alicia Talavera ph [phone]. This daughter is located in [place], [place] but has been in close communication with her sister Carmelina Mello who lives here in [place] and works as a Pharmacist.  Her phone is [phone]. Per daughter Kalyn Rodriguez she has been in close contact with her sister. Per Kalyn Rodriguez patient lives alone and a neighbor noticed her newspapers building up for 2 days and called daughter who lives in [place]. Dgt found mom on the floor and she could not recall the amount of time she was lying there. Patient had been driving and going for her own prescriptions to the local [facility]. Patient has used a cane as needed but daughter states she tends to avoid using. It.  Daughters have noticed a change in their mom's physical state and she has  To be reminded to change her clothers and bathe. Patient has [employer] and per daughter Huntington Rom she does not get much from [employer], however, daughter does not know the income. Patient owns her own home, but it needs lots of work. Daughter is going to speak with her sister to see if mom would be elgible for Medicaid through [facility]. Patient does not have an advanced directive

## 2021-01-03 NOTE — ED NOTES
Pt presents to ED via EMS from home, per EMS pt was on the floor for two days and covered in urine. Pt has a hx of dementia and is A&Ox2. Per EMS /90, , 94% O2 RA.  Pt reports not eating in the past 4 days

## 2021-01-03 NOTE — H&P
[address] [facility] Adult  Hospitalist Group  History and Physical    Primary Care Provider: Chelsea House NP  Date of Service:  1/3/2021    Subjective:     Lolis Paula is a 80 y.o. female with hx dementia, HTN, dyslipidemia, and COPD who presents with altered mential status. Ms Cosby lives alone, and was in normal health when her daughter spoke to her 2 days ago. Today, the patient was not answering her phone or her door, and when her daughter went to check on her found her on the floor disheveled. Patient does not remember how she got on the floor, yet denies loss of consciousness. Daughter called EMS who transported patientfor     In the ED, VSS. CK was elevated to 741, H/H was elevated at 16.2/52.6 , K+ was 6.3, CO2 20 BUN/creat  54/1 ration 54. UA with + nitrites, and leukocyte esterase. CT head with no acute intracranial abnormality. In the ED, she was treated with calcium gluconate, 2L ns bolus, and rocephin. Review of Systems:    A comprehensive review of systems was negative except for that written in the History of Present Illness.      Past Medical History:   Diagnosis Date    Arthritis     Arthritis of knee, left     Asthma     Asthma with COPD (chronic obstructive pulmonary disease) (HCC)     FEV1 - 0.99, IgE high    Atherosclerosis of both carotid arteries     Bipolar affective disorder (HCC)     Cataract, left eye     cataract - left    Chronic obstructive pulmonary disease (HCC)     Closed fracture of lateral portion of left tibial plateau     Depression with anxiety     depression/anxiety    Encephalopathy     encephalopathy    Esophageal ulcer     esophageal ulcer    Gastroparesis     GERD (gastroesophageal reflux disease)     Hepatic steatosis     History of fall     hx falls    Hyperlipidemia     increased cholesterol    Hyperlipidemia     Hypertension     IBS (irritable bowel syndrome)     IBS    IGT (impaired glucose tolerance) 9/15/2015    Liver disease     elevated liver enzymes - being evaluated/US done x 2 - inflammed liver/\"fat on liver\"    Menopause 1987    Neurological disorder     delayed thinking process? ??    OA (osteoarthritis) of knee     Osteoporosis     Seizures (HCC)     Sunburn, blistering     Unspecified adverse effect of anesthesia     was told by neurologist not to use anesthesia unless needed due to encephalopathy    Unspecified adverse effect of anesthesia     hallucinations after MRI with sedation    Vertigo     vertigo      Past Surgical History:   Procedure Laterality Date    COLONOSCOPY N/A 8/1/2017    COL[facility]COPY    HX BREAST BIOPSY Left     Surgical    HX GI      laser surgery for reflux and hernia repair    HX MOHS PROCEDURES  09/05/2017    BCC L nasal sidewall by Dr. Basil Faith  06/04/2018    [facility] R mid forehead by Dr. Chen Rogers      GERD reflux     Prior to Admission medications    Medication Sig Start Date End Date Taking? Authorizing Provider   nebivoloL (BYSTOLIC) 10 mg tablet Take 10 mg by mouth daily. 12/26/18   Provider, Historical   hydrALAZINE (APRESOLINE) 100 mg tablet Take 1 Tab by mouth two (2) times a day. 6/26/20   Rachell Velázquez NP   Arm Brace (Wrist Brace) [facility] 12hour on/ 12hours off 6/22/20   Akin Parikh NP   lamoTRIgine (LAMICTAL) 50 mg disintegrating tablet DISSOLVE 1 TABLET ON THE TONGUE EVERY NIGHT 3/10/20   Genoveva CAVAZOS NP   montelukast (SINGULAIR) 10 mg tablet take 1 tablet by mouth once daily 11/22/19   Genoveva CAVAZOS NP   celecoxib (CELEBREX) 100 mg capsule Take 100 mg by mouth. 9/3/19   Provider, Historical   nitrofurantoin, macrocrystal-monohydrate, (MACROBID) 100 mg capsule Take 100 mg by mouth two (2) times a day. 9/9/19   Provider, Historical   valsartan (DIOVAN) 160 mg tablet Take 160 mg by mouth daily.  8/22/19   Provider, Historical   loperamide (IMODIUM) 2 mg capsule 4 mg as needed. Provider, Historical   gabapentin (NEURONTIN) 100 mg capsule Take 100 mg by mouth nightly. Provider, Historical   clonazePAM (KLONOPIN) 0.5 mg tablet Take 0.5 mg by mouth nightly as needed. 8/20/19   Provider, Historical   chlorthalidone (HYGROTEN) 25 mg tablet Take 25 mg by mouth daily. 8/13/19   Provider, Historical   diphenoxylate-atropine (LOMOTIL) 2.5-0.025 mg per tablet as needed. 7/12/19   Provider, Historical   BYSTOLIC 10 mg tablet Take 10 mg by mouth two (2) times a day. 7/17/19   Provider, Historical   nebivolol (BYSTOLIC) 5 mg tablet Take  by mouth two (2) times a day. Provider, Historical   losartan (COZAAR) 100 mg tablet take 1 tablet by mouth once daily 8/13/18   Provider, Historical   zolpidem (AMBIEN) 5 mg tablet  9/10/18   Provider, Historical   INCRUSE ELLIPTA 62.5 mcg/actuation inhaler  10/1/18   Provider, Historical   sucralfate (CARAFATE) 1 gram tablet Take 1 g by mouth Before breakfast and dinner. Provider, Historical   fluticasone-salmeterol (ADVAIR) 500-50 mcg/dose diskus inhaler Take 1 Puff by inhalation two (2) times a day. 3/10/17   Lethea Skill T, NP   rosuvastatin (CRESTOR) 5 mg tablet Take 1 Tab by mouth nightly. Patient taking differently: Take 5 mg by mouth nightly. Indications: every other night 1/18/17   Shakir Ramírez MD   albuterol (PROVENTIL HFA, VENTOLIN HFA, PROAIR HFA) 90 mcg/actuation inhaler Take 2 Puffs by inhalation every four (4) hours as needed for Wheezing. 1/18/17   Shakir Ramírez MD   albuterol (PROVENTIL VENTOLIN) 2.5 mg /3 mL (0.083 %) nebulizer solution 3 mL by Nebulization route every four (4) hours as needed for Wheezing. 10/31/16   Shakir Ramírez MD   Cholecalciferol, Vitamin D3, (VITAMIN D3) 1,000 unit cap Take 2,000 Units by mouth. Provider, Historical   vitamin E (AQUA GEMS) 400 unit capsule Take 400 Units by mouth daily. Provider, Historical   LACTOBACILLUS ACIDOPHILUS (PROBIOTIC PO) Take  by mouth. Takes one po daily. Provider, Historical   CALCIUM CARBONATE (TUMS PO) Take  by mouth as needed. Provider, Historical   albuterol (PROVENTIL, VENTOLIN) 90 mcg/Actuation inhaler Take 2-4 Puffs by inhalation every four (4) hours as needed. Other, MD Delvin     Allergies   Allergen Reactions   Sonia Chawla Other (comments)     Blood testing    Cat Dander Other (comments)     Blood testing    Dog Dander Other (comments)     Blood testing    Elavil Other (comments)     imbalance    Ernest Other (comments)     Blood testing    Sulfa (Sulfonamide Antibiotics) Shortness of Breath      Family History   Problem Relation Age of Onset    Stroke Father     Heart Disease Father         angina    No Known Problems Mother     Cancer Brother         lung    Cancer Brother         lung        SOCIAL HISTORY:  Patient resides at Home. Patient ambulates with walker/cane, but does not always use them  Smoking history: former  Alcohol history:none         Objective:       Physical Exam:   Visit Vitals  BP (!) 148/58   Pulse (!) 106   Temp 98.4 °F (36.9 °C)   Resp 18   SpO2 96%     General:  Alert, cooperative, no distress, ill-appearing   Head:  Normocephalic, without obvious abnormality, atraumatic. Throat: Lips, mucosa, and tongue normal.   Neck: Supple, symmetrical, trachea midline   Back:   Symmetric, no curvature. ROM normal. No CVA tenderness. Lungs:   Clear to auscultation bilaterally. Chest wall:  No tenderness or deformity. Heart:  Regular rate and rhythm, S1, S2 normal, no murmur, click, rub or gallop. Abdomen:   Soft, non-tender. Bowel sounds normal. No masses,  No organomegaly. Extremities: Extremities normal, atraumatic, no cyanosis or edema. Pulses: 2+ radial pulse. Skin: Skin color, texture, turgor normal. No rashes or lesions. Neuro: alert and oriented to person, place, time, and event. No focal deficits    ECG:  ST depression V5/V6, flattened T waves AVL, LVH    Data Review:    All diagnostic labs and studies have been reviewed. Chest x-ray: no acute cardiopulmonary finding    Assessment:     Active Problems:    * No active hospital problems.  *      Plan:     #Acute Cystitis  -rocephin  -follow urine culture    #Suspected Fall/Syncope  -orthostatics  -telemetry  -echo    #Hyperkalemia-resolved  -K+ 6.3  -s/p Ca gluconate, and kayexelate, repeat 3.7    #COPD  -scheduled brovana, pulmicort, and atrovent    #HTN  -carvedilol 3.125 BID to replace home bystolic while inpatient    MPOA: daughter Kimberly Lutz  Code: full  dvt ppx: lovenox    FUNCTIONAL STATUS PRIOR TO HOSPITALIZATION Ambulatory with Use of Assistive Devices , presents with suspected fall/syncope    Signed By: Kelby Gunter MD     January 3, 2021

## 2021-01-03 NOTE — ED NOTES
Verbal shift change report given to Maggy Fallon RN (oncoming nurse) by Leonor Dean (offgoing nurse). Report included the following information SBAR, Kardex, ED Summary, [facility] and Recent Results.

## 2021-01-03 NOTE — PROGRESS NOTES
Advance Care Planning     General Advance Care Planning (ACP) Conversation      Date of Conversation: 1/2/2021  Conducted with: Patient with Decision Making Capacity    Healthcare Decision Maker:     Click here to complete [address] including selection of the Healthcare Decision Maker Relationship (ie \"Primary\")      Content/Action Overview:   Has NO ACP documents/care preferences - information provided, considering goals and options  Reviewed DNR/DNI and patient elects Full Code (Attempt Resuscitation)  Topics discussed: code status  Additional Comments: Explained with patient and daughter the meaning of code versus DNR. Germainetent elects to be a full code at this time.      Length of Voluntary ACP Conversation in minutes:  16 minutes    Dalia Rose MD

## 2021-01-03 NOTE — ROUTINE PROCESS
TRANSFER - OUT REPORT: 
 
Verbal report given to [facility], [facility], RN(name) on Mahesh Riojas  being transferred to 6S(unit) for routine progression of care Report consisted of patients Situation, Background, Assessment and  
Recommendations(SBAR). Information from the following report(s) SBAR, ED Summary, [facility] and Recent Results was reviewed with the receiving nurse. Lines:  
Peripheral IV Right Forearm (Active) Peripheral IV 01/03/21 Left Forearm (Active) Site Assessment Clean, dry, & intact 01/03/21 0425 Phlebitis Assessment 0 01/03/21 0425 Infiltration Assessment 0 01/03/21 0425 Dressing Status Clean, dry, & intact 01/03/21 0425 Hub Color/Line Status Pink 01/03/21 0425 Opportunity for questions and clarification was provided. Patient transported with: 
 Registered Nurse

## 2021-01-03 NOTE — ED PROVIDER NOTES
Kathy Rehman is a 80 y.o. female with past medical history notable for mild dementia although she lives independently and is able to for most of her ADLs independently as well, gastroparesis, esophageal ulcers, IBS, hyperlipidemia, hypertension presenting with altered mental status. Her daughter checks in on her frequently. She states 2 days ago she appeared to be in her usual state of health although when she checked on her today her things were strewn about and patient was on the ground, patient was unable to explain how she fell. Her hygiene was poor which is very unusual for this patient. Her daughter is a nurse practitioner and was concerned that may she may have had a stroke given the altered mental status. She did not however have localizing weakness/unilateral weakness or slurred speech as such. She did not have any confirmed traumatic injuries. Patient was quite inattentive during my interview unable to provide a specific reliable repeatable history. She did state that she was on the floor for \"4 days\". She denies any specific complaints of pain at this time.             Past Medical History:   Diagnosis Date    Arthritis     Arthritis of knee, left     Asthma     Asthma with COPD (chronic obstructive pulmonary disease) ([facility])     FEV1 - 0.99, IgE high    Atherosclerosis of both carotid arteries     Bipolar affective disorder ([facility])     Cataract, left eye     cataract - left    Chronic obstructive pulmonary disease ([facility])     Closed fracture of lateral portion of left tibial plateau     Depression with anxiety     depression/anxiety    Encephalopathy     encephalopathy    Esophageal ulcer     esophageal ulcer    Gastroparesis     GERD (gastroesophageal reflux disease)     Hepatic steatosis     History of fall     hx falls    Hyperlipidemia     increased cholesterol    Hyperlipidemia     Hypertension     IBS (irritable bowel syndrome)     IBS    IGT (impaired glucose tolerance) 9/15/2015    Liver disease     elevated liver enzymes - being evaluated/US done x 2 - inflammed liver/\"fat on liver\"    Menopause 1987    Neurological disorder     delayed thinking process? ??    OA (osteoarthritis) of knee     Osteoporosis     Seizures (HCC)     Sunburn, blistering     Unspecified adverse effect of anesthesia     was told by neurologist not to use anesthesia unless needed due to encephalopathy    Unspecified adverse effect of anesthesia     hallucinations after MRI with sedation    Vertigo     vertigo       Past Surgical History:   Procedure Laterality Date    COLONOSCOPY N/A 2017    COL[facility] performed [facility]rel [facility] at [facility] ENDOSCOPY    HX BREAST BIOPSY Left     Surgical    HX GI      laser surgery for reflux and hernia repair    HX MOHS PROCEDURES  2017    BCC L nasal sidewall by Dr. Jessica Jean-Baptiste  2018    [address] R mid forehead by Dr. Alexandria Knight      GERD reflux         Family History:   Problem Relation Age of Onset    Stroke Father     Heart Disease Father         angina    No Known Problems Mother     Cancer Brother         lung    Cancer Brother         lung       Social History     Socioeconomic History    Marital status: SINGLE     Spouse name: Not on file    Number of children: Not on file    Years of education: Not on file    Highest education level: Not on file   Occupational History    Not on file   Social Needs    Financial resource strain: Not on file    Food insecurity     Worry: Not on file     Inability: Not on file   [employer] needs     Medical: Not on file     Non-medical: Not on file   Tobacco Use    Smoking status: Former Smoker     Years: 20.00     Quit date:      Years since quittin.0    Smokeless tobacco: Never Used   Substance and Sexual Activity    Alcohol use: No    Drug use: No    Sexual activity: Not Currently     Partners: Male   Lifestyle    Physical activity     Days per week: Not on file     Minutes per session: Not on file    Stress: Not on file   Relationships    Social connections     Talks on phone: Not on file     Gets together: Not on file     Attends Religion service: Not on file     Active member of club or organization: Not on file     Attends meetings of clubs or organizations: Not on file     Relationship status: Not on file    Intimate partner violence     Fear of current or ex partner: Not on file     Emotionally abused: Not on file     Physically abused: Not on file     Forced sexual activity: Not on file   Other Topics Concern    Not on file   Social History Narrative    Not on file         ALLERGIES: Beverlee Lowers, Cat dander, Dog dander, Elavil, Oak, and Sulfa (sulfonamide antibiotics)    Review of Systems   Unable to perform ROS: Mental status change       Vitals:    01/03/21 0400 01/03/21 0415 01/03/21 0500 01/03/21 0637   BP: (!) 160/67  (!) 158/60 131/66   Pulse: (!) 102 (!) 104 (!) 108 100   Resp: 25 25 14 20   Temp:   98.4 °F (36.9 °C) 98.3 °F (36.8 °C)   SpO2: 92% 98% 98% 98%            Physical Exam  Vitals signs and nursing note reviewed. Constitutional:       General: She is not in acute distress. Appearance: She is well-developed. HENT:      Head: Normocephalic and atraumatic. Mouth/Throat:      Mouth: Mucous membranes are moist.      Pharynx: Oropharynx is clear. No oropharyngeal exudate. Eyes:      Pupils: Pupils are equal, round, and reactive to light. Cardiovascular:      Rate and Rhythm: Normal rate and regular rhythm. Heart sounds: Normal heart sounds. Pulmonary:      Effort: Pulmonary effort is normal.      Breath sounds: Normal breath sounds. Chest:      Chest wall: No tenderness. Abdominal:      General: There is no distension. Palpations: Abdomen is soft. Tenderness: There is no abdominal tenderness. There is no guarding or rebound. Musculoskeletal: Normal range of motion. General: No deformity. Right lower leg: No edema. Left lower leg: No edema. Comments: Entire spine palpated, no tenderness or deformity. Skin:     General: Skin is warm and dry. Capillary Refill: Capillary refill takes less than 2 seconds. Findings: No lesion or rash. Neurological:      General: No focal deficit present. Mental Status: She is alert. Cranial Nerves: No cranial nerve deficit. Sensory: No sensory deficit. Motor: No weakness. Gait: Gait normal.      Comments: Oriented to person and place. Disoriented to time. Inattentive,   Psychiatric:         Cognition and Memory: Cognition is impaired. Memory is impaired. Comments: Normal mood,          MDM  Number of Diagnoses or Management Options  Acute cystitis without hematuria  Acute hyperkalemia  Dehydration  Disorientation  Elevated CK  Prerenal azotemia  Diagnosis management comments: Rose Reinoso Review [phone]    ED Course as of Jan 03 0728   Sun Jan 03, 2021 0423 Potassium: 3.7 [NS]      ED Course User Index  [NS] Reddy Escobedo MD       Procedures    Perfect Serve Consult for Admission  1:47 AM    ED Room Number: 663/01  Patient Name and age:  Zachary Davidson 80 y.o.  female  Working Diagnosis:   1. Acute hyperkalemia    2. Dehydration    3. Disorientation    4. Elevated CK    5. Acute cystitis without hematuria    6. Prerenal azotemia        COVID-19 Suspicion:  no  Sepsis present:  no  Reassessment needed: no  Code Status:  Full Code  Readmission: no  Isolation Requirements:  no  Recommended Level of Care:  telemetry  Department:[facility] Adult ED - 21   Other:    69-year-old with mild cognitive impairment at baseline was able to live independently presenting with acute altered mental status, likely delirium secondary to UTI. Patient also has significant volume depletion and prerenal azotemia. She had mild hyperkalemia without EKG changes.   Will recheck after she receives fluid expansion with crystalloid.       Rocael mariano -- cell [phone]

## 2021-01-04 ENCOUNTER — APPOINTMENT (OUTPATIENT)
Dept: NON INVASIVE DIAGNOSTICS | Age: 86
End: 2021-01-04
Attending: FAMILY MEDICINE
Payer: MEDICARE

## 2021-01-04 LAB
ANION GAP SERPL CALC-SCNC: 3 MMOL/L (ref 5–15)
BUN SERPL-MCNC: 31 MG/DL (ref 6–20)
BUN/CREAT SERPL: 38 (ref 12–20)
CALCIUM SERPL-MCNC: 8.7 MG/DL (ref 8.5–10.1)
CHLORIDE SERPL-SCNC: 112 MMOL/L (ref 97–108)
CO2 SERPL-SCNC: 26 MMOL/L (ref 21–32)
CREAT SERPL-MCNC: 0.82 MG/DL (ref 0.55–1.02)
ECHO AO ROOT DIAM: 2.78 CM
ECHO AV PEAK GRADIENT: 7.62 MMHG
ECHO AV PEAK VELOCITY: 138.05 CM/S
ECHO EST RA PRESSURE: 3 MMHG
ECHO LA AREA 4C: 24.8 CM2
ECHO LA MAJOR AXIS: 5.01 CM
ECHO LA MINOR AXIS: 2.79 CM
ECHO LA VOL 2C: 84.37 ML (ref 22–52)
ECHO LA VOL 4C: 81.25 ML (ref 22–52)
ECHO LA VOL BP: 88.13 ML (ref 22–52)
ECHO LA VOL/BSA BIPLANE: 49.08 ML/M2 (ref 16–28)
ECHO LA VOLUME INDEX A2C: 46.98 ML/M2 (ref 16–28)
ECHO LA VOLUME INDEX A4C: 45.25 ML/M2 (ref 16–28)
ECHO LV EDV A2C: 52.83 ML
ECHO LV EDV A4C: 85.19 ML
ECHO LV EDV BP: 67.91 ML (ref 56–104)
ECHO LV EDV INDEX A4C: 47.4 ML/M2
ECHO LV EDV INDEX BP: 37.8 ML/M2
ECHO LV EDV NDEX A2C: 29.4 ML/M2
ECHO LV EJECTION FRACTION A2C: 65 PERCENT
ECHO LV EJECTION FRACTION A4C: 86 PERCENT
ECHO LV EJECTION FRACTION BIPLANE: 76.8 PERCENT (ref 55–100)
ECHO LV ESV A2C: 18.23 ML
ECHO LV ESV A4C: 12.28 ML
ECHO LV ESV BP: 15.78 ML (ref 19–49)
ECHO LV ESV INDEX A2C: 10.2 ML/M2
ECHO LV ESV INDEX A4C: 6.8 ML/M2
ECHO LV ESV INDEX BP: 8.8 ML/M2
ECHO LV INTERNAL DIMENSION DIASTOLIC: 4.12 CM (ref 3.9–5.3)
ECHO LV INTERNAL DIMENSION SYSTOLIC: 2.5 CM
ECHO LV IVSD: 1.23 CM (ref 0.6–0.9)
ECHO LV MASS 2D: 165.7 G (ref 67–162)
ECHO LV MASS INDEX 2D: 92.3 G/M2 (ref 43–95)
ECHO LV POSTERIOR WALL DIASTOLIC: 1.1 CM (ref 0.6–0.9)
ECHO LVOT PEAK GRADIENT: 3.53 MMHG
ECHO LVOT PEAK VELOCITY: 93.93 CM/S
ECHO MV A VELOCITY: 98.95 CM/S
ECHO MV AREA PHT: 5.01 CM2
ECHO MV E DECELERATION TIME (DT): 151.27 MS
ECHO MV E VELOCITY: 100.29 CM/S
ECHO MV E/A RATIO: 1.01
ECHO MV PRESSURE HALF TIME (PHT): 43.87 MS
ECHO PV PEAK INSTANTANEOUS GRADIENT SYSTOLIC: 2.6 MMHG
ECHO RIGHT VENTRICULAR SYSTOLIC PRESSURE (RVSP): 28.75 MMHG
ECHO RV TAPSE: 2.07 CM (ref 1.5–2)
ECHO TV REGURGITANT MAX VELOCITY: 253.73 CM/S
ECHO TV REGURGITANT PEAK GRADIENT: 25.75 MMHG
GLUCOSE SERPL-MCNC: 104 MG/DL (ref 65–100)
MAGNESIUM SERPL-MCNC: 2 MG/DL (ref 1.6–2.4)
POTASSIUM SERPL-SCNC: 3.5 MMOL/L (ref 3.5–5.1)
SODIUM SERPL-SCNC: 141 MMOL/L (ref 136–145)

## 2021-01-04 PROCEDURE — 36415 COLL VENOUS BLD VENIPUNCTURE: CPT

## 2021-01-04 PROCEDURE — 96376 TX/PRO/DX INJ SAME DRUG ADON: CPT

## 2021-01-04 PROCEDURE — 74011250637 HC RX REV CODE- 250/637: Performed by: HOSPITALIST

## 2021-01-04 PROCEDURE — 74011250636 HC RX REV CODE- 250/636: Performed by: HOSPITALIST

## 2021-01-04 PROCEDURE — 74011250637 HC RX REV CODE- 250/637: Performed by: INTERNAL MEDICINE

## 2021-01-04 PROCEDURE — 74011250637 HC RX REV CODE- 250/637: Performed by: FAMILY MEDICINE

## 2021-01-04 PROCEDURE — 74011250636 HC RX REV CODE- 250/636: Performed by: FAMILY MEDICINE

## 2021-01-04 PROCEDURE — 96372 THER/PROPH/DIAG INJ SC/IM: CPT

## 2021-01-04 PROCEDURE — 74011000258 HC RX REV CODE- 258: Performed by: FAMILY MEDICINE

## 2021-01-04 PROCEDURE — 96375 TX/PRO/DX INJ NEW DRUG ADDON: CPT

## 2021-01-04 PROCEDURE — 74011000250 HC RX REV CODE- 250: Performed by: FAMILY MEDICINE

## 2021-01-04 PROCEDURE — 80048 BASIC METABOLIC PNL TOTAL CA: CPT

## 2021-01-04 PROCEDURE — 99218 HC RM OBSERVATION: CPT

## 2021-01-04 PROCEDURE — 83735 ASSAY OF MAGNESIUM: CPT

## 2021-01-04 PROCEDURE — 94640 AIRWAY INHALATION TREATMENT: CPT

## 2021-01-04 PROCEDURE — 93306 TTE W/DOPPLER COMPLETE: CPT

## 2021-01-04 RX ORDER — LOSARTAN POTASSIUM 50 MG/1
100 TABLET ORAL DAILY
Status: DISCONTINUED | OUTPATIENT
Start: 2021-01-05 | End: 2021-01-08 | Stop reason: HOSPADM

## 2021-01-04 RX ORDER — MICONAZOLE NITRATE 2 %
POWDER (GRAM) TOPICAL 2 TIMES DAILY
Status: DISCONTINUED | OUTPATIENT
Start: 2021-01-04 | End: 2021-01-08 | Stop reason: HOSPADM

## 2021-01-04 RX ORDER — LAMOTRIGINE 50 MG/1
50 TABLET, ORALLY DISINTEGRATING ORAL
Status: DISCONTINUED | OUTPATIENT
Start: 2021-01-04 | End: 2021-01-07

## 2021-01-04 RX ORDER — LOPERAMIDE HYDROCHLORIDE 2 MG/1
4 CAPSULE ORAL
Status: DISCONTINUED | OUTPATIENT
Start: 2021-01-04 | End: 2021-01-08 | Stop reason: HOSPADM

## 2021-01-04 RX ORDER — HYDRALAZINE HYDROCHLORIDE 20 MG/ML
20 INJECTION INTRAMUSCULAR; INTRAVENOUS
Status: DISCONTINUED | OUTPATIENT
Start: 2021-01-04 | End: 2021-01-08 | Stop reason: HOSPADM

## 2021-01-04 RX ORDER — BALSAM PERU/CASTOR OIL
OINTMENT (GRAM) TOPICAL EVERY 12 HOURS
Status: DISCONTINUED | OUTPATIENT
Start: 2021-01-04 | End: 2021-01-08 | Stop reason: HOSPADM

## 2021-01-04 RX ORDER — MICONAZOLE NITRATE 2 %
POWDER (GRAM) TOPICAL EVERY 12 HOURS
Status: CANCELLED | OUTPATIENT
Start: 2021-01-04

## 2021-01-04 RX ADMIN — ARFORMOTEROL TARTRATE 15 MCG: 15 SOLUTION RESPIRATORY (INHALATION) at 07:42

## 2021-01-04 RX ADMIN — IPRATROPIUM BROMIDE 0.5 MG: 0.5 SOLUTION RESPIRATORY (INHALATION) at 07:42

## 2021-01-04 RX ADMIN — ACETAMINOPHEN 650 MG: 325 TABLET ORAL at 02:16

## 2021-01-04 RX ADMIN — ARFORMOTEROL TARTRATE 15 MCG: 15 SOLUTION RESPIRATORY (INHALATION) at 19:42

## 2021-01-04 RX ADMIN — ACETAMINOPHEN 650 MG: 325 TABLET ORAL at 22:44

## 2021-01-04 RX ADMIN — ENOXAPARIN SODIUM 40 MG: 100 INJECTION SUBCUTANEOUS at 10:38

## 2021-01-04 RX ADMIN — HYDRALAZINE HYDROCHLORIDE 20 MG: 20 INJECTION INTRAMUSCULAR; INTRAVENOUS at 21:38

## 2021-01-04 RX ADMIN — BUDESONIDE 500 MCG: 0.5 INHALANT RESPIRATORY (INHALATION) at 19:42

## 2021-01-04 RX ADMIN — METOPROLOL SUCCINATE 200 MG: 50 TABLET, EXTENDED RELEASE ORAL at 10:38

## 2021-01-04 RX ADMIN — Medication: at 21:44

## 2021-01-04 RX ADMIN — IPRATROPIUM BROMIDE 0.5 MG: 0.5 SOLUTION RESPIRATORY (INHALATION) at 19:42

## 2021-01-04 RX ADMIN — BUDESONIDE 500 MCG: 0.5 INHALANT RESPIRATORY (INHALATION) at 07:41

## 2021-01-04 RX ADMIN — Medication 10 ML: at 16:20

## 2021-01-04 RX ADMIN — CEFTRIAXONE SODIUM 1 G: 1 INJECTION, POWDER, FOR SOLUTION INTRAMUSCULAR; INTRAVENOUS at 02:17

## 2021-01-04 NOTE — WOUND CARE
Wound Care Note:  
 
New consult placed by yessica request for rash on buttocks Chart shows: 
Admitted for hyperkalemia and altered mental status with a history of arthritis, asthma, COPD, atherosclerosis of both carotid arteries, cataract, closed fracture of lateral portion of left tibial plateau, depression with anxiety, encephalopathy, esophageal ulcer, gastroparesis, GERD, hepatic steatosis, falls, hyperlipidemia, HTN, IBS, liver disease, menopause, neurological disorder, osteopenia, seizures, vertigo WBC = 10.5 on 1/2/21 Admitted from home Assessment:  
Patient is alert and talking, incontinent with moderate assistance needed in repositioning. Bed: Total care Patient wearing briefs for incontinence Diet: Renal regular Patient reports no pain. 1. POA Maculopapular erythematous rash with satellite lesions consistent with yeast to bilateral buttocks and sacrum. Antifungal powder to be ordered. 2.  POA blanchable erythema to bilateral heels. Venelex ointment to be ordered. Patient repositioned supine. Heels offloaded on pillows. Recommendations:   
Bilateral buttocks and sacrum- Every 12 hours cleanse with soap and water, blot dry, sprinkle with antifungal powder and gently rub into skin. Bilateral heels- Every 8 hours liberally apply Venelex ointment and offload heels. Skin Care & Pressure Prevention: 
Minimize layers of linen/pads under patient to optimize support surface. Turn/reposition approximately every 2 hours and offload heels. Manage incontinence / promote continence Nourishing Skin Cream to dry skin, minimize use of briefs when able Discussed above plan with patient & Medina Madison RN Transition of Care: Plan to follow as needed while admitted to hospital. 
 
April Ovens" RANDY Lopez, RN, [facility], [facility]. 
office [phone] 
pager [phone] or call  to page

## 2021-01-04 NOTE — PROGRESS NOTES
[facility]  Hospitalist Group                                                                                          Hospitalist Progress Note  Esperanza Lopez MD  Answering service: [phone] from in house phone        Date of Service:  2021  NAME:  Terri Colvin  :  1935  MRN:  [id number]      Admission Summary:   Terri Colvin is a 80 y.o. female with hx dementia, HTN, dyslipidemia, and COPD who presents with altered mential status. Ms Cosby lives alone, and was in normal health when her daughter spoke to her 2 days ago. Today, the patient was not answering her phone or her door, and when her daughter went to check on her found her on the floor disheveled. Patient does not remember how she got on the floor, yet denies loss of consciousness. Daughter called EMS who transported patientfor      In the ED, VSS. CK was elevated to 741, H/H was elevated at 16.2/52.6 , K+ was 6.3, CO2 20 BUN/creat  54/1 ration 54. UA with + nitrites, and leukocyte esterase. CT head with no acute intracranial abnormality.     In the ED, she was treated with calcium gluconate, 2L ns bolus, and rocephin.       Interval history / Subjective:   Patient seen and examined found resting comfortably discussed with nursing     Assessment & Plan:     #Acute Cystitis  -rocephin  -LACTOSE FERMENTING  [facility]  >100k     #Suspected Fall/Syncope  -orthostatics today BP actually high  -telemetry  -echo pending     #Hyperkalemia-resolved     #COPD  -scheduled brovana, pulmicort, and atrovent     #HTN  -carvedilol 3.125 BID to replace home bystolic while inpatient     MPOA: garcía Cosme Back    Code status: Full  DVT prophylaxis: Lovenox    Care Plan discussed with: Patient/Family and Nurse  Anticipated Disposition: Home w/Family  Anticipated Discharge: 24 hours to 48 hours     Hospital Problems  Date Reviewed: 2020          Codes Class Noted POA    Hyperkalemia ICD-10-CM: E87.5  ICD-9-CM: 276.7  1/3/2021 Unknown        Altered mental status ICD-10-CM: R41.82  ICD-9-CM: 780.97  1/3/2021 Unknown                Review of Systems:   A comprehensive review of systems was negative. Vital Signs:    Last 24hrs VS reviewed since prior progress note. Most recent are:  Visit Vitals  BP (!) 188/62   Pulse 77   Temp 98 °F (36.7 °C)   Resp 21   Ht 5' 3\" (1.6 m)   SpO2 96%   BMI 29.76 kg/m²       No intake or output data in the 24 hours ending 01/04/21 1258     Physical Examination:     I had a face to face encounter with this patient and independently examined them on 1/4/2021 as outlined below:          Constitutional:  No acute distress, cooperative, pleasant    ENT:  Oral mucosa moist, oropharynx benign. Resp:  CTA bilaterally. No wheezing/rhonchi/rales. No accessory muscle use   CV:  Regular rhythm, normal rate, no murmurs, gallops, rubs    GI:  Soft, non distended, non tender. normoactive bowel sounds, no hepatosplenomegaly     Musculoskeletal:  No edema, warm, 2+ pulses throughout    Neurologic:  Moves all extremities. AAOx3, CN II-XII reviewed     Psych:  Good insight, Not anxious nor agitated. Data Review:    I personally reviewed  Image and labs      Labs:     Recent Labs     01/02/21  2150   WBC 10.5   HGB 16.2*   HCT 52.6*        Recent Labs     01/04/21  0800 01/04/21  0400 01/03/21  1530 01/03/21  0836     --  140 144   K 3.5  --  3.3* 3.7   *  --  112* 115*   CO2 26  --  24 24   BUN 31*  --  43* 48*   CREA 0.82  --  1.00 1.00   *  --  119* 165*   CA 8.7  --  9.1 9.1   MG  --  2.0  --   --      Recent Labs     01/03/21  0326 01/02/21  2150   ALT 36 51   AP 71 94   TBILI 0.5 0.8   TP 6.2* 8.2   ALB 2.6* 3.2*   GLOB 3.6 5.0*     Recent Labs     01/02/21  2307   INR 1.1   PTP 11.3*      No results for input(s): FE, TIBC, PSAT, FERR in the last 72 hours.    No results found for: FOL, RBCF   No results for input(s): PH, PCO2, PO2 in the last 72 hours.   Recent Labs     01/02/21  2150   *     Lab Results   Component Value Date/Time    Cholesterol, total 195 08/10/2016 11:07 AM    HDL Cholesterol 62 08/10/2016 11:07 AM    LDL, calculated 101 (H) 08/10/2016 11:07 AM    Triglyceride 162 (H) 08/10/2016 11:07 AM    CHOL/HDL Ratio 5.7 (H) 07/29/2010 04:00 PM     No results found for: [facility]  Lab Results   Component Value Date/Time    Color YELLOW/STRAW 01/02/2021 11:31 PM    Appearance CLOUDY (A) 01/02/2021 11:31 PM    Specific gravity 1.022 01/02/2021 11:31 PM    Specific gravity <1.005 01/15/2010 02:30 PM    pH (UA) 5.5 01/02/2021 11:31 PM    Protein 30 (A) 01/02/2021 11:31 PM    Glucose Negative 01/02/2021 11:31 PM    Ketone 40 (A) 01/02/2021 11:31 PM    Bilirubin Negative 01/02/2021 11:31 PM    Urobilinogen 0.2 01/02/2021 11:31 PM    Nitrites Positive (A) 01/02/2021 11:31 PM    Leukocyte Esterase MODERATE (A) 01/02/2021 11:31 PM    Epithelial cells FEW 01/02/2021 11:31 PM    Bacteria 4+ (A) 01/02/2021 11:31 PM    WBC >100 (H) 01/02/2021 11:31 PM    RBC 0-5 01/02/2021 11:31 PM         Medications Reviewed:     Current Facility-Administered Medications   Medication Dose Route Frequency    sodium chloride (NS) flush 5-40 mL  5-40 mL IntraVENous Q8H    sodium chloride (NS) flush 5-40 mL  5-40 mL IntraVENous PRN    acetaminophen (TYLENOL) tablet 650 mg  650 mg Oral Q6H PRN    Or    acetaminophen (TYLENOL) suppository 650 mg  650 mg Rectal Q6H PRN    enoxaparin (LOVENOX) injection 40 mg  40 mg SubCUTAneous DAILY    arformoteroL (BROVANA) neb solution 15 mcg  15 mcg Nebulization BID RT    And    budesonide (PULMICORT) 500 mcg/2 ml nebulizer suspension  500 mcg Nebulization BID RT    cefTRIAXone (ROCEPHIN) 1 g in 0.9% sodium chloride (MBP/ADV) 50 mL MBP  1 g IntraVENous Q24H    ipratropium (ATROVENT) 0.02 % nebulizer solution 0.5 mg  0.5 mg Nebulization BID    metoprolol succinate (TOPROL-XL) XL tablet 200 mg  200 mg Oral DAILY ______________________________________________________________________  EXPECTED LENGTH OF STAY: - - -  ACTUAL LENGTH OF STAY:          0                 Tree Cruz MD

## 2021-01-05 ENCOUNTER — APPOINTMENT (OUTPATIENT)
Dept: CT IMAGING | Age: 86
End: 2021-01-05
Attending: HOSPITALIST
Payer: MEDICARE

## 2021-01-05 LAB
AMMONIA PLAS-SCNC: 42 UMOL/L
ANION GAP SERPL CALC-SCNC: 8 MMOL/L (ref 5–15)
BACTERIA SPEC CULT: ABNORMAL
BASOPHILS # BLD: 0 K/UL (ref 0–0.1)
BASOPHILS NFR BLD: 0 % (ref 0–1)
BUN SERPL-MCNC: 20 MG/DL (ref 6–20)
BUN/CREAT SERPL: 26 (ref 12–20)
CALCIUM SERPL-MCNC: 8.9 MG/DL (ref 8.5–10.1)
CC UR VC: ABNORMAL
CHLORIDE SERPL-SCNC: 107 MMOL/L (ref 97–108)
CO2 SERPL-SCNC: 24 MMOL/L (ref 21–32)
CREAT SERPL-MCNC: 0.77 MG/DL (ref 0.55–1.02)
DIFFERENTIAL METHOD BLD: ABNORMAL
EOSINOPHIL # BLD: 0.1 K/UL (ref 0–0.4)
EOSINOPHIL NFR BLD: 2 % (ref 0–7)
ERYTHROCYTE [DISTWIDTH] IN BLOOD BY AUTOMATED COUNT: 13.9 % (ref 11.5–14.5)
GLUCOSE SERPL-MCNC: 101 MG/DL (ref 65–100)
HCT VFR BLD AUTO: 40.4 % (ref 35–47)
HGB BLD-MCNC: 13.1 G/DL (ref 11.5–16)
IMM GRANULOCYTES # BLD AUTO: 0 K/UL (ref 0–0.04)
IMM GRANULOCYTES NFR BLD AUTO: 1 % (ref 0–0.5)
LYMPHOCYTES # BLD: 1.9 K/UL (ref 0.8–3.5)
LYMPHOCYTES NFR BLD: 25 % (ref 12–49)
MAGNESIUM SERPL-MCNC: 2 MG/DL (ref 1.6–2.4)
MCH RBC QN AUTO: 28.4 PG (ref 26–34)
MCHC RBC AUTO-ENTMCNC: 32.4 G/DL (ref 30–36.5)
MCV RBC AUTO: 87.6 FL (ref 80–99)
MONOCYTES # BLD: 1 K/UL (ref 0–1)
MONOCYTES NFR BLD: 14 % (ref 5–13)
NEUTS SEG # BLD: 4.6 K/UL (ref 1.8–8)
NEUTS SEG NFR BLD: 59 % (ref 32–75)
NRBC # BLD: 0 K/UL (ref 0–0.01)
NRBC BLD-RTO: 0 PER 100 WBC
PLATELET # BLD AUTO: 196 K/UL (ref 150–400)
PMV BLD AUTO: 11.6 FL (ref 8.9–12.9)
POTASSIUM SERPL-SCNC: 3.6 MMOL/L (ref 3.5–5.1)
RBC # BLD AUTO: 4.61 M/UL (ref 3.8–5.2)
SERVICE CMNT-IMP: ABNORMAL
SODIUM SERPL-SCNC: 139 MMOL/L (ref 136–145)
WBC # BLD AUTO: 7.7 K/UL (ref 3.6–11)

## 2021-01-05 PROCEDURE — 80048 BASIC METABOLIC PNL TOTAL CA: CPT

## 2021-01-05 PROCEDURE — 97161 PT EVAL LOW COMPLEX 20 MIN: CPT | Performed by: PHYSICAL THERAPIST

## 2021-01-05 PROCEDURE — 97165 OT EVAL LOW COMPLEX 30 MIN: CPT

## 2021-01-05 PROCEDURE — 85025 COMPLETE CBC W/AUTO DIFF WBC: CPT

## 2021-01-05 PROCEDURE — 82140 ASSAY OF AMMONIA: CPT

## 2021-01-05 PROCEDURE — 94640 AIRWAY INHALATION TREATMENT: CPT

## 2021-01-05 PROCEDURE — 97116 GAIT TRAINING THERAPY: CPT | Performed by: PHYSICAL THERAPIST

## 2021-01-05 PROCEDURE — 74011250636 HC RX REV CODE- 250/636: Performed by: HOSPITALIST

## 2021-01-05 PROCEDURE — 36415 COLL VENOUS BLD VENIPUNCTURE: CPT

## 2021-01-05 PROCEDURE — 74011250637 HC RX REV CODE- 250/637: Performed by: INTERNAL MEDICINE

## 2021-01-05 PROCEDURE — 74011000258 HC RX REV CODE- 258: Performed by: FAMILY MEDICINE

## 2021-01-05 PROCEDURE — 99218 HC RM OBSERVATION: CPT

## 2021-01-05 PROCEDURE — 97535 SELF CARE MNGMENT TRAINING: CPT

## 2021-01-05 PROCEDURE — 74011250637 HC RX REV CODE- 250/637: Performed by: HOSPITALIST

## 2021-01-05 PROCEDURE — 74011250636 HC RX REV CODE- 250/636: Performed by: FAMILY MEDICINE

## 2021-01-05 PROCEDURE — 74011000250 HC RX REV CODE- 250: Performed by: FAMILY MEDICINE

## 2021-01-05 PROCEDURE — 96372 THER/PROPH/DIAG INJ SC/IM: CPT

## 2021-01-05 PROCEDURE — 96376 TX/PRO/DX INJ SAME DRUG ADON: CPT

## 2021-01-05 PROCEDURE — 70450 CT HEAD/BRAIN W/O DYE: CPT

## 2021-01-05 PROCEDURE — 83735 ASSAY OF MAGNESIUM: CPT

## 2021-01-05 RX ADMIN — Medication: at 22:19

## 2021-01-05 RX ADMIN — Medication: at 09:00

## 2021-01-05 RX ADMIN — Medication 10 ML: at 06:00

## 2021-01-05 RX ADMIN — HYDRALAZINE HYDROCHLORIDE 20 MG: 20 INJECTION INTRAMUSCULAR; INTRAVENOUS at 05:29

## 2021-01-05 RX ADMIN — ENOXAPARIN SODIUM 40 MG: 100 INJECTION SUBCUTANEOUS at 10:12

## 2021-01-05 RX ADMIN — Medication 10 ML: at 14:00

## 2021-01-05 RX ADMIN — MICONAZOLE NITRATE 2 % TOPICAL POWDER: at 09:00

## 2021-01-05 RX ADMIN — BUDESONIDE 500 MCG: 0.5 INHALANT RESPIRATORY (INHALATION) at 08:29

## 2021-01-05 RX ADMIN — MICONAZOLE NITRATE 2 % TOPICAL POWDER: at 18:00

## 2021-01-05 RX ADMIN — Medication 10 ML: at 22:17

## 2021-01-05 RX ADMIN — METOPROLOL SUCCINATE 200 MG: 50 TABLET, EXTENDED RELEASE ORAL at 10:11

## 2021-01-05 RX ADMIN — CEFTRIAXONE SODIUM 1 G: 1 INJECTION, POWDER, FOR SOLUTION INTRAMUSCULAR; INTRAVENOUS at 01:41

## 2021-01-05 RX ADMIN — LOSARTAN POTASSIUM 100 MG: 50 TABLET, FILM COATED ORAL at 10:11

## 2021-01-05 RX ADMIN — IPRATROPIUM BROMIDE 0.5 MG: 0.5 SOLUTION RESPIRATORY (INHALATION) at 08:29

## 2021-01-05 RX ADMIN — ARFORMOTEROL TARTRATE 15 MCG: 15 SOLUTION RESPIRATORY (INHALATION) at 08:29

## 2021-01-05 RX ADMIN — Medication 10 ML: at 01:48

## 2021-01-05 NOTE — PROGRESS NOTES
Bedside and Verbal shift change report given to Katie Kang RN (oncoming nurse) by John Steward RN (offgoing nurse). Report included the following information SBAR, Kardex, Procedure Summary, Intake/Output, MAR, Recent Results, Med Rec Status, Cardiac Rhythm NSR, Alarm Parameters  and Dual Neuro Assessment.

## 2021-01-05 NOTE — PROGRESS NOTES
Transition of Care Plan   RUR-  N/A   DISPOSITION: The disposition plan recommendation is SNF.  Transport: Family - likely (daughter Lillian/[phone])    Reviewed chart for transitions of care and discussed in rounds. CM attempted to check in with patient at bedside regarding her aftercare plans however pt was not available at this time. CM contacted patients daughter/Ioana Castellanos ([phone]) regarding aftercare plans. CM provided daughter with 5 different SNFs:    [address] ([phone])  [facility] ([phone])  [facility] ([phone])  [facility] ([phone])  [address] ([phone])    CM sent referral via [employer], awaiting review. Pt will need [employer] Authorization. Pts daughter Jasen Frazier/[phone] contacted TW to inquire as to why CM did not contact her as she is the identified POA. TW apologized for not seeing pts daughter listed in the patient contact section. TW updated pts daughter regarding the recommendations for her mother to transition to a SNF. At this time:    [address] reports that there are currently no available beds. However admissions representative will contact TW tomorrow with an update. The [facility] of [facility] informed this writer that they have not had time to review pts chart and asked if CM can follow up with facility within the up and coming days. [address] declined due to no bed availability. CM will follow up with pt and pts daughter tomorrow regarding her aftercare plans.

## 2021-01-05 NOTE — PROGRESS NOTES
Physical Therapy  PT evaluation complete and full note to follow. Recommend SNF rehab setting at [place]. PT will continue to follow.   Tierney Blas, PT, DPT

## 2021-01-05 NOTE — PROGRESS NOTES
Problem: Mobility Impaired (Adult and Pediatric)  Goal: *Acute Goals and Plan of Care (Insert Text)  Description:   FUNCTIONAL STATUS PRIOR TO ADMISSION: Patient confused but states that she was ambulatory with RW at baseline and living alone. HOME SUPPORT PRIOR TO ADMISSION: The patient lived alone with daughter in the area to provide assistance. Physical Therapy Goals  Initiated 1/5/2021  1. Patient will move from supine to sit and sit to supine  in bed with minimal assistance within 7 day(s). 2.  Patient will transfer from bed to chair and chair to bed with contact guard assist using the least restrictive device within 7 day(s). 3.  Patient will perform sit to stand with contact guard assist within 7 day(s). 4.  Patient will ambulate with contact guard assist for 100 feet with the least restrictive device within 7 day(s). 5.  Patient will improve Whitaker Balance score by 7 points within 7 days. Outcome: Progressing Towards Goal  PHYSICAL THERAPY EVALUATION- NEURO POPULATION  Patient: Frieda Walters (77 y.o. female)  Date: 1/5/2021  Primary Diagnosis: Hyperkalemia [E87.5]  Altered mental status [R41.82]        Precautions:   Bed Alarm, Fall      ASSESSMENT  Based on the objective data described below, the patient presents with decreased functional mobility from baseline level of function. Patient currently limited by impaired strength, gait instability, impaired balance, confusion, and decreased activity tolerance. Patient currently needing modA to come to EOB and initially has fair sitting balance but improves once feet on the floor. Sit to stand with Deandre and able to take a few steps to the chair. Patient is overall confused during session but is oriented to self and place. She is well below her functional baseline and would benefit from DC to SNF rehab setting pending progression. If she is to DC home she will need  PT and supervision for safety secondary to confusion.       Functional Outcome Measure: The patient scored Total: 4/56 on the [facility] Assessment which is indicative of high fall risk. Other factors to consider for discharge: at risk for falls, confused, lives alone     Patient will benefit from skilled therapy intervention to address the above noted impairments. PLAN :  Recommendations and Planned Interventions: bed mobility training, transfer training, gait training, therapeutic exercises, neuromuscular re-education, patient and family training/education, and therapeutic activities      Frequency/Duration: Patient will be followed by physical therapy:  5 times a week to address goals. Recommendation for discharge: (in order for the patient to meet his/her long term goals)  Therapy up to 5 days/week in SNF setting        IF patient discharges home will need the following DME: patient owns DME required for discharge         SUBJECTIVE:   Patient stated Do I know your mom?     OBJECTIVE DATA SUMMARY:   HISTORY:    Past Medical History:   Diagnosis Date    Arthritis     Arthritis of knee, left     Asthma     Asthma with COPD (chronic obstructive pulmonary disease) (HCC)     FEV1 - 0.99, IgE high    Atherosclerosis of both carotid arteries     Bipolar affective disorder (HCC)     Cataract, left eye     cataract - left    Chronic obstructive pulmonary disease (HCC)     Closed fracture of lateral portion of left tibial plateau     Depression with anxiety     depression/anxiety    Encephalopathy     encephalopathy    Esophageal ulcer     esophageal ulcer    Gastroparesis     GERD (gastroesophageal reflux disease)     Hepatic steatosis     History of fall     hx falls    Hyperlipidemia     increased cholesterol    Hyperlipidemia     Hypertension     IBS (irritable bowel syndrome)     IBS    IGT (impaired glucose tolerance) 9/15/2015    Liver disease     elevated liver enzymes - being evaluated/US done x 2 - inflammed liver/\"fat on liver\"    Menopause 1987    Neurological disorder     delayed thinking process? ??    OA (osteoarthritis) of knee     Osteoporosis     Seizures (HCC)     Sunburn, blistering     Unspecified adverse effect of anesthesia     was told by neurologist not to use anesthesia unless needed due to encephalopathy    Unspecified adverse effect of anesthesia     hallucinations after MRI with sedation    Vertigo     vertigo     Past Surgical History:   Procedure Laterality Date    COLONOSCOPY N/A 8/1/2017    COLONOSCOPY performed by Paulino Yeager MD at [facility] ENDOSCOPY    HX BREAST BIOPSY Left     Surgical    HX GI      laser surgery for reflux and hernia repair    HX MOHS PROCEDURES  09/05/2017    BCC L nasal sidewall by Dr. Kim Ax PROCEDURES  06/04/2018    [employer] R mid forehead by Dr. Geo Rosas      GERD reflux       Personal factors and/or comorbidities impacting plan of care:     Home Situation  Home Environment: Private residence  # Steps to Enter: 12  Rails to Enter: Yes  One/Two Story Residence: Two story  Living Alone: Yes  Support Systems: Family member(s)  Current DME Used/Available at Home: Shower chair, Walker, rollator  Tub or Shower Type: Tub/Shower combination    EXAMINATION/PRESENTATION/DECISION MAKING:   Critical Behavior:  Neurologic State: Alert, Confused  Orientation Level: Oriented to person, Oriented to place, Disoriented to situation, Disoriented to time  Cognition: Decreased attention/concentration, Follows commands, Memory loss, Poor safety awareness  Safety/Judgement: Decreased awareness of environment, Decreased awareness of need for assistance, Decreased awareness of need for safety, Decreased insight into deficits    Range Of Motion:  AROM: Generally decreased, functional                       Strength:    Strength: Generally decreased, functional                    Tone & Sensation:    Not tseted                              Coordination:  Coordination: Generally decreased, functional(RUE decreased)  Vision:   Tracking: Able to track stimulus in all quadrants w/o difficulty  Diplopia: No  Acuity: Within Defined Limits  Corrective Lenses: Reading glasses  Functional Mobility:  Bed Mobility:  Rolling: Minimum assistance  Supine to Sit: Moderate assistance;Assist x2     Scooting: Moderate assistance;Assist x1  Transfers:  Sit to Stand: Minimum assistance;Assist x1  Stand to Sit: Minimum assistance;Assist x1                       Balance:   Sitting: Impaired  Sitting - Static: Good (unsupported)  Sitting - Dynamic: Fair (occasional)  Standing: Impaired; With support(RW)  Standing - Static: Fair;Constant support  Standing - Dynamic : Fair;Constant support  Ambulation/Gait Training:  Distance (ft): 5 Feet (ft)(bed to chair)  Assistive Device: Gait belt;Walker, rolling  Ambulation - Level of Assistance: Minimal assistance; Additional time;Assist x1     Gait Description (WDL): Exceptions to WDL  Gait Abnormalities: Decreased step clearance; Path deviations; Shuffling gait              Speed/Nicole: Pace decreased (<100 feet/min); Shuffled  Step Length: Left shortened;Right shortened         Functional Measure  Whitaker Balance Test:    Sitting to Standin  Standing Unsupported: 0  Sitting with Back Unsupported: 3  Standing to Sittin  Transfers: 0  Standing Unsupported with Eyes Closed: 0  Standing Unsupported with Feet Together: 0  Reach Forward with Outstretched Arm: 0   Object: 0  Turn to Look Over Shoulders: 0  Turn 360 Degrees: 0  Alternate Foot on Step/Stool: 0  Standing Unsupported One Foot in Front: 0  Stand on One Le  Total:          56=Maximum possible score;   0-20=High fall risk  21-40=Moderate fall risk   41-56=Low fall risk          Pain Rating:  No c/o pain    Activity Tolerance:   Fair and requires rest breaks      After treatment patient left in no apparent distress:   Sitting in chair, Call bell within reach, and Bed / chair alarm activated    COMMUNICATION/EDUCATION:   The patients plan of care was discussed with: Physical therapist, Occupational therapist, and Registered nurse. Patient was educated regarding her deficit(s) of R sided weakness as this relates to her diagnosis of r/o CVA. She demonstrated Fair understanding as evidenced by teachback. Patient and/or family was verbally educated on the BE FAST acronym for signs/symptoms of CVA and TIA. BE FAST was written on patient's communication board  for visual education and reinforcement. All questions answered with patient indicating fair understanding. Patient is unable to participate in goal setting and plan of care.     Thank you for this referral.  Kelli Levine, PT, DPT   Time Calculation: 22 mins

## 2021-01-05 NOTE — PROGRESS NOTES
Problem: Falls - Risk of  Goal: *Absence of Falls  Description: Document Machelle Tierney Fall Risk and appropriate interventions in the flowsheet.   Outcome: Progressing Towards Goal  Note: Fall Risk Interventions:  Mobility Interventions: Communicate number of staff needed for ambulation/transfer, Patient to call before getting OOB, PT Consult for mobility concerns, OT consult for ADLs    Mentation Interventions: Adequate sleep, hydration, pain control, Bed/chair exit alarm, Door open when patient unattended, More frequent rounding, Reorient patient, Room close to nurse's station, Toileting rounds, Update white board    Medication Interventions: Patient to call before getting OOB    Elimination Interventions: Bed/chair exit alarm, Call light in reach, Patient to call for help with toileting needs, Toileting schedule/hourly rounds    History of Falls Interventions: Bed/chair exit alarm, Door open when patient unattended, Evaluate medications/consider consulting pharmacy, Investigate reason for fall, Vital signs minimum Q4HRs X 24 hrs (comment for end date)         Problem: Patient Education: Go to Patient Education Activity  Goal: Patient/Family Education  Outcome: Progressing Towards Goal

## 2021-01-05 NOTE — PROGRESS NOTES
Problem: Falls - Risk of  Goal: *Absence of Falls  Description: Document Carey Rose Fall Risk and appropriate interventions in the flowsheet.   Outcome: Progressing Towards Goal  Note: Fall Risk Interventions:  Mobility Interventions: Communicate number of staff needed for ambulation/transfer, PT Consult for mobility concerns, Patient to call before getting OOB, OT consult for ADLs    Mentation Interventions: Adequate sleep, hydration, pain control, Bed/chair exit alarm, Door open when patient unattended, Reorient patient, More frequent rounding, Increase mobility, Room close to nurse's station, Toileting rounds, Update white board    Medication Interventions: Evaluate medications/consider consulting pharmacy, Patient to call before getting OOB    Elimination Interventions: Patient to call for help with toileting needs, Toileting schedule/hourly rounds    History of Falls Interventions: Bed/chair exit alarm, Evaluate medications/consider consulting pharmacy, Vital signs minimum Q4HRs X 24 hrs (comment for end date), Assess for delayed presentation/identification of injury for 48 hrs (comment for end date)         Problem: Patient Education: Go to Patient Education Activity  Goal: Patient/Family Education  Outcome: Progressing Towards Goal

## 2021-01-05 NOTE — PROGRESS NOTES
Problem: Self Care Deficits Care Plan (Adult)  Goal: *Acute Goals and Plan of Care (Insert Text)  Description: FUNCTIONAL STATUS PRIOR TO ADMISSION: Patient is a poor historian, confused, however reporting she lives alone, is IND with ADLs and some IADLs, and ambulates with no AD. HOME SUPPORT: The patient lived alone with a daughter in the area to provide assistance. Occupational Therapy Goals  Initiated 1/5/2021   1. Patient will perform self-feeding with supervision/set-up & compensatory techniques PRN within 7 day(s). 2.  Patient will perform grooming with minimal assistance/contact guard assist within 7 day(s). 3.  Patient will perform bathing with moderate assistance  within 7 day(s). 4.  Patient will perform toilet transfers with minimal assistance/contact guard assist within 7 day(s). 5.  Patient will perform all aspects of toileting with moderate assistance  within 7 day(s). 6.  Patient will participate in upper extremity therapeutic exercise/activities with minimal assistance/contact guard assist for 5 minutes within 7 day(s). 7.  Patient will utilize energy conservation techniques during functional activities with verbal and visual cues within 7 day(s). 8.  Patient will complete the Fugl Boland UE Assessment within 7 days. Outcome: Not Met     OCCUPATIONAL THERAPY EVALUATION  Patient: Cholo Osullivan (85 y.o. female)  Date: 1/5/2021  Primary Diagnosis: Hyperkalemia [E87.5]  Altered mental status [R41.82]        Precautions: Bed Alarm, Fall, SBP <160    ASSESSMENT  Based on the objective data described below, the patient presents with decreased balance, attention to task, coordination, cognition (deana memory, judgment, executive functioning), RUE ROM/strength/coordination safety awareness, insight into deficits, and activity tolerance s/p admission for AMS and GLF, down at least 2 days.  Patient is a poor historian, however reports she lives alone, was IND without AD, and has a daughter in the area. She now presents far from her baseline, requiring Min-Mod A x1-2 for limited OOB mobility with RW support, Max A for lower body ADLs completed with mod-max cues for attention to task as she is pleasantly talkative requiring frequent redirection to task. Considering she lives alone, is far from her baseline, and requiring increased physical assistance, recommend d/c to SNF rehab to maximize safety & functional independence as she is a high fall risk, unsafe to return home alone. Current Level of Function Impacting Discharge (ADLs/self-care): setup-Mod A for upper body ADLs, Max-Total A for lower body ADLs, and Min-Mod A x1-2 for mobility    Functional Outcome Measure: The patient scored 25/100 on the Barthel Index, indicating 75% impairment in ADLs and mobility, infer 100%  dependence on others for IADLs, not appropriate or Fugl Boland d/t poor attention & cognition. Other factors to consider for discharge: fall risk, baseline mild dementia, lives alone, acute neuro deficits--CT pending      Patient will benefit from skilled therapy intervention to address the above noted impairments. PLAN :  Recommendations and Planned Interventions: self care training, functional mobility training, therapeutic exercise, balance training, visual/perceptual training, therapeutic activities, cognitive retraining, endurance activities, neuromuscular re-education, patient education, home safety training, and family training/education    Frequency/Duration: Patient will be followed by occupational therapy 5 times a week to address goals. Recommendation for discharge: (in order for the patient to meet his/her long term goals)  Therapy up to 5 days/week in SNF setting    This discharge recommendation:  A follow-up discussion with the attending provider and/or case management is planned    IF patient discharges home will need the following DME:  To be determined (TBD)         SUBJECTIVE:   Patient stated Were you my next door neighbor?     OBJECTIVE DATA SUMMARY:   HISTORY:   Past Medical History:   Diagnosis Date    Arthritis     Arthritis of knee, left     Asthma     Asthma with COPD (chronic obstructive pulmonary disease) (HCC)     FEV1 - 0.99, IgE high    Atherosclerosis of both carotid arteries     Bipolar affective disorder (HCC)     Cataract, left eye     cataract - left    Chronic obstructive pulmonary disease (HCC)     Closed fracture of lateral portion of left tibial plateau     Depression with anxiety     depression/anxiety    Encephalopathy     encephalopathy    Esophageal ulcer     esophageal ulcer    Gastroparesis     GERD (gastroesophageal reflux disease)     Hepatic steatosis     History of fall     hx falls    Hyperlipidemia     increased cholesterol    Hyperlipidemia     Hypertension     IBS (irritable bowel syndrome)     IBS    IGT (impaired glucose tolerance) 9/15/2015    Liver disease     elevated liver enzymes - being evaluated/US done x 2 - inflammed liver/\"fat on liver\"    Menopause 1987    Neurological disorder     delayed thinking process? ??    OA (osteoarthritis) of knee     Osteoporosis     Seizures (HCC)     Sunburn, blistering     Unspecified adverse effect of anesthesia     was told by neurologist not to use anesthesia unless needed due to encephalopathy    Unspecified adverse effect of anesthesia     hallucinations after MRI with sedation    Vertigo     vertigo     Past Surgical History:   Procedure Laterality Date    COLONOSCOPY N/A 8/1/2017    COLONOSCOPY performed by Ni Hammer MD at [facility] ENDOSCOPY    HX BREAST BIOPSY Left     Surgical    HX GI      laser surgery for reflux and hernia repair    HX MOHS PROCEDURES  09/05/2017    BCC L nasal sidewall by Dr. Gurinder Funk  06/04/2018    BCC R mid forehead by Dr. Mohinder Alvarez      GERD reflux     Expanded or extensive additional review of patient history:     Home Situation  Home Environment: Private residence  # Steps to Enter: 15  Rails to Enter: Yes  One/Two Story Residence: Two story  Living Alone: Yes  Support Systems: Family member(s)  Current DME Used/Available at Home: Shower chair, Walker, rollator  Tub or Shower Type: Tub/Shower combination    Hand dominance: Right    EXAMINATION OF PERFORMANCE DEFICITS:  Cognitive/Behavioral Status:  Neurologic State: Alert;Confused  Orientation Level: Oriented to person;Oriented to place; Disoriented to situation;Disoriented to time  Cognition: Decreased attention/concentration; Follows commands;Memory loss;Poor safety awareness  Perception: Appears intact  Perseveration: Perseverates during conversation  Safety/Judgement: Decreased awareness of environment;Decreased awareness of need for assistance;Decreased awareness of need for safety;Decreased insight into deficits    Skin: Appears intact    Edema: None    Hearing:       Vision/Perceptual:    Tracking: Able to track stimulus in all quadrants w/o difficulty                 Diplopia: No    Acuity: Within Defined Limits    Corrective Lenses: Reading glasses    Range of Motion:  AROM: Generally decreased, functional                         Strength:  Strength: Generally decreased, functional                Coordination:  Coordination: Generally decreased, functional(RUE decreased)  Fine Motor Skills-Upper: Left Intact; Right Intact(decreased coordination noted R>L)    Gross Motor Skills-Upper: Left Intact; Right Intact         Balance:  Sitting: Impaired  Sitting - Static: Good (unsupported)  Sitting - Dynamic: Fair (occasional)  Standing: Impaired; With support(RW)  Standing - Static: Fair;Constant support  Standing - Dynamic : Fair;Constant support    Functional Mobility and Transfers for ADLs:  Bed Mobility:  Rolling: Minimum assistance  Supine to Sit: Moderate assistance;Assist x2  Scooting:  Moderate assistance;Assist x1    Transfers:  Sit to Stand: Minimum assistance;Assist x1  Stand to Sit: Minimum assistance;Assist x1  Toilet Transfer : Minimum assistance;Assist x1(infer to [facility] with RW per mobility)  Assistive Device : Gait Belt;Walker, rolling    ADL Assessment:  Feeding: Minimum assistance    Oral Facial Hygiene/Grooming: Moderate assistance    Bathing: Moderate assistance    Upper Body Dressing: Moderate assistance    Lower Body Dressing: Maximum assistance    Toileting: Total assistance                ADL Intervention and task modifications:       Grooming  Grooming Assistance: Set-up  Position Performed: Seated in chair  Washing Face: Set-up(min cues for attention to task)  Cues: Verbal cues provided                   Lower Body Dressing Assistance  Dressing Assistance: Maximum assistance  Socks: Maximum assistance; Compensatory technique training    Toileting  Toileting Assistance: Total assistance(dependent)  Bladder Hygiene: Total assistance (dependent)(purewick)    Cognitive Retraining  Orientation Retraining: Awareness of environment; Reorienting  Problem Solving: Awareness of environment  Executive Functions: Executing cognitive plans;Regulating behavior;Managing time  Organizing/Sequencing: Breaking task down  Attention to Task: Single task  Maintains Attention For (Time): (<30 seconds with max cues for redirection)  Following Commands: Follows one step commands/directions(max cues for redirection)  Safety/Judgement: Decreased awareness of environment;Decreased awareness of need for assistance;Decreased awareness of need for safety;Decreased insight into deficits  Cues: Tactile cues provided;Verbal cues provided;Visual cues provided      Functional Measure:  Barthel Index:    Bathin  Bladder: 0  Bowels: 5  Groomin  Dressin  Feedin  Mobility: 0  Stairs: 0  Toilet Use: 5  Transfer (Bed to Chair and Back): 5  Total: 25/100        The Barthel ADL Index: Guidelines  1.  The index should be used as a record of what a patient does, not as a record of what a patient could do. 2. The main aim is to establish degree of independence from any help, physical or verbal, however minor and for whatever reason. 3. The need for supervision renders the patient not independent. 4. A patient's performance should be established using the best available evidence. Asking the patient, friends/relatives and nurses are the usual sources, but direct observation and common sense are also important. However direct testing is not needed. 5. Usually the patient's performance over the preceding 24-48 hours is important, but occasionally longer periods will be relevant. 6. Middle categories imply that the patient supplies over 50 per cent of the effort. 7. Use of aids to be independent is allowed. Usman Renner., Barthel, DMarieW. ([id number]). Functional evaluation: the Barthel Index. [facility] (14)2. JESSICA Martines, Britany Perez, Carrie Ford., [place], [address] [place] [address] (1999). Measuring the change indisability after inpatient rehabilitation; comparison of the responsiveness of the Barthel Index and Functional [place] Measure. Journal of Neurology, Neurosurgery, and Psychiatry, 66(4), 997-643. Adalberto Zhao, N.J.A, AUGUST Guerra, & Keshia Toussaint, M.A. (2004.) Assessment of post-stroke quality of life in cost-effectiveness studies: The usefulness of the Barthel Index and the EuroQoL-5D.  Quality of Life Research, 15, 296-11         Occupational Therapy Evaluation Charge Determination   History Examination Decision-Making   LOW Complexity : Brief history review  MEDIUM Complexity : 3-5 performance deficits relating to physical, cognitive , or psychosocial skils that result in activity limitations and / or participation restrictions LOW Complexity : No comorbidities that affect functional and no verbal or physical assistance needed to complete eval tasks       Based on the above components, the patient evaluation is determined to be of the following complexity level: LOW   Pain Rating:  None    Activity Tolerance:   Fair and requires rest breaks    After treatment patient left in no apparent distress:    Sitting in chair, Call bell within reach, and Bed / chair alarm activated    COMMUNICATION/EDUCATION:   The patients plan of care was discussed with: Physical therapist and Registered nurse. Patient was educated regarding her deficit(s) of impaired balance, cognition, RUE function, attention, strength, and coordination as this relates to her diagnosis of neuro workup & AMS s/p GLF. She demonstrated Poor  understanding as evidenced by baseline dementia, anticipate worsened with poor attention & reception to education. Patient and/or family was verbally educated on the BE FAST acronym for signs/symptoms of CVA and TIA. BE FAST was written on patient's communication board  for visual education and reinforcement. All questions answered with patient indicating poor understanding. Home safety education was provided and the patient/caregiver indicated understanding., Patient/family have participated as able in goal setting and plan of care. , and Patient/family agree to work toward stated goals and plan of care. This patients plan of care is appropriate for delegation to [facility].     Thank you for this referral.  JAKUB Frots, OTR/L  Time Calculation: 22 mins

## 2021-01-05 NOTE — PROGRESS NOTES
[facility] Adult  Hospitalist Group                                                                                          Hospitalist Progress Note  Westley Martinez MD  Answering service: [phone] from in house phone        Date of Service:  2021  NAME:  Lolis Paula  :  1935  MRN:  [id number]      Admission Summary:   Lolis Paula is a 80 y.o. female with hx dementia, HTN, dyslipidemia, and COPD who presents with altered mential status. Ms Cosby lives alone, and was in normal health when her daughter spoke to her 2 days ago. Today, the patient was not answering her phone or her door, and when her daughter went to check on her found her on the floor disheveled. Patient does not remember how she got on the floor, yet denies loss of consciousness. Daughter called EMS who transported patientfor      In the ED, VSS. CK was elevated to 741, H/H was elevated at 16.2/52.6 , K+ was 6.3, CO2 20 BUN/creat  54/1 ration 54. UA with + nitrites, and leukocyte esterase. CT head with no acute intracranial abnormality.     In the ED, she was treated with calcium gluconate, 2L ns bolus, and rocephin. Interval history / Subjective:   Patient seen and examined found resting comfortably discussed with nursing     Assessment & Plan:     #Acute Cystitis  -rocephin for 5 days   -e coli c/s to ceftriaxone 100k     #Suspected Fall/Syncope  - BP better PT recommended SNF   -telemetry  -echo - Estimated LVEF is 60 - 65%. Normal cavity size and systolic function (ejection fraction normal). Mild concentric hypertrophy.  Wall motion: normal.Mild MR      #Hyperkalemia-resolved     #COPD  -scheduled brovana, pulmicort, and atrovent     #HTN  -resume home meds better controlled      MPOA: daughter Ros Farnsworth [phone]    Code status: Full  DVT prophylaxis: Lovenox    Care Plan discussed with: Patient/Family and Nurse  Anticipated Disposition: Home w/Family  Anticipated Discharge: 24 hours to 48 hours     Called daughter updated 1/5     Hospital Problems  Date Reviewed: 6/26/2020          Codes Class Noted POA    Hyperkalemia ICD-10-CM: E87.5  ICD-9-CM: 276.7  1/3/2021 Unknown        Altered mental status ICD-10-CM: R41.82  ICD-9-CM: 780.97  1/3/2021 Unknown                Review of Systems:   A comprehensive review of systems was negative. Vital Signs:    Last 24hrs VS reviewed since prior progress note. Most recent are:  Visit Vitals  BP (!) 147/68 (BP 1 Location: Left arm, BP Patient Position: At rest)   Pulse 78   Temp 97.7 °F (36.5 °C)   Resp 21   Ht 5' 3\" (1.6 m)   Wt 76.5 kg (168 lb 10.4 oz)   SpO2 97%   BMI 29.88 kg/m²       No intake or output data in the 24 hours ending 01/05/21 1227     Physical Examination:     I had a face to face encounter with this patient and independently examined them on 1/5/2021 as outlined below:          Constitutional:  No acute distress, confused but can converse   ENT:  MMM   Resp:  CTA bilaterally. CV:  Regular rhythm, normal rate    GI:  Soft, non distended, non tender. Musculoskeletal:  No edema, warm, 2+ pulses throughout    Neurologic:  Moves all extremities. AAOx1,      Psych:  Good insight, Not anxious nor agitated. Data Review:    I personally reviewed  Image and labs    Ct Head Wo Cont    Result Date: 1/3/2021  IMPRESSION: No acute intracranial abnormality on this noncontrast head CT. Increased chronic microvascular ischemic disease. Xr Chest Port    Result Date: 1/3/2021  IMPRESSION: No acute process on portable chest. No change.      Labs:     Recent Labs     01/05/21  0134 01/02/21  2150   WBC 7.7 10.5   HGB 13.1 16.2*   HCT 40.4 52.6*    289     Recent Labs     01/04/21  0800 01/04/21  0400 01/03/21  1530 01/03/21  0836     --  140 144   K 3.5  --  3.3* 3.7   *  --  112* 115*   CO2 26  --  24 24   BUN 31*  --  43* 48*   CREA 0.82  --  1.00 1.00   *  --  119* 165*   CA 8.7  --  9.1 9.1   MG  --  2.0  -- --      Recent Labs     01/03/21  0326 01/02/21  2150   ALT 36 51   AP 71 94   TBILI 0.5 0.8   TP 6.2* 8.2   ALB 2.6* 3.2*   GLOB 3.6 5.0*     Recent Labs     01/02/21  2307   INR 1.1   PTP 11.3*      No results for input(s): FE, TIBC, PSAT, FERR in the last 72 hours. No results found for: FOL, RBCF   No results for input(s): PH, PCO2, PO2 in the last 72 hours.   Recent Labs     01/02/21  2150   *     Lab Results   Component Value Date/Time    Cholesterol, total 195 08/10/2016 11:07 AM    HDL Cholesterol 62 08/10/2016 11:07 AM    LDL, calculated 101 (H) 08/10/2016 11:07 AM    Triglyceride 162 (H) 08/10/2016 11:07 AM    CHOL/HDL Ratio 5.7 (H) 07/29/2010 04:00 PM     No results found for: [facility]  Lab Results   Component Value Date/Time    Color YELLOW/STRAW 01/02/2021 11:31 PM    Appearance CLOUDY (A) 01/02/2021 11:31 PM    Specific gravity 1.022 01/02/2021 11:31 PM    Specific gravity <1.005 01/15/2010 02:30 PM    pH (UA) 5.5 01/02/2021 11:31 PM    Protein 30 (A) 01/02/2021 11:31 PM    Glucose Negative 01/02/2021 11:31 PM    Ketone 40 (A) 01/02/2021 11:31 PM    Bilirubin Negative 01/02/2021 11:31 PM    Urobilinogen 0.2 01/02/2021 11:31 PM    Nitrites Positive (A) 01/02/2021 11:31 PM    Leukocyte Esterase MODERATE (A) 01/02/2021 11:31 PM    Epithelial cells FEW 01/02/2021 11:31 PM    Bacteria 4+ (A) 01/02/2021 11:31 PM    WBC >100 (H) 01/02/2021 11:31 PM    RBC 0-5 01/02/2021 11:31 PM         Medications Reviewed:     Current Facility-Administered Medications   Medication Dose Route Frequency    hydrALAZINE (APRESOLINE) 20 mg/mL injection 20 mg  20 mg IntraVENous Q6H PRN    balsam peru-castor oiL (VENELEX) ointment   Topical Q12H    miconazole (MICOTIN) 2 % powder   Topical BID    loperamide (IMODIUM) capsule 4 mg  4 mg Oral Q4H PRN    lamoTRIgine (LaMICtal) disintegrating tablet 50 mg  50 mg Oral QHS    losartan (COZAAR) tablet 100 mg  100 mg Oral DAILY    sodium chloride (NS) flush 5-40 mL  5-40 mL IntraVENous Q8H    sodium chloride (NS) flush 5-40 mL  5-40 mL IntraVENous PRN    acetaminophen (TYLENOL) tablet 650 mg  650 mg Oral Q6H PRN    Or    acetaminophen (TYLENOL) suppository 650 mg  650 mg Rectal Q6H PRN    enoxaparin (LOVENOX) injection 40 mg  40 mg SubCUTAneous DAILY    arformoteroL (BROVANA) neb solution 15 mcg  15 mcg Nebulization BID RT    And    budesonide (PULMICORT) 500 mcg/2 ml nebulizer suspension  500 mcg Nebulization BID RT    cefTRIAXone (ROCEPHIN) 1 g in 0.9% sodium chloride (MBP/ADV) 50 mL MBP  1 g IntraVENous Q24H    ipratropium (ATROVENT) 0.02 % nebulizer solution 0.5 mg  0.5 mg Nebulization BID    metoprolol succinate (TOPROL-XL) XL tablet 200 mg  200 mg Oral DAILY     ______________________________________________________________________  EXPECTED LENGTH OF STAY: - - -  ACTUAL LENGTH OF STAY:          0                 Britni Anaya MD

## 2021-01-05 NOTE — PROGRESS NOTES
Occupational Therapy  01/05/21    Orders received, chart reviewed and patient evaluated by occupational therapy. Pending progression with skilled acute occupational therapy, recommend:  Therapy up to 5 days/week in SNF setting     Recommend with nursing patient to complete as able in order to maintain strength, endurance and independence: OOB to chair 3x/day and functional mobility to the [facility] with RW & 1 assist. Thank you for your assistance. Full evaluation to follow.      Thank you,   Jean Paul Garza, OTD, OTR/L

## 2021-01-05 NOTE — ROUTINE PROCESS
Bedside shift change report given to Cora Navarrete (oncoming nurse) by Margaret Luis RN (offgoing nurse). Report included the following information SBAR, Kardex, ED Summary, Intake/Output, MAR, Cardiac Rhythm NSR and Dual Neuro Assessment.

## 2021-01-06 LAB
ANION GAP SERPL CALC-SCNC: 3 MMOL/L (ref 5–15)
BASOPHILS # BLD: 0.1 K/UL (ref 0–0.1)
BASOPHILS NFR BLD: 1 % (ref 0–1)
BUN SERPL-MCNC: 18 MG/DL (ref 6–20)
BUN/CREAT SERPL: 20 (ref 12–20)
CALCIUM SERPL-MCNC: 9.3 MG/DL (ref 8.5–10.1)
CHLORIDE SERPL-SCNC: 109 MMOL/L (ref 97–108)
CO2 SERPL-SCNC: 24 MMOL/L (ref 21–32)
CREAT SERPL-MCNC: 0.92 MG/DL (ref 0.55–1.02)
DIFFERENTIAL METHOD BLD: ABNORMAL
EOSINOPHIL # BLD: 0.1 K/UL (ref 0–0.4)
EOSINOPHIL NFR BLD: 2 % (ref 0–7)
ERYTHROCYTE [DISTWIDTH] IN BLOOD BY AUTOMATED COUNT: 14.2 % (ref 11.5–14.5)
GLUCOSE SERPL-MCNC: 132 MG/DL (ref 65–100)
HCT VFR BLD AUTO: 46.3 % (ref 35–47)
HGB BLD-MCNC: 14.1 G/DL (ref 11.5–16)
IMM GRANULOCYTES # BLD AUTO: 0.1 K/UL (ref 0–0.04)
IMM GRANULOCYTES NFR BLD AUTO: 1 % (ref 0–0.5)
LYMPHOCYTES # BLD: 1.5 K/UL (ref 0.8–3.5)
LYMPHOCYTES NFR BLD: 25 % (ref 12–49)
MAGNESIUM SERPL-MCNC: 1.8 MG/DL (ref 1.6–2.4)
MCH RBC QN AUTO: 28.5 PG (ref 26–34)
MCHC RBC AUTO-ENTMCNC: 30.5 G/DL (ref 30–36.5)
MCV RBC AUTO: 93.5 FL (ref 80–99)
MONOCYTES # BLD: 0.7 K/UL (ref 0–1)
MONOCYTES NFR BLD: 12 % (ref 5–13)
NEUTS SEG # BLD: 3.5 K/UL (ref 1.8–8)
NEUTS SEG NFR BLD: 59 % (ref 32–75)
NRBC # BLD: 0 K/UL (ref 0–0.01)
NRBC BLD-RTO: 0 PER 100 WBC
PLATELET # BLD AUTO: 102 K/UL (ref 150–400)
PMV BLD AUTO: 11.8 FL (ref 8.9–12.9)
POTASSIUM SERPL-SCNC: 4.2 MMOL/L (ref 3.5–5.1)
RBC # BLD AUTO: 4.95 M/UL (ref 3.8–5.2)
SODIUM SERPL-SCNC: 136 MMOL/L (ref 136–145)
WBC # BLD AUTO: 5.9 K/UL (ref 3.6–11)

## 2021-01-06 PROCEDURE — 74011000250 HC RX REV CODE- 250: Performed by: FAMILY MEDICINE

## 2021-01-06 PROCEDURE — 80048 BASIC METABOLIC PNL TOTAL CA: CPT

## 2021-01-06 PROCEDURE — 74011250637 HC RX REV CODE- 250/637: Performed by: FAMILY MEDICINE

## 2021-01-06 PROCEDURE — 99218 HC RM OBSERVATION: CPT

## 2021-01-06 PROCEDURE — 74011250637 HC RX REV CODE- 250/637: Performed by: HOSPITALIST

## 2021-01-06 PROCEDURE — 96376 TX/PRO/DX INJ SAME DRUG ADON: CPT

## 2021-01-06 PROCEDURE — 85025 COMPLETE CBC W/AUTO DIFF WBC: CPT

## 2021-01-06 PROCEDURE — 83735 ASSAY OF MAGNESIUM: CPT

## 2021-01-06 PROCEDURE — 87635 SARS-COV-2 COVID-19 AMP PRB: CPT

## 2021-01-06 PROCEDURE — 74011250636 HC RX REV CODE- 250/636: Performed by: HOSPITALIST

## 2021-01-06 PROCEDURE — 94640 AIRWAY INHALATION TREATMENT: CPT

## 2021-01-06 PROCEDURE — 74011250636 HC RX REV CODE- 250/636: Performed by: FAMILY MEDICINE

## 2021-01-06 PROCEDURE — 74011250637 HC RX REV CODE- 250/637: Performed by: INTERNAL MEDICINE

## 2021-01-06 PROCEDURE — 96372 THER/PROPH/DIAG INJ SC/IM: CPT

## 2021-01-06 PROCEDURE — 36415 COLL VENOUS BLD VENIPUNCTURE: CPT

## 2021-01-06 PROCEDURE — 74011000258 HC RX REV CODE- 258: Performed by: FAMILY MEDICINE

## 2021-01-06 RX ADMIN — BUDESONIDE 500 MCG: 0.5 INHALANT RESPIRATORY (INHALATION) at 21:00

## 2021-01-06 RX ADMIN — CEFTRIAXONE SODIUM 1 G: 1 INJECTION, POWDER, FOR SOLUTION INTRAMUSCULAR; INTRAVENOUS at 01:18

## 2021-01-06 RX ADMIN — IPRATROPIUM BROMIDE 0.5 MG: 0.5 SOLUTION RESPIRATORY (INHALATION) at 09:58

## 2021-01-06 RX ADMIN — LOSARTAN POTASSIUM 100 MG: 50 TABLET, FILM COATED ORAL at 10:01

## 2021-01-06 RX ADMIN — ENOXAPARIN SODIUM 40 MG: 100 INJECTION SUBCUTANEOUS at 10:01

## 2021-01-06 RX ADMIN — Medication 10 ML: at 14:00

## 2021-01-06 RX ADMIN — Medication: at 09:00

## 2021-01-06 RX ADMIN — MICONAZOLE NITRATE 2 % TOPICAL POWDER: at 09:00

## 2021-01-06 RX ADMIN — ACETAMINOPHEN 650 MG: 325 TABLET ORAL at 19:31

## 2021-01-06 RX ADMIN — BUDESONIDE 500 MCG: 0.5 INHALANT RESPIRATORY (INHALATION) at 09:58

## 2021-01-06 RX ADMIN — ARFORMOTEROL TARTRATE 15 MCG: 15 SOLUTION RESPIRATORY (INHALATION) at 21:00

## 2021-01-06 RX ADMIN — IPRATROPIUM BROMIDE 0.5 MG: 0.5 SOLUTION RESPIRATORY (INHALATION) at 21:00

## 2021-01-06 RX ADMIN — HYDRALAZINE HYDROCHLORIDE 20 MG: 20 INJECTION INTRAMUSCULAR; INTRAVENOUS at 02:14

## 2021-01-06 RX ADMIN — MICONAZOLE NITRATE 2 % TOPICAL POWDER: at 18:00

## 2021-01-06 RX ADMIN — METOPROLOL SUCCINATE 200 MG: 50 TABLET, EXTENDED RELEASE ORAL at 10:01

## 2021-01-06 RX ADMIN — ARFORMOTEROL TARTRATE 15 MCG: 15 SOLUTION RESPIRATORY (INHALATION) at 09:58

## 2021-01-06 NOTE — PROGRESS NOTES
Spoke with patient's daughter on phone concerning home medication, Lamictal that patient takes before bedtime. Daughter states she needs to refill medication and will be bringing it tomorrow.

## 2021-01-06 NOTE — PROGRESS NOTES
Transition of Care Plan   RUR- N/A   DISPOSITION: The disposition plan is SNF; [facility], auth received.  Transport: TBD    Reviewed chart for transitions of care and discussed in rounds. At 11:23AM - CM contacted SNF facilities for update regarding referral sent yesterday:  [address] - declined due to no bed availability. [facility]- declined, not able to do admissions at this time. [address] spoke with admission and awaiting follow up. [facility] - declined, currently no bed availability. CM will continue to follow up with patient and patients daughter. At 12:25pm - CM contacted pts daughter Liza New to inform her that [facility] was [facility] to [facility] and CM would begin insurance authorization. Reference [id number]    The following information was submitted to [facility] for initial authorization:  Face Sheet/Demographics    (Include: Usual living situation)  History and Physical  Last 24 hours of MD and RN notes   (Include: Current Level of Functioning; cognitive status)  PT/OT/ST Evaluations and/or notes within the last 48 hours  MAR  MD orders  (Include: Attending or ordering MDs name and phone #; skilled nursing needs; Wound care/etc)  Projected Date of Transfer to SNF  Requested SNF  SNF Point of Contact and Phone #   CM Point of Contact and Phone #  NPI # for SNF      CM requested covid test.     AT 4:15pm CM has received a call from [facility] and the insurance authorization has been received and approved. Pts daughter Liza New was informed by TW. CM contacted [facility] regarding bed availability, admissions liasion was not available at this time and will call TW back. Pts daughter reported, \"I am not sure that I will be able to pick her up tomorrow. \" CM informed pts daughter that the ambulance can transport her. The authorization is for 5 days starting, Thursday, January 7, 2021 and the renew date will be Monday, January 11, 2021. CM will await call from [facility]. Patients daughter is requesting that her mother stay until Friday so that she can get her mother's items together. CM informed pts physician. Awaiting follow up.     EVA Carmichael

## 2021-01-06 NOTE — ROUTINE PROCESS
Bedside shift change report given to Travis Metcalf 69 (oncoming nurse) by Jaylen Ritchie RN (offgoing nurse). Report included the following information SBAR, Kardex, ED Summary, Intake/Output, MAR, Cardiac Rhythm NSR and Dual Neuro Assessment.

## 2021-01-06 NOTE — PROGRESS NOTES
[address] [facility] Adult  Hospitalist Group                                                                                          Hospitalist Progress Note  Gloria Jaimes MD  Answering service: [phone] from in house phone        Date of Service:  2021  NAME:  Rusty Khan  :  1935  MRN:  [id number]      Admission Summary:   Rusty Khan is a 80 y.o. female with hx dementia, HTN, dyslipidemia, and COPD who presents with altered mential status. Ms Cosby lives alone, and was in normal health when her daughter spoke to her 2 days ago. Today, the patient was not answering her phone or her door, and when her daughter went to check on her found her on the floor disheveled. Patient does not remember how she got on the floor, yet denies loss of consciousness. Daughter called EMS who transported patientfor      In the ED, VSS. CK was elevated to 741, H/H was elevated at 16.2/52.6 , K+ was 6.3, CO2 20 BUN/creat  54/1 ration 54. UA with + nitrites, and leukocyte esterase. CT head with no acute intracranial abnormality.     In the ED, she was treated with calcium gluconate, 2L ns bolus, and rocephin. Interval history / Subjective:   Patient seen and examined found resting comfortably discussed with nursing  More awake , lamictal will be brought by daughter      Assessment & Plan:     #Acute Cystitis  -rocephin for 5 days finish    -e coli c/s to ceftriaxone 100k     #Suspected Fall/Syncope  - BP better PT recommended SNF d/w CM   -echo - Estimated LVEF is 60 - 65%. Normal cavity size and systolic function (ejection fraction normal). Mild concentric hypertrophy.  Wall motion: normal.Mild MR      #Hyperkalemia-resolved recheck lab     #COPD  -scheduled brovana, pulmicort, and atrovent     #HTN  -resume home meds      MPOA: daughter Nuzhat Almonte [phone]    Code status: Full  DVT prophylaxis: Lovenox    Care Plan discussed with: Patient/Family and Nurse  Anticipated Disposition: Home w/Family  Anticipated Discharge: 24 hrs to SNF     Called daughter updated 1/5     Hospital Problems  Date Reviewed: 6/26/2020          Codes Class Noted POA    Hyperkalemia ICD-10-CM: E87.5  ICD-9-CM: 276.7  1/3/2021 Unknown        Altered mental status ICD-10-CM: R41.82  ICD-9-CM: 780.97  1/3/2021 Unknown                Review of Systems:   A comprehensive review of systems was negative. Vital Signs:    Last 24hrs VS reviewed since prior progress note. Most recent are:  Visit Vitals  BP (!) 153/56 (BP 1 Location: Left arm, BP Patient Position: At rest)   Pulse 80   Temp 97.9 °F (36.6 °C)   Resp 22   Ht 5' 3\" (1.6 m)   Wt 76.5 kg (168 lb 10.4 oz)   SpO2 95%   BMI 29.88 kg/m²       No intake or output data in the 24 hours ending 01/06/21 0914     Physical Examination:     I had a face to face encounter with this patient and independently examined them on 1/6/2021 as outlined below:          Constitutional:  No acute distress, confused but can converse   ENT:  MMM   Resp:  CTA bilaterally. CV:  Regular rhythm, normal rate    GI:  Soft, non distended, non tender. Musculoskeletal:  No edema, warm, 2+ pulses throughout    Neurologic:  Moves all extremities. AAOx1,      Psych:  Good insight, Not anxious nor agitated. Data Review:    I personally reviewed  Image and labs    Ct Head Wo Cont    Result Date: 1/5/2021  IMPRESSION: No acute intracranial abnormality. Chronic small vessel ischemic disease. Right maxillary sinus disease. Ct Head Wo Cont    Result Date: 1/3/2021  IMPRESSION: No acute intracranial abnormality on this noncontrast head CT. Increased chronic microvascular ischemic disease. Xr Chest Port    Result Date: 1/3/2021  IMPRESSION: No acute process on portable chest. No change.      Labs:     Recent Labs     01/06/21 0249 01/05/21  0134   WBC 5.9 7.7   HGB 14.1 13.1   HCT 46.3 40.4   * 196     Recent Labs     01/06/21  0249 01/05/21  0134 01/04/21  0800 01/04/21  0400    139 141  --    K 4.2 3.6 3.5  --    * 107 112*  --    CO2 24 24 26  --    BUN 18 20 31*  --    CREA 0.92 0.77 0.82  --    * 101* 104*  --    CA 9.3 8.9 8.7  --    MG  --  2.0  --  2.0     No results for input(s): ALT, AP, TBIL, TBILI, TP, ALB, GLOB, GGT, AML, LPSE in the last 72 hours. No lab exists for component: SGOT, GPT, AMYP, HLPSE  No results for input(s): INR, PTP, APTT, INREXT, INREXT in the last 72 hours. No results for input(s): FE, TIBC, PSAT, FERR in the last 72 hours. No results found for: FOL, RBCF   No results for input(s): PH, PCO2, PO2 in the last 72 hours. No results for input(s): CPK, CKNDX, TROIQ in the last 72 hours.     No lab exists for component: CPKMB  Lab Results   Component Value Date/Time    Cholesterol, total 195 08/10/2016 11:07 AM    HDL Cholesterol 62 08/10/2016 11:07 AM    LDL, calculated 101 (H) 08/10/2016 11:07 AM    Triglyceride 162 (H) 08/10/2016 11:07 AM    CHOL/HDL Ratio 5.7 (H) 07/29/2010 04:00 PM     No results found for: 4[address]  [facility]  Lab Results   Component Value Date/Time    Color YELLOW/STRAW 01/02/2021 11:31 PM    Appearance CLOUDY (A) 01/02/2021 11:31 PM    Specific gravity 1.022 01/02/2021 11:31 PM    Specific gravity <1.005 01/15/2010 02:30 PM    pH (UA) 5.5 01/02/2021 11:31 PM    Protein 30 (A) 01/02/2021 11:31 PM    Glucose Negative 01/02/2021 11:31 PM    Ketone 40 (A) 01/02/2021 11:31 PM    Bilirubin Negative 01/02/2021 11:31 PM    Urobilinogen 0.2 01/02/2021 11:31 PM    Nitrites Positive (A) 01/02/2021 11:31 PM    Leukocyte Esterase MODERATE (A) 01/02/2021 11:31 PM    Epithelial cells FEW 01/02/2021 11:31 PM    Bacteria 4+ (A) 01/02/2021 11:31 PM    WBC >100 (H) 01/02/2021 11:31 PM    RBC 0-5 01/02/2021 11:31 PM         Medications Reviewed:     Current Facility-Administered Medications   Medication Dose Route Frequency    hydrALAZINE (APRESOLINE) 20 mg/mL injection 20 mg  20 mg IntraVENous Q6H PRN    balsam peru-castor oiL (VENELEX) ointment   Topical Q12H    miconazole (MICOTIN) 2 % powder   Topical BID    loperamide (IMODIUM) capsule 4 mg  4 mg Oral Q4H PRN    lamoTRIgine (LaMICtal) disintegrating tablet 50 mg  50 mg Oral QHS    losartan (COZAAR) tablet 100 mg  100 mg Oral DAILY    sodium chloride (NS) flush 5-40 mL  5-40 mL IntraVENous Q8H    sodium chloride (NS) flush 5-40 mL  5-40 mL IntraVENous PRN    acetaminophen (TYLENOL) tablet 650 mg  650 mg Oral Q6H PRN    Or    acetaminophen (TYLENOL) suppository 650 mg  650 mg Rectal Q6H PRN    enoxaparin (LOVENOX) injection 40 mg  40 mg SubCUTAneous DAILY    arformoteroL (BROVANA) neb solution 15 mcg  15 mcg Nebulization BID RT    And    budesonide (PULMICORT) 500 mcg/2 ml nebulizer suspension  500 mcg Nebulization BID RT    cefTRIAXone (ROCEPHIN) 1 g in 0.9% sodium chloride (MBP/ADV) 50 mL MBP  1 g IntraVENous Q24H    ipratropium (ATROVENT) 0.02 % nebulizer solution 0.5 mg  0.5 mg Nebulization BID    metoprolol succinate (TOPROL-XL) XL tablet 200 mg  200 mg Oral DAILY     ______________________________________________________________________  EXPECTED LENGTH OF STAY: - - -  ACTUAL LENGTH OF STAY:          0                 Kacy Mcconnell MD

## 2021-01-06 NOTE — ROUTINE PROCESS
Bedside and Verbal shift change report given to Celestino Paul RN (oncoming nurse) by Opal Louise RN (offgoing nurse). Report included the following information SBAR, Kardex, Intake/Output, MAR, Accordion, Recent Results, Cardiac Rhythm NSR, Alarm Parameters , Quality Measures and Dual Neuro Assessment.

## 2021-01-07 LAB
BASOPHILS # BLD: 0.1 K/UL (ref 0–0.1)
BASOPHILS NFR BLD: 1 % (ref 0–1)
DIFFERENTIAL METHOD BLD: ABNORMAL
EOSINOPHIL # BLD: 0.2 K/UL (ref 0–0.4)
EOSINOPHIL NFR BLD: 3 % (ref 0–7)
ERYTHROCYTE [DISTWIDTH] IN BLOOD BY AUTOMATED COUNT: 14 % (ref 11.5–14.5)
GLUCOSE BLD STRIP.AUTO-MCNC: 99 MG/DL (ref 65–100)
HCT VFR BLD AUTO: 42.1 % (ref 35–47)
HEALTH STATUS, XMCV2T: NORMAL
HGB BLD-MCNC: 12.8 G/DL (ref 11.5–16)
IMM GRANULOCYTES # BLD AUTO: 0.1 K/UL (ref 0–0.04)
IMM GRANULOCYTES NFR BLD AUTO: 1 % (ref 0–0.5)
LYMPHOCYTES # BLD: 1.8 K/UL (ref 0.8–3.5)
LYMPHOCYTES NFR BLD: 23 % (ref 12–49)
MCH RBC QN AUTO: 28.4 PG (ref 26–34)
MCHC RBC AUTO-ENTMCNC: 30.4 G/DL (ref 30–36.5)
MCV RBC AUTO: 93.3 FL (ref 80–99)
MONOCYTES # BLD: 1.2 K/UL (ref 0–1)
MONOCYTES NFR BLD: 15 % (ref 5–13)
NEUTS SEG # BLD: 4.2 K/UL (ref 1.8–8)
NEUTS SEG NFR BLD: 57 % (ref 32–75)
NRBC # BLD: 0 K/UL (ref 0–0.01)
NRBC BLD-RTO: 0 PER 100 WBC
PLATELET # BLD AUTO: 195 K/UL (ref 150–400)
PMV BLD AUTO: 11.8 FL (ref 8.9–12.9)
RBC # BLD AUTO: 4.51 M/UL (ref 3.8–5.2)
SARS-COV-2, COV2: NOT DETECTED
SERVICE CMNT-IMP: NORMAL
SOURCE, COVRS: NORMAL
SPECIMEN SOURCE, FCOV2M: NORMAL
SPECIMEN TYPE, XMCV1T: NORMAL
WBC # BLD AUTO: 7.5 K/UL (ref 3.6–11)

## 2021-01-07 PROCEDURE — 74011250637 HC RX REV CODE- 250/637: Performed by: INTERNAL MEDICINE

## 2021-01-07 PROCEDURE — 77010033678 HC OXYGEN DAILY

## 2021-01-07 PROCEDURE — 74011250636 HC RX REV CODE- 250/636: Performed by: HOSPITALIST

## 2021-01-07 PROCEDURE — 96372 THER/PROPH/DIAG INJ SC/IM: CPT

## 2021-01-07 PROCEDURE — 96376 TX/PRO/DX INJ SAME DRUG ADON: CPT

## 2021-01-07 PROCEDURE — 97530 THERAPEUTIC ACTIVITIES: CPT

## 2021-01-07 PROCEDURE — 85025 COMPLETE CBC W/AUTO DIFF WBC: CPT

## 2021-01-07 PROCEDURE — 74011250637 HC RX REV CODE- 250/637: Performed by: HOSPITALIST

## 2021-01-07 PROCEDURE — 94664 DEMO&/EVAL PT USE INHALER: CPT

## 2021-01-07 PROCEDURE — 74011000258 HC RX REV CODE- 258: Performed by: FAMILY MEDICINE

## 2021-01-07 PROCEDURE — 99218 HC RM OBSERVATION: CPT

## 2021-01-07 PROCEDURE — 36415 COLL VENOUS BLD VENIPUNCTURE: CPT

## 2021-01-07 PROCEDURE — 94640 AIRWAY INHALATION TREATMENT: CPT

## 2021-01-07 PROCEDURE — 82962 GLUCOSE BLOOD TEST: CPT

## 2021-01-07 PROCEDURE — 74011250637 HC RX REV CODE- 250/637: Performed by: FAMILY MEDICINE

## 2021-01-07 PROCEDURE — 74011250636 HC RX REV CODE- 250/636: Performed by: FAMILY MEDICINE

## 2021-01-07 PROCEDURE — 74011000250 HC RX REV CODE- 250: Performed by: FAMILY MEDICINE

## 2021-01-07 RX ORDER — LAMOTRIGINE 25 MG/1
50 TABLET ORAL
Status: DISCONTINUED | OUTPATIENT
Start: 2021-01-07 | End: 2021-01-08 | Stop reason: HOSPADM

## 2021-01-07 RX ORDER — LORATADINE 10 MG/1
10 TABLET ORAL DAILY
Status: DISCONTINUED | OUTPATIENT
Start: 2021-01-08 | End: 2021-01-08 | Stop reason: HOSPADM

## 2021-01-07 RX ORDER — SUCRALFATE 1 G/1
1 TABLET ORAL
Status: DISCONTINUED | OUTPATIENT
Start: 2021-01-07 | End: 2021-01-08 | Stop reason: HOSPADM

## 2021-01-07 RX ORDER — LAMOTRIGINE 25 MG/1
50 TABLET ORAL
Status: DISCONTINUED | OUTPATIENT
Start: 2021-01-07 | End: 2021-01-07

## 2021-01-07 RX ORDER — ROSUVASTATIN CALCIUM 10 MG/1
10 TABLET, COATED ORAL
Status: DISCONTINUED | OUTPATIENT
Start: 2021-01-07 | End: 2021-01-08 | Stop reason: HOSPADM

## 2021-01-07 RX ORDER — MONTELUKAST SODIUM 10 MG/1
10 TABLET ORAL
Status: DISCONTINUED | OUTPATIENT
Start: 2021-01-07 | End: 2021-01-08 | Stop reason: HOSPADM

## 2021-01-07 RX ADMIN — ACETAMINOPHEN 650 MG: 325 TABLET ORAL at 06:20

## 2021-01-07 RX ADMIN — BUDESONIDE 500 MCG: 0.5 INHALANT RESPIRATORY (INHALATION) at 23:10

## 2021-01-07 RX ADMIN — Medication 10 ML: at 02:07

## 2021-01-07 RX ADMIN — HYDRALAZINE HYDROCHLORIDE 20 MG: 20 INJECTION INTRAMUSCULAR; INTRAVENOUS at 06:15

## 2021-01-07 RX ADMIN — LAMOTRIGINE 50 MG: 25 TABLET ORAL at 20:39

## 2021-01-07 RX ADMIN — CEFTRIAXONE SODIUM 1 G: 1 INJECTION, POWDER, FOR SOLUTION INTRAMUSCULAR; INTRAVENOUS at 02:07

## 2021-01-07 RX ADMIN — Medication 10 ML: at 14:00

## 2021-01-07 RX ADMIN — ENOXAPARIN SODIUM 40 MG: 100 INJECTION SUBCUTANEOUS at 09:17

## 2021-01-07 RX ADMIN — HYDRALAZINE HYDROCHLORIDE 20 MG: 20 INJECTION INTRAMUSCULAR; INTRAVENOUS at 22:51

## 2021-01-07 RX ADMIN — MICONAZOLE NITRATE 2 % TOPICAL POWDER: at 09:26

## 2021-01-07 RX ADMIN — ARFORMOTEROL TARTRATE 15 MCG: 15 SOLUTION RESPIRATORY (INHALATION) at 23:10

## 2021-01-07 RX ADMIN — IPRATROPIUM BROMIDE 0.5 MG: 0.5 SOLUTION RESPIRATORY (INHALATION) at 23:10

## 2021-01-07 RX ADMIN — IPRATROPIUM BROMIDE 0.5 MG: 0.5 SOLUTION RESPIRATORY (INHALATION) at 07:12

## 2021-01-07 RX ADMIN — Medication 10 ML: at 05:07

## 2021-01-07 RX ADMIN — ACETAMINOPHEN 650 MG: 325 TABLET ORAL at 20:39

## 2021-01-07 RX ADMIN — ARFORMOTEROL TARTRATE 15 MCG: 15 SOLUTION RESPIRATORY (INHALATION) at 07:12

## 2021-01-07 RX ADMIN — Medication: at 02:07

## 2021-01-07 RX ADMIN — Medication: at 20:43

## 2021-01-07 RX ADMIN — Medication 10 ML: at 22:52

## 2021-01-07 RX ADMIN — ROSUVASTATIN 10 MG: 10 TABLET, FILM COATED ORAL at 22:51

## 2021-01-07 RX ADMIN — METOPROLOL SUCCINATE 200 MG: 50 TABLET, EXTENDED RELEASE ORAL at 09:17

## 2021-01-07 RX ADMIN — Medication: at 09:26

## 2021-01-07 RX ADMIN — MONTELUKAST 10 MG: 10 TABLET, FILM COATED ORAL at 22:51

## 2021-01-07 RX ADMIN — SUCRALFATE 1 G: 1 TABLET ORAL at 18:40

## 2021-01-07 RX ADMIN — LOSARTAN POTASSIUM 100 MG: 50 TABLET, FILM COATED ORAL at 09:17

## 2021-01-07 RX ADMIN — BUDESONIDE 500 MCG: 0.5 INHALANT RESPIRATORY (INHALATION) at 07:12

## 2021-01-07 RX ADMIN — MICONAZOLE NITRATE 2 % TOPICAL POWDER: at 18:00

## 2021-01-07 NOTE — PROGRESS NOTES
Problem: Falls - Risk of  Goal: *Absence of Falls  Description: Document Nory Marx Fall Risk and appropriate interventions in the flowsheet.   Outcome: Progressing Towards Goal  Note: Fall Risk Interventions:  Mobility Interventions: Assess mobility with egress test, Bed/chair exit alarm, Communicate number of staff needed for ambulation/transfer, OT consult for ADLs, Patient to call before getting OOB, PT Consult for mobility concerns, PT Consult for assist device competence, Utilize gait belt for transfers/ambulation, Utilize walker, cane, or other assistive device    Mentation Interventions: Adequate sleep, hydration, pain control, Bed/chair exit alarm, Door open when patient unattended, Evaluate medications/consider consulting pharmacy, Increase mobility, More frequent rounding, Reorient patient, Room close to nurse's station, Toileting rounds, Update white board    Medication Interventions: Evaluate medications/consider consulting pharmacy, Bed/chair exit alarm, Patient to call before getting OOB, Teach patient to arise slowly, Utilize gait belt for transfers/ambulation    Elimination Interventions: Bed/chair exit alarm, Call light in reach, Patient to call for help with toileting needs, Toileting schedule/hourly rounds    History of Falls Interventions: Bed/chair exit alarm, Consult care management for discharge planning, Door open when patient unattended, Evaluate medications/consider consulting pharmacy, Investigate reason for fall, Room close to nurse's station, Utilize gait belt for transfer/ambulation

## 2021-01-07 NOTE — PROGRESS NOTES
Problem: Falls - Risk of  Goal: *Absence of Falls  Description: Document Leafy Chaves Fall Risk and appropriate interventions in the flowsheet.   Outcome: Progressing Towards Goal  Note: Fall Risk Interventions:  Mobility Interventions: Bed/chair exit alarm, Communicate number of staff needed for ambulation/transfer, OT consult for ADLs, Patient to call before getting OOB, PT Consult for mobility concerns, Utilize walker, cane, or other assistive device    Mentation Interventions: Adequate sleep, hydration, pain control, Bed/chair exit alarm, Door open when patient unattended, More frequent rounding, Reorient patient, Room close to nurse's station, Toileting rounds, Update white board    Medication Interventions: Evaluate medications/consider consulting pharmacy, Patient to call before getting OOB    Elimination Interventions: Bed/chair exit alarm, Call light in reach, Patient to call for help with toileting needs, Toileting schedule/hourly rounds    History of Falls Interventions: Bed/chair exit alarm, Evaluate medications/consider consulting pharmacy, Investigate reason for fall, Room close to nurse's station, Vital signs minimum Q4HRs X 24 hrs (comment for end date)         Problem: Patient Education: Go to Patient Education Activity  Goal: Patient/Family Education  Outcome: Progressing Towards Goal

## 2021-01-07 NOTE — PROGRESS NOTES
Problem: Mobility Impaired (Adult and Pediatric)  Goal: *Acute Goals and Plan of Care (Insert Text)  Description:   FUNCTIONAL STATUS PRIOR TO ADMISSION: Patient confused but states that she was ambulatory with RW at baseline and living alone. HOME SUPPORT PRIOR TO ADMISSION: The patient lived alone with daughter in the area to provide assistance. Physical Therapy Goals  Initiated 1/5/2021  1. Patient will move from supine to sit and sit to supine  in bed with minimal assistance within 7 day(s). 2.  Patient will transfer from bed to chair and chair to bed with contact guard assist using the least restrictive device within 7 day(s). 3.  Patient will perform sit to stand with contact guard assist within 7 day(s). 4.  Patient will ambulate with contact guard assist for 100 feet with the least restrictive device within 7 day(s). 5.  Patient will improve Whitaker Balance score by 7 points within 7 days. Outcome: Progressing Towards Goal  PHYSICAL THERAPY TREATMENT  Patient: Terri Colvin (42 y.o. female)  Date: 1/7/2021  Diagnosis: Hyperkalemia [E87.5]  Altered mental status [R41.82] <principal problem not specified>       Precautions: Bed Alarm, Fall  Chart, physical therapy assessment, plan of care and goals were reviewed. ASSESSMENT  Patient continues with skilled PT services and is progressing towards goals. Patient in bed on arrival and pleasantly confused. Improved bed mobility and sitting balance since prior visit. Tolerated transferring to chair but self limited ambulation to transfer only due to fatigue. Remains below baseline and with an increased fall risk. Continue to recommend SNF rehab. Current Level of Function Impacting Discharge (mobility/balance): Min A overall     Other factors to consider for discharge: fall risk, lives alone         PLAN :  Patient continues to benefit from skilled intervention to address the above impairments.   Continue treatment per established plan of care.  to address goals. Recommendation for discharge: (in order for the patient to meet his/her long term goals)  Therapy up to 5 days/week in SNF setting    This discharge recommendation:  Has been made in collaboration with the attending provider and/or case management    IF patient discharges home will need the following DME: to be determined (TBD)       SUBJECTIVE:   Patient stated I think that will be good.     OBJECTIVE DATA SUMMARY:   Critical Behavior:  Neurologic State: Alert, Confused  Orientation Level: Oriented to person, Disoriented to situation, Disoriented to place, Disoriented to time  Cognition: Follows commands, Decreased attention/concentration, Decreased command following, Poor safety awareness, Recognition of people/places  Safety/Judgement: Decreased awareness of environment, Decreased awareness of need for assistance, Decreased awareness of need for safety, Decreased insight into deficits  Functional Mobility Training:  Bed Mobility:  Rolling: Minimum assistance  Supine to Sit: Minimum assistance; Additional time     Scooting: Minimum assistance; Additional time        Transfers:  Sit to Stand: Minimum assistance; Additional time;Assist x1(Cues for safety with hand placement)  Stand to Sit: Minimum assistance(cues for safety)                             Balance:  Sitting: Intact  Standing: Impaired  Standing - Static: Fair  Standing - Dynamic : Fair  Ambulation/Gait Training:  Distance (ft): 4 Feet (ft)(transferring bed to chair)  Assistive Device: Walker, rolling;Gait belt  Ambulation - Level of Assistance: Minimal assistance; Additional time        Gait Abnormalities: Decreased step clearance;Shuffling gait        Base of Support: Widened     Speed/Nicole: Pace decreased (<100 feet/min)  Step Length: Right shortened;Left shortened                  Slow, requires support of RW. Reports increased fatigue.    Pain Rating:  No c/o pain     Activity Tolerance:   Fair    After treatment patient left in no apparent distress:   Sitting in chair, Call bell within reach and Bed / chair alarm activated    COMMUNICATION/COLLABORATION:   The patients plan of care was discussed with: Registered nurse.      Veronica Cardenas PT, DPT   Time Calculation: 18 mins

## 2021-01-07 NOTE — ROUTINE PROCESS
Bedside shift change report given to [facility] (oncoming nurse) by Tari Shook RN (offgoing nurse). Report included the following information SBAR, Kardex, ED Summary, Intake/Output, MAR, Cardiac Rhythm NSR and Dual Neuro Assessment.

## 2021-01-07 NOTE — PROGRESS NOTES
Transition of Care Plan   RUR- N/A    DISPOSITION: The disposition plan is SNF; [facility] - patient accepted.  Transport: [facility]    Reviewed chart for transitions of care,and discussed in rounds. CM contacted admissions [address] at [facility]/[phone] to inform her that pt has decided to go with another agency. TW left . CM contacted [address] [facility]/[phone] to confirm bed for Friday Jan 8, 2021. Patient is expected at facility about 11:00am on Friday Jan 8, 2021. CM contacted pts daughter/Lillian/[phone] to inform that [facility] is able to accept pt tomorrow Friday Jan 8, 2021. Pts daughter was given admissions Liaison Yuni's contact number to inquire about facility and the facilities policies. CM submitted [facility] referral for pts scheduled d/c for tomorrow Friday 1/7/2021.

## 2021-01-07 NOTE — PROGRESS NOTES
[address] [facility] Adult  Hospitalist Group                                                                                          Hospitalist Progress Note  Tree Cruz MD  Answering service: [phone] from in house phone        Date of Service:  2021  NAME:  Jose Guadalupe Boogie  :  1935  MRN:  [id number]      Admission Summary:   Jose Guadalupe Boogie is a 80 y.o. female with hx dementia, HTN, dyslipidemia, and COPD who presents with altered mential status. Ms Cosby lives alone, and was in normal health when her daughter spoke to her 2 days ago. Today, the patient was not answering her phone or her door, and when her daughter went to check on her found her on the floor disheveled. Patient does not remember how she got on the floor, yet denies loss of consciousness. Daughter called EMS who transported patientfor      In the ED, VSS. CK was elevated to 741, H/H was elevated at 16.2/52.6 , K+ was 6.3, CO2 20 BUN/creat  54/1 ration 54. UA with + nitrites, and leukocyte esterase. CT head with no acute intracranial abnormality.     In the ED, she was treated with calcium gluconate, 2L ns bolus, and rocephin. Interval history / Subjective:   Patient seen and examined found resting comfortably   Discussed with daughter yesterday at bedside in the afternoon she would like her to go to the rehab SNF tomorrow Friday discussed with  set up for tomorrow  Will be finishing antibiotics today as well     Assessment & Plan:     #Acute Cystitis  -rocephin for 5 days finish    -e coli c/s to ceftriaxone 100k     #Suspected Fall/Syncope  - BP better PT recommended SNF d/w CM   -echo - Estimated LVEF is 60 - 65%. Normal cavity size and systolic function (ejection fraction normal). Mild concentric hypertrophy.  Wall motion: normal.Mild MR      #Hyperkalemia-resolved recheck lab     #COPD  -scheduled brovana, pulmicort, and atrovent     #HTN  -resume home meds      MPOA: daughter Alverto Elias [phone]    Code status: Full  DVT prophylaxis: Lovenox    Care Plan discussed with: Patient/Family and Nurse  Anticipated Disposition: Home w/Family  Anticipated Discharge: 24 hrs to SNF     Called daughter updated 1/6     Hospital Problems  Date Reviewed: 6/26/2020          Codes Class Noted POA    Hyperkalemia ICD-10-CM: E87.5  ICD-9-CM: 276.7  1/3/2021 Unknown        Altered mental status ICD-10-CM: R41.82  ICD-9-CM: 780.97  1/3/2021 Unknown                Review of Systems:   A comprehensive review of systems was negative. Vital Signs:    Last 24hrs VS reviewed since prior progress note. Most recent are:  Visit Vitals  BP (!) 130/58 (BP 1 Location: Right arm, BP Patient Position: At rest)   Pulse 72   Temp 97.5 °F (36.4 °C)   Resp 22   Ht 5' 3\" (1.6 m)   Wt 74.3 kg (163 lb 14.4 oz)   SpO2 96%   BMI 29.03 kg/m²       No intake or output data in the 24 hours ending 01/07/21 1054     Physical Examination:     I had a face to face encounter with this patient and independently examined them on 1/7/2021 as outlined below:          Constitutional:  No acute distress, confused but can converse   ENT:  MMM   Resp:  CTA bilaterally. CV:  Regular rhythm, normal rate    GI:  Soft, non distended, non tender. Musculoskeletal:  No edema, warm, 2+ pulses throughout    Neurologic:  Moves all extremities. AAOx1,      Psych:  Good insight, Not anxious nor agitated. Data Review:    I personally reviewed  Image and labs    Ct Head Wo Cont    Result Date: 1/5/2021  IMPRESSION: No acute intracranial abnormality. Chronic small vessel ischemic disease. Right maxillary sinus disease. Ct Head Wo Cont    Result Date: 1/3/2021  IMPRESSION: No acute intracranial abnormality on this noncontrast head CT. Increased chronic microvascular ischemic disease. Xr Chest Port    Result Date: 1/3/2021  IMPRESSION: No acute process on portable chest. No change.      Labs:     Recent Labs 01/07/21  0049 01/06/21  0249   WBC 7.5 5.9   HGB 12.8 14.1   HCT 42.1 46.3    102*     Recent Labs     01/06/21  0249 01/05/21  0134    139   K 4.2 3.6   * 107   CO2 24 24   BUN 18 20   CREA 0.92 0.77   * 101*   CA 9.3 8.9   MG 1.8 2.0     No results for input(s): ALT, AP, TBIL, TBILI, TP, ALB, GLOB, GGT, AML, LPSE in the last 72 hours. No lab exists for component: SGOT, GPT, AMYP, HLPSE  No results for input(s): INR, PTP, APTT, INREXT, INREXT in the last 72 hours. No results for input(s): FE, TIBC, PSAT, FERR in the last 72 hours. No results found for: FOL, RBCF   No results for input(s): PH, PCO2, PO2 in the last 72 hours. No results for input(s): CPK, CKNDX, TROIQ in the last 72 hours.     No lab exists for component: CPKMB  Lab Results   Component Value Date/Time    Cholesterol, total 195 08/10/2016 11:07 AM    HDL Cholesterol 62 08/10/2016 11:07 AM    LDL, calculated 101 (H) 08/10/2016 11:07 AM    Triglyceride 162 (H) 08/10/2016 11:07 AM    CHOL/HDL Ratio 5.7 (H) 07/29/2010 04:00 PM     No results found for: [facility]  Lab Results   Component Value Date/Time    Color YELLOW/STRAW 01/02/2021 11:31 PM    Appearance CLOUDY (A) 01/02/2021 11:31 PM    Specific gravity 1.022 01/02/2021 11:31 PM    Specific gravity <1.005 01/15/2010 02:30 PM    pH (UA) 5.5 01/02/2021 11:31 PM    Protein 30 (A) 01/02/2021 11:31 PM    Glucose Negative 01/02/2021 11:31 PM    Ketone 40 (A) 01/02/2021 11:31 PM    Bilirubin Negative 01/02/2021 11:31 PM    Urobilinogen 0.2 01/02/2021 11:31 PM    Nitrites Positive (A) 01/02/2021 11:31 PM    Leukocyte Esterase MODERATE (A) 01/02/2021 11:31 PM    Epithelial cells FEW 01/02/2021 11:31 PM    Bacteria 4+ (A) 01/02/2021 11:31 PM    WBC >100 (H) 01/02/2021 11:31 PM    RBC 0-5 01/02/2021 11:31 PM         Medications Reviewed:     Current Facility-Administered Medications   Medication Dose Route Frequency    lamoTRIgine (LaMICtal) tablet 50 mg  50 mg Oral QHS    polyvinyl alcohol-povidone (NATURAL TEARS) 0.5-0.6 % ophthalmic solution 1 Drop  1 Drop Both Eyes PRN    hydrALAZINE (APRESOLINE) 20 mg/mL injection 20 mg  20 mg IntraVENous Q6H PRN    balsam peru-castor oiL (VENELEX) ointment   Topical Q12H    miconazole (MICOTIN) 2 % powder   Topical BID    loperamide (IMODIUM) capsule 4 mg  4 mg Oral Q4H PRN    losartan (COZAAR) tablet 100 mg  100 mg Oral DAILY    sodium chloride (NS) flush 5-40 mL  5-40 mL IntraVENous Q8H    sodium chloride (NS) flush 5-40 mL  5-40 mL IntraVENous PRN    acetaminophen (TYLENOL) tablet 650 mg  650 mg Oral Q6H PRN    Or    acetaminophen (TYLENOL) suppository 650 mg  650 mg Rectal Q6H PRN    enoxaparin (LOVENOX) injection 40 mg  40 mg SubCUTAneous DAILY    arformoteroL (BROVANA) neb solution 15 mcg  15 mcg Nebulization BID RT    And    budesonide (PULMICORT) 500 mcg/2 ml nebulizer suspension  500 mcg Nebulization BID RT    ipratropium (ATROVENT) 0.02 % nebulizer solution 0.5 mg  0.5 mg Nebulization BID    metoprolol succinate (TOPROL-XL) XL tablet 200 mg  200 mg Oral DAILY     ______________________________________________________________________  EXPECTED LENGTH OF STAY: - - -  ACTUAL LENGTH OF STAY:          0                 Lachelle Jiménez MD

## 2021-01-07 NOTE — PROGRESS NOTES
Bedside shift change report given to [facility], [address] [place] [address] (oncoming nurse) by Deb Vincent RN (offgoing nurse). Report included the following information SBAR, Cardiac Rhythm nsr and Dual Neuro Assessment.

## 2021-01-08 VITALS
TEMPERATURE: 98.2 F | DIASTOLIC BLOOD PRESSURE: 47 MMHG | BODY MASS INDEX: 29.8 KG/M2 | OXYGEN SATURATION: 97 % | RESPIRATION RATE: 16 BRPM | HEIGHT: 63 IN | HEART RATE: 77 BPM | SYSTOLIC BLOOD PRESSURE: 130 MMHG | WEIGHT: 168.21 LBS

## 2021-01-08 PROBLEM — R41.82 ALTERED MENTAL STATUS: Status: RESOLVED | Noted: 2021-01-03 | Resolved: 2021-01-08

## 2021-01-08 PROBLEM — E87.5 HYPERKALEMIA: Status: RESOLVED | Noted: 2021-01-03 | Resolved: 2021-01-08

## 2021-01-08 PROCEDURE — 99218 HC RM OBSERVATION: CPT

## 2021-01-08 PROCEDURE — 96375 TX/PRO/DX INJ NEW DRUG ADDON: CPT

## 2021-01-08 PROCEDURE — 74011000250 HC RX REV CODE- 250: Performed by: HOSPITALIST

## 2021-01-08 PROCEDURE — 74011250637 HC RX REV CODE- 250/637: Performed by: HOSPITALIST

## 2021-01-08 PROCEDURE — 96372 THER/PROPH/DIAG INJ SC/IM: CPT

## 2021-01-08 PROCEDURE — 94640 AIRWAY INHALATION TREATMENT: CPT

## 2021-01-08 PROCEDURE — C9113 INJ PANTOPRAZOLE SODIUM, VIA: HCPCS | Performed by: HOSPITALIST

## 2021-01-08 PROCEDURE — 96376 TX/PRO/DX INJ SAME DRUG ADON: CPT

## 2021-01-08 PROCEDURE — 74011250637 HC RX REV CODE- 250/637: Performed by: INTERNAL MEDICINE

## 2021-01-08 PROCEDURE — 74011000250 HC RX REV CODE- 250: Performed by: FAMILY MEDICINE

## 2021-01-08 PROCEDURE — 74011250636 HC RX REV CODE- 250/636: Performed by: FAMILY MEDICINE

## 2021-01-08 PROCEDURE — 74011250636 HC RX REV CODE- 250/636: Performed by: HOSPITALIST

## 2021-01-08 RX ORDER — LORATADINE 10 MG/1
10 TABLET ORAL DAILY
Qty: 30 TAB | Refills: 0 | Status: SHIPPED
Start: 2021-01-08 | End: 2021-02-07

## 2021-01-08 RX ORDER — DOCUSATE SODIUM 100 MG/1
100 CAPSULE, LIQUID FILLED ORAL DAILY
Status: DISCONTINUED | OUTPATIENT
Start: 2021-01-08 | End: 2021-01-08 | Stop reason: HOSPADM

## 2021-01-08 RX ORDER — PANTOPRAZOLE SODIUM 40 MG/1
40 TABLET, DELAYED RELEASE ORAL DAILY
Qty: 30 TAB | Refills: 0 | Status: SHIPPED
Start: 2021-01-08 | End: 2021-02-07

## 2021-01-08 RX ADMIN — LOSARTAN POTASSIUM 100 MG: 50 TABLET, FILM COATED ORAL at 08:26

## 2021-01-08 RX ADMIN — ENOXAPARIN SODIUM 40 MG: 100 INJECTION SUBCUTANEOUS at 08:26

## 2021-01-08 RX ADMIN — Medication: at 09:00

## 2021-01-08 RX ADMIN — LORATADINE 10 MG: 10 TABLET ORAL at 08:26

## 2021-01-08 RX ADMIN — SUCRALFATE 1 G: 1 TABLET ORAL at 06:39

## 2021-01-08 RX ADMIN — Medication 1 CAPSULE: at 08:26

## 2021-01-08 RX ADMIN — MICONAZOLE NITRATE 2 % TOPICAL POWDER: at 09:00

## 2021-01-08 RX ADMIN — BUDESONIDE 500 MCG: 0.5 INHALANT RESPIRATORY (INHALATION) at 08:35

## 2021-01-08 RX ADMIN — HYDRALAZINE HYDROCHLORIDE 20 MG: 20 INJECTION INTRAMUSCULAR; INTRAVENOUS at 06:39

## 2021-01-08 RX ADMIN — METOPROLOL SUCCINATE 200 MG: 50 TABLET, EXTENDED RELEASE ORAL at 08:26

## 2021-01-08 RX ADMIN — ARFORMOTEROL TARTRATE 15 MCG: 15 SOLUTION RESPIRATORY (INHALATION) at 08:35

## 2021-01-08 RX ADMIN — DOCUSATE SODIUM 100 MG: 100 CAPSULE, LIQUID FILLED ORAL at 10:39

## 2021-01-08 RX ADMIN — Medication 10 ML: at 06:06

## 2021-01-08 RX ADMIN — PANTOPRAZOLE SODIUM 40 MG: 40 INJECTION, POWDER, FOR SOLUTION INTRAVENOUS at 08:26

## 2021-01-08 NOTE — PROGRESS NOTES
Transition of Care Plan to SNF/Rehab    SNF/Rehab Transition:  Patient has been accepted to [address] [place] [address],[address] and meets criteria for admission. Patient will transported by [facility] and expected to leave at 11:30am.    Communication to Patient/Family:  Met with patient and Kaylee screws -[phone] (identified care giver) and they are agreeable to the transition plan. Communication to SNF/Rehab:  Bedside RN, Hector Bell, has been notified to update the transition plan to the facility and call report (phone number [phone]). Discharge information has been updated on the AVS.     Discharge instructions to be fax'd to facility at . Fax # CC Link. SNF/Rehab Transition:  Patient to follow-up with Home Health: [facility], other   PCP/Specialist:     Reviewed and confirmed with facility, spoke with admission coordinator Jeni Owens they can manage the patient care needs for the following:     Morenita Meier with (X) only those applicable:    Medication:  [x]  Medications will be available at the facility  []  IV Antibiotics   []  Controlled Substance - hard copy to be sent with patient   []  Weekly Labs   Documents:  [x] Hard RX  [x] MAR  [x] Kardex  [x] AVS  []Transfer Summary  [x]Discharge   Equipment:  []  CPAP/BiPAP  []  Wound Vacuum  []  Bustamante or Urinary Device  []  PICC/Central Line  []  Nebulizer  []  Ventilator   Treatment:  []Isolation (for MRSA, VRE, etc.)  []Surgical Drain Management  []Tracheostomy Care  []Dressing Changes  []Dialysis with transportation and chair time. []PEG Care  []Oxygen  []Daily Weights for Heart Failure   Dietary:  []Any diet limitations  []Tube Feedings   []Total Parenteral Management (TPN)   Eligible for Medicaid Long Term Services and Supports  Yes:  [] Eligible for medical assistance or will become eligible within 180 days and UAI completed. [] Provider/Patient and/or support system has requested screening. [] UAI copy provided to patient or responsible party.   [] UAI unavailable at discharge will send once processed to SNF provider. [] UAI unavailable at discharged mailed to patient  No:   [x] Private pay and is not financially eligible for Medicaid within the next 180 days. [] Reside out-of-state.   [] A residents of a state owned/operated facility that is licensed  by [address] and [employer] [facility] or [facility]  [] Enrollment in [facility] services  [] [address]  [] Patient /Family declines to have screening completed or provide financial information for screening     Financial Resources:  Medicaid    [] Initiated and application pending   [] Full coverage     Advanced Care Plan:  []Surrogate Decision Maker of Care  []POA  [x]Communicated Code Status (\"Full\")    Other     Financial Resources:  Medicaid    [] Initiated and application pending   [] Full coverage     Advanced Care Plan:  []Surrogate Decision Maker of Care  []POA  []Communicated Code Status (DDNR\", \"Full\")    Other     EVA Carmichael

## 2021-01-08 NOTE — PROGRESS NOTES
Bedside shift change report given to [facility] (oncoming nurse) by Mary Brink (offgoing nurse). Report included the following information SBAR, Kardex, Intake/Output, MAR, Cardiac Rhythm NSR, Quality Measures and Dual Neuro Assessment.

## 2021-01-08 NOTE — DISCHARGE INSTRUCTIONS
Discharge Instructions       PATIENT ID: Jhelisoe Neely  MRN: [id number]   YOB: 1935    DATE OF ADMISSION: 1/2/2021  9:31 PM    DATE OF DISCHARGE: 1/8/2021    PRIMARY CARE PROVIDER: Mark Che NP     ATTENDING PHYSICIAN: Yariel Mcneil MD  DISCHARGING PROVIDER: Sona Gonzalez MD    To contact this individual call [phone] and ask the  to page. If unavailable ask to be transferred the Adult Hospitalist Department. DISCHARGE DIAGNOSES UTI    CONSULTATIONS: None    PROCEDURES/SURGERIES: * No surgery found *    PENDING TEST RESULTS:   At the time of discharge the following test results are still pending:     FOLLOW UP APPOINTMENTS:   Follow-up Information     Follow up With Specialties Details Why Contact Info    [address] [place] [address],[address] [facility]    Mark Che NP Family Medicine In 1 week  [address]  [phone]             ADDITIONAL CARE RECOMMENDATIONS:     DIET: Regular Diet  ACTIVITY: Activity as tolerated    WOUND CARE:     EQUIPMENT needed:       Radiology      DISCHARGE MEDICATIONS:   See Medication Reconciliation Form    · It is important that you take the medication exactly as they are prescribed. · Keep your medication in the bottles provided by the pharmacist and keep a list of the medication names, dosages, and times to be taken in your wallet. · Do not take other medications without consulting your doctor. NOTIFY YOUR PHYSICIAN FOR ANY OF THE FOLLOWING:   Fever over 101 degrees for 24 hours. Chest pain, shortness of breath, fever, chills, nausea, vomiting, diarrhea, change in mentation, falling, weakness, bleeding. Severe pain or pain not relieved by medications. Or, any other signs or symptoms that you may have questions about.       DISPOSITION:    Home With:   OT  PT  HH  RN      x SNF/Inpatient Rehab/LTAC Independent/assisted living    Hospice    Other:     CDMP Checked:   Yes x     PROBLEM LIST Updated:  Yes x     Advance Care Planning  People with COVID-19 may have no symptoms, mild symptoms, such as fever, cough, and shortness of breath or they may have more severe illness, developing severe and fatal pneumonia. As a result, Advance Care Planning with attention to naming a health care decision maker (someone you trust to make healthcare decisions for you if you could not speak for yourself) and sharing other health care preferences is important BEFORE a possible health crisis. Please contact your Primary Care Provider to discuss Advance Care Planning. Preventing the Spread of Coronavirus Disease 2019 in Homes and Residential Communities  For the most recent information go to [employer].    Prevention steps for People with confirmed or suspected COVID-19 (including persons under investigation) who do not need to be hospitalized  and   People with confirmed COVID-19 who were hospitalized and determined to be medically stable to go home    Your healthcare provider and public health staff will evaluate whether you can be cared for at home. If it is determined that you do not need to be hospitalized and can be isolated at home, you will be monitored by staff from your local or state health department. You should follow the prevention steps below until a healthcare provider or local or state health department says you can return to your normal activities. Stay home except to get medical care  People who are mildly ill with COVID-19 are able to isolate at home during their illness. You should restrict activities outside your home, except for getting medical care. Do not go to work, school, or public areas. Avoid using public transportation, ride-sharing, or taxis.   Separate yourself from other people and animals in your home  People: As much as possible, you should stay in a specific room and away from other people in your home. Also, you should use a separate bathroom, if available. Animals: You should restrict contact with pets and other animals while you are sick with COVID-19, just like you would around other people. Although there have not been reports of pets or other animals becoming sick with COVID-19, it is still recommended that people sick with COVID-19 limit contact with animals until more information is known about the virus. When possible, have another member of your household care for your animals while you are sick. If you are sick with COVID-19, avoid contact with your pet, including petting, snuggling, being kissed or licked, and sharing food. If you must care for your pet or be around animals while you are sick, wash your hands before and after you interact with pets and wear a facemask. Call ahead before visiting your doctor  If you have a medical appointment, call the healthcare provider and tell them that you have or may have COVID-19. This will help the healthcare providers office take steps to keep other people from getting infected or exposed. Wear a facemask  You should wear a facemask when you are around other people (e.g., sharing a room or vehicle) or pets and before you enter a healthcare providers office. If you are not able to wear a facemask (for example, because it causes trouble breathing), then people who live with you should not stay in the same room with you, or they should wear a facemask if they enter your room. Cover your coughs and sneezes  Cover your mouth and nose with a tissue when you cough or sneeze. Throw used tissues in a lined trash can. Immediately wash your hands with soap and water for at least 20 seconds or, if soap and water are not available, clean your hands with an alcohol-based hand  that contains at least 60% alcohol.   Clean your hands often  Wash your hands often with soap and water for at least 20 seconds, especially after blowing your nose, coughing, or sneezing; going to the bathroom; and before eating or preparing food. If soap and water are not readily available, use an alcohol-based hand  with at least 60% alcohol, covering all surfaces of your hands and rubbing them together until they feel dry. Soap and water are the best option if hands are visibly dirty. Avoid touching your eyes, nose, and mouth with unwashed hands. Avoid sharing personal household items  You should not share dishes, drinking glasses, cups, eating utensils, towels, or bedding with other people or pets in your home. After using these items, they should be washed thoroughly with soap and water. Clean all high-touch surfaces everyday  High touch surfaces include counters, tabletops, doorknobs, bathroom fixtures, toilets, phones, keyboards, tablets, and bedside tables. Also, clean any surfaces that may have blood, stool, or body fluids on them. Use a household cleaning spray or wipe, according to the label instructions. Labels contain instructions for safe and effective use of the cleaning product including precautions you should take when applying the product, such as wearing gloves and making sure you have good ventilation during use of the product. Monitor your symptoms  Seek prompt medical attention if your illness is worsening (e.g., difficulty breathing). Before seeking care, call your healthcare provider and tell them that you have, or are being evaluated for, COVID-19. Put on a facemask before you enter the facility. These steps will help the healthcare providers office to keep other people in the office or waiting room from getting infected or exposed. Ask your healthcare provider to call the local or state health department.  Persons who are placed under active monitoring or facilitated self-monitoring should follow instructions provided by their local health department or occupational health professionals, as appropriate. When working with your local health department check their available hours. If you have a medical emergency and need to call 911, notify the dispatch personnel that you have, or are being evaluated for COVID-19. If possible, put on a facemask before emergency medical services arrive. Discontinuing home isolation  Patients with confirmed COVID-19 should remain under home isolation precautions until the risk of secondary transmission to others is thought to be low. The decision to discontinue home isolation precautions should be made on a case-by-case basis, in consultation with healthcare providers and state and local health departments.         Signed:   Sherry Khan MD  1/8/2021  7:40 AM

## 2021-01-08 NOTE — DISCHARGE SUMMARY
Discharge Summary       PATIENT ID: Светлана Carrera  MRN: [id number]   YOB: 1935    DATE OF ADMISSION: 1/2/2021  9:31 PM    DATE OF DISCHARGE: 1/8/21   PRIMARY CARE PROVIDER: Domingo Irving NP     ATTENDING PHYSICIAN: Bindu Lux  DISCHARGING PROVIDER: Gurvinder Carballo MD    To contact this individual call [phone] and ask the  to page. If unavailable ask to be transferred the Adult Hospitalist Department. CONSULTATIONS: None    PROCEDURES/SURGERIES: * No surgery found *    ADMITTING DIAGNOSES & HOSPITAL COURSE:   Travis Gee a 80 y. o. female with hx dementia, HTN, dyslipidemia, and COPD who presents with altered mential status. Zainab Hummel lives alone, and was in normal health when her daughter spoke to her 2 days ago.  Today, the patient was not answering her phone or her door, and when her daughter went to check on her found her on the floor disheveled. Dali Johnson does not remember how she got on the floor, yet denies loss of consciousness.  Daughter called EMS who transported patientfor      In the ED, VSS. CK was elevated to 741, H/H was elevated at 16.2/52.6 , K+ was 6.3, CO2 20 BUN/creat  54/1 ration 54. UA with + nitrites, and leukocyte esterase. CT head with no acute intracranial abnormality.     In the ED, she was treated with calcium gluconate, 2L ns bolus, and rocephin.           Assessment & Plan:      #Acute Cystitis  -rocephin for 5 days finish 1/7   -e coli c/s to ceftriaxone 100k     #Suspected Fall/Syncope  - BP better PT recommended SNF d/w CM   -echo - Estimated LVEF is 60 - 65%. Normal cavity size and systolic function (ejection fraction normal). Mild concentric hypertrophy.  Wall motion: normal.Mild MR      #Hyperkalemia-resolved recheck lab     #COPD  -scheduled brovana, pulmicort, and atrovent     #HTN  -resume home meds      MPOA: daughter Carlos Harrell [phone]                DISCHARGE DIAGNOSES / PLAN:      1. D/c to [facility]     ADDITIONAL CARE RECOMMENDATIONS:        PENDING TEST RESULTS:   At the time of discharge the following test results are still pending:     FOLLOW UP APPOINTMENTS:    Follow-up Information     Follow up With Specialties Details Why Contact [facility] [address]ulk[address]ir Mancer[address] [facility]    Juanita Gutierres NP Family Medicine In 1 week  [address]  [phone]               DIET: Cardiac Diet      ACTIVITY: Activity as tolerated    WOUND CARE:     EQUIPMENT needed:       DISCHARGE MEDICATIONS:  Current Discharge Medication List      START taking these medications    Details   loratadine (CLARITIN) 10 mg tablet Take 1 Tab by mouth daily for 30 days. Qty: 30 Tab, Refills: 0      fish oil-omega-3 fatty acids 340-1,000 mg capsule Take 1 Cap by mouth daily for 30 days. Qty: 30 Cap, Refills: 0      pantoprazole (PROTONIX) 40 mg tablet Take 1 Tab by mouth daily for 30 days. Qty: 30 Tab, Refills: 0         CONTINUE these medications which have NOT CHANGED    Details   BYSTOLIC 10 mg tablet Take 10 mg by mouth two (2) times a day. Refills: 2      lamoTRIgine (LAMICTAL) 50 mg disintegrating tablet DISSOLVE 1 TABLET ON THE TONGUE EVERY NIGHT  Qty: 90 Tab, Refills: 0      montelukast (SINGULAIR) 10 mg tablet take 1 tablet by mouth once daily  Qty: 90 Tab, Refills: 1    Associated Diagnoses: Chronic obstructive pulmonary disease, unspecified COPD type (HCC)      valsartan (DIOVAN) 160 mg tablet Take 160 mg by mouth daily. Refills: 5      loperamide (IMODIUM) 2 mg capsule 4 mg as needed. gabapentin (NEURONTIN) 100 mg capsule Take 100 mg by mouth nightly. clonazePAM (KLONOPIN) 0.5 mg tablet Take 0.5 mg by mouth nightly as needed. Refills: 0      INCRUSE ELLIPTA 62.5 mcg/actuation inhaler Refills: 0      sucralfate (CARAFATE) 1 gram tablet Take 1 g by mouth Before breakfast and dinner. fluticasone-salmeterol (ADVAIR) 500-50 mcg/dose diskus inhaler Take 1 Puff by inhalation two (2) times a day. Qty: 3 Inhaler, Refills: 1      rosuvastatin (CRESTOR) 5 mg tablet Take 1 Tab by mouth nightly. Qty: 90 Tab, Refills: 1    Associated Diagnoses: Hyperlipidemia, unspecified hyperlipidemia type      albuterol (PROVENTIL HFA, VENTOLIN HFA, PROAIR HFA) 90 mcg/actuation inhaler Take 2 Puffs by inhalation every four (4) hours as needed for Wheezing. Qty: 1 Inhaler, Refills: 2    Associated Diagnoses: Asthma with COPD (chronic obstructive pulmonary disease) ([facility] Acute bronchitis, unspecified organism      albuterol (PROVENTIL VENTOLIN) 2.5 mg /3 mL (0.083 %) nebulizer solution 3 mL by Nebulization route every four (4) hours as needed for Wheezing. Qty: 1 Each, Refills: 4    Associated Diagnoses: Moderate persistent asthma without complication      Cholecalciferol, Vitamin D3, (VITAMIN D3) 1,000 unit cap Take 2,000 Units by mouth. Associated Diagnoses: Alzheimer's disease ([facility] Postconcussion syndrome; Depressed; Dizziness and giddiness; Occlusion and stenosis of carotid artery without mention of cerebral infarction      vitamin E (AQUA GEMS) 400 unit capsule Take 400 Units by mouth daily. LACTOBACILLUS ACIDOPHILUS (PROBIOTIC PO) Take  by mouth. Takes one po daily. CALCIUM CARBONATE (TUMS PO) Take  by mouth as needed. NOTIFY YOUR PHYSICIAN FOR ANY OF THE FOLLOWING:   Fever over 101 degrees for 24 hours. Chest pain, shortness of breath, fever, chills, nausea, vomiting, diarrhea, change in mentation, falling, weakness, bleeding. Severe pain or pain not relieved by medications. Or, any other signs or symptoms that you may have questions about.     DISPOSITION:    Home With:   OT  PT  HH  RN      x Long term SNF/Inpatient Rehab    Independent/assisted living    Hospice    Other:       PATIENT CONDITION AT DISCHARGE:     Functional status   x Poor     Deconditioned     Independent Cognition     Lucid    x Forgetful     Dementia      Catheters/lines (plus indication)    Bustamante     PICC     PEG    x None      Code status   x  Full code     DNR      PHYSICAL EXAMINATION AT DISCHARGE:  General:          Alert, cooperative, no distress, appears stated age. HEENT:           Atraumatic, anicteric sclerae, pink conjunctivae                          No oral ulcers, mucosa moist, throat clear, dentition fair  Neck:               Supple, symmetrical  Lungs:             Clear to auscultation bilaterally. No Wheezing or Rhonchi. No rales. Chest wall:      No tenderness  No Accessory muscle use. Heart:              Regular  rhythm,  No  murmur   No edema  Abdomen:        Soft, non-tender. Not distended. Bowel sounds normal  Extremities:     No cyanosis. No clubbing,                            Skin turgor normal, Capillary refill normal  Skin:                Not pale. Not Jaundiced  No rashes   Psych:             Not anxious or agitated.   Neurologic:      Alert, moves all extremities, answers questions appropriately and responds to commands       CHRONIC MEDICAL DIAGNOSES:  Problem List as of 1/8/2021 Date Reviewed: 1/8/2021          Codes Class Noted - Resolved    LELE (obstructive sleep apnea) ICD-10-CM: I82.03  ICD-9-CM: 327.23  5/10/2017 - Present        Closed fracture of lateral portion of left tibial plateau XAZ-12-OR: H52.460H  ICD-9-CM: 823.00  Unknown - Present        OA (osteoarthritis) of knee ICD-10-CM: M17.10  ICD-9-CM: 715.36  Unknown - Present        Asthma with COPD (chronic obstructive pulmonary disease) ([facility] ICD-10-CM: J44.9  ICD-9-CM: 493.20  Unknown - Present        Advanced directives, counseling/discussion ICD-10-CM: Z71.89  ICD-9-CM: V65.49  1/31/2016 - Present    Overview Signed 1/31/2016 11:52 PM by Danna Coles MD     Nurse provided handout             Bipolar affective disorder [facility]) ICD-10-CM: F31.9  ICD-9-CM: 296.80  Unknown - Present        Essential hypertension ICD-10-CM: I10  ICD-9-CM: 401.9  9/29/2015 - Present        Arthritis of knee, left ICD-10-CM: M17.12  ICD-9-CM: 716.96  Unknown - Present        IGT (impaired glucose tolerance) ICD-10-CM: R73.02  ICD-9-CM: 790.22  9/15/2015 - Present        Hyperlipidemia ICD-10-CM: E78.5  ICD-9-CM: 272.4  Unknown - Present        Postconcussion syndrome ICD-10-CM: F07.81  ICD-9-CM: 310.2  9/12/2013 - Present        HBP (high blood pressure) ICD-10-CM: I10  ICD-9-CM: 401.9  9/12/2013 - Present        Asthma ICD-10-CM: J45.909  ICD-9-CM: 493.90  9/12/2013 - Present        GERD (gastroesophageal reflux disease) ICD-10-CM: K21.9  ICD-9-CM: 530.81  9/12/2013 - Present        Depressed ICD-10-CM: F32.9  ICD-9-CM: 633  9/12/2013 - Present        Hyperlipemia ICD-10-CM: E78.5  ICD-9-CM: 272.4  9/12/2013 - Present        IBS (irritable bowel syndrome) ICD-10-CM: K58.9  ICD-9-CM: 564.1  9/12/2013 - Present        Occlusion and stenosis of carotid artery without mention of cerebral infarction ICD-10-CM: I65.29  ICD-9-CM: 433.10  9/12/2013 - Present    Overview Signed 8/31/2015  8:08 AM by Rasheeda Ramon MD     Bilateral carotoid plaques, 2013 doppler shows normal flow.               Dizziness and giddiness ICD-10-CM: R42  ICD-9-CM: 780.4  9/12/2013 - Present        RESOLVED: Hyperkalemia ICD-10-CM: E87.5  ICD-9-CM: 276.7  1/3/2021 - 1/8/2021        RESOLVED: Altered mental status ICD-10-CM: R41.82  ICD-9-CM: 780.97  1/3/2021 - 1/8/2021              Greater than 30  minutes were spent with the patient on counseling and coordination of care    Signed:   Huong Hernández MD  1/8/2021  7:41 AM

## 2021-01-08 NOTE — ROUTINE PROCESS
Bedside and Verbal shift change report given to Yohan Escalante RN  (oncoming nurse) by Hector Bell RN (offgoing nurse). Report included the following information SBAR, Kardex, ED Summary, Procedure Summary, Intake/Output, MAR, Recent Results, Med Rec Status, Cardiac Rhythm NSR., Alarm Parameters , Quality Measures and Dual Neuro Assessment.

## 2021-01-08 NOTE — ROUTINE PROCESS
I have reviewed discharge instructions with the patient. The patient verbalized understanding. PIV and Telemetry discontinued. Report called to Gini Monique, RN at [facility],[address] Floor Care plan and education closed No visible s/s of distress Signed AVS, Noble, H&P, MAr sent to facility. F/U appt made.

## 2021-01-09 LAB
MAGNESIUM SERPL-MCNC: NORMAL MG/DL
MAGNESIUM SERPL-MCNC: NORMAL MG/DL

## 2021-01-11 ENCOUNTER — PATIENT OUTREACH (OUTPATIENT)
Dept: CASE MANAGEMENT | Age: 86
End: 2021-01-11

## 2021-01-14 ENCOUNTER — PATIENT OUTREACH (OUTPATIENT)
Dept: CASE MANAGEMENT | Age: 86
End: 2021-01-14

## 2021-01-17 NOTE — PROGRESS NOTES
Post Acute Facility Update     *The information contained in this note was received during a weekly care coordination call with the post acute facility*    Current Facility: [address] [place] [address] ([facility])  Anticipated Discharge Date: TBD    Facility Nursing Update: none  Facility Social Work Update: none  ADL's: not noted  Current Level of Assistance: not noted  Bed Mobility: Minimal Assistance  Transfers: Minimal Assistance  Ambulation: Minimal Assistance  How far (in feet) is the patient ambulating?  20  Does patient us an assistive device: yes  If yes, device used: Walker  Barriers to Discharge: not noted  Other: Daniel CUMMINSN, RN

## 2021-01-21 ENCOUNTER — PATIENT OUTREACH (OUTPATIENT)
Dept: CASE MANAGEMENT | Age: 86
End: 2021-01-21

## 2021-01-21 NOTE — PROGRESS NOTES
Post Acute Facility Update     *The information contained in this note was received during a weekly care coordination call with the post acute facility*    Current Facility: [address] [place] [address], [place] ([facility])  Anticipated Discharge Date: 1 week    Facility Nursing Update: - Sent out to ER on 1/20/21 AM for elevated BP of 220/92 and came back same day around noon with new order of amlodipine 10 mg PO QD for HTN, , Chlorthalidone 25 mg PO QD for HTN   Facility Social Work Update:  Resident is here for short term skilled care and plans to discharge home with family support once goals are met. SS will assist as needed with discharge planning. ADL's: Stand by Assist - Presence and Cueing  Current Level of Assistance: Stand by Assist - Presence and Cueing  Bed Mobility: Stand by Assist - Presence and Cueing  Transfers: Stand by Assist - Presence and Cueing  Ambulation: Stand by Assist - Presence and Cueing  How far (in feet) is the patient ambulating?  300 ft  Does patient us an assistive device: yes  If yes, device used: Rolling Walker  Barriers to Discharge: TBD  Other: stairs with supervision, pt has SPC, RW, shower chair and grab bars at home      [address], RN

## 2021-01-25 ENCOUNTER — PATIENT OUTREACH (OUTPATIENT)
Dept: CASE MANAGEMENT | Age: 86
End: 2021-01-25

## 2021-01-31 ENCOUNTER — PATIENT OUTREACH (OUTPATIENT)
Dept: CASE MANAGEMENT | Age: 86
End: 2021-01-31

## 2021-01-31 NOTE — PROGRESS NOTES
[address] [place] [address] Dr Discharge Note    *The information contained in this note was received during a weekly care coordination call with the post acute facility*      Patient was discharged from [address] [place] [address] ([facility]) on 1/28/202 to [place] [address] with their in house therapy. . Wheelchair ordered from [employer],      PCP: KAREN Steward appointment: [facility] will make follow up PCP appointment. LPN Care Coordinator managing patient: [facility] will sign off at this time. Medications were not reconciled and general patient assessment was not completed during this skilled nursing facility outreach.

## 2021-02-01 ENCOUNTER — PATIENT OUTREACH (OUTPATIENT)
Dept: CASE MANAGEMENT | Age: 86
End: 2021-02-01

## 2021-02-01 NOTE — PROGRESS NOTES
Follow up call place to patient from SNF discharge, patient has transferred to an halfway. Unable to reach patient or RP at this time will follow up in 1 week.

## 2021-02-08 ENCOUNTER — PATIENT OUTREACH (OUTPATIENT)
Dept: CASE MANAGEMENT | Age: 86
End: 2021-02-08

## 2021-02-08 NOTE — PROGRESS NOTES
Attempt call placed to patient regarding follow from SNF, patient unable to be reached at this time. Patient has not return to ED/Hosp in the last 30 days. Will close episode at this time.

## 2021-02-10 ENCOUNTER — APPOINTMENT (OUTPATIENT)
Dept: CT IMAGING | Age: 86
End: 2021-02-10
Attending: PHYSICIAN ASSISTANT
Payer: MEDICARE

## 2021-02-10 ENCOUNTER — APPOINTMENT (OUTPATIENT)
Dept: GENERAL RADIOLOGY | Age: 86
End: 2021-02-10
Attending: PHYSICIAN ASSISTANT
Payer: MEDICARE

## 2021-02-10 ENCOUNTER — HOSPITAL ENCOUNTER (EMERGENCY)
Age: 86
Discharge: HOME | End: 2021-02-10
Attending: EMERGENCY MEDICINE
Payer: MEDICARE

## 2021-02-10 ENCOUNTER — PATIENT OUTREACH (OUTPATIENT)
Dept: CASE MANAGEMENT | Age: 86
End: 2021-02-10

## 2021-02-10 VITALS
BODY MASS INDEX: 26.58 KG/M2 | TEMPERATURE: 98 F | OXYGEN SATURATION: 98 % | RESPIRATION RATE: 16 BRPM | DIASTOLIC BLOOD PRESSURE: 88 MMHG | HEIGHT: 63 IN | WEIGHT: 150 LBS | HEART RATE: 76 BPM | SYSTOLIC BLOOD PRESSURE: 156 MMHG
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
## 2021-02-10 DIAGNOSIS — M54.50 ACUTE LOW BACK PAIN WITHOUT SCIATICA, UNSPECIFIED BACK PAIN LATERALITY: Primary | ICD-10-CM

## 2021-02-10 LAB
ALBUMIN SERPL-MCNC: 3.6 G/DL (ref 3.5–5)
ALBUMIN/GLOB SERPL: 0.8 {RATIO} (ref 1.1–2.2)
ALP SERPL-CCNC: 92 U/L (ref 45–117)
ALT SERPL-CCNC: 21 U/L (ref 12–78)
ANION GAP SERPL CALC-SCNC: 9 MMOL/L (ref 5–15)
APPEARANCE UR: CLEAR
AST SERPL-CCNC: 24 U/L (ref 15–37)
ATRIAL RATE: 70 BPM
BACTERIA URNS QL MICRO: NEGATIVE /HPF
BASOPHILS # BLD: 0 K/UL (ref 0–0.1)
BASOPHILS NFR BLD: 0 % (ref 0–1)
BILIRUB SERPL-MCNC: 0.5 MG/DL (ref 0.2–1)
BILIRUB UR QL: NEGATIVE
BUN SERPL-MCNC: 21 MG/DL (ref 6–20)
BUN/CREAT SERPL: 20 (ref 12–20)
CALCIUM SERPL-MCNC: 9.5 MG/DL (ref 8.5–10.1)
CALCULATED P AXIS, ECG09: 92 DEGREES
CALCULATED R AXIS, ECG10: 2 DEGREES
CALCULATED T AXIS, ECG11: 94 DEGREES
CHLORIDE SERPL-SCNC: 102 MMOL/L (ref 97–108)
CK SERPL-CCNC: 88 U/L (ref 26–192)
CO2 SERPL-SCNC: 27 MMOL/L (ref 21–32)
COLOR UR: ABNORMAL
CREAT SERPL-MCNC: 1.06 MG/DL (ref 0.55–1.02)
DIAGNOSIS, 93000: NORMAL
DIFFERENTIAL METHOD BLD: ABNORMAL
EOSINOPHIL # BLD: 0.1 K/UL (ref 0–0.4)
EOSINOPHIL NFR BLD: 1 % (ref 0–7)
EPITH CASTS URNS QL MICRO: ABNORMAL /LPF
ERYTHROCYTE [DISTWIDTH] IN BLOOD BY AUTOMATED COUNT: 14.6 % (ref 11.5–14.5)
GLOBULIN SER CALC-MCNC: 4.4 G/DL (ref 2–4)
GLUCOSE SERPL-MCNC: 117 MG/DL (ref 65–100)
GLUCOSE UR STRIP.AUTO-MCNC: NEGATIVE MG/DL
HCT VFR BLD AUTO: 44.3 % (ref 35–47)
HGB BLD-MCNC: 14.4 G/DL (ref 11.5–16)
HGB UR QL STRIP: NEGATIVE
HYALINE CASTS URNS QL MICRO: ABNORMAL /LPF (ref 0–5)
IMM GRANULOCYTES # BLD AUTO: 0.1 K/UL (ref 0–0.04)
IMM GRANULOCYTES NFR BLD AUTO: 1 % (ref 0–0.5)
KETONES UR QL STRIP.AUTO: NEGATIVE MG/DL
LEUKOCYTE ESTERASE UR QL STRIP.AUTO: ABNORMAL
LYMPHOCYTES # BLD: 1.6 K/UL (ref 0.8–3.5)
LYMPHOCYTES NFR BLD: 15 % (ref 12–49)
MCH RBC QN AUTO: 28 PG (ref 26–34)
MCHC RBC AUTO-ENTMCNC: 32.5 G/DL (ref 30–36.5)
MCV RBC AUTO: 86.2 FL (ref 80–99)
MONOCYTES # BLD: 0.8 K/UL (ref 0–1)
MONOCYTES NFR BLD: 8 % (ref 5–13)
NEUTS SEG # BLD: 7.4 K/UL (ref 1.8–8)
NEUTS SEG NFR BLD: 74 % (ref 32–75)
NITRITE UR QL STRIP.AUTO: NEGATIVE
NRBC # BLD: 0 K/UL (ref 0–0.01)
NRBC BLD-RTO: 0 PER 100 WBC
P-R INTERVAL, ECG05: 170 MS
PH UR STRIP: 6.5 [PH] (ref 5–8)
PLATELET # BLD AUTO: 152 K/UL (ref 150–400)
PMV BLD AUTO: 11.7 FL (ref 8.9–12.9)
POTASSIUM SERPL-SCNC: 3.7 MMOL/L (ref 3.5–5.1)
PROT SERPL-MCNC: 8 G/DL (ref 6.4–8.2)
PROT UR STRIP-MCNC: NEGATIVE MG/DL
Q-T INTERVAL, ECG07: 424 MS
QRS DURATION, ECG06: 88 MS
QTC CALCULATION (BEZET), ECG08: 457 MS
RBC # BLD AUTO: 5.14 M/UL (ref 3.8–5.2)
RBC #/AREA URNS HPF: ABNORMAL /HPF (ref 0–5)
SODIUM SERPL-SCNC: 138 MMOL/L (ref 136–145)
SP GR UR REFRACTOMETRY: 1.01 (ref 1–1.03)
TROPONIN I SERPL-MCNC: <0.05 NG/ML
UA: UC IF INDICATED,UAUC: ABNORMAL
UROBILINOGEN UR QL STRIP.AUTO: 0.2 EU/DL (ref 0.2–1)
VENTRICULAR RATE, ECG03: 70 BPM
WBC # BLD AUTO: 10 K/UL (ref 3.6–11)
WBC URNS QL MICRO: ABNORMAL /HPF (ref 0–4)

## 2021-02-10 PROCEDURE — 85025 COMPLETE CBC W/AUTO DIFF WBC: CPT

## 2021-02-10 PROCEDURE — 99285 EMERGENCY DEPT VISIT HI MDM: CPT

## 2021-02-10 PROCEDURE — 36415 COLL VENOUS BLD VENIPUNCTURE: CPT

## 2021-02-10 PROCEDURE — 84484 ASSAY OF TROPONIN QUANT: CPT

## 2021-02-10 PROCEDURE — 93005 ELECTROCARDIOGRAM TRACING: CPT

## 2021-02-10 PROCEDURE — 82550 ASSAY OF CK (CPK): CPT

## 2021-02-10 PROCEDURE — 70450 CT HEAD/BRAIN W/O DYE: CPT

## 2021-02-10 PROCEDURE — 72072 X-RAY EXAM THORAC SPINE 3VWS: CPT

## 2021-02-10 PROCEDURE — 72100 X-RAY EXAM L-S SPINE 2/3 VWS: CPT

## 2021-02-10 PROCEDURE — 81001 URINALYSIS AUTO W/SCOPE: CPT

## 2021-02-10 PROCEDURE — 80053 COMPREHEN METABOLIC PANEL: CPT

## 2021-02-10 NOTE — ED PROVIDER NOTES
EMERGENCY DEPARTMENT HISTORY AND PHYSICAL EXAM      Date: 2/10/2021  Patient Name: Jean Claude Keith    History of Presenting Illness     Chief Complaint   Patient presents with   Renay Her Fall     pt arrives via EMS from [facility] after GLF around 0100, pt did hit the back of her head however denies LOC.  Back Pain     pt states after she got in bed this evening thats when her mid back starting hurting       History Provided By: Patient    HPI: Jean Claude Keith, 80 y.o. female with significant PMHx as listed below, presents by EMS to the ED with cc of bilateral mid back pain following a fall that occurred at 0100 today. The patient notes that she was attempting to get back into her wheelchair after using the restroom when she slipped and fell. She struck her head on the floor but denies syncope, LOC, lightheadedness or dizziness. She reports soon after the fall she developed back pain and requested to come to the ED for xrays. The pain is constant, non-radiating and there are no excerbating or relieving factors. She denies neck pain. She states that immediately after falling she had a headache but this has since resolved. There was no treatment PTA. There are no other complaints, changes, or physical findings at this time. Social Hx: Tobacco (denies), EtOH (denies), Illicit drug use (denies), currently resides at [facility]     PCP: Kyle Alcala NP    No current facility-administered medications on file prior to encounter. Current Outpatient Medications on File Prior to Encounter   Medication Sig Dispense Refill    lamoTRIgine (LAMICTAL) 50 mg disintegrating tablet DISSOLVE 1 TABLET ON THE TONGUE EVERY NIGHT 90 Tab 0    montelukast (SINGULAIR) 10 mg tablet take 1 tablet by mouth once daily 90 Tab 1    valsartan (DIOVAN) 160 mg tablet Take 160 mg by mouth daily. 5    loperamide (IMODIUM) 2 mg capsule 4 mg as needed.  gabapentin (NEURONTIN) 100 mg capsule Take 100 mg by mouth nightly.  clonazePAM (KLONOPIN) 0.5 mg tablet Take 0.5 mg by mouth nightly as needed. 0    BYSTOLIC 10 mg tablet Take 10 mg by mouth two (2) times a day. 2    INCRUSE ELLIPTA 62.5 mcg/actuation inhaler   0    sucralfate (CARAFATE) 1 gram tablet Take 1 g by mouth Before breakfast and dinner.  fluticasone-salmeterol (ADVAIR) 500-50 mcg/dose diskus inhaler Take 1 Puff by inhalation two (2) times a day. 3 Inhaler 1    rosuvastatin (CRESTOR) 5 mg tablet Take 1 Tab by mouth nightly. (Patient taking differently: Take 5 mg by mouth nightly. Indications: every other night) 90 Tab 1    albuterol (PROVENTIL HFA, VENTOLIN HFA, PROAIR HFA) 90 mcg/actuation inhaler Take 2 Puffs by inhalation every four (4) hours as needed for Wheezing. 1 Inhaler 2    albuterol (PROVENTIL VENTOLIN) 2.5 mg /3 mL (0.083 %) nebulizer solution 3 mL by Nebulization route every four (4) hours as needed for Wheezing. 1 Each 4    Cholecalciferol, Vitamin D3, (VITAMIN D3) 1,000 unit cap Take 2,000 Units by mouth.  vitamin E (AQUA GEMS) 400 unit capsule Take 400 Units by mouth daily.  LACTOBACILLUS ACIDOPHILUS (PROBIOTIC PO) Take  by mouth. Takes one po daily.  CALCIUM CARBONATE (TUMS PO) Take  by mouth as needed.          Past History     Past Medical History:  Past Medical History:   Diagnosis Date    Arthritis     Arthritis of knee, left     Asthma     Asthma with COPD (chronic obstructive pulmonary disease) ([facility])     FEV1 - 0.99, IgE high    Atherosclerosis of both carotid arteries     Bipolar affective disorder (HCC)     Cataract, left eye     cataract - left    Chronic obstructive pulmonary disease ([facility])     Closed fracture of lateral portion of left tibial plateau     Depression with anxiety     depression/anxiety    Encephalopathy     encephalopathy    Esophageal ulcer     esophageal ulcer    Gastroparesis     GERD (gastroesophageal reflux disease)     Hepatic steatosis     History of fall     hx falls    Hyperlipidemia     increased cholesterol    Hyperlipidemia     Hypertension     IBS (irritable bowel syndrome)     IBS    IGT (impaired glucose tolerance) 9/15/2015    Liver disease     elevated liver enzymes - being evaluated/US done x 2 - inflammed liver/\"fat on liver\"    Menopause 1987    Neurological disorder     delayed thinking process? ??    OA (osteoarthritis) of knee     Osteoporosis     Seizures (HCC)     Sunburn, blistering     Unspecified adverse effect of anesthesia     was told by neurologist not to use anesthesia unless needed due to encephalopathy    Unspecified adverse effect of anesthesia     hallucinations after MRI with sedation    Vertigo     vertigo       Past Surgical History:  Past Surgical History:   Procedure Laterality Date    COLONOSCOPY N/A 2017    COL[facility]adas [facility]    HX BREAST BIOPSY Left     Surgical    HX GI      laser surgery for reflux and hernia repair    HX MOHS PROCEDURES  2017    BCC L nasal sidewall by Dr. Ritter Reason  2018    [facility] R mid forehead by Dr. Ketty Darden      GERD reflux       Family History:  Family History   Problem Relation Age of Onset    Stroke Father     Heart Disease Father         angina    No Known Problems Mother     Cancer Brother         lung    Cancer Brother         lung       Social History:  Social History     Tobacco Use    Smoking status: Former Smoker     Years: 20.00     Quit date:      Years since quittin.1    Smokeless tobacco: Never Used   Substance Use Topics    Alcohol use: No    Drug use: No       Allergies:   Allergies   Allergen Reactions   Duy Badder Other (comments)     Blood testing    Cat Dander Other (comments)     Blood testing    Dog Dander Other (comments)     Blood testing    Elavil Other (comments)     imbalance    [place] Other (comments)     Blood testing    Sulfa (Sulfonamide Antibiotics) Shortness of Breath         Review of Systems   Review of Systems   Constitutional: Negative for chills, diaphoresis and fever. HENT: Negative for congestion, ear pain, rhinorrhea and sore throat. Respiratory: Negative for cough and shortness of breath. Cardiovascular: Negative for chest pain. Gastrointestinal: Negative for abdominal pain, constipation, diarrhea, nausea and vomiting. Genitourinary: Negative for difficulty urinating, dysuria, frequency and hematuria. Musculoskeletal: Positive for back pain and gait problem. Negative for arthralgias, myalgias, neck pain and neck stiffness. Neurological: Positive for headaches. Negative for dizziness, seizures, syncope, weakness and light-headedness. All other systems reviewed and are negative. Physical Exam   Physical Exam  Vitals signs and nursing note reviewed. Constitutional:       General: She is not in acute distress. Appearance: She is well-developed. She is not diaphoretic. Comments: 80 y.o.  female    HENT:      Head: Normocephalic and atraumatic. Eyes:      General:         Right eye: No discharge. Left eye: No discharge. Extraocular Movements: Extraocular movements intact. Right eye: Normal extraocular motion and no nystagmus. Left eye: Normal extraocular motion and no nystagmus. Conjunctiva/sclera: Conjunctivae normal.   Neck:      Musculoskeletal: Normal range of motion and neck supple. Cardiovascular:      Rate and Rhythm: Normal rate and regular rhythm. Heart sounds: Normal heart sounds. No murmur. Pulmonary:      Effort: Pulmonary effort is normal. No respiratory distress. Breath sounds: Normal breath sounds. Musculoskeletal:      Comments: BACK: Normal spinal curvatures. No step off or deformity. NT to palpation. Negative supine SLR bilaterally. Strength of the LE 5/5 and equal bilaterally. Skin:     General: Skin is warm and dry.    Neurological: General: No focal deficit present. Mental Status: She is alert and oriented to person, place, and time. Sensory: Sensation is intact. No sensory deficit. Motor: Motor function is intact. Coordination: Coordination is intact. Psychiatric:         Behavior: Behavior normal.         Diagnostic Study Results     Labs -     Recent Results (from the past 12 hour(s))   CBC WITH AUTOMATED DIFF    Collection Time: 02/10/21  4:06 AM   Result Value Ref Range    WBC 10.0 3.6 - 11.0 K/uL    RBC 5.14 3.80 - 5.20 M/uL    HGB 14.4 11.5 - 16.0 g/dL    HCT 44.3 35.0 - 47.0 %    MCV 86.2 80.0 - 99.0 FL    MCH 28.0 26.0 - 34.0 PG    MCHC 32.5 30.0 - 36.5 g/dL    RDW 14.6 (H) 11.5 - 14.5 %    PLATELET 045 735 - 063 K/uL    MPV 11.7 8.9 - 12.9 FL    NRBC 0.0 0  WBC    ABSOLUTE NRBC 0.00 0.00 - 0.01 K/uL    NEUTROPHILS 74 32 - 75 %    LYMPHOCYTES 15 12 - 49 %    MONOCYTES 8 5 - 13 %    EOSINOPHILS 1 0 - 7 %    BASOPHILS 0 0 - 1 %    IMMATURE GRANULOCYTES 1 (H) 0.0 - 0.5 %    ABS. NEUTROPHILS 7.4 1.8 - 8.0 K/UL    ABS. LYMPHOCYTES 1.6 0.8 - 3.5 K/UL    ABS. MONOCYTES 0.8 0.0 - 1.0 K/UL    ABS. EOSINOPHILS 0.1 0.0 - 0.4 K/UL    ABS. BASOPHILS 0.0 0.0 - 0.1 K/UL    ABS. IMM. GRANS. 0.1 (H) 0.00 - 0.04 K/UL    DF AUTOMATED     METABOLIC PANEL, COMPREHENSIVE    Collection Time: 02/10/21  4:06 AM   Result Value Ref Range    Sodium 138 136 - 145 mmol/L    Potassium 3.7 3.5 - 5.1 mmol/L    Chloride 102 97 - 108 mmol/L    CO2 27 21 - 32 mmol/L    Anion gap 9 5 - 15 mmol/L    Glucose 117 (H) 65 - 100 mg/dL    BUN 21 (H) 6 - 20 MG/DL    Creatinine 1.06 (H) 0.55 - 1.02 MG/DL    BUN/Creatinine ratio 20 12 - 20      GFR est AA 60 (L) >60 ml/min/1.73m2    GFR est non-AA 49 (L) >60 ml/min/1.73m2    Calcium 9.5 8.5 - 10.1 MG/DL    Bilirubin, total 0.5 0.2 - 1.0 MG/DL    ALT (SGPT) 21 12 - 78 U/L    AST (SGOT) 24 15 - 37 U/L    Alk.  phosphatase 92 45 - 117 U/L    Protein, total 8.0 6.4 - 8.2 g/dL    Albumin 3.6 3.5 - 5.0 g/dL Globulin 4.4 (H) 2.0 - 4.0 g/dL    A-G Ratio 0.8 (L) 1.1 - 2.2     CK    Collection Time: 02/10/21  4:06 AM   Result Value Ref Range    CK 88 26 - 192 U/L   TROPONIN I    Collection Time: 02/10/21  4:06 AM   Result Value Ref Range    Troponin-I, Qt. <0.05 <0.05 ng/mL   EKG, 12 LEAD, INITIAL    Collection Time: 02/10/21  4:06 AM   Result Value Ref Range    Ventricular Rate 70 BPM    Atrial Rate 70 BPM    P-R Interval 170 ms    QRS Duration 88 ms    Q-T Interval 424 ms    QTC Calculation (Bezet) 457 ms    Calculated P Axis 92 degrees    Calculated R Axis 2 degrees    Calculated T Axis 94 degrees    Diagnosis       Normal sinus rhythm  Left ventricular hypertrophy with repolarization abnormality  When compared with ECG of 02-JAN-2021 21:52,  ST no longer depressed in Anterolateral leads  Nonspecific T wave abnormality has replaced inverted T waves in Lateral leads     URINALYSIS W/ REFLEX CULTURE    Collection Time: 02/10/21  5:54 AM    Specimen: Urine   Result Value Ref Range    Color YELLOW/STRAW      Appearance CLEAR CLEAR      Specific gravity 1.010 1.003 - 1.030      pH (UA) 6.5 5.0 - 8.0      Protein Negative NEG mg/dL    Glucose Negative NEG mg/dL    Ketone Negative NEG mg/dL    Bilirubin Negative NEG      Blood Negative NEG      Urobilinogen 0.2 0.2 - 1.0 EU/dL    Nitrites Negative NEG      Leukocyte Esterase TRACE (A) NEG      WBC 0-4 0 - 4 /hpf    RBC 0-5 0 - 5 /hpf    Epithelial cells FEW FEW /lpf    Bacteria Negative NEG /hpf    UA:UC IF INDICATED CULTURE NOT INDICATED BY UA RESULT CNI      Hyaline cast 0-2 0 - 5 /lpf       Radiologic Studies -   XR SPINE LUMB 2 OR 3 V   Final Result   Age indeterminate compression deformities T5, T6, T8, T12. If clinically   indicated, MRI would help determine acute versus chronic fractures. XR SPINE THORAC 3 V   Final Result   Age indeterminate compression deformities T5, T6, T8, T12.  If clinically   indicated, MRI would help determine acute versus chronic fractures. CT HEAD WO CONT   Final Result      No acute traumatic injury. CT Results  (Last 48 hours)               02/10/21 0443  CT HEAD WO CONT Final result    Impression:      No acute traumatic injury. Narrative:  INDICATION:   fall       EXAMINATION:  CT HEAD WO CONTRAST       COMPARISON:  January 5, 2021       TECHNIQUE:  Routine noncontrast axial head CT was performed. Sagittal and   coronal reconstructions were generated. CT dose reduction was achieved through   use of a standardized protocol tailored for this examination and automatic   exposure control for dose modulation. FINDINGS:       Ventricles:  Midline, no hydrocephalus. Intracranial Hemorrhage:  None. Brain Parenchyma/Brainstem: Moderate chronic white matter disease. Basal Cisterns:  Normal.    Paranasal Sinuses:  Right maxillary sinus opacification. Soft Tissues:  No significant soft tissue swelling. Osseous Structures:  No acute fracture. Additional Comments:  N/A. Medical Decision Making   I am the first provider for this patient. I reviewed the vital signs, available nursing notes, past medical history, past surgical history, family history and social history. Vital Signs-Reviewed the patient's vital signs. Patient Vitals for the past 12 hrs:   Temp Pulse Resp BP SpO2   02/10/21 0352 97.9 °F (36.6 °C) 68 18 (!) 170/74 97 %       Records Reviewed: Nursing Notes and Old Medical Records    Provider Notes (Medical Decision Making): The patient presents for back pain following a fall. DDx fracture, sprain, strain, spasm, DDD, DJD, herniated disk, ICB, contusion, dehydration, electrolyte abnl, UTI. Xrays show compressions that are mild and likely subacute. Recommended follow up with a spine specialist.     ED Course:   Initial assessment performed.  The patients presenting problems have been discussed, and they are in agreement with the care plan formulated and outlined with them. I have encouraged them to ask questions as they arise throughout their visit. ED EKG interpretation:  Rhythm: normal sinus rhythm; and regular . Rate (approx.): 70; Axis: normal; P wave: normal; QRS interval: normal ; ST/T wave: normal; Other findings: normal.           Critical Care Time: None    Disposition:  DISCHARGE NOTE:  6:44 AM  The pt is ready for discharge. The pt's signs, symptoms, diagnosis, and discharge instructions have been discussed and pt has conveyed their understanding. The pt is to follow up as recommended or return to ER should their symptoms worsen. Plan has been discussed and pt is in agreement. PLAN:  1. Current Discharge Medication List        2. Follow-up Information     Follow up With Specialties Details Why Contact Info    Juanita Gutierres NP Family Medicine In 1 week  She Chowdary MD Orthopedic Surgery In 1 week  [address] [place] [address]  [address] [address] [phone]          Return to ED if worse     Diagnosis     Clinical Impression:   1. Acute low back pain without sciatica, unspecified back pain laterality    2. Fall, initial encounter          Please note that this dictation was completed with e(ye)BRAIN, the computer voice recognition software. Quite often unanticipated grammatical, syntax, homophones, and other interpretive errors are inadvertently transcribed by the computer software. Please disregards these errors. Please excuse any errors that have escaped final proofreading.

## 2021-02-10 NOTE — ED NOTES
Pt arrives via EMS from [facility] after GLF around 0100, pt states she slipped from her wheelchair and bumped her head. Pt denies LOC, no laceration noted    Pt states when she got in bed this evening she started having mid back pain that radiates to lower back pain. [id number]:  [facility] called at this time  9499: report called to [facility], RN at [facility], all questions and concerns answered at this time

## 2021-02-11 ENCOUNTER — PATIENT OUTREACH (OUTPATIENT)
Dept: CASE MANAGEMENT | Age: 86
End: 2021-02-11

## 2021-02-11 NOTE — PROGRESS NOTES
Outbound call to patient today and AC was unable to get an answer. Call placed to [address]. Spoke to Director, who stated the patient is being followed by their provider and services. No further outreach needed by this provider. Episode resolved.

## 2021-04-29 ENCOUNTER — HOSPITAL ENCOUNTER (EMERGENCY)
Age: 86
Discharge: NURSING, REHAB OR LONG-TERM CARE | End: 2021-04-29
Attending: EMERGENCY MEDICINE
Payer: MEDICARE

## 2021-04-29 VITALS
TEMPERATURE: 98.2 F | HEART RATE: 62 BPM | RESPIRATION RATE: 19 BRPM | DIASTOLIC BLOOD PRESSURE: 76 MMHG | OXYGEN SATURATION: 96 % | SYSTOLIC BLOOD PRESSURE: 127 MMHG

## 2021-04-29 DIAGNOSIS — E87.6 HYPOKALEMIA: Primary | ICD-10-CM

## 2021-04-29 LAB
ALBUMIN SERPL-MCNC: 3.8 G/DL (ref 3.5–5)
ALBUMIN/GLOB SERPL: 0.9 {RATIO} (ref 1.1–2.2)
ALP SERPL-CCNC: 117 U/L (ref 45–117)
ALT SERPL-CCNC: 21 U/L (ref 12–78)
ANION GAP SERPL CALC-SCNC: 9 MMOL/L (ref 5–15)
APTT PPP: 24.5 SEC (ref 22.1–31)
AST SERPL-CCNC: 8 U/L (ref 15–37)
BASOPHILS # BLD: 0 K/UL (ref 0–0.1)
BASOPHILS NFR BLD: 0 % (ref 0–1)
BILIRUB SERPL-MCNC: 0.4 MG/DL (ref 0.2–1)
BUN SERPL-MCNC: 23 MG/DL (ref 6–20)
BUN/CREAT SERPL: 20 (ref 12–20)
CALCIUM SERPL-MCNC: 9.2 MG/DL (ref 8.5–10.1)
CHLORIDE SERPL-SCNC: 103 MMOL/L (ref 97–108)
CO2 SERPL-SCNC: 27 MMOL/L (ref 21–32)
COMMENT, HOLDF: NORMAL
CREAT SERPL-MCNC: 1.16 MG/DL (ref 0.55–1.02)
DIFFERENTIAL METHOD BLD: ABNORMAL
EOSINOPHIL # BLD: 0.2 K/UL (ref 0–0.4)
EOSINOPHIL NFR BLD: 2 % (ref 0–7)
ERYTHROCYTE [DISTWIDTH] IN BLOOD BY AUTOMATED COUNT: 13.7 % (ref 11.5–14.5)
GLOBULIN SER CALC-MCNC: 4.1 G/DL (ref 2–4)
GLUCOSE SERPL-MCNC: 111 MG/DL (ref 65–100)
HCT VFR BLD AUTO: 41.5 % (ref 35–47)
HGB BLD-MCNC: 13.6 G/DL (ref 11.5–16)
IMM GRANULOCYTES # BLD AUTO: 0.1 K/UL (ref 0–0.04)
IMM GRANULOCYTES NFR BLD AUTO: 1 % (ref 0–0.5)
INR PPP: 0.9 (ref 0.9–1.1)
LYMPHOCYTES # BLD: 1.9 K/UL (ref 0.8–3.5)
LYMPHOCYTES NFR BLD: 20 % (ref 12–49)
MCH RBC QN AUTO: 27.9 PG (ref 26–34)
MCHC RBC AUTO-ENTMCNC: 32.8 G/DL (ref 30–36.5)
MCV RBC AUTO: 85 FL (ref 80–99)
MONOCYTES # BLD: 0.9 K/UL (ref 0–1)
MONOCYTES NFR BLD: 9 % (ref 5–13)
NEUTS SEG # BLD: 6.5 K/UL (ref 1.8–8)
NEUTS SEG NFR BLD: 68 % (ref 32–75)
NRBC # BLD: 0 K/UL (ref 0–0.01)
NRBC BLD-RTO: 0 PER 100 WBC
PLATELET # BLD AUTO: 189 K/UL (ref 150–400)
PMV BLD AUTO: 12 FL (ref 8.9–12.9)
POTASSIUM SERPL-SCNC: 2.7 MMOL/L (ref 3.5–5.1)
POTASSIUM SERPL-SCNC: 2.9 MMOL/L (ref 3.5–5.1)
PROT SERPL-MCNC: 7.9 G/DL (ref 6.4–8.2)
PROTHROMBIN TIME: 9.8 SEC (ref 9–11.1)
RBC # BLD AUTO: 4.88 M/UL (ref 3.8–5.2)
SAMPLES BEING HELD,HOLD: NORMAL
SODIUM SERPL-SCNC: 139 MMOL/L (ref 136–145)
THERAPEUTIC RANGE,PTTT: NORMAL SECS (ref 58–77)
WBC # BLD AUTO: 9.5 K/UL (ref 3.6–11)

## 2021-04-29 PROCEDURE — 85610 PROTHROMBIN TIME: CPT

## 2021-04-29 PROCEDURE — 93005 ELECTROCARDIOGRAM TRACING: CPT

## 2021-04-29 PROCEDURE — 85025 COMPLETE CBC W/AUTO DIFF WBC: CPT

## 2021-04-29 PROCEDURE — 84132 ASSAY OF SERUM POTASSIUM: CPT

## 2021-04-29 PROCEDURE — 85730 THROMBOPLASTIN TIME PARTIAL: CPT

## 2021-04-29 PROCEDURE — 74011250637 HC RX REV CODE- 250/637: Performed by: EMERGENCY MEDICINE

## 2021-04-29 PROCEDURE — 80053 COMPREHEN METABOLIC PANEL: CPT

## 2021-04-29 PROCEDURE — 99285 EMERGENCY DEPT VISIT HI MDM: CPT

## 2021-04-29 PROCEDURE — 96365 THER/PROPH/DIAG IV INF INIT: CPT

## 2021-04-29 PROCEDURE — 74011250636 HC RX REV CODE- 250/636: Performed by: EMERGENCY MEDICINE

## 2021-04-29 PROCEDURE — 36415 COLL VENOUS BLD VENIPUNCTURE: CPT

## 2021-04-29 RX ORDER — POTASSIUM CHLORIDE 20 MEQ/1
20 TABLET, EXTENDED RELEASE ORAL 2 TIMES DAILY
Qty: 4 TAB | Refills: 0 | Status: SHIPPED | OUTPATIENT
Start: 2021-04-29 | End: 2021-05-01

## 2021-04-29 RX ORDER — POTASSIUM CHLORIDE 7.45 MG/ML
10 INJECTION INTRAVENOUS
Status: COMPLETED | OUTPATIENT
Start: 2021-04-29 | End: 2021-04-29

## 2021-04-29 RX ORDER — POTASSIUM CHLORIDE 750 MG/1
20 TABLET, FILM COATED, EXTENDED RELEASE ORAL
Status: COMPLETED | OUTPATIENT
Start: 2021-04-29 | End: 2021-04-29

## 2021-04-29 RX ADMIN — POTASSIUM CHLORIDE 20 MEQ: 750 TABLET, FILM COATED, EXTENDED RELEASE ORAL at 16:05

## 2021-04-29 RX ADMIN — POTASSIUM CHLORIDE 10 MEQ: 7.46 INJECTION, SOLUTION INTRAVENOUS at 16:05

## 2021-04-29 NOTE — ED TRIAGE NOTES
Patient arrived via EMS from [facility] with reported abnormal lab results     Collection date 4/28/2021    Potassium   3.2  Hemoglobin  3.1  Hematocrit    9.8  Platelets      72.4    Patient without complaints.

## 2021-04-29 NOTE — ED PROVIDER NOTES
HPI     Pt is a 80 y.o. F here via EMS from [employer] [facility] for abnormal labs. She says she feels tired but otherwise feels ok. Pt had routine labs and was found to have hypokalemia and pancytopenia (WBC, H/H, and platelets). No bleeding. Pt says she does have dark stools but says she felt that was 2/2 to taking iron. No other complaints at this time. Past Medical History:   Diagnosis Date    Arthritis     Arthritis of knee, left     Asthma     Asthma with COPD (chronic obstructive pulmonary disease) (HCC)     FEV1 - 0.99, IgE high    Atherosclerosis of both carotid arteries     Bipolar affective disorder (HCC)     Cataract, left eye     cataract - left    Chronic obstructive pulmonary disease (HCC)     Closed fracture of lateral portion of left tibial plateau     Depression with anxiety     depression/anxiety    Encephalopathy     encephalopathy    Esophageal ulcer     esophageal ulcer    Gastroparesis     GERD (gastroesophageal reflux disease)     Hepatic steatosis     History of fall     hx falls    Hyperlipidemia     increased cholesterol    Hyperlipidemia     Hypertension     IBS (irritable bowel syndrome)     IBS    IGT (impaired glucose tolerance) 9/15/2015    Liver disease     elevated liver enzymes - being evaluated/US done x 2 - inflammed liver/\"fat on liver\"    Menopause 1987    Neurological disorder     delayed thinking process? ??    OA (osteoarthritis) of knee     Osteoporosis     Seizures (HCC)     Sunburn, blistering     Unspecified adverse effect of anesthesia     was told by neurologist not to use anesthesia unless needed due to encephalopathy    Unspecified adverse effect of anesthesia     hallucinations after MRI with sedation    Vertigo     vertigo       Past Surgical History:   Procedure Laterality Date    COLONOSCOPY N/A 8/1/2017    COLONOS[facility] performed by Abimael Harley [facility] at [facility] ENDOSCOPY    HX BREAST BIOPSY Left     Surgical    HX GI laser surgery for reflux and hernia repair    HX MOHS PROCEDURES  2017    BCC L nasal sidewall by Dr. Jyothi Goetz  2018    [facility] R mid forehead by Dr. Sushil Willett      GERD reflux         Family History:   Problem Relation Age of Onset    Stroke Father     Heart Disease Father         angina    No Known Problems Mother     Cancer Brother         lung    Cancer Brother         lung       Social History     Socioeconomic History    Marital status: SINGLE     Spouse name: Not on file    Number of children: Not on file    Years of education: Not on file    Highest education level: Not on file   Occupational History    Not on file   Social Needs    Financial resource strain: Not on file    Food insecurity     Worry: Not on file     Inability: Not on file   [employer] needs     Medical: Not on file     Non-medical: Not on file   Tobacco Use    Smoking status: Former Smoker     Years: 20.00     Quit date:      Years since quittin.3    Smokeless tobacco: Never Used   Substance and Sexual Activity    Alcohol use: No    Drug use: No    Sexual activity: Not Currently     Partners: Male   Lifestyle    Physical activity     Days per week: Not on file     Minutes per session: Not on file    Stress: Not on file   Relationships    Social connections     Talks on phone: Not on file     Gets together: Not on file     Attends [employer] service: Not on file     Active member of club or organization: Not on file     Attends meetings of clubs or organizations: Not on file     Relationship status: Not on file    Intimate partner violence     Fear of current or ex partner: Not on file     Emotionally abused: Not on file     Physically abused: Not on file     Forced sexual activity: Not on file   Other Topics Concern    Not on file   Social History Narrative    Not on file         ALLERGIES: Mennie Pee, Cat dander, Dog dander, Elavil, Oak, and Sulfa (sulfonamide antibiotics)    Review of Systems   Constitutional: Positive for fatigue. Negative for chills, diaphoresis and fever. HENT: Negative for congestion and trouble swallowing. Eyes: Negative for photophobia and visual disturbance. Respiratory: Negative for cough, chest tightness and shortness of breath. Cardiovascular: Negative for chest pain, palpitations and leg swelling. Gastrointestinal: Negative for abdominal pain, diarrhea, nausea and vomiting. Genitourinary: Negative for difficulty urinating, dysuria, flank pain and frequency. Musculoskeletal: Negative for back pain and myalgias. Skin: Negative for rash and wound. Neurological: Negative for dizziness, weakness, light-headedness and headaches. Hematological: Negative for adenopathy. Does not bruise/bleed easily. Psychiatric/Behavioral: Negative for agitation and confusion. All other systems reviewed and are negative. Vitals:    04/29/21 1448   BP: (!) 144/66   Pulse: 64   Resp: 18   Temp: 98.2 °F (36.8 °C)   SpO2: 96%            Physical Exam  Vitals signs and nursing note reviewed. Constitutional:       General: She is not in acute distress. Appearance: She is well-developed. She is not diaphoretic. HENT:      Head: Normocephalic. Eyes:      Conjunctiva/sclera: Conjunctivae normal.      Pupils: Pupils are equal, round, and reactive to light. Neck:      Musculoskeletal: Normal range of motion and neck supple. Vascular: No JVD. Cardiovascular:      Rate and Rhythm: Normal rate and regular rhythm. Heart sounds: Normal heart sounds. Pulmonary:      Effort: Pulmonary effort is normal.      Breath sounds: Normal breath sounds. Abdominal:      General: Bowel sounds are normal. There is no distension. Palpations: Abdomen is soft. Tenderness: There is no abdominal tenderness. Musculoskeletal: Normal range of motion. General: No tenderness or deformity.    Lymphadenopathy: Cervical: No cervical adenopathy. Skin:     General: Skin is warm and dry. Capillary Refill: Capillary refill takes less than 2 seconds. Findings: No erythema or rash. Neurological:      Mental Status: She is alert and oriented to person, place, and time. Cranial Nerves: No cranial nerve deficit. Sensory: No sensory deficit. MDM       Procedures    ED EKG interpretation:  Rhythm: normal sinus rhythm; and regular . Rate (approx.): 65; Axis: normal; P wave: normal; QRS interval: normal ; ST/T wave: normal; LVH; EKG documented by Luke Stein MD, as interpreted by Janice Reedre MD, ED MD.    5:53 PM  Repeat potassium sent to lab.      6:02 PM  Change of shift. Care of patient signed over to Dr. Jyotsna Connell. Bedside handoff complete. Awaiting repeat potassium and suspect discharge most likely.     Luke Stein MD

## 2021-05-01 LAB
ATRIAL RATE: 65 BPM
CALCULATED P AXIS, ECG09: 55 DEGREES
CALCULATED R AXIS, ECG10: -2 DEGREES
CALCULATED T AXIS, ECG11: 101 DEGREES
DIAGNOSIS, 93000: NORMAL
P-R INTERVAL, ECG05: 158 MS
Q-T INTERVAL, ECG07: 432 MS
QRS DURATION, ECG06: 80 MS
QTC CALCULATION (BEZET), ECG08: 449 MS
VENTRICULAR RATE, ECG03: 65 BPM

## 2021-08-09 ENCOUNTER — TELEPHONE (OUTPATIENT)
Dept: INTERNAL MEDICINE CLINIC | Age: 86
End: 2021-08-09

## 2021-08-09 ENCOUNTER — OFFICE VISIT (OUTPATIENT)
Dept: INTERNAL MEDICINE CLINIC | Age: 86
End: 2021-08-09
Payer: MEDICARE

## 2021-08-09 VITALS
TEMPERATURE: 98.1 F | DIASTOLIC BLOOD PRESSURE: 71 MMHG | WEIGHT: 155.8 LBS | SYSTOLIC BLOOD PRESSURE: 121 MMHG | OXYGEN SATURATION: 97 % | HEART RATE: 61 BPM | HEIGHT: 63 IN | RESPIRATION RATE: 16 BRPM | BODY MASS INDEX: 27.61 KG/M2

## 2021-08-09 DIAGNOSIS — I10 ESSENTIAL HYPERTENSION: ICD-10-CM

## 2021-08-09 DIAGNOSIS — G62.9 NEUROPATHY: ICD-10-CM

## 2021-08-09 DIAGNOSIS — F31.60 BIPOLAR AFFECTIVE DISORDER, CURRENT EPISODE MIXED, CURRENT EPISODE SEVERITY UNSPECIFIED (HCC): ICD-10-CM

## 2021-08-09 DIAGNOSIS — J20.9 COPD WITH ACUTE BRONCHITIS (HCC): ICD-10-CM

## 2021-08-09 DIAGNOSIS — J44.0 COPD WITH ACUTE BRONCHITIS (HCC): ICD-10-CM

## 2021-08-09 DIAGNOSIS — F03.A0 MILD DEMENTIA: ICD-10-CM

## 2021-08-09 DIAGNOSIS — Z00.00 ENCOUNTER FOR MEDICARE ANNUAL WELLNESS EXAM: Primary | ICD-10-CM

## 2021-08-09 DIAGNOSIS — Z99.89 DEPENDENT ON WALKER FOR AMBULATION: ICD-10-CM

## 2021-08-09 DIAGNOSIS — Z91.81 AT MAXIMUM RISK FOR FALL: ICD-10-CM

## 2021-08-09 DIAGNOSIS — B35.1 ONYCHOMYCOSIS OF GREAT TOE: ICD-10-CM

## 2021-08-09 DIAGNOSIS — R60.0 LEG EDEMA: ICD-10-CM

## 2021-08-09 DIAGNOSIS — Z74.09 IMPAIRED MOBILITY: ICD-10-CM

## 2021-08-09 DIAGNOSIS — N39.0 RECURRENT UTI: ICD-10-CM

## 2021-08-09 DIAGNOSIS — M19.90 ARTHRITIS: ICD-10-CM

## 2021-08-09 PROCEDURE — 1101F PT FALLS ASSESS-DOCD LE1/YR: CPT | Performed by: NURSE PRACTITIONER

## 2021-08-09 PROCEDURE — G8427 DOCREV CUR MEDS BY ELIG CLIN: HCPCS | Performed by: NURSE PRACTITIONER

## 2021-08-09 PROCEDURE — G8536 NO DOC ELDER MAL SCRN: HCPCS | Performed by: NURSE PRACTITIONER

## 2021-08-09 PROCEDURE — G0439 PPPS, SUBSEQ VISIT: HCPCS | Performed by: NURSE PRACTITIONER

## 2021-08-09 PROCEDURE — G8419 CALC BMI OUT NRM PARAM NOF/U: HCPCS | Performed by: NURSE PRACTITIONER

## 2021-08-09 PROCEDURE — G9717 DOC PT DX DEP/BP F/U NT REQ: HCPCS | Performed by: NURSE PRACTITIONER

## 2021-08-09 RX ORDER — LANOLIN ALCOHOL/MO/W.PET/CERES
CREAM (GRAM) TOPICAL
COMMUNITY
Start: 2021-06-20 | End: 2022-04-22 | Stop reason: ALTCHOICE

## 2021-08-09 RX ORDER — POTASSIUM CHLORIDE 20 MEQ/1
TABLET, EXTENDED RELEASE ORAL
COMMUNITY
Start: 2021-06-20 | End: 2022-04-22 | Stop reason: ALTCHOICE

## 2021-08-09 RX ORDER — LAMOTRIGINE 50 MG/1
TABLET, ORALLY DISINTEGRATING ORAL
Qty: 90 TABLET | Refills: 3 | Status: SHIPPED | OUTPATIENT
Start: 2021-08-09 | End: 2022-04-22 | Stop reason: SINTOL

## 2021-08-09 RX ORDER — METHENAMINE HIPPURATE 1000 MG/1
TABLET ORAL
COMMUNITY
Start: 2021-06-20 | End: 2021-08-09 | Stop reason: SDUPTHER

## 2021-08-09 RX ORDER — CHLORTHALIDONE 25 MG/1
TABLET ORAL
COMMUNITY
Start: 2021-08-05

## 2021-08-09 RX ORDER — ACETAMINOPHEN 500 MG/.95G
500 POWDER ORAL 2 TIMES DAILY
COMMUNITY

## 2021-08-09 RX ORDER — METHENAMINE HIPPURATE 1000 MG/1
1 TABLET ORAL 2 TIMES DAILY WITH MEALS
Qty: 180 TABLET | Refills: 3 | Status: SHIPPED | OUTPATIENT
Start: 2021-08-09

## 2021-08-09 RX ORDER — DICLOFENAC SODIUM 10 MG/G
GEL TOPICAL
COMMUNITY
Start: 2021-07-14

## 2021-08-09 RX ORDER — GABAPENTIN 100 MG/1
200 CAPSULE ORAL
Qty: 180 CAPSULE | Refills: 3 | Status: SHIPPED | OUTPATIENT
Start: 2021-08-09 | End: 2022-04-04

## 2021-08-09 RX ORDER — AMLODIPINE BESYLATE 10 MG/1
TABLET ORAL
COMMUNITY
Start: 2021-08-07 | End: 2021-12-09

## 2021-08-09 RX ORDER — FUROSEMIDE 40 MG/1
TABLET ORAL
COMMUNITY
Start: 2021-07-19 | End: 2022-09-30 | Stop reason: ALTCHOICE

## 2021-08-09 RX ORDER — OMEPRAZOLE 20 MG/1
20 CAPSULE, DELAYED RELEASE ORAL DAILY
COMMUNITY

## 2021-08-09 RX ORDER — LORATADINE 10 MG/1
TABLET ORAL
COMMUNITY
Start: 2021-06-20 | End: 2022-09-30 | Stop reason: ALTCHOICE

## 2021-08-09 NOTE — TELEPHONE ENCOUNTER
Home base PT/OT referral placed. Patient would like to see Lorri  PT form [facility].  Please ask if he is able to resume patient's PT

## 2021-08-09 NOTE — PATIENT INSTRUCTIONS
Low Sodium Diet (2,000 Milligram): Care Instructions  Overview     Limiting sodium can be an important part of managing some health problems. The most common source of sodium is salt. People get most of the salt in their diet from canned, prepared, and packaged foods. Fast food and restaurant meals also are very high in sodium. Your doctor will probably limit your sodium to less than 2,000 milligrams (mg) a day. This limit counts all the sodium in prepared and packaged foods and any salt you add to your food. Follow-up care is a key part of your treatment and safety. Be sure to make and go to all appointments, and call your doctor if you are having problems. It's also a good idea to know your test results and keep a list of the medicines you take. How can you care for yourself at home? Read food labels  · Read labels on cans and food packages. The labels tell you how much sodium is in each serving. Make sure that you look at the serving size. If you eat more than the serving size, you have eaten more sodium. · Food labels also tell you the Percent Daily Value for sodium. Choose products with low Percent Daily Values for sodium. · Be aware that sodium can come in forms other than salt, including monosodium glutamate (MSG), sodium citrate, and sodium bicarbonate (baking soda). MSG is often added to Asian food. When you eat out, you can sometimes ask for food without MSG or added salt. Buy low-sodium foods  · Buy foods that are labeled \"unsalted\" (no salt added), \"sodium-free\" (less than 5 mg of sodium per serving), or \"low-sodium\" (140 mg or less of sodium per serving). Foods labeled \"reduced-sodium\" and \"light sodium\" may still have too much sodium. Be sure to read the label to see how much sodium you are getting. · Buy fresh vegetables, or frozen vegetables without added sauces. Buy low-sodium versions of canned vegetables, soups, and other canned goods.   Prepare low-sodium meals  · Cut back on the amount of salt you use in cooking. This will help you adjust to the taste. Do not add salt after cooking. One teaspoon of salt has about 2,300 mg of sodium. · Take the salt shaker off the table. · Flavor your food with garlic, lemon juice, onion, vinegar, herbs, and spices. Do not use soy sauce, lite soy sauce, steak sauce, onion salt, garlic salt, celery salt, or ketchup on your food. · Use low-sodium salad dressings, sauces, and ketchup. Or make your own salad dressings and sauces without adding salt. · Use less salt (or none) when recipes call for it. You can often use half the salt a recipe calls for without losing flavor. Other foods such as rice, pasta, and grains do not need added salt. · Rinse canned vegetables, and cook them in fresh water. This removes some--but not all--of the salt. · Avoid water that is naturally high in sodium or that has been treated with water softeners, which add sodium. If you buy bottled water, read the label and choose a sodium-free brand. Avoid high-sodium foods  · Avoid eating:  ? Smoked, cured, salted, and canned meat, fish, and poultry. ? Ham, serrano, hot dogs, and luncheon meats. ? Regular, hard, and processed cheese and regular peanut butter. ? Crackers with salted tops, and other salted snack foods such as pretzels, chips, and salted popcorn. ? Frozen prepared meals, unless labeled low-sodium. ? Canned and dried soups, broths, and bouillon, unless labeled sodium-free or low-sodium. ? Canned vegetables, unless labeled sodium-free or low-sodium. ? Western [place]ha fries, pizza, tacos, and other fast foods. ? Pickles, olives, ketchup, and other condiments, especially soy sauce, unless labeled sodium-free or low-sodium. Where can you learn more? Go to http://www.gray.com/  Enter V843 in the search box to learn more about \"Low Sodium Diet (2,000 Milligram): Care Instructions. \"  Current as of: December 17, 2020               Content Version: 12.8  © 2006-2021 [employer]. Care instructions adapted under license by [employer] (which disclaims liability or warranty for this information). If you have questions about a medical condition or this instruction, always ask your healthcare professional. [address] any warranty or liability for your use of this information. Learning About High Blood Pressure  What is high blood pressure? Blood pressure is a measure of how hard the blood pushes against the walls of your arteries. It's normal for blood pressure to go up and down throughout the day. But if it stays up, you have high blood pressure. Another name for high blood pressure is hypertension. Two numbers tell you your blood pressure. The first number is the systolic pressure (top number). It shows how hard the blood pushes when your heart is pumping. The second number is the diastolic pressure (bottom number). It shows how hard the blood pushes between heartbeats, when your heart is relaxed and filling with blood. Your doctor will give you a goal for your blood pressure based on your health and your age. High blood pressure (hypertension) means that the top number stays high, or the bottom number stays high, or both. High blood pressure increases the risk of stroke, heart attack, and other problems. What happens when you have high blood pressure? · Blood flows through your arteries with too much force. Over time, this can damage the heart and the walls of your arteries. But you can't feel it. High blood pressure usually doesn't cause symptoms. · High blood pressure makes your heart work harder. And that can lead to heart failure, which means your heart doesn't pump as much blood as your body needs. · Fat and calcium start to build up in your arteries. This buildup is called hardening of the arteries. It can cause many problems including a heart attack and stroke.   · Arteries also carry blood and oxygen to organs like your eyes, kidneys, and brain. If high blood pressure damages those arteries, it can lead to vision loss, kidney disease, stroke, and a higher risk of dementia. How can you prevent high blood pressure? · Stay at a healthy weight. · Try to limit how much sodium you eat to less than 2,300 milligrams (mg) a day. If you limit your sodium to 1,500 mg a day, you can lower your blood pressure even more. ? Buy foods that are labeled \"unsalted,\" \"sodium-free,\" or \"low-sodium. \" Foods labeled \"reduced-sodium\" and \"light sodium\" may still have too much sodium. ? Flavor your food with garlic, lemon juice, onion, vinegar, herbs, and spices instead of salt. Do not use soy sauce, steak sauce, onion salt, garlic salt, mustard, or ketchup on your food. ? Use less salt (or none) when recipes call for it. You can often use half the salt a recipe calls for without losing flavor. · Be physically active. Get at least 30 minutes of exercise on most days of the week. Walking is a good choice. You also may want to do other activities, such as running, swimming, cycling, or playing tennis or team sports. · Limit alcohol to 2 drinks a day for men and 1 drink a day for women. · Eat plenty of fruits, vegetables, and low-fat dairy products. Eat less saturated and total fats. How is high blood pressure treated? · Your doctor will suggest making lifestyle changes to help your heart. For example, your doctor may ask you to eat healthy foods, quit smoking, lose extra weight, and be more active. · If lifestyle changes don't help enough, your doctor may recommend that you take medicine. · When blood pressure is very high, medicines are needed to lower it. Follow-up care is a key part of your treatment and safety. Be sure to make and go to all appointments, and call your doctor if you are having problems. It's also a good idea to know your test results and keep a list of the medicines you take.   Where can you learn more? Go to http://www.Appbistro.com/  Enter P501 in the search box to learn more about \"Learning About High Blood Pressure. \"  Current as of: August 31, 2020               Content Version: 12.8  © 2006-2021 [employer]. Care instructions adapted under license by [employer] (which disclaims liability or warranty for this information). If you have questions about a medical condition or this instruction, always ask your healthcare professional. [address] any warranty or liability for your use of this information. Home Blood Pressure Test: About This Test  What is it? A home blood pressure test allows you to keep track of your blood pressure at home. Blood pressure is a measure of the force of blood against the walls of your arteries. Blood pressure readings include two numbers, such as 130/80 (say \"130 over 80\"). The first number is the systolic pressure. The second number is the diastolic pressure. Why is this test done? You may do this test at home to:  · Find out if you have high blood pressure. · Track your blood pressure if you have high blood pressure. · Track how well medicine is working to reduce high blood pressure. · Check how lifestyle changes, such as weight loss and exercise, are affecting blood pressure. How do you prepare for the test?  For at least 30 minutes before you take your blood pressure, don't exercise, drink caffeine, or smoke. Empty your bladder before the test. Sit quietly with your back straight and both feet on the floor for at least 5 minutes. This helps you take your blood pressure while you feel comfortable and relaxed. How is the test done? · If your doctor recommends it, take your blood pressure twice a day. Take it in the morning and evening. · Sit with your arm slightly bent and resting on a table so that your upper arm is at the same level as your heart.   · Use the same arm each time you take your blood pressure. · Place the blood pressure cuff on the bare skin of your upper arm. You may have to roll up your sleeve, remove your arm from the sleeve, or take your shirt off. · Wrap the blood pressure cuff around your upper arm so that the lower edge of the cuff is about 1 inch above the bend of your elbow. · Do not move, talk, or text while you take your blood pressure. Follow the instructions that came with your blood pressure monitor. They might be different from the following. · Press the on/off button on the automatic monitor. Then you may need to wait until the screen says the monitor is ready. · Press the start button. The cuff will inflate and deflate by itself. · Your blood pressure numbers will appear on the screen. · Wait one minute and take your blood pressure again. · If your monitor does not automatically save your numbers, write them in your log book, along with the date and time. Follow-up care is a key part of your treatment and safety. Be sure to make and go to all appointments, and call your doctor if you are having problems. It's also a good idea to keep a list of the medicines you take. Where can you learn more? Go to http://www.ruiz.com/  Enter C427 in the search box to learn more about \"Home Blood Pressure Test: About This Test.\"  Current as of: August 31, 2020               Content Version: 12.8  © 2006-2021 [employer]. Care instructions adapted under license by [employer] (which disclaims liability or warranty for this information). If you have questions about a medical condition or this instruction, always ask your healthcare professional. [place] [address] any warranty or liability for your use of this information. Leg and Ankle Edema: Care Instructions  Your Care Instructions  Swelling in the legs, ankles, and feet is called edema.  It is common after you sit or stand for a while. Long plane flights or car rides often cause swelling in the legs and feet. You may also have swelling if you have to stand for long periods of time at your job. Problems with the veins in the legs (varicose veins) and changes in hormones can also cause swelling. Sometimes the swelling in the ankles and feet is caused by a more serious problem, such as heart failure, infection, blood clots, or liver or kidney disease. Follow-up care is a key part of your treatment and safety. Be sure to make and go to all appointments, and call your doctor if you are having problems. It's also a good idea to know your test results and keep a list of the medicines you take. How can you care for yourself at home? · If your doctor gave you medicine, take it as prescribed. Call your doctor if you think you are having a problem with your medicine. · Whenever you are resting, raise your legs up. Try to keep the swollen area higher than the level of your heart. · Take breaks from standing or sitting in one position. ? Walk around to increase the blood flow in your lower legs. ? Move your feet and ankles often while you stand, or tighten and relax your leg muscles. · Wear support stockings. Put them on in the morning, before swelling gets worse. · Eat a balanced diet. Lose weight if you need to. · Limit the amount of salt (sodium) in your diet. Salt holds fluid in the body and may increase swelling. When should you call for help? Call 911 anytime you think you may need emergency care. For example, call if:    · You have symptoms of a blood clot in your lung (called a pulmonary embolism). These may include:  ? Sudden chest pain. ? Trouble breathing. ? Coughing up blood. Call your doctor now or seek immediate medical care if:    · You have signs of a blood clot, such as:  ? Pain in your calf, back of the knee, thigh, or groin. ?  Redness and swelling in your leg or groin.     · You have symptoms of infection, such as:  ? Increased pain, swelling, warmth, or redness. ? Red streaks or pus. ? A fever. Watch closely for changes in your health, and be sure to contact your doctor if:    · Your swelling is getting worse.     · You have new or worsening pain in your legs.     · You do not get better as expected. Where can you learn more? Go to http://www.gray.com/  Enter I048 in the search box to learn more about \"Leg and Ankle Edema: Care Instructions. \"  Current as of: February 26, 2020               Content Version: 12.8  © 6526-6292 [employer]. Care instructions adapted under license by [employer] (which disclaims liability or warranty for this information). If you have questions about a medical condition or this instruction, always ask your healthcare professional. [address] any warranty or liability for your use of this information. Medicare Wellness Visit, Female     The best way to live healthy is to have a lifestyle where you eat a well-balanced diet, exercise regularly, limit alcohol use, and quit all forms of tobacco/nicotine, if applicable. Regular preventive services are another way to keep healthy. Preventive services (vaccines, screening tests, monitoring & exams) can help personalize your care plan, which helps you manage your own care. Screening tests can find health problems at the earliest stages, when they are easiest to treat. [facility] follows the current, evidence-based guidelines published by the [facility] Moise Gupta (USPSTF) when recommending preventive services for our patients. Because we follow these guidelines, sometimes recommendations change over time as research supports it. (For example, mammograms used to be recommended annually.  Even though Medicare will still pay for an annual mammogram, the newer guidelines recommend a mammogram every two years for women of average risk). Of course, you and your doctor may decide to screen more often for some diseases, based on your risk and your co-morbidities (chronic disease you are already diagnosed with). Preventive services for you include:  - Medicare offers their members a free annual wellness visit, which is time for you and your primary care provider to discuss and plan for your preventive service needs. Take advantage of this benefit every year!  -All adults over the age of 72 should receive the recommended pneumonia vaccines. Current USPSTF guidelines recommend a series of two vaccines for the best pneumonia protection.   -All adults should have a flu vaccine yearly and a tetanus vaccine every 10 years.   -All adults age 48 and older should receive the shingles vaccines (series of two vaccines). -All adults age 38-68 who are overweight should have a diabetes screening test once every three years.   -All adults born between [birth year] and 1965 should be screened once for Hepatitis C.  -Other screening tests and preventive services for persons with diabetes include: an eye exam to screen for diabetic retinopathy, a kidney function test, a foot exam, and stricter control over your cholesterol.   -Cardiovascular screening for adults with routine risk involves an electrocardiogram (ECG) at intervals determined by your doctor.   -Colorectal cancer screenings should be done for adults age 54-65 with no increased risk factors for colorectal cancer. There are a number of acceptable methods of screening for this type of cancer. Each test has its own benefits and drawbacks. Discuss with your doctor what is most appropriate for you during your annual wellness visit. The different tests include: colonoscopy (considered the best screening method), a fecal occult blood test, a fecal DNA test, and sigmoidoscopy.    -A bone mass density test is recommended when a woman turns 65 to screen for osteoporosis.  This test is only recommended one time, as a screening. Some providers will use this same test as a disease monitoring tool if you already have osteoporosis. -Breast cancer screenings are recommended every other year for women of normal risk, age 54-69.  -Cervical cancer screenings for women over age 72 are only recommended with certain risk factors.      Here is a list of your current Health Maintenance items (your personalized list of preventive services) with a due date:  Health Maintenance Due   Topic Date Due    COVID-19 Vaccine (1) Never done    DTaP/Tdap/Td  (1 - Tdap) Never done    Shingles Vaccine (1 of 2) Never done    Cholesterol Test   08/10/2017

## 2021-08-09 NOTE — PROGRESS NOTES
Rm 9    Recent Travel Screening and Travel History documentation     Travel Screening     Question   Response    In the last month, have you been in contact with someone who was confirmed or suspected to have Coronavirus / COVID-19? No / Unsure    Have you had a COVID-19 viral test in the last 14 days? No    Do you have any of the following new or worsening symptoms? None of these    Have you traveled internationally or domestically in the last month? No      Travel History   Travel since 07/09/21     No documented travel since 07/09/21        Chief Complaint   Patient presents with    Hypertension    ED Follow-up   Cardiology visit 8/5/21 NP Oscar, new labs done    Covid vaccines done at [facility] assisted living [phone]    1. Have you been to the ER, urgent care clinic since your last visit? Hospitalized since your last visit? ED, 4/29/21, hypokalemia    2. Have you seen or consulted any other health care providers outside of the [address] [place] [address] since your last visit? Include any pap smears or colon screening.  No        Health Maintenance Due   Topic Date Due    COVID-19 Vaccine (1) Never done    DTaP/Tdap/Td series (1 - Tdap) Never done    Shingrix Vaccine Age 50> (1 of 2) Never done    Lipid Screen  08/10/2017    Medicare Yearly Exam  06/27/2021           3 most recent PHQ Screens 8/9/2021   Little interest or pleasure in doing things Not at all   Feeling down, depressed, irritable, or hopeless Not at all   Total Score PHQ 2 0   Trouble falling or staying asleep, or sleeping too much -   Feeling tired or having little energy -   Poor appetite, weight loss, or overeating -   Feeling bad about yourself - or that you are a failure or have let yourself or your family down -   Trouble concentrating on things such as school, work, reading, or watching TV -   Moving or speaking so slowly that other people could have noticed; or the opposite being so fidgety that others notice - Thoughts of being better off dead, or hurting yourself in some way -   PHQ 9 Score -   How difficult have these problems made it for you to do your work, take care of your home and get along with others -       Fall Risk Assessment, last 12 mths 8/9/2021   Able to walk? Yes   Fall in past 12 months? 1   Do you feel unsteady? 0   Are you worried about falling 0   Is the gait abnormal? 1   Number of falls in past 12 months 1   Fall with injury? 0           Learning Assessment 3/11/2020   PRIMARY LEARNER Patient   HIGHEST LEVEL OF EDUCATION - PRIMARY LEARNER  -   BARRIERS PRIMARY LEARNER -   CO-LEARNER CAREGIVER -   PRIMARY LANGUAGE ENGLISH   LEARNER PREFERENCE PRIMARY READING   ANSWERED BY patient   RELATIONSHIP SELF         An After Visit Summary was printed and given to the patient.

## 2021-08-09 NOTE — PROGRESS NOTES
Subjective  Duane Burgos is a 80 y.o. female presents with daughter  for hypertension follow up  HPI     Was seen 4/29 in ED with hypokalemia and fatigue   Most recent labs done by GI provider on 8/5; normal electrolytes   January was admitted to [facility] 1/2-1/8 with fall and altered mental status and hyperkalemia. Discharged to SNF, [facility]. was there for  3 weeks, then transferred to [place] [address]. Left [facility] July 4 to home. Daughter reports patient needs to readdress PT with  [facility] therapist  provider Dilan Lira  She continues to need walker d/t left knee pain and instability  Difficulty with stairs   She lives alone.  Daughter lives close and sees her daily   No fall since she left [facility]  Ankles swollen   Daughter ordered compression stocking   Takes 1/2 tablet Lasix   Fungal toenail infection, would like to be treated    Sees Dr. Leobardo Guy for foot and ankle pain  Methenamine prescribed by urology for recurrent UTI; medication effective     Past Medical History:   Diagnosis Date    Arthritis     Arthritis of knee, left     Asthma     Asthma with COPD (chronic obstructive pulmonary disease) ([address]     FEV1 - 0.99, IgE high    Atherosclerosis of both carotid arteries     Bipolar affective disorder ([address]     Cataract, left eye     cataract - left    Chronic obstructive pulmonary disease ([address]     Closed fracture of lateral portion of left tibial plateau     Depression with anxiety     depression/anxiety    Encephalopathy     encephalopathy    Esophageal ulcer     esophageal ulcer    Gastroparesis     GERD (gastroesophageal reflux disease)     Hepatic steatosis     History of fall     hx falls    Hyperlipidemia     increased cholesterol    Hyperlipidemia     Hypertension     IBS (irritable bowel syndrome)     IBS    IGT (impaired glucose tolerance) 9/15/2015    Liver disease     elevated liver enzymes - being evaluated/US done x 2 - inflammed liver/\"fat on liver\"    Menopause 1987    Neurological disorder     delayed thinking process? ??    OA (osteoarthritis) of knee     Osteoporosis     Seizures (HCC)     Sunburn, blistering     Unspecified adverse effect of anesthesia     was told by neurologist not to use anesthesia unless needed due to encephalopathy    Unspecified adverse effect of anesthesia     hallucinations after MRI with sedation    Vertigo     vertigo       Allergies   Allergen Reactions   [facility] Other (comments)     Blood testing    Cat Dander Other (comments)     Blood testing    Dog Dander Other (comments)     Blood testing    Elavil Other (comments)     imbalance    [place] Other (comments)     Blood testing    Sulfa (Sulfonamide Antibiotics) Shortness of Breath     Past Surgical History:   Procedure Laterality Date    COLONOSCOPY N/A 8/1/2017    COL[facility] performed [facility] at [facility]OSCOPY    HX BREAST BIOPSY Left     Surgical    HX GI      laser surgery for reflux and hernia repair    HX MOHS PROCEDURES  09/05/2017    BCC L nasal sidewall by Dr. Zaid Apple HX MOHS PROCEDURES  06/04/2018    BCC R mid forehead by Dr. Ge Junior      GERD reflux       Current Outpatient Medications   Medication Sig    amLODIPine (NORVASC) 10 mg tablet     chlorthalidone (HYGROTON) 25 mg tablet TAKE 1 TABLET BY MOUTH EVERY DAY    furosemide (LASIX) 40 mg tablet TAKE 1 TABLET BY MOUTH EVERY MORNING    potassium chloride (K-DUR, KLOR-CON) 20 mEq tablet     vitamin E, dl,tocopheryl acet, (vitamin E acetate) 400 unit capsule     Allergy Relief, loratadine, 10 mg tablet     diclofenac (VOLTAREN) 1 % gel APPLY 2 GRAMS TO INSTRUCTED SITE ON LEFT LEG TWICE DAILY AS NEEDED FOR PAIN    acetaminophen (Tylenol Extra Strength) 500 mg pwpk Take 500 mg by mouth two (2) times a day.  omeprazole (PRILOSEC) 20 mg capsule Take 20 mg by mouth daily.  gabapentin (NEURONTIN) 100 mg capsule Take 2 Capsules by mouth nightly.  Max Daily Amount: 200 mg.    lamoTRIgine (LaMICtal) 50 mg disintegrating tablet DISSOLVE 1 TABLET ON THE TONGUE EVERY NIGHT    methenamine hippurate (HIPREX) 1 gram tablet Take 1 Tablet by mouth two (2) times daily (with meals).  montelukast (SINGULAIR) 10 mg tablet take 1 tablet by mouth once daily    loperamide (IMODIUM) 2 mg capsule 4 mg as needed.  BYSTOLIC 10 mg tablet Take 10 mg by mouth two (2) times a day.  sucralfate (CARAFATE) 1 gram tablet Take 1 g by mouth Before breakfast and dinner.  fluticasone-salmeterol (ADVAIR) 500-50 mcg/dose diskus inhaler Take 1 Puff by inhalation two (2) times a day.  rosuvastatin (CRESTOR) 5 mg tablet Take 1 Tab by mouth nightly. (Patient taking differently: Take 5 mg by mouth nightly. Indications: every other night)    albuterol (PROVENTIL HFA, VENTOLIN HFA, PROAIR HFA) 90 mcg/actuation inhaler Take 2 Puffs by inhalation every four (4) hours as needed for Wheezing.  albuterol (PROVENTIL VENTOLIN) 2.5 mg /3 mL (0.083 %) nebulizer solution 3 mL by Nebulization route every four (4) hours as needed for Wheezing.  Cholecalciferol, Vitamin D3, (VITAMIN D3) 1,000 unit cap Take 2,000 Units by mouth.  vitamin E (AQUA GEMS) 400 unit capsule Take 400 Units by mouth daily.  clonazePAM (KLONOPIN) 0.5 mg tablet Take 0.5 mg by mouth nightly as needed. (Patient not taking: Reported on 8/9/2021)    INCRUSE ELLIPTA 62.5 mcg/actuation inhaler  (Patient not taking: Reported on 8/9/2021)    LACTOBACILLUS ACIDOPHILUS (PROBIOTIC PO) Take  by mouth. Takes one po daily. (Patient not taking: Reported on 8/9/2021)    CALCIUM CARBONATE (TUMS PO) Take  by mouth as needed. (Patient not taking: Reported on 8/9/2021)     No current facility-administered medications for this visit. Review of Systems   Constitutional: Positive for malaise/fatigue. Eyes: Positive for blurred vision. Cardiovascular: Positive for leg swelling. Negative for chest pain. Gastrointestinal: Negative. Genitourinary: Negative. Musculoskeletal: Positive for back pain and joint pain. Neurological: Positive for weakness. Negative for dizziness and headaches. Psychiatric/Behavioral: Negative for depression. The patient is nervous/anxious. The patient does not have insomnia. Objective  Visit Vitals  /71 (BP 1 Location: Left upper arm, BP Patient Position: Sitting, BP Cuff Size: Adult)   Pulse 61   Temp 98.1 °F (36.7 °C) (Oral)   Resp 16   Ht 5' 3\" (1.6 m)   Wt 155 lb 12.8 oz (70.7 kg)   SpO2 97%   BMI 27.60 kg/m²     Physical Exam  Constitutional:       General: She is not in acute distress. Appearance: Normal appearance. She is not ill-appearing. Cardiovascular:      Rate and Rhythm: Normal rate. Pulses: Normal pulses. Heart sounds: Normal heart sounds. Pulmonary:      Effort: Pulmonary effort is normal.      Breath sounds: Normal breath sounds. Musculoskeletal:      Right lower leg: Edema present. Left lower leg: Edema present. Comments: Using rolling walker  Bilateral ankle edema and deformity; left >right   OA changes bilateral knee    Skin:     General: Skin is warm and dry. Findings: No bruising, erythema or rash. Comments: Bilateral great toe nails, darkly pigmented, mycotic   Neurological:      Mental Status: She is alert. Mental status is at baseline. Gait: Gait abnormal.   Psychiatric:         Attention and Perception: Attention normal.         Mood and Affect: Mood is anxious. Speech: Speech normal.         Behavior: Behavior is cooperative. Cognition and Memory: She exhibits impaired recent memory (mild). Assessment & Plan    ICD-10-CM ICD-9-CM    1. Encounter for Medicare annual wellness exam  Z00.00 V70.0    2. Essential hypertension  I10 401.9    3. Onychomycosis of great toe  B35.1 110.1 REFERRAL TO PODIATRY   4. Recurrent UTI  N39.0 599.0    5. Mild dementia ([facility] [address]  F03.90 294.20    6.  COPD with acute bronchitis ([facility]  J44.0 491.22     J20.9     7. Bipolar affective disorder, current episode mixed, current episode severity unspecified ([facility]  F31.60 296.60    8. Neuropathy  G62.9 355.9 gabapentin (NEURONTIN) 100 mg capsule      REFERRAL TO PHYSICAL THERAPY   9. Leg edema  R60.0 782.3    10. Impaired mobility  Z74.09 799.89 REFERRAL TO PHYSICAL THERAPY   11. Dependent on walker for ambulation  Z99.89 V46.8 REFERRAL TO PHYSICAL THERAPY   12. At maximum risk for fall  Z91.81 V15.88 REFERRAL TO PHYSICAL THERAPY   13. Arthritis  M19.90 716.90 REFERRAL TO PHYSICAL THERAPY     Follow-up and Dispositions    · Return in about 3 months (around 11/9/2021) for htn, edema leg. Follow-up and Disposition History        Normotensive     3. Discussed currently available Tx. Daughter and patient elect to defer treatment/referral.  Encourage proper foot care, keep nails trimmed. current treatment plan is effective, no change in therapy  Encouraged to wear compression stockings,   Reviewed fall prevention in and out doors  lab results and schedule of future lab studies reviewed with patient  reviewed medications and side effects in detail    Patient voices understanding and acceptance of this advice and will call back if any further questions or concerns. Max Chao NP   This is the Subsequent Medicare Annual Wellness Exam, performed 12 months or more after the Initial AWV or the last Subsequent AWV    I have reviewed the patient's medical history in detail and updated the computerized patient record. Assessment/Plan   Education and counseling provided:  Are appropriate based on today's review and evaluation    1. Encounter for Medicare annual wellness exam  2. Essential hypertension  3. Onychomycosis of great toe  -     REFERRAL TO PODIATRY  4. Recurrent UTI  5. Mild dementia ([facility]  6. COPD with acute bronchitis ([facility]  7. Bipolar affective disorder, current episode mixed, current episode severity unspecified ([facility]  8. Neuropathy  -     gabapentin (NEURONTIN) 100 mg capsule; Take 2 Capsules by mouth nightly. Max Daily Amount: 200 mg., Normal, Disp-180 Capsule, R-3  -     REFERRAL TO PHYSICAL THERAPY  9. Leg edema  10. Impaired mobility  -     REFERRAL TO PHYSICAL THERAPY  11. Dependent on walker for ambulation  -     REFERRAL TO PHYSICAL THERAPY  12. At maximum risk for fall  -     REFERRAL TO PHYSICAL THERAPY  13. Arthritis  -     REFERRAL TO PHYSICAL THERAPY       Depression Risk Factor Screening     3 most recent PHQ Screens 8/9/2021   Little interest or pleasure in doing things Not at all   Feeling down, depressed, irritable, or hopeless Not at all   Total Score PHQ 2 0   Trouble falling or staying asleep, or sleeping too much -   Feeling tired or having little energy -   Poor appetite, weight loss, or overeating -   Feeling bad about yourself - or that you are a failure or have let yourself or your family down -   Trouble concentrating on things such as school, work, reading, or watching TV -   Moving or speaking so slowly that other people could have noticed; or the opposite being so fidgety that others notice -   Thoughts of being better off dead, or hurting yourself in some way -   PHQ 9 Score -   How difficult have these problems made it for you to do your work, take care of your home and get along with others -       Alcohol Risk Screen    Do you average more than 1 drink per night or more than 7 drinks a week:  No, does not drink     On any one occasion in the past three months have you have had more than 3 drinks containing alcohol:  No        Functional Ability and Level of Safety    Hearing: The patient needs further evaluation. Activities of Daily Living:   The home contains: handrails and grab bars  Patient needs help with:  transportation, shopping, preparing meals, laundry, housework, managing medications, dressing, bathing and walking      Ambulation: with difficulty, uses a walker     Fall Risk:  Fall Risk Assessment, last 12 mths 8/9/2021   Able to walk? Yes   Fall in past 12 months? 1   Do you feel unsteady? 0   Are you worried about falling 0   Is the gait abnormal? 1   Number of falls in past 12 months 1   Fall with injury?  0      Abuse Screen:  Patient is not abused       Cognitive Screening    Has your family/caregiver stated any concerns about your memory: yes - impaired memory      Cognitive Screening: known mild dementia     Health Maintenance Due     Health Maintenance Due   Topic Date Due    COVID-19 Vaccine (1) Never done    DTaP/Tdap/Td series (1 - Tdap) Never done    Shingrix Vaccine Age 50> (1 of 2) Never done    Lipid Screen  08/10/2017       Patient Care Team   Patient Care Team:  Shannan Isidro NP as PCP - General (Family Medicine)  Shannan Isidro NP as PCP - [facility] Empaneled Provider  Call, Tika Valencia MD (Allergy)  Jenni Parker MD (Gastroenterology)  Tim Rodríguez MD (Inactive) (Cardiology)  Tequila Mo MD (Neurology)  Wil Harley MD (Ophthalmology)  Alicia Ledezma MD (Psychiatry)  Keila Gibbs MD (Neurology)    History     Patient Active Problem List   Diagnosis Code    Postconcussion syndrome F07.81    Asthma J45.909    GERD (gastroesophageal reflux disease) K21.9    Depressed F32.9    IBS (irritable bowel syndrome) K58.9    Occlusion and stenosis of carotid artery without mention of cerebral infarction I65.29    Dizziness and giddiness R42    Arthritis of knee, left M17.12    IGT (impaired glucose tolerance) R73.02    Hyperlipidemia E78.5    Essential hypertension I10    Bipolar affective disorder ([facility] F31.9    Advanced directives, counseling/discussion Z71.89    Asthma with COPD (chronic obstructive pulmonary disease) ([facility] J44.9    Closed fracture of lateral portion of left tibial plateau P75.832Q    OA (osteoarthritis) of knee M17.10    LELE (obstructive sleep apnea) G47.33     Past Medical History:   Diagnosis Date    Arthritis     Arthritis of knee, left     Asthma     Asthma with COPD (chronic obstructive pulmonary disease) (HCC)     FEV1 - 0.99, IgE high    Atherosclerosis of both carotid arteries     Bipolar affective disorder (HCC)     Cataract, left eye     cataract - left    Chronic obstructive pulmonary disease (HCC)     Closed fracture of lateral portion of left tibial plateau     Depression with anxiety     depression/anxiety    Encephalopathy     encephalopathy    Esophageal ulcer     esophageal ulcer    Gastroparesis     GERD (gastroesophageal reflux disease)     Hepatic steatosis     History of fall     hx falls    Hyperlipidemia     increased cholesterol    Hyperlipidemia     Hypertension     IBS (irritable bowel syndrome)     IBS    IGT (impaired glucose tolerance) 9/15/2015    Liver disease     elevated liver enzymes - being evaluated/US done x 2 - inflammed liver/\"fat on liver\"    Menopause 1987    Neurological disorder     delayed thinking process? ??    OA (osteoarthritis) of knee     Osteoporosis     Seizures (HCC)     Sunburn, blistering     Unspecified adverse effect of anesthesia     was told by neurologist not to use anesthesia unless needed due to encephalopathy    Unspecified adverse effect of anesthesia     hallucinations after MRI with sedation    Vertigo     vertigo      Past Surgical History:   Procedure Laterality Date    COLONOSCOPY N/A 8/1/2017    [facility] ENDOSCOPY    HX BREAST BIOPSY Left     Surgical    HX GI      laser surgery for reflux and hernia repair    HX MOHS PROCEDURES  09/05/2017    BCC L nasal sidewall by Dr. Pascual Forward  06/04/2018    [facility] R mid forehead by Dr. Breezy Doe      GERD reflux     Current Outpatient Medications   Medication Sig Dispense Refill    amLODIPine (NORVASC) 10 mg tablet       chlorthalidone (HYGROTON) 25 mg tablet TAKE 1 TABLET BY MOUTH EVERY DAY      furosemide (LASIX) 40 mg tablet TAKE 1 TABLET BY MOUTH EVERY MORNING      potassium chloride (K-DUR, KLOR-CON) 20 mEq tablet       vitamin E, dl,tocopheryl acet, (vitamin E acetate) 400 unit capsule       Allergy Relief, loratadine, 10 mg tablet       diclofenac (VOLTAREN) 1 % gel APPLY 2 GRAMS TO INSTRUCTED SITE ON LEFT LEG TWICE DAILY AS NEEDED FOR PAIN      acetaminophen (Tylenol Extra Strength) 500 mg pwpk Take 500 mg by mouth two (2) times a day.  omeprazole (PRILOSEC) 20 mg capsule Take 20 mg by mouth daily.  gabapentin (NEURONTIN) 100 mg capsule Take 2 Capsules by mouth nightly. Max Daily Amount: 200 mg. 180 Capsule 3    lamoTRIgine (LaMICtal) 50 mg disintegrating tablet DISSOLVE 1 TABLET ON THE TONGUE EVERY NIGHT 90 Tablet 3    methenamine hippurate (HIPREX) 1 gram tablet Take 1 Tablet by mouth two (2) times daily (with meals). 180 Tablet 3    montelukast (SINGULAIR) 10 mg tablet take 1 tablet by mouth once daily 90 Tab 1    loperamide (IMODIUM) 2 mg capsule 4 mg as needed.  BYSTOLIC 10 mg tablet Take 10 mg by mouth two (2) times a day. 2    sucralfate (CARAFATE) 1 gram tablet Take 1 g by mouth Before breakfast and dinner.  fluticasone-salmeterol (ADVAIR) 500-50 mcg/dose diskus inhaler Take 1 Puff by inhalation two (2) times a day. 3 Inhaler 1    rosuvastatin (CRESTOR) 5 mg tablet Take 1 Tab by mouth nightly. (Patient taking differently: Take 5 mg by mouth nightly. Indications: every other night) 90 Tab 1    albuterol (PROVENTIL HFA, VENTOLIN HFA, PROAIR HFA) 90 mcg/actuation inhaler Take 2 Puffs by inhalation every four (4) hours as needed for Wheezing. 1 Inhaler 2    albuterol (PROVENTIL VENTOLIN) 2.5 mg /3 mL (0.083 %) nebulizer solution 3 mL by Nebulization route every four (4) hours as needed for Wheezing. 1 Each 4    Cholecalciferol, Vitamin D3, (VITAMIN D3) 1,000 unit cap Take 2,000 Units by mouth.       vitamin E (AQUA GEMS) 400 unit capsule Take 400 Units by mouth daily.      clonazePAM (KLONOPIN) 0.5 mg tablet Take 0.5 mg by mouth nightly as needed. (Patient not taking: Reported on 2021)  0    INCRUSE ELLIPTA 62.5 mcg/actuation inhaler  (Patient not taking: Reported on 2021)  0    LACTOBACILLUS ACIDOPHILUS (PROBIOTIC PO) Take  by mouth. Takes one po daily. (Patient not taking: Reported on 2021)      CALCIUM CARBONATE (TUMS PO) Take  by mouth as needed.  (Patient not taking: Reported on 2021)       Allergies   Allergen Reactions    Claudell Kemps Other (comments)     Blood testing    Cat Dander Other (comments)     Blood testing    Dog Dander Other (comments)     Blood testing    Elavil Other (comments)     imbalance    [place] Other (comments)     Blood testing    Sulfa (Sulfonamide Antibiotics) Shortness of Breath       Family History   Problem Relation Age of Onset    Stroke Father     Heart Disease Father         angina    No Known Problems Mother     Cancer Brother         lung    Cancer Brother         lung     Social History     Tobacco Use    Smoking status: Former Smoker     Years: 20.00     Quit date:      Years since quittin.6    Smokeless tobacco: Never Used   Substance Use Topics    Alcohol use: No         Susanne Almanza NP

## 2021-08-11 NOTE — TELEPHONE ENCOUNTER
Referral faxed to [facility] as requested. [phone]. Confirmation received. Document placed in bin for centralized scanning.

## 2021-09-24 ENCOUNTER — TRANSCRIBE ORDER (OUTPATIENT)
Dept: SCHEDULING | Age: 86
End: 2021-09-24

## 2021-09-24 DIAGNOSIS — Z12.31 VISIT FOR SCREENING MAMMOGRAM: Primary | ICD-10-CM

## 2021-09-30 ENCOUNTER — VIRTUAL VISIT (OUTPATIENT)
Dept: INTERNAL MEDICINE CLINIC | Age: 86
End: 2021-09-30
Payer: MEDICARE

## 2021-09-30 DIAGNOSIS — B37.2 INTERTRIGINOUS CANDIDIASIS: ICD-10-CM

## 2021-09-30 DIAGNOSIS — Z12.31 ENCOUNTER FOR SCREENING MAMMOGRAM FOR MALIGNANT NEOPLASM OF BREAST: Primary | ICD-10-CM

## 2021-09-30 PROCEDURE — 99213 OFFICE O/P EST LOW 20 MIN: CPT | Performed by: NURSE PRACTITIONER

## 2021-09-30 RX ORDER — NYSTATIN AND TRIAMCINOLONE ACETONIDE 100000; 1 [USP'U]/G; MG/G
CREAM TOPICAL 2 TIMES DAILY
Qty: 30 G | Refills: 1 | Status: SHIPPED | OUTPATIENT
Start: 2021-09-30

## 2021-10-04 NOTE — PROGRESS NOTES
Subjective  Naa Gomez is a 80 y.o. female presents for acute care     Naa Gomez, who was evaluated through a synchronous (real-time) audio-video encounter, and/or her healthcare decision maker, is aware that it is a billable service, with coverage as determined by her insurance carrier. She provided verbal consent to proceed: Yes, and patient identification was verified. This visit was conducted pursuant to the emergency declaration under the [facility], [address] [place] [address] authority and the [employer] Act. A caregiver was present when appropriate. Ability to conduct physical exam was limited. The patient was located in a state where the provider was credentialed to provide care. --Krysta Horton NP on 10/4/2021 at 4:16 AM              HPI   She request referral for breast ultrasound. Due for mammogram   Denies breast mass   Believes ultrasound would be better imaging study  Reddish rash under both breast and  increased perspiration under breasts   She tries to keep area dry and uses  OTC medication for yeast infection       Objective  Physical Exam     Assessment & Plan    ICD-10-CM ICD-9-CM    1. Encounter for screening mammogram for malignant neoplasm of breast  Z12.31 V76.12 RINKU 3D DANIA W MAMMO BI SCREENING INCL CAD   2. Intertriginous candidiasis  B37.2 112.3 RINKU 3D DANIA W MAMMO BI SCREENING INCL CAD     Follow-up and Dispositions    · Return if symptoms worsen or fail to improve. 1. Encouraged to get mammogram  Advised breast US indicated if abnormal findings on mammogram   2. Discussed skin changes c/w with candidiasis , keep are clean, dry    reviewed medications and side effects in detail    Patient voices understanding and acceptance of this advice and will call back if any further questions or concerns.   Krysta Horton NP

## 2021-12-07 ENCOUNTER — NURSE TRIAGE (OUTPATIENT)
Dept: OTHER | Facility: CLINIC | Age: 86
End: 2021-12-07

## 2021-12-07 NOTE — TELEPHONE ENCOUNTER
Received call from Leatha at [facility] with [employer]. Brief description of triage: Pt's daughter calling to report symptoms of left leg bruise after injury. States injury happened 3 weeks ago and was diagnosed with a contusion. Daughter states bruise is not getting any better at this time. Describes as a hard, golf ball sized, tender knot. Denies bruise expanding at this time. Triage indicates for patient to: See PCP within 3 days     Care advice provided, patient verbalizes understanding; denies any other questions or concerns; instructed to call back for any new or worsening symptoms. Writer provided warm transfer to Mike at [facility] for appointment scheduling. Attention Provider: Thank you for allowing me to participate in the care of your patient. The patient was connected to triage in response to information provided to the [facility]. Please do not respond through this encounter as the response is not directed to a shared pool. Reason for Disposition   [1] After 10 days AND [2] bruise not fading    Answer Assessment - Initial Assessment Questions  1. APPEARANCE of BRUISE: \"Describe the bruise. \"       Hard knot    2. SIZE: \"How large is the bruise? \"       Golf ball    3. NUMBER: \"How many bruises are there? \"       One    4. LOCATION: \"Where is the bruise located? \"       Left lower leg    5. ONSET: \"How long ago did the bruise occur? \"       3 weeks ag    6. CAUSE: \"Tell me how it happened. \"      Nellie Dural off the couch    7. MEDICAL HISTORY: \"Do you have any medical problems that can cause easy bruising or bleeding? \" (e.g., leukemia, liver disease, recent chemotherapy)      No    8. MEDICATIONS : \"Do you take any medications which thin the blood such as: aspirin, heparin, ibuprofen (NSAIDS), Plavix, or Coumadin? \"      No    9. OTHER SYMPTOMS: \"Do you have any other symptoms? \"  (e.g., weakness, dizziness, pain, fever, nosebleed, blood in urine/stool)      Dizziness yesterday-went to ED and dx with PNA    10. PREGNANCY: \"Is there any chance you are pregnant? \" \"When was your last menstrual period? \"        No    Protocols used: BRUISES-ADULT-AH

## 2021-12-09 ENCOUNTER — OFFICE VISIT (OUTPATIENT)
Dept: INTERNAL MEDICINE CLINIC | Age: 86
End: 2021-12-09
Payer: MEDICARE

## 2021-12-09 VITALS
BODY MASS INDEX: 27.36 KG/M2 | SYSTOLIC BLOOD PRESSURE: 145 MMHG | WEIGHT: 154.4 LBS | HEART RATE: 63 BPM | HEIGHT: 63 IN | OXYGEN SATURATION: 98 % | DIASTOLIC BLOOD PRESSURE: 70 MMHG | TEMPERATURE: 98.3 F

## 2021-12-09 DIAGNOSIS — S89.92XD INJURY OF LEFT LOWER EXTREMITY, SUBSEQUENT ENCOUNTER: Primary | ICD-10-CM

## 2021-12-09 DIAGNOSIS — J44.9 ASTHMA WITH COPD (CHRONIC OBSTRUCTIVE PULMONARY DISEASE) (HCC): ICD-10-CM

## 2021-12-09 DIAGNOSIS — I10 ESSENTIAL HYPERTENSION: ICD-10-CM

## 2021-12-09 DIAGNOSIS — E78.5 HYPERLIPIDEMIA, UNSPECIFIED HYPERLIPIDEMIA TYPE: ICD-10-CM

## 2021-12-09 DIAGNOSIS — R60.0 EDEMA OF EXTREMITIES: ICD-10-CM

## 2021-12-09 DIAGNOSIS — R73.02 IGT (IMPAIRED GLUCOSE TOLERANCE): ICD-10-CM

## 2021-12-09 PROCEDURE — 1101F PT FALLS ASSESS-DOCD LE1/YR: CPT | Performed by: INTERNAL MEDICINE

## 2021-12-09 PROCEDURE — G8427 DOCREV CUR MEDS BY ELIG CLIN: HCPCS | Performed by: INTERNAL MEDICINE

## 2021-12-09 PROCEDURE — 1090F PRES/ABSN URINE INCON ASSESS: CPT | Performed by: INTERNAL MEDICINE

## 2021-12-09 PROCEDURE — G8419 CALC BMI OUT NRM PARAM NOF/U: HCPCS | Performed by: INTERNAL MEDICINE

## 2021-12-09 PROCEDURE — G9717 DOC PT DX DEP/BP F/U NT REQ: HCPCS | Performed by: INTERNAL MEDICINE

## 2021-12-09 PROCEDURE — 99214 OFFICE O/P EST MOD 30 MIN: CPT | Performed by: INTERNAL MEDICINE

## 2021-12-09 PROCEDURE — G8536 NO DOC ELDER MAL SCRN: HCPCS | Performed by: INTERNAL MEDICINE

## 2021-12-09 RX ORDER — SPIRONOLACTONE 25 MG/1
25 TABLET ORAL DAILY
COMMUNITY
Start: 2021-11-10

## 2021-12-09 RX ORDER — AMLODIPINE BESYLATE 2.5 MG/1
2.5 TABLET ORAL DAILY
Qty: 30 TABLET | Refills: 0
Start: 2021-12-09 | End: 2022-09-30 | Stop reason: ALTCHOICE

## 2021-12-09 NOTE — PROGRESS NOTES
RM 15    Chief Complaint   Patient presents with    Leg Injury     fell off the sofa in the night. them swelling started on leg. ER visit 3 weeks ago for the event. [facility] ER     Pneumonia     dx monday, put on azithomycin. having diarrhea       Visit Vitals  BP (!) 145/70   Pulse 63   Temp 98.3 °F (36.8 °C)   Ht 5' 3\" (1.6 m)   Wt 154 lb 6.4 oz (70 kg)   SpO2 98%   BMI 27.35 kg/m²       3 most recent PHQ Screens 8/9/2021   Little interest or pleasure in doing things Not at all   Feeling down, depressed, irritable, or hopeless Not at all   Total Score PHQ 2 0   Trouble falling or staying asleep, or sleeping too much -   Feeling tired or having little energy -   Poor appetite, weight loss, or overeating -   Feeling bad about yourself - or that you are a failure or have let yourself or your family down -   Trouble concentrating on things such as school, work, reading, or watching TV -   Moving or speaking so slowly that other people could have noticed; or the opposite being so fidgety that others notice -   Thoughts of being better off dead, or hurting yourself in some way -   PHQ 9 Score -   How difficult have these problems made it for you to do your work, take care of your home and get along with others -         1. Have you been to the ER, urgent care clinic since your last visit? Hospitalized since your last visit? ER visit 3 weeks ago to [facility] for leg injury, back to [facility] Monday for pneumonia     2. Have you seen or consulted any other health care providers outside of the [address] [place] [address] since your last visit? Include any pap smears or colon screening. Pulm.  [facility]     Health Maintenance Due   Topic Date Due    COVID-19 Vaccine (1) Never done    DTaP/Tdap/Td series (1 - Tdap) Never done    Shingrix Vaccine Age 50> (1 of 2) Never done    Lipid Screen  08/10/2017    Flu Vaccine (1) 09/01/2021       Learning Assessment 3/11/2020   PRIMARY LEARNER Patient   HIGHEST LEVEL OF EDUCATION - PRIMARY LEARNER  -   BARRIERS PRIMARY LEARNER -   CO-LEARNER CAREGIVER -   PRIMARY LANGUAGE ENGLISH   LEARNER PREFERENCE PRIMARY READING   ANSWERED BY patient   RELATIONSHIP SELF       AVS  education, follow up, and recommendations provided and addressed with patient.   services used to advise patient -no

## 2021-12-09 NOTE — PROGRESS NOTES
HPI:  established patient  Presents for f/u leg injury    Fell 3 weeks ago and seen at [facility] ER  Unclear what, if any w/u done  No known imaging done    Daughter present today, but pt not good historian and pt was alone in ER    Swelling - asked Cards and reduced amlodipine this past weekend to 2.5 mg - swelling improved. Dx pneumonia on Monday of this week  Presented with dizziness, not at her baseline. Seen [facility] ER   Taking azithromycin - but not fully compliant due to diarrhea  Video visit - this week with Dr. Paul Rose - pulmonary      Past medical, Social, and Family history reviewed    Prior to Admission medications    Medication Sig Start Date End Date Taking? Authorizing Provider   spironolactone (ALDACTONE) 25 mg tablet Take 25 mg by mouth daily. 11/10/21  Yes Provider, Historical   nystatin-triamcinolone (MYCOLOG II) topical cream Apply  to affected area two (2) times a day. Apply thin layer 9/30/21  Yes Julian Gibbs NP   amLODIPine (NORVASC) 10 mg tablet  8/7/21  Yes Provider, Historical   chlorthalidone (HYGROTON) 25 mg tablet TAKE 1 TABLET BY MOUTH EVERY DAY 8/5/21  Yes Provider, Historical   furosemide (LASIX) 40 mg tablet TAKE 1 TABLET BY MOUTH EVERY MORNING 7/19/21  Yes Provider, Historical   Allergy Relief, loratadine, 10 mg tablet  6/20/21  Yes Provider, Historical   diclofenac (VOLTAREN) 1 % gel APPLY 2 GRAMS TO INSTRUCTED SITE ON LEFT LEG TWICE DAILY AS NEEDED FOR PAIN 7/14/21  Yes Provider, Historical   acetaminophen (Tylenol Extra Strength) 500 mg pwpk Take 500 mg by mouth two (2) times a day. Yes Provider, Historical   omeprazole (PRILOSEC) 20 mg capsule Take 20 mg by mouth daily. Yes Provider, Historical   gabapentin (NEURONTIN) 100 mg capsule Take 2 Capsules by mouth nightly.  Max Daily Amount: 200 mg. 8/9/21  Yes Julian Gibbs NP   lamoTRIgine (LaMICtal) 50 mg disintegrating tablet DISSOLVE 1 TABLET ON THE TONGUE EVERY NIGHT 8/9/21  Yes Julian Gibbs NP   methenamine hippurate (HIPREX) 1 gram tablet Take 1 Tablet by mouth two (2) times daily (with meals). 8/9/21  Yes William Young NP   montelukast (SINGULAIR) 10 mg tablet take 1 tablet by mouth once daily 11/22/19  Yes William Young NP   loperamide (IMODIUM) 2 mg capsule 4 mg as needed. Yes Provider, Historical   BYSTOLIC 10 mg tablet Take 10 mg by mouth two (2) times a day. 7/17/19  Yes Provider, Historical   sucralfate (CARAFATE) 1 gram tablet Take 1 g by mouth Before breakfast and dinner. Yes Provider, Historical   fluticasone-salmeterol (ADVAIR) 500-50 mcg/dose diskus inhaler Take 1 Puff by inhalation two (2) times a day. 3/10/17  Yes William Young NP   rosuvastatin (CRESTOR) 5 mg tablet Take 1 Tab by mouth nightly. Patient taking differently: Take 5 mg by mouth nightly. Indications: every other night 1/18/17  Yes Lesly Pichardo MD   albuterol (PROVENTIL HFA, VENTOLIN HFA, PROAIR HFA) 90 mcg/actuation inhaler Take 2 Puffs by inhalation every four (4) hours as needed for Wheezing. 1/18/17  Yes Lesly Pichardo MD   albuterol (PROVENTIL VENTOLIN) 2.5 mg /3 mL (0.083 %) nebulizer solution 3 mL by Nebulization route every four (4) hours as needed for Wheezing. 10/31/16  Yes Lesly Pichardo MD   Cholecalciferol, Vitamin D3, (VITAMIN D3) 1,000 unit cap Take 2,000 Units by mouth. Yes Provider, Historical   CALCIUM CARBONATE (TUMS PO) Take  by mouth as needed. Yes Provider, Historical   potassium chloride (K-DUR, KLOR-CON) 20 mEq tablet  6/20/21   Provider, Historical   vitamin E, dl,tocopheryl acet, (vitamin E acetate) 400 unit capsule  6/20/21   Provider, Historical   clonazePAM (KLONOPIN) 0.5 mg tablet Take 0.5 mg by mouth nightly as needed. Patient not taking: Reported on 8/9/2021 8/20/19   Provider, Historical   INCRUSE ELLIPTA 62.5 mcg/actuation inhaler  10/1/18   Provider, Historical   vitamin E (AQUA GEMS) 400 unit capsule Take 400 Units by mouth daily.   Patient not taking: Reported on 12/9/2021    Provider, Historical LACTOBACILLUS ACIDOPHILUS (PROBIOTIC PO) Take  by mouth. Takes one po daily. Patient not taking: Reported on 8/9/2021    Provider, Historical          ROS  Complete ROS reviewed and negative or stable except as noted in HPI. Physical Exam  Vitals and nursing note reviewed. Constitutional:       General: She is not in acute distress. HENT:      Head: Normocephalic and atraumatic. Eyes:      General: No scleral icterus. Pupils: Pupils are equal, round, and reactive to light. Neck:      Vascular: No JVD. Cardiovascular:      Rate and Rhythm: Normal rate and regular rhythm. Heart sounds: Normal heart sounds. No murmur heard. No friction rub. No gallop. Pulmonary:      Effort: Pulmonary effort is normal. No respiratory distress. Breath sounds: Normal breath sounds. No wheezing or rales. Abdominal:      General: There is no distension. Palpations: Abdomen is soft. Tenderness: There is no abdominal tenderness. Musculoskeletal:         General: Normal range of motion. Cervical back: Normal range of motion and neck supple. Lymphadenopathy:      Cervical: No cervical adenopathy. Skin:     General: Skin is warm. Findings: No rash. Neurological:      Mental Status: She is alert and oriented to person, place, and time. Motor: No abnormal muscle tone. Prior labs reviewed. Assessment/Plan:    ICD-10-CM ICD-9-CM    1. Injury of left lower extremity, subsequent encounter  S89. 92XD V58.89 US EXT NONVAS LT LTD     959.7    2. Edema of extremities  R60.0 782.3 amLODIPine (NORVASC) 2.5 mg tablet      DUPLEX LOWER EXT VENOUS LEFT   3. Essential hypertension  I10 401.9    4. IGT (impaired glucose tolerance)  R73.02 790.22    5. Hyperlipidemia, unspecified hyperlipidemia type  E78.5 272.4    6.  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 results and schedule of future studies reviewed with patient  reviewed diet  and weight   cardiovascular risk and specific lipid/LDL goals reviewed  reviewed medications and side effects in detail    Probiotic   Doppler and local US of likely hematoma  Monitor for s/sx of infection - not present today  Continue current medications       An electronic signature was used to authenticate this note.   -- Glory Jean MD

## 2021-12-16 ENCOUNTER — VIRTUAL VISIT (OUTPATIENT)
Dept: INTERNAL MEDICINE CLINIC | Age: 86
End: 2021-12-16
Payer: MEDICARE

## 2021-12-16 DIAGNOSIS — R60.0 EDEMA OF EXTREMITIES: ICD-10-CM

## 2021-12-16 DIAGNOSIS — S89.92XD INJURY OF LEFT LOWER EXTREMITY, SUBSEQUENT ENCOUNTER: Primary | ICD-10-CM

## 2021-12-16 PROCEDURE — 99442 PR PHYS/QHP TELEPHONE EVALUATION 11-20 MIN: CPT | Performed by: NURSE PRACTITIONER

## 2021-12-19 NOTE — PROGRESS NOTES
Subjective  Phoenix Wallace is a 80 y.o. female presents for follow up   Phoenix Wallace is a 80 y.o. female, evaluated via audio-only technology on 12/16/2021 for No chief complaint on file. .    Assessment & Plan:       12  Subjective:       Prior to Admission medications    Medication Sig Start Date End Date Taking? Authorizing Provider   spironolactone (ALDACTONE) 25 mg tablet Take 25 mg by mouth daily. 11/10/21   Provider, Historical   amLODIPine (NORVASC) 2.5 mg tablet Take 1 Tablet by mouth daily. 12/9/21   Lesly Pichardo MD   nystatin-triamcinolone [facility] II) topical cream Apply  to affected area two (2) times a day. Apply thin layer 9/30/21   Dunia CAVAZOS NP   chlorthalidone (HYGROTON) 25 mg tablet TAKE 1 TABLET BY MOUTH EVERY DAY 8/5/21   Provider, Historical   furosemide (LASIX) 40 mg tablet TAKE 1 TABLET BY MOUTH EVERY MORNING 7/19/21   Provider, Historical   potassium chloride (K-DUR, KLOR-CON) 20 mEq tablet  6/20/21   Provider, Historical   vitamin E, dl,tocopheryl acet, (vitamin E acetate) 400 unit capsule  6/20/21   Provider, Historical   Allergy Relief, loratadine, 10 mg tablet  6/20/21   Provider, Historical   diclofenac (VOLTAREN) 1 % gel APPLY 2 GRAMS TO INSTRUCTED SITE ON LEFT LEG TWICE DAILY AS NEEDED FOR PAIN 7/14/21   Provider, Historical   acetaminophen (Tylenol Extra Strength) 500 mg pwpk Take 500 mg by mouth two (2) times a day. Provider, Historical   omeprazole (PRILOSEC) 20 mg capsule Take 20 mg by mouth daily. Provider, Historical   gabapentin (NEURONTIN) 100 mg capsule Take 2 Capsules by mouth nightly. Max Daily Amount: 200 mg. 8/9/21   Dunia CAVAZOS NP   lamoTRIgine (LaMICtal) 50 mg disintegrating tablet DISSOLVE 1 TABLET ON THE TONGUE EVERY NIGHT 8/9/21   William Young NP   methenamine hippurate (HIPREX) 1 gram tablet Take 1 Tablet by mouth two (2) times daily (with meals).  8/9/21   Williamwally Young NP   montelukast (SINGULAIR) 10 mg tablet take 1 tablet by mouth once daily 11/22/19   Tarik CAVAZOS NP   loperamide (IMODIUM) 2 mg capsule 4 mg as needed. Provider, Historical   clonazePAM (KLONOPIN) 0.5 mg tablet Take 0.5 mg by mouth nightly as needed. Patient not taking: Reported on 8/9/2021 8/20/19   Provider, Historical   BYSTOLIC 10 mg tablet Take 10 mg by mouth two (2) times a day. 7/17/19   Provider, Historical   INCRUSE ELLIPTA 62.5 mcg/actuation inhaler  10/1/18   Provider, Historical   sucralfate (CARAFATE) 1 gram tablet Take 1 g by mouth Before breakfast and dinner. Provider, Historical   fluticasone-salmeterol (ADVAIR) 500-50 mcg/dose diskus inhaler Take 1 Puff by inhalation two (2) times a day. 3/10/17   Tarik CAVAZOS NP   rosuvastatin (CRESTOR) 5 mg tablet Take 1 Tab by mouth nightly. Patient taking differently: Take 5 mg by mouth nightly. Indications: every other night 1/18/17   Latonya Seals MD   albuterol (PROVENTIL HFA, VENTOLIN HFA, PROAIR HFA) 90 mcg/actuation inhaler Take 2 Puffs by inhalation every four (4) hours as needed for Wheezing. 1/18/17   Latonya Seals MD   albuterol (PROVENTIL VENTOLIN) 2.5 mg /3 mL (0.083 %) nebulizer solution 3 mL by Nebulization route every four (4) hours as needed for Wheezing. 10/31/16   Latonya Seals MD   Cholecalciferol, Vitamin D3, (VITAMIN D3) 1,000 unit cap Take 2,000 Units by mouth. Provider, Historical   vitamin E (AQUA GEMS) 400 unit capsule Take 400 Units by mouth daily. Patient not taking: Reported on 12/9/2021    Provider, Historical   LACTOBACILLUS ACIDOPHILUS (PROBIOTIC PO) Take  by mouth. Takes one po daily. Patient not taking: Reported on 8/9/2021    Provider, Historical   CALCIUM CARBONATE (TUMS PO) Take  by mouth as needed. Provider, Historical     Patient Active Problem List   Diagnosis Code    Postconcussion syndrome F07.81    Asthma J45.909    GERD (gastroesophageal reflux disease) K21.9    Depressed F32. 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) ([facility]) J44.9    Closed fracture of lateral portion of left tibial plateau O05.688S    OA (osteoarthritis) of knee M17.10    LELE (obstructive sleep apnea) G47.33     Patient Active Problem List    Diagnosis Date Noted    LELE (obstructive sleep apnea) 05/10/2017    Closed fracture of lateral portion of left tibial plateau     OA (osteoarthritis) of knee     Asthma with COPD (chronic obstructive pulmonary disease) ([facility] [address]     Advanced directives, counseling/discussion 01/31/2016    Bipolar affective disorder ([facility] [address]     Essential hypertension 09/29/2015    IGT (impaired glucose tolerance) 09/15/2015    Arthritis of knee, left     Hyperlipidemia     Postconcussion syndrome 09/12/2013    Asthma 09/12/2013    GERD (gastroesophageal reflux disease) 09/12/2013    Depressed 09/12/2013    IBS (irritable bowel syndrome) 09/12/2013    Occlusion and stenosis of carotid artery without mention of cerebral infarction 09/12/2013    Dizziness and giddiness 09/12/2013     Current Outpatient Medications   Medication Sig Dispense Refill    spironolactone (ALDACTONE) 25 mg tablet Take 25 mg by mouth daily.  amLODIPine (NORVASC) 2.5 mg tablet Take 1 Tablet by mouth daily. 30 Tablet 0    nystatin-triamcinolone (MYCOLOG II) topical cream Apply  to affected area two (2) times a day.  Apply thin layer 30 g 1    chlorthalidone (HYGROTON) 25 mg tablet TAKE 1 TABLET BY MOUTH EVERY DAY      furosemide (LASIX) 40 mg tablet TAKE 1 TABLET BY MOUTH EVERY MORNING      potassium chloride (K-DUR, KLOR-CON) 20 mEq tablet  (Patient not taking: Reported on 12/9/2021)      vitamin E, dl,tocopheryl acet, (vitamin E acetate) 400 unit capsule  (Patient not taking: Reported on 12/9/2021)      Allergy Relief, loratadine, 10 mg tablet       diclofenac (VOLTAREN) 1 % gel APPLY 2 GRAMS TO INSTRUCTED SITE ON LEFT LEG TWICE DAILY AS NEEDED FOR PAIN      acetaminophen (Tylenol Extra Strength) 500 mg pwpk Take 500 mg by mouth two (2) times a day.  omeprazole (PRILOSEC) 20 mg capsule Take 20 mg by mouth daily.  gabapentin (NEURONTIN) 100 mg capsule Take 2 Capsules by mouth nightly. Max Daily Amount: 200 mg. 180 Capsule 3    lamoTRIgine (LaMICtal) 50 mg disintegrating tablet DISSOLVE 1 TABLET ON THE TONGUE EVERY NIGHT 90 Tablet 3    methenamine hippurate (HIPREX) 1 gram tablet Take 1 Tablet by mouth two (2) times daily (with meals). 180 Tablet 3    montelukast (SINGULAIR) 10 mg tablet take 1 tablet by mouth once daily 90 Tab 1    loperamide (IMODIUM) 2 mg capsule 4 mg as needed.  clonazePAM (KLONOPIN) 0.5 mg tablet Take 0.5 mg by mouth nightly as needed. (Patient not taking: Reported on 3/7/2098)  0    BYSTOLIC 10 mg tablet Take 10 mg by mouth two (2) times a day. 2    INCRUSE ELLIPTA 62.5 mcg/actuation inhaler  (Patient not taking: Reported on 8/9/2021)  0    sucralfate (CARAFATE) 1 gram tablet Take 1 g by mouth Before breakfast and dinner.  fluticasone-salmeterol (ADVAIR) 500-50 mcg/dose diskus inhaler Take 1 Puff by inhalation two (2) times a day. 3 Inhaler 1    rosuvastatin (CRESTOR) 5 mg tablet Take 1 Tab by mouth nightly. (Patient taking differently: Take 5 mg by mouth nightly. Indications: every other night) 90 Tab 1    albuterol (PROVENTIL HFA, VENTOLIN HFA, PROAIR HFA) 90 mcg/actuation inhaler Take 2 Puffs by inhalation every four (4) hours as needed for Wheezing. 1 Inhaler 2    albuterol (PROVENTIL VENTOLIN) 2.5 mg /3 mL (0.083 %) nebulizer solution 3 mL by Nebulization route every four (4) hours as needed for Wheezing.  1 Each 4    Cholecalciferol, Vitamin D3, (VITAMIN D3) 1,000 unit cap Take 2,000 Units by mouth.  vitamin E (AQUA GEMS) 400 unit capsule Take 400 Units by mouth daily. (Patient not taking: Reported on 12/9/2021)      LACTOBACILLUS ACIDOPHILUS (PROBIOTIC PO) Take  by mouth. Takes one po daily. (Patient not taking: Reported on 8/9/2021)      CALCIUM CARBONATE (TUMS PO) Take  by mouth as needed. Allergies   Allergen Reactions   Brenita Many Other (comments)     Blood testing    Cat Dander Other (comments)     Blood testing    Dog Dander Other (comments)     Blood testing    Elavil Other (comments)     imbalance    Sherwood Other (comments)     Blood testing    Sulfa (Sulfonamide Antibiotics) Shortness of Breath     Past Medical History:   Diagnosis Date    Arthritis     Arthritis of knee, left     Asthma     Asthma with COPD (chronic obstructive pulmonary disease) ([facility])     FEV1 - 0.99, IgE high    Atherosclerosis of both carotid arteries     Bipolar affective disorder (HCC)     Cataract, left eye     cataract - left    Chronic obstructive pulmonary disease (HCC)     Closed fracture of lateral portion of left tibial plateau     Depression with anxiety     depression/anxiety    Encephalopathy     encephalopathy    Esophageal ulcer     esophageal ulcer    Gastroparesis     GERD (gastroesophageal reflux disease)     Hepatic steatosis     History of fall     hx falls    Hyperlipidemia     increased cholesterol    Hyperlipidemia     Hypertension     IBS (irritable bowel syndrome)     IBS    IGT (impaired glucose tolerance) 9/15/2015    Liver disease     elevated liver enzymes - being evaluated/US done x 2 - inflammed liver/\"fat on liver\"    Menopause 1987    Neurological disorder     delayed thinking process? ??    OA (osteoarthritis) of knee     Osteoporosis     Seizures (HCC)     Sunburn, blistering     Unspecified adverse effect of anesthesia     was told by neurologist not to use anesthesia unless needed due to encephalopathy    Unspecified adverse effect of anesthesia     hallucinations after MRI with sedation    Vertigo     vertigo     Past Surgical History:   Procedure Laterality Date    COLONOSCOPY N/A 8/1/2017    COLONOSCOPY performed by Federico Love MD at [facility] END[facility]    HX BREAST BIOPSY Left     Surgical    HX GI      laser surgery for reflux and hernia repair    HX MOHS PROCEDURES  09/05/2017    BCC L nasal sidewall by Dr. Lynn Christianson  06/04/2018    [facility] R mid forehead by Dr. William Morel      GERD reflux       Review of Systems   Constitutional: Negative. Eyes: Negative. Respiratory: Positive for cough and shortness of breath. Cardiovascular: Positive for leg swelling. Gastrointestinal: Negative. Genitourinary: Positive for frequency. Musculoskeletal: Positive for back pain and myalgias. Skin: Negative. Neurological: Negative for dizziness and headaches. Psychiatric/Behavioral: Positive for depression. Negative for suicidal ideas. The patient is nervous/anxious. No flowsheet data found. Светлана Carrera, who was evaluated through a patient-initiated, synchronous (real-time) audio only encounter, and/or her healthcare decision maker, is aware that it is a billable service, with coverage as determined by her insurance carrier. She provided verbal consent to proceed: Yes. She has not had a related appointment within my department in the past 7 days or scheduled within the next 24 hours. On this date 12/16/2021 I have spent 11-20 minutes reviewing previous notes, test results and face to face (virtual) with the patient discussing the diagnosis and importance of compliance with the treatment plan as well as documenting on the day of the visit.     Maria Victoria Bass NP   .  HPI   Patient seen by Dr. Lucinda Marks 12/9 for evaluation of LLE injury and bilateral LE edema   She did not get venous doppler ordered by provider Daughter, Brittney Julien, works as senior pharmacist at hospital and is unable to get off work to take her     Swelling improved in feet and legs   She is propping her feet and increased fluid intake   Takes 1 fluid pill daily    She is still coughing up yellow sputum. Completed Abx prescribed for pneumonia,  By Pulmonary specialist      Denies unusual SOB or fever     She is worried about mammogram and plans to call herself to schedule appointment     She is \"thinking of negative things\"        Objective  Physical Exam     Assessment & Plan    ICD-10-CM ICD-9-CM    1. Injury of left lower extremity, subsequent encounter  S89. 92XD V58.89      959.7    2.  Edema of extremities  R60.0 782.3          lab results and schedule of future lab studies reviewed with patient  reviewed diet, exercise and weight control  reviewed medications and side effects in detail  radiology results and schedule of future radiology studies reviewed with patient  Bandar Azul NP

## 2021-12-21 ENCOUNTER — APPOINTMENT (OUTPATIENT)
Dept: ULTRASOUND IMAGING | Age: 86
End: 2021-12-21
Attending: INTERNAL MEDICINE
Payer: MEDICARE

## 2021-12-21 ENCOUNTER — HOSPITAL ENCOUNTER (OUTPATIENT)
Dept: ULTRASOUND IMAGING | Age: 86
Discharge: HOME | End: 2021-12-21
Attending: INTERNAL MEDICINE
Payer: MEDICARE

## 2021-12-21 DIAGNOSIS — R60.0 EDEMA OF EXTREMITIES: ICD-10-CM

## 2021-12-21 PROCEDURE — 93971 EXTREMITY STUDY: CPT

## 2022-02-02 ENCOUNTER — TELEPHONE (OUTPATIENT)
Dept: INTERNAL MEDICINE CLINIC | Age: 87
End: 2022-02-02

## 2022-02-02 NOTE — TELEPHONE ENCOUNTER
----- Message from Marisue Gitelman sent at 2/2/2022 11:53 AM EST -----  Subject: Referral Request    QUESTIONS   Reason for referral request? Physical Therapy to help build strength. Has the physician seen you for this condition before? No   Preferred Specialist (if applicable)? Do you already have an appointment scheduled? No  Additional Information for Provider? Patient would [facility]   [facility]. Fax# [phone] Phone #[phone]. Please   advise.   ---------------------------------------------------------------------------  --------------  CALL BACK INFO  What is the best way for the office to contact you? OK to leave message on   voicemail  Preferred Call Back Phone Number?  [phone]

## 2022-02-02 NOTE — TELEPHONE ENCOUNTER
Open referral to PT in chart. Has been printed, along with demographics sheet, and last office visit notes. All faxed to [employer] at number provided. [phone]. Transmission log received and placed in scanning.

## 2022-02-04 ENCOUNTER — TELEPHONE (OUTPATIENT)
Dept: INTERNAL MEDICINE CLINIC | Age: 87
End: 2022-02-04

## 2022-02-04 NOTE — TELEPHONE ENCOUNTER
Home health called requesting a verbal late start date of patient to begin on the 9/2/7/2022  Please call [facility] [phone]

## 2022-02-10 ENCOUNTER — TELEPHONE (OUTPATIENT)
Dept: INTERNAL MEDICINE CLINIC | Age: 87
End: 2022-02-10

## 2022-02-10 NOTE — TELEPHONE ENCOUNTER
Home care called stating that they had a late start of care, it started yesterday and they will be picking her for occupational and physical therapy.  If you have any questions please call  Paula Grajeda at [phone]

## 2022-02-21 ENCOUNTER — TRANSCRIBE ORDER (OUTPATIENT)
Dept: SCHEDULING | Age: 87
End: 2022-02-21

## 2022-02-21 DIAGNOSIS — Z12.31 VISIT FOR SCREENING MAMMOGRAM: Primary | ICD-10-CM

## 2022-03-16 ENCOUNTER — HOSPITAL ENCOUNTER (OUTPATIENT)
Dept: MAMMOGRAPHY | Age: 87
Discharge: HOME | End: 2022-03-16
Attending: INTERNAL MEDICINE

## 2022-03-16 DIAGNOSIS — Z12.31 VISIT FOR SCREENING MAMMOGRAM: ICD-10-CM

## 2022-03-19 PROBLEM — G47.33 OSA (OBSTRUCTIVE SLEEP APNEA): Status: ACTIVE | Noted: 2017-05-10

## 2022-03-23 ENCOUNTER — TELEPHONE (OUTPATIENT)
Dept: INTERNAL MEDICINE CLINIC | Age: 87
End: 2022-03-23

## 2022-03-23 ENCOUNTER — HOSPITAL ENCOUNTER (OUTPATIENT)
Dept: MAMMOGRAPHY | Age: 87
Discharge: HOME | End: 2022-03-23
Attending: INTERNAL MEDICINE
Payer: MEDICARE

## 2022-03-23 DIAGNOSIS — N64.52 BREAST DISCHARGE: ICD-10-CM

## 2022-03-23 PROCEDURE — 77062 BREAST TOMOSYNTHESIS BI: CPT

## 2022-03-23 PROCEDURE — 76642 ULTRASOUND BREAST LIMITED: CPT

## 2022-03-23 NOTE — TELEPHONE ENCOUNTER
----- Message from Claudy Breen sent at 3/22/2022  5:02 PM EDT -----  Subject: Message to Provider    QUESTIONS  Information for Provider? pt of Dr. Es Booker just wanting Dr to know that pt   is going for her mammogram tomorrow 3/23  ---------------------------------------------------------------------------  --------------  [address]  What is the best way for the office to contact you? Do not leave any   message, patient will call back for answer  Preferred Call Back Phone Number? [phone]  ---------------------------------------------------------------------------  --------------  SCRIPT ANSWERS  Relationship to Patient?  Self

## 2022-04-04 DIAGNOSIS — G62.9 NEUROPATHY: ICD-10-CM

## 2022-04-04 RX ORDER — GABAPENTIN 100 MG/1
200 CAPSULE ORAL
Qty: 180 CAPSULE | Refills: 3 | Status: CANCELLED | OUTPATIENT
Start: 2022-04-04

## 2022-04-04 RX ORDER — GABAPENTIN 100 MG/1
CAPSULE ORAL
Qty: 180 CAPSULE | Refills: 0 | Status: SHIPPED | OUTPATIENT
Start: 2022-04-04 | End: 2022-04-07

## 2022-04-04 NOTE — TELEPHONE ENCOUNTER
Duplicate request: Neurontin 100 mg #180 was sent to [address] [place] [address] on file on 04/04/2022. No access to     Requested Prescriptions     Pending Prescriptions Disp Refills    gabapentin (NEURONTIN) 100 mg capsule 180 Capsule 3     Sig: Take 2 Capsules by mouth nightly. Max Daily Amount: 200 mg.

## 2022-04-07 DIAGNOSIS — G62.9 NEUROPATHY: ICD-10-CM

## 2022-04-07 RX ORDER — GABAPENTIN 100 MG/1
100 CAPSULE ORAL 3 TIMES DAILY
Qty: 270 CAPSULE | Refills: 1 | Status: SHIPPED | OUTPATIENT
Start: 2022-04-07 | End: 2022-10-10 | Stop reason: SDUPTHER

## 2022-04-19 ENCOUNTER — OFFICE VISIT (OUTPATIENT)
Dept: INTERNAL MEDICINE CLINIC | Age: 87
End: 2022-04-19
Payer: MEDICARE

## 2022-04-19 VITALS
OXYGEN SATURATION: 95 % | WEIGHT: 156 LBS | BODY MASS INDEX: 27.64 KG/M2 | HEIGHT: 63 IN | HEART RATE: 63 BPM | TEMPERATURE: 98 F | SYSTOLIC BLOOD PRESSURE: 134 MMHG | DIASTOLIC BLOOD PRESSURE: 84 MMHG

## 2022-04-19 DIAGNOSIS — E78.5 HYPERLIPIDEMIA, UNSPECIFIED HYPERLIPIDEMIA TYPE: ICD-10-CM

## 2022-04-19 DIAGNOSIS — R73.02 IGT (IMPAIRED GLUCOSE TOLERANCE): ICD-10-CM

## 2022-04-19 DIAGNOSIS — J20.9 COPD WITH ACUTE BRONCHITIS (HCC): Primary | ICD-10-CM

## 2022-04-19 DIAGNOSIS — F31.60 BIPOLAR AFFECTIVE DISORDER, CURRENT EPISODE MIXED, CURRENT EPISODE SEVERITY UNSPECIFIED (HCC): ICD-10-CM

## 2022-04-19 DIAGNOSIS — F03.A0 MILD DEMENTIA: ICD-10-CM

## 2022-04-19 DIAGNOSIS — J44.9 ASTHMA WITH COPD (CHRONIC OBSTRUCTIVE PULMONARY DISEASE) (HCC): ICD-10-CM

## 2022-04-19 DIAGNOSIS — I10 ESSENTIAL HYPERTENSION: ICD-10-CM

## 2022-04-19 DIAGNOSIS — G47.33 OSA (OBSTRUCTIVE SLEEP APNEA): ICD-10-CM

## 2022-04-19 DIAGNOSIS — H91.93 BILATERAL HEARING LOSS, UNSPECIFIED HEARING LOSS TYPE: ICD-10-CM

## 2022-04-19 DIAGNOSIS — J44.0 COPD WITH ACUTE BRONCHITIS (HCC): Primary | ICD-10-CM

## 2022-04-19 PROCEDURE — G8419 CALC BMI OUT NRM PARAM NOF/U: HCPCS | Performed by: INTERNAL MEDICINE

## 2022-04-19 PROCEDURE — G8536 NO DOC ELDER MAL SCRN: HCPCS | Performed by: INTERNAL MEDICINE

## 2022-04-19 PROCEDURE — 1090F PRES/ABSN URINE INCON ASSESS: CPT | Performed by: INTERNAL MEDICINE

## 2022-04-19 PROCEDURE — 1101F PT FALLS ASSESS-DOCD LE1/YR: CPT | Performed by: INTERNAL MEDICINE

## 2022-04-19 PROCEDURE — G9717 DOC PT DX DEP/BP F/U NT REQ: HCPCS | Performed by: INTERNAL MEDICINE

## 2022-04-19 PROCEDURE — 99214 OFFICE O/P EST MOD 30 MIN: CPT | Performed by: INTERNAL MEDICINE

## 2022-04-19 PROCEDURE — G8427 DOCREV CUR MEDS BY ELIG CLIN: HCPCS | Performed by: INTERNAL MEDICINE

## 2022-04-19 RX ORDER — MELOXICAM 15 MG/1
15 TABLET ORAL DAILY
COMMUNITY
Start: 2022-04-14

## 2022-04-19 RX ORDER — ALBUTEROL SULFATE 0.83 MG/ML
2.5 SOLUTION RESPIRATORY (INHALATION)
Qty: 30 NEBULE | Refills: 3 | Status: SHIPPED | OUTPATIENT
Start: 2022-04-19

## 2022-04-19 RX ORDER — PREDNISONE 10 MG/1
TABLET ORAL
Qty: 21 TABLET | Refills: 0 | Status: SHIPPED | OUTPATIENT
Start: 2022-04-19 | End: 2022-09-30 | Stop reason: ALTCHOICE

## 2022-04-19 NOTE — PROGRESS NOTES
HPI:  established patient  Presents for f/u asthma/COPD, lipids, HTN, etc    Accompanied by daughter    Increased resp sx in recent week or so  Had been doing advair and singulair  Had not needed albuterol until recently    No fever, chills. Gabapentin helps with neuropathy    Ortho Rx'd mobic   Considering knee injection with lubricant product. Has used diclofenac gel     Uses walker for ambulation    Past medical, Social, and Family history reviewed    Prior to Admission medications    Medication Sig Start Date End Date Taking? Authorizing Provider   meloxicam (MOBIC) 15 mg tablet Take 15 mg by mouth daily. 4/14/22  Yes Provider, Historical   spironolactone (ALDACTONE) 25 mg tablet Take 25 mg by mouth daily. 11/10/21  Yes Provider, Historical   amLODIPine (NORVASC) 2.5 mg tablet Take 1 Tablet by mouth daily. 12/9/21  Yes Rahul Ortiz MD   nystatin-triamcinolone [facility] II) topical cream Apply  to affected area two (2) times a day. Apply thin layer 9/30/21  Yes Sabas Koch NP   chlorthalidone (HYGROTON) 25 mg tablet TAKE 1 TABLET BY MOUTH EVERY DAY 8/5/21  Yes Provider, Historical   diclofenac (VOLTAREN) 1 % gel APPLY 2 GRAMS TO INSTRUCTED SITE ON LEFT LEG TWICE DAILY AS NEEDED FOR PAIN 7/14/21  Yes Provider, Historical   acetaminophen (Tylenol Extra Strength) 500 mg pwpk Take 500 mg by mouth two (2) times a day. Yes Provider, Historical   omeprazole (PRILOSEC) 20 mg capsule Take 20 mg by mouth daily. Yes Provider, Historical   methenamine hippurate (HIPREX) 1 gram tablet Take 1 Tablet by mouth two (2) times daily (with meals). 8/9/21  Yes Sabas Koch NP   montelukast (SINGULAIR) 10 mg tablet take 1 tablet by mouth once daily 11/22/19  Yes Jae Garrison NP   BYSTOLIC 10 mg tablet Take 10 mg by mouth two (2) times a day. 7/17/19  Yes Provider, Historical   sucralfate (CARAFATE) 1 gram tablet Take 1 g by mouth Before breakfast and dinner.    Yes Provider, Historical   fluticasone-salmeterol (ADVAIR) 500-50 mcg/dose diskus inhaler Take 1 Puff by inhalation two (2) times a day. 3/10/17  Yes Kulwinder Patel NP   albuterol (PROVENTIL HFA, VENTOLIN HFA, PROAIR HFA) 90 mcg/actuation inhaler Take 2 Puffs by inhalation every four (4) hours as needed for Wheezing. 1/18/17  Yes Awais Tavarez MD   Cholecalciferol, Vitamin D3, (VITAMIN D3) 1,000 unit cap Take 2,000 Units by mouth. Yes Provider, Historical   CALCIUM CARBONATE (TUMS PO) Take  by mouth as needed. Yes Provider, Historical   gabapentin (NEURONTIN) 100 mg capsule Take 1 Capsule by mouth three (3) times daily. Max Daily Amount: 300 mg. As needed. 4/7/22   Awais Tavarez MD   furosemide (LASIX) 40 mg tablet TAKE 1 TABLET BY MOUTH EVERY MORNING 7/19/21   Provider, Historical   potassium chloride (K-DUR, KLOR-CON) 20 mEq tablet  6/20/21   Provider, Historical   vitamin E, dl,tocopheryl acet, (vitamin E acetate) 400 unit capsule  6/20/21   Provider, Historical   Allergy Relief, loratadine, 10 mg tablet  6/20/21   Provider, Historical   lamoTRIgine (LaMICtal) 50 mg disintegrating tablet DISSOLVE 1 TABLET ON THE TONGUE EVERY NIGHT 8/9/21   Afshin CAVAZOS NP   loperamide (IMODIUM) 2 mg capsule 4 mg as needed. Provider, Historical   clonazePAM (KLONOPIN) 0.5 mg tablet Take 0.5 mg by mouth nightly as needed. Patient not taking: Reported on 8/9/2021 8/20/19   Provider, Historical   INCRUSE ELLIPTA 62.5 mcg/actuation inhaler  10/1/18   Provider, Historical   rosuvastatin (CRESTOR) 5 mg tablet Take 1 Tab by mouth nightly. Patient taking differently: Take 5 mg by mouth nightly. Indications: every other night 1/18/17   Awais Tavarez MD   albuterol (PROVENTIL VENTOLIN) 2.5 mg /3 mL (0.083 %) nebulizer solution 3 mL by Nebulization route every four (4) hours as needed for Wheezing. 10/31/16   Awais Tavarez MD   vitamin E (AQUA GEMS) 400 unit capsule Take 400 Units by mouth daily.   Patient not taking: Reported on 12/9/2021    Provider, Historical   LACTOBACILLUS ACIDOPHILUS (PROBIOTIC PO) Take  by mouth. Takes one po daily. Patient not taking: Reported on 8/9/2021    Provider, Historical          ROS  Complete ROS reviewed and negative or stable except as noted in HPI. Physical Exam  Vitals and nursing note reviewed. Constitutional:       General: She is not in acute distress. HENT:      Head: Normocephalic and atraumatic. Eyes:      General: No scleral icterus. Pupils: Pupils are equal, round, and reactive to light. Neck:      Vascular: No JVD. Cardiovascular:      Rate and Rhythm: Normal rate and regular rhythm. Heart sounds: Normal heart sounds. No murmur heard. No friction rub. No gallop. Pulmonary:      Effort: Pulmonary effort is normal. No respiratory distress. Breath sounds: No stridor. Wheezing (expiratory) and rhonchi (rare) present. No rales. Abdominal:      General: There is no distension. Palpations: Abdomen is soft. Tenderness: There is no abdominal tenderness. Musculoskeletal:         General: Normal range of motion. Cervical back: Normal range of motion and neck supple. Lymphadenopathy:      Cervical: No cervical adenopathy. Skin:     General: Skin is warm. Findings: No rash. Neurological:      Mental Status: She is alert and oriented to person, place, and time. Motor: No abnormal muscle tone. Prior labs reviewed. Assessment/Plan:    ICD-10-CM ICD-9-CM    1. COPD with acute bronchitis ([facility]  J44.0 491.22     J20.9     2. Asthma with COPD (chronic obstructive pulmonary disease) ([facility])  J44.9 493.20 predniSONE (DELTASONE) 10 mg tablet      inhalational spacing device      albuterol (PROVENTIL VENTOLIN) 2.5 mg /3 mL (0.083 %) nebu   3. Mild dementia ([facility]  F03.90 294.20    4. Bipolar affective disorder, current episode mixed, current episode severity unspecified ([facility]  F31.60 296.60    5. Essential hypertension  I10 401.9    6.  IGT (impaired glucose tolerance)  R73.02 790.22    7. Hyperlipidemia, unspecified hyperlipidemia type  E78.5 272.4    8. Bilateral hearing loss, unspecified hearing loss type  H91.93 389.9    9. LELE (obstructive sleep apnea)  G47.33 327.23      Follow-up and Dispositions    · Return in about 2 months (around 6/19/2022), or if symptoms worsen or fail to improve, for asthma, blood pressure. results and schedule of future studies reviewed with patient  reviewed diet, exercise and weight   cardiovascular risk and specific lipid/LDL goals reviewed  reviewed medications and side effects in detail    pred taper  Nebulizer albuterol prn  Spacer with MDI use  Continue other current medications     Addendum:  Added sanya    An electronic signature was used to authenticate this note.   -- Cem Elam MD

## 2022-04-19 NOTE — PROGRESS NOTES
RM 15    Chief Complaint   Patient presents with    Asthma    Follow-up     mammogram results        Visit Vitals  BP (!) 167/76   Pulse 63   Temp 98 °F (36.7 °C)   Ht 5' 3\" (1.6 m)   Wt 156 lb (70.8 kg)   SpO2 95%   BMI 27.63 kg/m²       3 most recent PHQ Screens 4/19/2022   Little interest or pleasure in doing things Not at all   Feeling down, depressed, irritable, or hopeless Not at all   Total Score PHQ 2 0   Trouble falling or staying asleep, or sleeping too much -   Feeling tired or having little energy -   Poor appetite, weight loss, or overeating -   Feeling bad about yourself - or that you are a failure or have let yourself or your family down -   Trouble concentrating on things such as school, work, reading, or watching TV -   Moving or speaking so slowly that other people could have noticed; or the opposite being so fidgety that others notice -   Thoughts of being better off dead, or hurting yourself in some way -   PHQ 9 Score -   How difficult have these problems made it for you to do your work, take care of your home and get along with others -         1. Have you been to the ER, urgent care clinic since your last visit? Hospitalized since your last visit? No    2. Have you seen or consulted any other health care providers outside of the [address] [place] [address] since your last visit? Include any pap smears or colon screening. Dr. Romeo Munoz.      Health Maintenance Due   Topic Date Due    COVID-19 Vaccine (1) Never done    DTaP/Tdap/Td series (1 - Tdap) Never done    Shingrix Vaccine Age 50> (1 of 2) Never done    Lipid Screen  08/10/2017       Learning Assessment 3/11/2020   PRIMARY LEARNER Patient   HIGHEST LEVEL OF EDUCATION - PRIMARY LEARNER  -   BARRIERS PRIMARY LEARNER -   CO-LEARNER CAREGIVER -   PRIMARY LANGUAGE ENGLISH   LEARNER PREFERENCE PRIMARY READING   ANSWERED BY patient   RELATIONSHIP SELF         AVS  education, follow up, and recommendations provided and addressed with patient.   services used to advise patient -no

## 2022-04-22 DIAGNOSIS — J40 BRONCHITIS: Primary | ICD-10-CM

## 2022-04-22 RX ORDER — ROSUVASTATIN CALCIUM 20 MG/1
20 TABLET, COATED ORAL DAILY
COMMUNITY

## 2022-04-22 RX ORDER — AZITHROMYCIN 250 MG/1
TABLET, FILM COATED ORAL
Qty: 6 TABLET | Refills: 0 | Status: SHIPPED | OUTPATIENT
Start: 2022-04-22 | End: 2022-04-27

## 2022-08-29 ENCOUNTER — HOSPITAL ENCOUNTER (OUTPATIENT)
Dept: MAMMOGRAPHY | Age: 87
Discharge: HOME | End: 2022-08-29
Payer: MEDICARE

## 2022-08-29 DIAGNOSIS — N63.0 BREAST NODULE: ICD-10-CM

## 2022-08-29 PROCEDURE — 77061 BREAST TOMOSYNTHESIS UNI: CPT

## 2022-09-02 ENCOUNTER — PATIENT MESSAGE (OUTPATIENT)
Dept: INTERNAL MEDICINE CLINIC | Age: 87
End: 2022-09-02

## 2022-09-02 DIAGNOSIS — F03.91 DEMENTIA WITH BEHAVIORAL DISTURBANCE, UNSPECIFIED DEMENTIA TYPE: Primary | ICD-10-CM

## 2022-09-07 RX ORDER — CLONAZEPAM 0.5 MG/1
0.25 TABLET ORAL
Qty: 15 TABLET | Refills: 0 | Status: SHIPPED | OUTPATIENT
Start: 2022-09-07

## 2022-09-07 NOTE — TELEPHONE ENCOUNTER
6/23/2017    Call Regarding Preventive Health Screening Cervical/PAP    Attempt 1    Message on voicemail     Comments:       Outreach   CC     From: Cholo Osullivan  To: Jason Moreno NP  Sent: 9/2/2022 8:50 AM EDT  Subject: Romana Brandan (personality and memory changers)    Good morning Ms. Evangelista Herring am Mitul Quesadas daughter, writing you this morning about concerns that I have for my mom. In May, she was waking from sleep occasionally disoriented as to where she is; thinking she is not in her home. So my mom calls the pharmacist at [facility] who tells her she could have this type of issue with her Lamictal, and mom stopped taking the Lamictal.  The last month, she has become increasingly irritated and irrational at times, then ok the next day. .   I have made a neuro appt with Maria L Cortes but earliest is 10/21/22. I have tried to find her a counselor/psychotrist and all are either not taking new patients, etc.  I am reaching out today to the local [facility] for counselor names but in the meantime, my question for you: what do you think about her restarting the lamictal? She was taking 50 mg nightly. Maybe start at 25 mg? And for anxiety, she has used clonazepam 0.5 mg only bid as needed and took 1/2 of a 0.5 mg when needed. Would you consider calling in a very small qty for her. When she gets anxious, her asthma/copd is affected and I know the clonazepam has helped that also. Its upsetting to see her like this and am trying to address her health; things seemed steady for a long time. Please let me know what you think about moms meds and any guidance you can suggest.  I will call your office this am to make an appt. for follow up.   Thank you so much for your consideration,  Sincerely,  David White

## 2022-09-30 ENCOUNTER — OFFICE VISIT (OUTPATIENT)
Dept: NEUROLOGY | Age: 87
End: 2022-09-30
Payer: MEDICARE

## 2022-09-30 VITALS
RESPIRATION RATE: 16 BRPM | DIASTOLIC BLOOD PRESSURE: 62 MMHG | BODY MASS INDEX: 26.58 KG/M2 | WEIGHT: 150 LBS | SYSTOLIC BLOOD PRESSURE: 140 MMHG | HEIGHT: 63 IN | HEART RATE: 64 BPM | OXYGEN SATURATION: 96 %

## 2022-09-30 DIAGNOSIS — R41.3 MEMORY LOSS: Primary | ICD-10-CM

## 2022-09-30 PROCEDURE — 99214 OFFICE O/P EST MOD 30 MIN: CPT | Performed by: PSYCHIATRY & NEUROLOGY

## 2022-09-30 PROCEDURE — 1101F PT FALLS ASSESS-DOCD LE1/YR: CPT | Performed by: PSYCHIATRY & NEUROLOGY

## 2022-09-30 PROCEDURE — G9717 DOC PT DX DEP/BP F/U NT REQ: HCPCS | Performed by: PSYCHIATRY & NEUROLOGY

## 2022-09-30 PROCEDURE — G8427 DOCREV CUR MEDS BY ELIG CLIN: HCPCS | Performed by: PSYCHIATRY & NEUROLOGY

## 2022-09-30 PROCEDURE — G8417 CALC BMI ABV UP PARAM F/U: HCPCS | Performed by: PSYCHIATRY & NEUROLOGY

## 2022-09-30 PROCEDURE — 1123F ACP DISCUSS/DSCN MKR DOCD: CPT | Performed by: PSYCHIATRY & NEUROLOGY

## 2022-09-30 PROCEDURE — 1090F PRES/ABSN URINE INCON ASSESS: CPT | Performed by: PSYCHIATRY & NEUROLOGY

## 2022-09-30 PROCEDURE — G8536 NO DOC ELDER MAL SCRN: HCPCS | Performed by: PSYCHIATRY & NEUROLOGY

## 2022-09-30 NOTE — PROGRESS NOTES
Neurology Consult Note      HISTORY PROVIDED BY: patient and daughter    Chief Complaint:   Chief Complaint   Patient presents with    Follow-up     Memory changes: Patient reports not remembering things as much. Subjective:    Ivan Mejia is a 80 y.o. right handed female last seen in clinic on 3/11/20 by Andrea Vela, she is a patient of Dr. Leticia House. She was incorrectly scheduled with me. She is having memory loss and has depression and anxiety. She has been unable to find a mental health provider. Her daughter first noticed memory loss in May, 2022. She was in [facility] from 2021 until  after a fall. Back in her home of 30 years after that, lives alone. She has been confused, doesn't think she is in her own home. She now says the [facility] showed her more love and care that she has ever had. Her daughter states that the whole time she was in [facility], she was crying to go home and asking her daughter why she was there. She called the police and said someone has broken in her home, specifically a  from [facility]. They have added security cameras and alarms at her house to reassure her. She is particularly confused at night and after awakening from a nap. She is on gabapentin for neuropathy, Lamictal for mood for a long time, stopped cold turkey thinking it made her confused. Her daughter has set up her meds in packets. Her mother had Alzheimers with onset in 76s or [de-identified]. Her father  at a young age of stroke. In review of EMR, she saw Dr. Balbina Anderson in  and was diagnosed with MCI/mild dementia at that time.      Past Medical History:   Diagnosis Date    Arthritis     Arthritis of knee, left     Asthma     Asthma with COPD (chronic obstructive pulmonary disease) ([facility])     FEV1 - 0.99, IgE high    Atherosclerosis of both carotid arteries     Bipolar affective disorder (HCC)     Cataract, left eye     cataract - left    Chronic obstructive pulmonary disease (HCC)     Closed fracture of lateral portion of left tibial plateau     Depression with anxiety     depression/anxiety    Encephalopathy     encephalopathy    Esophageal ulcer     esophageal ulcer    Gastroparesis     GERD (gastroesophageal reflux disease)     Hepatic steatosis     History of fall     hx falls    Hyperlipidemia     increased cholesterol    Hyperlipidemia     Hypertension     IBS (irritable bowel syndrome)     IBS    IGT (impaired glucose tolerance) 9/15/2015    Liver disease     elevated liver enzymes - being evaluated/US done x 2 - inflammed liver/\"fat on liver\"    Menopause 1987    Neurological disorder     delayed thinking process? ??    OA (osteoarthritis) of knee     Osteoporosis     Seizures (HCC)     Sunburn, blistering     Unspecified adverse effect of anesthesia     was told by neurologist not to use anesthesia unless needed due to encephalopathy    Unspecified adverse effect of anesthesia     hallucinations after MRI with sedation    Vertigo     vertigo      Past Surgical History:   Procedure Laterality Date    COLONOSCOPY N/A 2017    COLONOSCOPY performed by Luisa Callahan MD at [facility] ENDOSCOPY    HX BREAST BIOPSY Left     Surgical    HX GI      laser surgery for reflux and hernia repair    HX MOHS PROCEDURES  2017    BCC L nasal sidewall by Dr. Cor[facility]  2018    [facility] R mid forehead by Dr. Jennifer Lugo      GERD reflux      Social History     Socioeconomic History    Marital status: SINGLE     Spouse name: Not on file    Number of children: Not on file    Years of education: Not on file    Highest education level: Not on file   Occupational History    Not on file   Tobacco Use    Smoking status: Former     Years: 20.00     Types: Cigarettes     Quit date:      Years since quittin.7    Smokeless tobacco: Never   Substance and Sexual Activity    Alcohol use: No    Drug use: No    Sexual activity: Not Currently     Partners: Male   Other Topics Concern    Not on file   Social History Narrative    Not on file     Social Determinants of Health     Financial Resource Strain: Not on file   Food Insecurity: Not on file   Transportation Needs: Not on file   Physical Activity: Not on file   Stress: Not on file   Social Connections: Not on file   Intimate Partner Violence: Not on file   Housing Stability: Not on file     Family History   Problem Relation Age of Onset    Stroke Father     Heart Disease Father         angina    No Known Problems Mother     Cancer Brother         lung    Cancer Brother         lung         Objective:   ROS    Allergies   Allergen Reactions    Mathew Crea Other (comments)     Blood testing    Cat Dander Other (comments)     Blood testing    Dog Dander Other (comments)     Blood testing    Elavil Other (comments)     imbalance    Iodine Shortness of Breath    [place] Other (comments)     Blood testing    Sulfa (Sulfonamide Antibiotics) Shortness of Breath        Meds:  Outpatient Medications Prior to Visit   Medication Sig Dispense Refill    clonazePAM (KlonoPIN) 0.5 mg tablet Take 0.5 Tablets by mouth two (2) times daily as needed for Anxiety. Max Daily Amount: 0.5 mg. 15 Tablet 0    rosuvastatin (CRESTOR) 20 mg tablet Take 20 mg by mouth daily. meloxicam (MOBIC) 15 mg tablet Take 15 mg by mouth daily. inhalational spacing device 1 Each by Does Not Apply route as needed for Wheezing. 1 Each 1    albuterol (PROVENTIL VENTOLIN) 2.5 mg /3 mL (0.083 %) nebu 3 mL by Nebulization route every four (4) hours as needed for Wheezing. 30 Nebule 3    gabapentin (NEURONTIN) 100 mg capsule Take 1 Capsule by mouth three (3) times daily. Max Daily Amount: 300 mg. As needed. 270 Capsule 1    spironolactone (ALDACTONE) 25 mg tablet Take 25 mg by mouth daily. nystatin-triamcinolone (MYCOLOG II) topical cream Apply  to affected area two (2) times a day.  Apply thin layer 30 g 1    chlorthalidone (HYGROTON) 25 mg tablet TAKE 1 TABLET BY MOUTH EVERY DAY      diclofenac (VOLTAREN) 1 % gel APPLY 2 GRAMS TO INSTRUCTED SITE ON LEFT LEG TWICE DAILY AS NEEDED FOR PAIN      acetaminophen (Tylenol Extra Strength) 500 mg pwpk Take 500 mg by mouth two (2) times a day. omeprazole (PRILOSEC) 20 mg capsule Take 20 mg by mouth daily. methenamine hippurate (HIPREX) 1 gram tablet Take 1 Tablet by mouth two (2) times daily (with meals). 180 Tablet 3    montelukast (SINGULAIR) 10 mg tablet take 1 tablet by mouth once daily 90 Tab 1    loperamide (IMODIUM) 2 mg capsule 4 mg as needed. BYSTOLIC 10 mg tablet Take 10 mg by mouth two (2) times a day. 2    sucralfate (CARAFATE) 1 gram tablet Take 1 g by mouth Before breakfast and dinner. fluticasone-salmeterol (ADVAIR) 500-50 mcg/dose diskus inhaler Take 1 Puff by inhalation two (2) times a day. 3 Inhaler 1    albuterol (PROVENTIL HFA, VENTOLIN HFA, PROAIR HFA) 90 mcg/actuation inhaler Take 2 Puffs by inhalation every four (4) hours as needed for Wheezing. 1 Inhaler 2    cholecalciferol (VITAMIN D3) 25 mcg (1,000 unit) cap Take 2,000 Units by mouth.      predniSONE (DELTASONE) 10 mg tablet Taper daily. 60mg x1, 50mg x 1, 40mg x 1, 30mg x 1, 20mg x 1, 10mg x 1 (Patient not taking: Reported on 9/30/2022) 21 Tablet 0    amLODIPine (NORVASC) 2.5 mg tablet Take 1 Tablet by mouth daily. (Patient not taking: Reported on 9/30/2022) 30 Tablet 0    furosemide (LASIX) 40 mg tablet TAKE 1 TABLET BY MOUTH EVERY MORNING (Patient not taking: Reported on 9/30/2022)      Allergy Relief, loratadine, 10 mg tablet  (Patient not taking: Reported on 9/30/2022)      vitamin E (AQUA GEMS) 400 unit capsule Take 400 Units by mouth daily. (Patient not taking: No sig reported)      LACTOBACILLUS ACIDOPHILUS (PROBIOTIC PO) Take  by mouth. Takes one po daily. (Patient not taking: No sig reported)      CALCIUM CARBONATE (TUMS PO) Take  by mouth as needed.  (Patient not taking: Reported on 9/30/2022)       No facility-administered medications prior to visit. Imaging:  MRI Results (most recent):  No results found for this or any previous visit. CT Results (most recent):  Results from Hospital Encounter encounter on 02/10/21    CT HEAD WO CONT    Narrative  INDICATION:   fall    EXAMINATION:  CT HEAD WO CONTRAST    COMPARISON:  January 5, 2021    TECHNIQUE:  Routine noncontrast axial head CT was performed. Sagittal and  coronal reconstructions were generated. CT dose reduction was achieved through  use of a standardized protocol tailored for this examination and automatic  exposure control for dose modulation. FINDINGS:    Ventricles:  Midline, no hydrocephalus. Intracranial Hemorrhage:  None. Brain Parenchyma/Brainstem: Moderate chronic white matter disease. Basal Cisterns:  Normal.  Paranasal Sinuses:  Right maxillary sinus opacification. Soft Tissues:  No significant soft tissue swelling. Osseous Structures:  No acute fracture. Additional Comments:  N/A. Impression  No acute traumatic injury. Reviewed records in [employer] tab today    Lab Review   Results for orders placed or performed during the hospital encounter of 04/29/21   CBC WITH AUTOMATED DIFF   Result Value Ref Range    WBC 9.5 3.6 - 11.0 K/uL    RBC 4.88 3.80 - 5.20 M/uL    HGB 13.6 11.5 - 16.0 g/dL    HCT 41.5 35.0 - 47.0 %    MCV 85.0 80.0 - 99.0 FL    MCH 27.9 26.0 - 34.0 PG    MCHC 32.8 30.0 - 36.5 g/dL    RDW 13.7 11.5 - 14.5 %    PLATELET 337 737 - 042 K/uL    MPV 12.0 8.9 - 12.9 FL    NRBC 0.0 0  WBC    ABSOLUTE NRBC 0.00 0.00 - 0.01 K/uL    NEUTROPHILS 68 32 - 75 %    LYMPHOCYTES 20 12 - 49 %    MONOCYTES 9 5 - 13 %    EOSINOPHILS 2 0 - 7 %    BASOPHILS 0 0 - 1 %    IMMATURE GRANULOCYTES 1 (H) 0.0 - 0.5 %    ABS. NEUTROPHILS 6.5 1.8 - 8.0 K/UL    ABS. LYMPHOCYTES 1.9 0.8 - 3.5 K/UL    ABS. MONOCYTES 0.9 0.0 - 1.0 K/UL    ABS. EOSINOPHILS 0.2 0.0 - 0.4 K/UL    ABS. BASOPHILS 0.0 0.0 - 0.1 K/UL    ABS. IMM.  GRANS. 0.1 (H) 0.00 - 0.04 K/UL DF AUTOMATED     METABOLIC PANEL, COMPREHENSIVE   Result Value Ref Range    Sodium 139 136 - 145 mmol/L    Potassium 2.7 (LL) 3.5 - 5.1 mmol/L    Chloride 103 97 - 108 mmol/L    CO2 27 21 - 32 mmol/L    Anion gap 9 5 - 15 mmol/L    Glucose 111 (H) 65 - 100 mg/dL    BUN 23 (H) 6 - 20 MG/DL    Creatinine 1.16 (H) 0.55 - 1.02 MG/DL    BUN/Creatinine ratio 20 12 - 20      GFR est AA 54 (L) >60 ml/min/1.73m2    GFR est non-AA 44 (L) >60 ml/min/1.73m2    Calcium 9.2 8.5 - 10.1 MG/DL    Bilirubin, total 0.4 0.2 - 1.0 MG/DL    ALT (SGPT) 21 12 - 78 U/L    AST (SGOT) 8 (L) 15 - 37 U/L    Alk. phosphatase 117 45 - 117 U/L    Protein, total 7.9 6.4 - 8.2 g/dL    Albumin 3.8 3.5 - 5.0 g/dL    Globulin 4.1 (H) 2.0 - 4.0 g/dL    A-G Ratio 0.9 (L) 1.1 - 2.2     SAMPLES BEING HELD   Result Value Ref Range    SAMPLES BEING HELD 1RED     COMMENT        Add-on orders for these samples will be processed based on acceptable specimen integrity and analyte stability, which may vary by analyte.    PROTHROMBIN TIME + INR   Result Value Ref Range    INR 0.9 0.9 - 1.1      Prothrombin time 9.8 9.0 - 11.1 sec   PTT   Result Value Ref Range    aPTT 24.5 22.1 - 31.0 sec    aPTT, therapeutic range     58.0 - 77.0 SECS   POTASSIUM   Result Value Ref Range    Potassium 2.9 (L) 3.5 - 5.1 mmol/L   EKG, 12 LEAD, INITIAL   Result Value Ref Range    Ventricular Rate 65 BPM    Atrial Rate 65 BPM    P-R Interval 158 ms    QRS Duration 80 ms    Q-T Interval 432 ms    QTC Calculation (Bezet) 449 ms    Calculated P Axis 55 degrees    Calculated R Axis -2 degrees    Calculated T Axis 101 degrees    Diagnosis       Normal sinus rhythm  Left ventricular hypertrophy with repolarization abnormality  When compared with ECG of 10-FEB-2021 04:06,  No significant change was found  Confirmed by Bety Madsen M.D., Merle Sieve ([id number]) on 5/1/2021 11:26:13 AM          Exam:  Visit Vitals  BP (!) 140/62 (BP 1 Location: Left upper arm, BP Patient Position: Sitting)   Pulse 64 Resp 16   Ht 5' 3\" (1.6 m)   Wt 150 lb (68 kg)   SpO2 96%   BMI 26.57 kg/m²     General:  Alert, cooperative, no distress. Head:  Normocephalic, without obvious abnormality, atraumatic. Respiratory:  Heart:   Non labored breathing  Regular rate and rhythm, no murmurs   Neck:   2+ carotids, no bruits   Extremities: Warm, no cyanosis or edema. Pulses: 2+ radial pulses. Neurologic:  MS: Alert, speech intact. Language -see MMSE Attention and fund of knowledge appropriate.     Cranial Nerves:  II: visual fields Full to confrontation   II: pupils Equal, round, reactive to light   II: optic disc    III,VII: ptosis none   III,IV,VI: extraocular muscles  EOMI, no nystagmus or diplopia   V: facial light touch sensation     VII: facial muscle function   symmetric   VIII: hearing intact   IX: soft palate elevation     XI: trapezius strength     XI: sternocleidomastoid strength    XII: tongue       Motor: normal bulk and tone, no tremor              Strength: 5/5 throughout, no PD  Sensory:   Coordination: FTN intact  Gait: normal gait  Reflexes:   Mini Mental State Exam 9/30/2022   What is the Year 0   What is the Season 1   What is the Date 0   What is the Day 0   What is the Month 1   Where are we State 1   Where are we Country 1   Where are we [address] [place] [address] or [facility] 1   Where are we Floor 1   Name three objects, then ask the patient to say them 3   Serial sevens Subtract 7 from 100 in increments 1   Ask for the three objects repeated above 2   Name a pencil 1   Name a watch 1   Have the patient repeat this phrase \"No ifs, ands, or buts\" 1   Three stage command: Take the paper in your right hand 1   Fold the paper in half 0   Put the paper on the floor 0   Read and obey the following: CLOSE YOUR EYES 1   Have the patient write a sentence 1   Have the patient copy a figure 0   Mini Mental Score 19   Some recent data might be hidden          Assessment/Plan   Pt is a 80 y.o. right handed female followed in the office by  Samuel with memory loss, depression, and anxiety with recent paranoia and behavior changes after returning home  after a stay at an [facility] due to a fall. Her mother had Alzheimers with onset in 76s or [de-identified]. Her father  at a young age of stroke. According to records, Dr. Dorrie Osler saw her in  and diagnosed her with MCI/mild dementia at that time. Exam with MMSE 19/30 missing 3 orientation, 4 attn, 1 at delayed recall, 2 steps, and unable to copy. Suspected dementia worsened due to fall and change in routine in , but does have untreated mental illness. Recommend MRI brain to evaluate for evidence of stroke, mass, NPH as an etiology for her seemingly acute on chronic changes. Recommend labs to evaluate for metabolic etiology including CBC with differential, CMP, TSH, B12/MMA. Recommend neuropsychological testing to tease out dementia versus pseudodementia related to her mood disorders. Follow-up in clinic in approximately 4 months with Dr. Ila Vera, instructed to call in the interim with any questions or concerns or after testing if she does not hear from our office within 1 week. ICD-10-CM ICD-9-CM    1. Memory loss  R41.3 780.93 REFERRAL TO NEUROPSYCHOLOGY      METABOLIC PANEL, COMPREHENSIVE      CBC WITH AUTOMATED DIFF      TSH 3RD GENERATION      VITAMIN B12      METHYLMALONIC ACID      MRI BRAIN WO CONT          Signed:   Oleg Sarah MD  2022

## 2022-09-30 NOTE — LETTER
10/27/2022    Patient: Shen Stewart   YOB: 1935   Date of Visit: 9/30/2022     Nahomy Hdz NP  [id number] [facility] [address]  Via In Basket    Dear Nahomy Hdz NP,      Thank you for referring Ms. Bonnie Cordero to [facility] for evaluation. My notes for this consultation are attached. If you have questions, please do not hesitate to call me. I look forward to following your patient along with you.       Sincerely,    Mare Fox MD

## 2022-10-05 LAB
ALBUMIN SERPL-MCNC: 4.7 G/DL (ref 3.6–4.6)
ALBUMIN/GLOB SERPL: 1.9 {RATIO} (ref 1.2–2.2)
ALP SERPL-CCNC: 75 IU/L (ref 44–121)
ALT SERPL-CCNC: 18 IU/L (ref 0–32)
AST SERPL-CCNC: 16 IU/L (ref 0–40)
BASOPHILS # BLD AUTO: 0 X10E3/UL (ref 0–0.2)
BASOPHILS NFR BLD AUTO: 0 %
BILIRUB SERPL-MCNC: 0.5 MG/DL (ref 0–1.2)
BUN SERPL-MCNC: 41 MG/DL (ref 8–27)
BUN/CREAT SERPL: 23 (ref 12–28)
CALCIUM SERPL-MCNC: 9.7 MG/DL (ref 8.7–10.3)
CHLORIDE SERPL-SCNC: 103 MMOL/L (ref 96–106)
CO2 SERPL-SCNC: 22 MMOL/L (ref 20–29)
CREAT SERPL-MCNC: 1.79 MG/DL (ref 0.57–1)
EGFR: 27 ML/MIN/1.73
EOSINOPHIL # BLD AUTO: 0.1 X10E3/UL (ref 0–0.4)
EOSINOPHIL NFR BLD AUTO: 1 %
ERYTHROCYTE [DISTWIDTH] IN BLOOD BY AUTOMATED COUNT: 13.7 % (ref 11.7–15.4)
GLOBULIN SER CALC-MCNC: 2.5 G/DL (ref 1.5–4.5)
GLUCOSE SERPL-MCNC: 113 MG/DL (ref 70–99)
HCT VFR BLD AUTO: 42.8 % (ref 34–46.6)
HGB BLD-MCNC: 13.9 G/DL (ref 11.1–15.9)
IMM GRANULOCYTES # BLD AUTO: 0 X10E3/UL (ref 0–0.1)
IMM GRANULOCYTES NFR BLD AUTO: 0 %
LYMPHOCYTES # BLD AUTO: 1.4 X10E3/UL (ref 0.7–3.1)
LYMPHOCYTES NFR BLD AUTO: 18 %
MCH RBC QN AUTO: 27.7 PG (ref 26.6–33)
MCHC RBC AUTO-ENTMCNC: 32.5 G/DL (ref 31.5–35.7)
MCV RBC AUTO: 85 FL (ref 79–97)
METHYLMALONATE SERPL-SCNC: 458 NMOL/L (ref 0–378)
MONOCYTES # BLD AUTO: 0.6 X10E3/UL (ref 0.1–0.9)
MONOCYTES NFR BLD AUTO: 8 %
NEUTROPHILS # BLD AUTO: 5.5 X10E3/UL (ref 1.4–7)
NEUTROPHILS NFR BLD AUTO: 73 %
PLATELET # BLD AUTO: 168 X10E3/UL (ref 150–450)
POTASSIUM SERPL-SCNC: 5.4 MMOL/L (ref 3.5–5.2)
PROT SERPL-MCNC: 7.2 G/DL (ref 6–8.5)
RBC # BLD AUTO: 5.02 X10E6/UL (ref 3.77–5.28)
SODIUM SERPL-SCNC: 140 MMOL/L (ref 134–144)
TSH SERPL DL<=0.005 MIU/L-ACNC: 1.64 UIU/ML (ref 0.45–4.5)
VIT B12 SERPL-MCNC: 452 PG/ML (ref 232–1245)
WBC # BLD AUTO: 7.6 X10E3/UL (ref 3.4–10.8)

## 2022-10-10 DIAGNOSIS — G62.9 NEUROPATHY: ICD-10-CM

## 2022-10-10 NOTE — TELEPHONE ENCOUNTER
Last visit 04/19/2022 MD Lawyer Garrett   Next appointment 11/09/2022 KAREN Nesbitt   Previous refill encounter(s)   04/07/2022 [facility]urontin #[id number] with 1 refill. [facility]    Intervention Detail: New Rx: 1, reason: Patient Preference  Time Spent (min): 5      Requested Prescriptions     Pending Prescriptions Disp Refills    gabapentin (NEURONTIN) 100 mg capsule 90 Capsule 0     Sig: Take 1 Capsule by mouth three (3) times daily. Max Daily Amount: 300 mg. As needed.

## 2022-10-11 RX ORDER — GABAPENTIN 100 MG/1
100 CAPSULE ORAL 3 TIMES DAILY
Qty: 90 CAPSULE | Refills: 0 | Status: SHIPPED | OUTPATIENT
Start: 2022-10-11

## 2022-10-27 ENCOUNTER — TELEPHONE (OUTPATIENT)
Dept: NEUROLOGY | Age: 87
End: 2022-10-27

## 2022-10-27 NOTE — PROGRESS NOTES
Isiah Hylton - Please call pt's daughter: Stanley Kennedy 9/30/22 reviewed - Kidney function is abnormal, BUN/Cr 41/1.79, these have been high in past, but not this high, only 23/1.16 a year ago. This could be due to dehydration. She should see her PCP for further evaluation. Other labs look good. MMA is a little high, but this is likely due to kidney function rather than true B12 deficiency. Awaiting MRI, it doesn't appear to be scheduled. Awaiting Neuropsych testing, also not scheduled.

## 2022-10-27 NOTE — TELEPHONE ENCOUNTER
Called patient's daughter. Informed her that the doctor stated, \"Labs 9/30/22 reviewed - Kidney function is abnormal, BUN/Cr 41/1.79, these have been high in past, but not this high, only 23/1.16 a year ago. This could be due to dehydration. She should see her PCP for further evaluation. Other labs look good. MMA is a little high, but this is likely due to kidney function rather than true B12 deficiency. Awaiting MRI, it doesn't appear to be scheduled. Awaiting Neuropsych testing, also not scheduled. \"   She stated that she has not received a call for the MRI. Patient was given central scheduling number. Patient stated that she has not received a call for neuropsych testing and the number to that office was given also.

## 2022-11-09 ENCOUNTER — TELEPHONE (OUTPATIENT)
Dept: NEUROLOGY | Age: 87
End: 2022-11-09

## 2022-11-09 ENCOUNTER — OFFICE VISIT (OUTPATIENT)
Dept: INTERNAL MEDICINE CLINIC | Age: 87
End: 2022-11-09
Payer: MEDICARE

## 2022-11-09 VITALS
DIASTOLIC BLOOD PRESSURE: 73 MMHG | HEART RATE: 58 BPM | TEMPERATURE: 98 F | RESPIRATION RATE: 17 BRPM | HEIGHT: 63 IN | OXYGEN SATURATION: 98 % | WEIGHT: 161 LBS | BODY MASS INDEX: 28.53 KG/M2 | SYSTOLIC BLOOD PRESSURE: 169 MMHG

## 2022-11-09 DIAGNOSIS — R73.02 IGT (IMPAIRED GLUCOSE TOLERANCE): ICD-10-CM

## 2022-11-09 DIAGNOSIS — Z23 NEEDS FLU SHOT: ICD-10-CM

## 2022-11-09 DIAGNOSIS — Z00.00 MEDICARE ANNUAL WELLNESS VISIT, SUBSEQUENT: Primary | ICD-10-CM

## 2022-11-09 DIAGNOSIS — R60.0 LEG EDEMA: ICD-10-CM

## 2022-11-09 DIAGNOSIS — E78.5 HYPERLIPIDEMIA, UNSPECIFIED HYPERLIPIDEMIA TYPE: ICD-10-CM

## 2022-11-09 DIAGNOSIS — I10 ESSENTIAL HYPERTENSION: ICD-10-CM

## 2022-11-09 DIAGNOSIS — G30.9 MILD ALZHEIMER'S DEMENTIA WITH OTHER BEHAVIORAL DISTURBANCE, UNSPECIFIED TIMING OF DEMENTIA ONSET (HCC): ICD-10-CM

## 2022-11-09 DIAGNOSIS — Z71.89 ADVANCE DIRECTIVE DISCUSSED WITH PATIENT: ICD-10-CM

## 2022-11-09 DIAGNOSIS — N17.9 ACUTE RENAL FAILURE, UNSPECIFIED ACUTE RENAL FAILURE TYPE (HCC): ICD-10-CM

## 2022-11-09 DIAGNOSIS — F02.A18 MILD ALZHEIMER'S DEMENTIA WITH OTHER BEHAVIORAL DISTURBANCE, UNSPECIFIED TIMING OF DEMENTIA ONSET (HCC): ICD-10-CM

## 2022-11-09 LAB
BILIRUB UR QL STRIP: NEGATIVE
GLUCOSE UR-MCNC: NEGATIVE MG/DL
KETONES P FAST UR STRIP-MCNC: NEGATIVE MG/DL
PH UR STRIP: 5.5 [PH] (ref 4.6–8)
PROT UR QL STRIP: NEGATIVE
SP GR UR STRIP: 1.02 (ref 1–1.03)
UA UROBILINOGEN AMB POC: NORMAL (ref 0.2–1)
URINALYSIS CLARITY POC: CLEAR
URINALYSIS COLOR POC: YELLOW
URINE BLOOD POC: NEGATIVE
URINE LEUKOCYTES POC: NEGATIVE
URINE NITRITES POC: NEGATIVE

## 2022-11-09 PROCEDURE — G0439 PPPS, SUBSEQ VISIT: HCPCS | Performed by: NURSE PRACTITIONER

## 2022-11-09 PROCEDURE — G8427 DOCREV CUR MEDS BY ELIG CLIN: HCPCS | Performed by: NURSE PRACTITIONER

## 2022-11-09 PROCEDURE — 81002 URINALYSIS NONAUTO W/O SCOPE: CPT | Performed by: NURSE PRACTITIONER

## 2022-11-09 PROCEDURE — 90694 VACC AIIV4 NO PRSRV 0.5ML IM: CPT | Performed by: NURSE PRACTITIONER

## 2022-11-09 PROCEDURE — G9717 DOC PT DX DEP/BP F/U NT REQ: HCPCS | Performed by: NURSE PRACTITIONER

## 2022-11-09 PROCEDURE — G0008 ADMIN INFLUENZA VIRUS VAC: HCPCS | Performed by: NURSE PRACTITIONER

## 2022-11-09 PROCEDURE — 1123F ACP DISCUSS/DSCN MKR DOCD: CPT | Performed by: NURSE PRACTITIONER

## 2022-11-09 PROCEDURE — 1101F PT FALLS ASSESS-DOCD LE1/YR: CPT | Performed by: NURSE PRACTITIONER

## 2022-11-09 PROCEDURE — G8417 CALC BMI ABV UP PARAM F/U: HCPCS | Performed by: NURSE PRACTITIONER

## 2022-11-09 PROCEDURE — G8536 NO DOC ELDER MAL SCRN: HCPCS | Performed by: NURSE PRACTITIONER

## 2022-11-09 RX ORDER — LORATADINE 10 MG/1
10 TABLET ORAL
COMMUNITY

## 2022-11-09 NOTE — PROGRESS NOTES
Room:  Identified pt with two pt identifiers(name and ). Reviewed record in preparation for visit and have obtained necessary documentation. Chief Complaint   Patient presents with    Follow-up    Hypertension    Annual Wellness Visit        Vitals:    22 1335 22 1347   BP: (!) 171/72 (!) 169/73   Pulse: (!) 58    Resp: 17    Temp: 98 °F (36.7 °C)    TempSrc: Oral    SpO2: 98%    Weight: 161 lb (73 kg)    Height: 5' 3\" (1.6 m)    PainSc:   0 - No pain        Health Maintenance Due   Topic    DTaP/Tdap/Td series (1 - Tdap)    Shingrix Vaccine Age 50> (1 of 2)    Lipid Screen     COVID-19 Vaccine (4 - Booster for Yuyuto series)    Flu Vaccine (1)    Medicare Yearly Exam        1. \"Have you been to the ER, urgent care clinic since your last visit? Hospitalized since your last visit? \" No    2. \"Have you seen or consulted any other health care providers outside of the [address] [place] [address] since your last visit? \" No     3. For patients over 45: Has the patient had a colonoscopy? NA - based on age     If the patient is female:    4. For patients over 36: Has the patient had a mammogram? NA - based on age    11. For patients over 21: Has the patient had a pap smear?  NA - based on age    Current Outpatient Medications   Medication Instructions    albuterol (PROVENTIL HFA, VENTOLIN HFA, PROAIR HFA) 90 mcg/actuation inhaler 2 Puffs, Inhalation, EVERY 4 HOURS AS NEEDED    albuterol (PROVENTIL VENTOLIN) 2.5 mg, Nebulization, EVERY 4 HOURS AS NEEDED    Bystolic 10 mg, Oral, 2 TIMES DAILY    CALCIUM CARBONATE (TUMS PO) Oral, AS NEEDED    chlorthalidone (HYGROTON) 25 mg tablet TAKE 1 TABLET BY MOUTH EVERY DAY    cholecalciferol (VITAMIN D3) 2,000 Units, Oral    clonazePAM (KLONOPIN) 0.25 mg, Oral, 2 TIMES DAILY AS NEEDED    diclofenac (VOLTAREN) 1 % gel APPLY 2 GRAMS TO INSTRUCTED SITE ON LEFT LEG TWICE DAILY AS NEEDED FOR PAIN    fluticasone-salmeterol (ADVAIR) 500-50 mcg/dose diskus inhaler 1 Puff, Inhalation, 2 TIMES DAILY    gabapentin (NEURONTIN) 100 mg, Oral, 3 TIMES DAILY, As needed.     inhalational spacing device 1 Each, Does Not Apply, AS NEEDED    loperamide (IMODIUM) 4 mg, AS NEEDED    loratadine (CLARITIN) 10 mg, Oral    meloxicam (MOBIC) 15 mg, Oral, DAILY    methenamine hippurate (HIPREX) 1 g, Oral, 2 TIMES DAILY WITH MEALS    montelukast (SINGULAIR) 10 mg tablet take 1 tablet by mouth once daily    nystatin-triamcinolone (MYCOLOG II) topical cream Topical, 2 TIMES DAILY, Apply thin layer    omeprazole (PRILOSEC) 20 mg, Oral, DAILY    rosuvastatin (CRESTOR) 20 mg, Oral, DAILY    spironolactone (ALDACTONE) 25 mg, Oral, DAILY    sucralfate (CARAFATE) 1 g, Oral, 2 TIMES DAILY BEFORE MEALS    Tylenol Extra Strength 500 mg, Oral, 2 TIMES DAILY       Allergies   Allergen Reactions    Gearld Sarks Other (comments)     Blood testing    Cat Dander Other (comments)     Blood testing    Dog Dander Other (comments)     Blood testing    Elavil Other (comments)     imbalance    Iodine Shortness of Breath    [place] Other (comments)     Blood testing    Sulfa (Sulfonamide Antibiotics) Shortness of Breath       Immunization History   Administered Date(s) Administered    COVID-19, PFIZER PURPLE top, DILUTE for use, (age 15 y+), IM, 30mcg/0.3mL 02/13/2021, 03/06/2021, 03/26/2022    Influenza High Dose Vaccine PF 01/19/2017    Influenza Vaccine (Tri) Adjuvanted (>65 Yrs FLUAD TRI 39702) 09/11/2019    Pneumococcal Conjugate (PCV-13) 04/09/2015, 08/30/2015    Pneumococcal Polysaccharide (PPSV-23) 11/02/2018    Zoster Vaccine, Live 07/02/2014       Past Medical History:   Diagnosis Date    Arthritis     Arthritis of knee, left     Asthma with COPD (chronic obstructive pulmonary disease) (HCC)     FEV1 - 0.99, IgE high    Atherosclerosis of both carotid arteries     Bipolar affective disorder (HCC)     Cataract, left eye     cataract - left    Closed fracture of lateral portion of left tibial plateau     Depression with anxiety depression/anxiety    Esophageal ulcer     esophageal ulcer    Gastroparesis     GERD (gastroesophageal reflux disease)     Hepatic steatosis     History of fall     hx falls    Hyperlipidemia     increased cholesterol    Hypertension     IBS (irritable bowel syndrome)     IBS    IGT (impaired glucose tolerance) 09/15/2015    Liver disease     elevated liver enzymes - being evaluated/US done x 2 - inflammed liver/\"fat on liver\"    Menopause 1987    Neurological disorder     delayed thinking process? ??    OA (osteoarthritis) of knee     Osteoporosis     Seizures (HCC)     Sunburn, blistering     Unspecified adverse effect of anesthesia     was told by neurologist not to use anesthesia unless needed due to encephalopathy    Unspecified adverse effect of anesthesia     hallucinations after MRI with sedation    Vertigo     vertigo

## 2022-11-09 NOTE — PATIENT INSTRUCTIONS

## 2022-11-09 NOTE — PROGRESS NOTES
Subjective  Loreto Rosales is a 80 y.o. female presents for follow up and AWV  HPI    Past Medical History:   Diagnosis Date    Arthritis     Arthritis of knee, left     Asthma with COPD (chronic obstructive pulmonary disease) (HCC)     FEV1 - 0.99, IgE high    Atherosclerosis of both carotid arteries     Bipolar affective disorder (HCC)     Cataract, left eye     cataract - left    Closed fracture of lateral portion of left tibial plateau     Depression with anxiety     depression/anxiety    Esophageal ulcer     esophageal ulcer    Gastroparesis     GERD (gastroesophageal reflux disease)     Hepatic steatosis     History of fall     hx falls    Hyperlipidemia     increased cholesterol    Hypertension     IBS (irritable bowel syndrome)     IBS    IGT (impaired glucose tolerance) 09/15/2015    Liver disease     elevated liver enzymes - being evaluated/US done x 2 - inflammed liver/\"fat on liver\"    Menopause 1987    Neurological disorder     delayed thinking process? ??    OA (osteoarthritis) of knee     Osteoporosis     Seizures (HCC)     Sunburn, blistering     Unspecified adverse effect of anesthesia     was told by neurologist not to use anesthesia unless needed due to encephalopathy    Unspecified adverse effect of anesthesia     hallucinations after MRI with sedation    Vertigo     vertigo        Allergies   Allergen Reactions    Cheryn Gourd Other (comments)     Blood testing    Cat Dander Other (comments)     Blood testing    Dog Dander Other (comments)     Blood testing    Elavil Other (comments)     imbalance    Iodine Shortness of Breath    [place] Other (comments)     Blood testing    Sulfa (Sulfonamide Antibiotics) Shortness of Breath        Current Outpatient Medications   Medication Sig    loratadine (Claritin) 10 mg tablet Take 10 mg by mouth.    gabapentin (NEURONTIN) 100 mg capsule Take 1 Capsule by mouth three (3) times daily. Max Daily Amount: 300 mg. As needed.  (Patient taking differently: Take 200 mg by mouth three (3) times daily. As needed.)    clonazePAM (KlonoPIN) 0.5 mg tablet Take 0.5 Tablets by mouth two (2) times daily as needed for Anxiety. Max Daily Amount: 0.5 mg.    rosuvastatin (CRESTOR) 20 mg tablet Take 20 mg by mouth daily. meloxicam (MOBIC) 15 mg tablet Take 15 mg by mouth daily. inhalational spacing device 1 Each by Does Not Apply route as needed for Wheezing. albuterol (PROVENTIL VENTOLIN) 2.5 mg /3 mL (0.083 %) nebu 3 mL by Nebulization route every four (4) hours as needed for Wheezing. spironolactone (ALDACTONE) 25 mg tablet Take 25 mg by mouth daily. nystatin-triamcinolone (MYCOLOG II) topical cream Apply  to affected area two (2) times a day. Apply thin layer    chlorthalidone (HYGROTON) 25 mg tablet TAKE 1 TABLET BY MOUTH EVERY DAY    diclofenac (VOLTAREN) 1 % gel APPLY 2 GRAMS TO INSTRUCTED SITE ON LEFT LEG TWICE DAILY AS NEEDED FOR PAIN    acetaminophen (Tylenol Extra Strength) 500 mg pwpk Take 500 mg by mouth two (2) times a day. omeprazole (PRILOSEC) 20 mg capsule Take 20 mg by mouth daily. methenamine hippurate (HIPREX) 1 gram tablet Take 1 Tablet by mouth two (2) times daily (with meals). montelukast (SINGULAIR) 10 mg tablet take 1 tablet by mouth once daily    loperamide (IMODIUM) 2 mg capsule 4 mg as needed. BYSTOLIC 10 mg tablet Take 10 mg by mouth two (2) times a day. sucralfate (CARAFATE) 1 gram tablet Take 1 g by mouth Before breakfast and dinner. fluticasone-salmeterol (ADVAIR) 500-50 mcg/dose diskus inhaler Take 1 Puff by inhalation two (2) times a day. albuterol (PROVENTIL HFA, VENTOLIN HFA, PROAIR HFA) 90 mcg/actuation inhaler Take 2 Puffs by inhalation every four (4) hours as needed for Wheezing. cholecalciferol (VITAMIN D3) 25 mcg (1,000 unit) cap Take 2,000 Units by mouth. CALCIUM CARBONATE (TUMS PO) Take  by mouth as needed. No current facility-administered medications for this visit.         Past Surgical History:   Procedure Laterality Date    COLONOSCOPY N/A 8/1/2017    COLONOSCOPY performed by Norm Way MD at [facility] ENDOSCOPY    HX BREAST BIOPSY Left     Surgical    HX GI      laser surgery for reflux and hernia repair    HX MOHS PROCEDURES  09/05/2017    BCC L nasal sidewall by Dr. Jose Armando Yancey MOHS PROCEDURES  06/04/2018    BCC R mid forehead by Dr. Xochilt Peterson      GERD reflux      Using 1/4 tablet Clonazepam qhs, medication  effective for sleep   Stopped Lamictal in May \"cold turkey\". Uwilliling to restart medication. She believes medication \"was too much\" had ADRs   Neuro Psychologist, Dr Michelle Hernandez, office not returning calls [address] Dr Martin Morales for left knee pain. Take Meloxicam once daily and received injection . She has open MRI scheduled for November 18  Pulmonary appointment  November 17   Not taking BP medication. Off  Amlodipine  for  ~3 months   She lives alone  She drinks very little water   Meals are pre packaged  Daughter sees her daily           Review of Systems   Constitutional:  Negative for weight loss. HENT:  Positive for hearing loss. Eyes:  Positive for blurred vision. Respiratory: Negative. Cardiovascular:  Negative for chest pain and leg swelling. Gastrointestinal: Negative. Genitourinary: Negative. Musculoskeletal:  Positive for falls, joint pain and myalgias. Skin: Negative. Neurological:  Negative for dizziness and headaches. Psychiatric/Behavioral:  Positive for depression. The patient is nervous/anxious and has insomnia. Objective  Visit Vitals  BP (!) 169/73 (BP 1 Location: Left upper arm, BP Patient Position: Sitting, BP Cuff Size: Adult)   Pulse (!) 58   Temp 98 °F (36.7 °C) (Oral)   Resp 17   Ht 5' 3\" (1.6 m)   Wt 161 lb (73 kg)   SpO2 98%   BMI 28.52 kg/m²      Physical Exam  Constitutional:       Appearance: Normal appearance. HENT:      Head: Normocephalic and atraumatic.    Cardiovascular:      Rate and Rhythm: Normal rate and regular rhythm. Pulmonary:      Effort: Pulmonary effort is normal.      Breath sounds: Normal breath sounds. Abdominal:      General: Bowel sounds are normal.      Palpations: Abdomen is soft. Skin:     General: Skin is warm and dry. Neurological:      Mental Status: She is alert. Mental status is at baseline. Gait: Gait abnormal.   Psychiatric:         Attention and Perception: She is inattentive. Mood and Affect: Mood is anxious. Speech: Speech normal.         Behavior: Behavior is cooperative. Thought Content: Thought content normal. Thought content is not paranoid or delusional.         Cognition and Memory: Cognition is impaired. Assessment & Plan  Diagnoses and all orders for this visit:    1. Medicare annual wellness visit, subsequent    2. Acute renal failure, unspecified acute renal failure type ([facility]  -     METABOLIC PANEL, COMPREHENSIVE; Future  -     REFERRAL TO NEPHROLOGY  -     AMB POC URINALYSIS DIP STICK MANUAL W/O MICRO    3. Essential hypertension  -     METABOLIC PANEL, COMPREHENSIVE; Future    4. IGT (impaired glucose tolerance)  -     METABOLIC PANEL, COMPREHENSIVE; Future  -     CBC WITH AUTOMATED DIFF; Future    5. Hyperlipidemia, unspecified hyperlipidemia type  -     METABOLIC PANEL, COMPREHENSIVE; Future    6. 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 Advance directive discussed with patient  -     REFERRAL TO ACP CLINICAL SPECIALIST    8. Needs flu shot  -     INFLUENZA, FLUAD, (AGE 65 Y+), IM, PF, 0.5 ML    9. Leg edema    Follow-up and Dispositions    Return in about 3 months (around 2/9/2023) for htn.      HTN uncontrolled d/t multiple factors,  poor adherence to medical management, dementia, high sodium diet   Discussed way to improved medication adherence, encouraged dietary changes, adequate hydration   Discussed  ACM services and referral   lab results and schedule of future lab studies reviewed with patient  reviewed diet, exercise and weight control  reviewed medications and side effects in detail  Kali Allred is the Subsequent Medicare Annual Wellness Exam, performed 12 months or more after the Initial AWV or the last Subsequent AWV    I have reviewed the patient's medical history in detail and updated the computerized patient record. Assessment/Plan   Education and counseling provided:  Are appropriate based on today's review and evaluation  Influenza Vaccine  Shingrix vaccine     1. Medicare annual wellness visit, subsequent  2. Acute renal failure, unspecified acute renal failure type ([facility]  -     METABOLIC PANEL, COMPREHENSIVE; Future  -     REFERRAL TO NEPHROLOGY  -     AMB POC URINALYSIS DIP STICK MANUAL W/O MICRO  3. Essential hypertension  -     METABOLIC PANEL, COMPREHENSIVE; Future  4. IGT (impaired glucose tolerance)  -     METABOLIC PANEL, COMPREHENSIVE; Future  -     CBC WITH AUTOMATED DIFF; Future  5. Hyperlipidemia, unspecified hyperlipidemia type  -     METABOLIC PANEL, COMPREHENSIVE; Future  6. Mild Alzheimer's dementia with other behavioral disturbance, unspecified timing of dementia onset ([facility]  7. Advance directive discussed with patient  -     REFERRAL TO ACP CLINICAL SPECIALIST  8. Needs flu shot  -     INFLUENZA, FLUAD, (AGE 65 Y+), IM, PF, 0.5 ML  9.  Leg edema       Depression Risk Factor Screening     3 most recent PHQ Screens 11/9/2022   Little interest or pleasure in doing things Several days   Feeling down, depressed, irritable, or hopeless Several days   Total Score PHQ 2 2   Trouble falling or staying asleep, or sleeping too much -   Feeling tired or having little energy -   Poor appetite, weight loss, or overeating -   Feeling bad about yourself - or that you are a failure or have let yourself or your family down -   Trouble concentrating on things such as school, work, reading, or watching TV -   Moving or speaking so slowly that other people could have noticed; or the opposite being so fidgety that others notice -   Thoughts of being better off dead, or hurting yourself in some way -   PHQ 9 Score -   How difficult have these problems made it for you to do your work, take care of your home and get along with others -       Alcohol & Drug Abuse Risk Screen    Do you average more than 1 drink per night or more than 7 drinks a week:  No    On any one occasion in the past three months have you have had more than 3 drinks containing alcohol:  No          Functional Ability and Level of Safety    Hearing: The patient wears hearing aids. Activities of Daily Living: The home contains: handrails  Patient needs help with:  transportation, shopping, preparing meals, managing medications, managing money, and walking      Ambulation: with difficulty, uses a walker     Fall Risk:  Fall Risk Assessment, last 12 mths 11/9/2022   Able to walk? Yes   Fall in past 12 months? 0   Do you feel unsteady? 0   Are you worried about falling 0   Is the gait abnormal? -   Number of falls in past 12 months -   Fall with injury?  -      Abuse Screen:  Patient is not abused       Cognitive Screening    Has your family/caregiver stated any concerns about your memory: yes - known mild dementia      Cognitive Screening: n/a    Health Maintenance Due     Health Maintenance Due   Topic Date Due    DTaP/Tdap/Td series (1 - Tdap) Never done    Shingrix Vaccine Age 50> (1 of 2) Never done    COVID-19 Vaccine (4 - Booster for Law Peter series) 05/21/2022       Patient Care Team   Patient Care Team:  Guicho Galvan NP as PCP - General (Family Medicine)  Guicho Galvan NP as PCP - [facility] Empaneled Provider  Call, Radha Posada MD (Allergy)  Kimberly Joyner MD (Gastroenterology)  Maday Louis MD (Inactive) (Cardiovascular Disease Physician)  Jarrett Warren MD (Neurology)  Jori Mello MD (Ophthalmology)  Luis Richardson MD (Psychiatry)  Lina Trejo MD (Neurology)    History     Patient Active Problem List   Diagnosis Code    Postconcussion syndrome F07.81    Asthma J45.909    GERD (gastroesophageal reflux disease) K21.9    Depressed F32. A    IBS (irritable bowel syndrome) K58.9    Occlusion and stenosis of carotid artery without mention of cerebral infarction I65.29    Dizziness and giddiness R42    Arthritis of knee, left M17.12    IGT (impaired glucose tolerance) R73.02    Hyperlipidemia E78.5    Essential hypertension I10    Bipolar affective disorder ([facility] F31.9    Advanced directives, counseling/discussion Z71.89    Asthma with COPD (chronic obstructive pulmonary disease) ([facility]) J44.9    Closed fracture of lateral portion of left tibial plateau K42.211E    OA (osteoarthritis) of knee M17.9    LELE (obstructive sleep apnea) G47.33     Past Medical History:   Diagnosis Date    Arthritis     Arthritis of knee, left     Asthma with COPD (chronic obstructive pulmonary disease) ([facility])     FEV1 - 0.99, IgE high    Atherosclerosis of both carotid arteries     Bipolar affective disorder ([facility])     Cataract, left eye     cataract - left    Closed fracture of lateral portion of left tibial plateau     Depression with anxiety     depression/anxiety    Esophageal ulcer     esophageal ulcer    Gastroparesis     GERD (gastroesophageal reflux disease)     Hepatic steatosis     History of fall     hx falls    Hyperlipidemia     increased cholesterol    Hypertension     IBS (irritable bowel syndrome)     IBS    IGT (impaired glucose tolerance) 09/15/2015    Liver disease     elevated liver enzymes - being evaluated/US done x 2 - inflammed liver/\"fat on liver\"    Menopause 1987    Neurological disorder     delayed thinking process? ??    OA (osteoarthritis) of knee     Osteoporosis     Seizures ([facility])     Sunburn, blistering     Unspecified adverse effect of anesthesia     was told by neurologist not to use anesthesia unless needed due to encephalopathy    Unspecified adverse effect of anesthesia     hallucinations after MRI with sedation    Vertigo     vertigo      Past Surgical History:   Procedure Laterality Date    COLONOSCOPY N/A 8/1/2017    COLONOSCOPY performed by Stella Angela MD at [facility] ENDOSCOPY    HX BREAST BIOPSY Left     Surgical    HX GI      laser surgery for reflux and hernia repair    HX MOHS PROCEDURES  09/05/2017    BCC L nasal sidewall by Dr. Faina Henao MOHS PROCEDURES  06/04/2018    BCC R mid forehead by Dr. Bijal Carolina      GERD reflux     Current Outpatient Medications   Medication Sig Dispense Refill    loratadine (Claritin) 10 mg tablet Take 10 mg by mouth.      gabapentin (NEURONTIN) 100 mg capsule Take 1 Capsule by mouth three (3) times daily. Max Daily Amount: 300 mg. As needed. (Patient taking differently: Take 200 mg by mouth three (3) times daily. As needed.) 90 Capsule 0    clonazePAM (KlonoPIN) 0.5 mg tablet Take 0.5 Tablets by mouth two (2) times daily as needed for Anxiety. Max Daily Amount: 0.5 mg. 15 Tablet 0    rosuvastatin (CRESTOR) 20 mg tablet Take 20 mg by mouth daily. meloxicam (MOBIC) 15 mg tablet Take 15 mg by mouth daily. inhalational spacing device 1 Each by Does Not Apply route as needed for Wheezing. 1 Each 1    albuterol (PROVENTIL VENTOLIN) 2.5 mg /3 mL (0.083 %) nebu 3 mL by Nebulization route every four (4) hours as needed for Wheezing. 30 Nebule 3    spironolactone (ALDACTONE) 25 mg tablet Take 25 mg by mouth daily. nystatin-triamcinolone (MYCOLOG II) topical cream Apply  to affected area two (2) times a day. Apply thin layer 30 g 1    chlorthalidone (HYGROTON) 25 mg tablet TAKE 1 TABLET BY MOUTH EVERY DAY      diclofenac (VOLTAREN) 1 % gel APPLY 2 GRAMS TO INSTRUCTED SITE ON LEFT LEG TWICE DAILY AS NEEDED FOR PAIN      acetaminophen (Tylenol Extra Strength) 500 mg pwpk Take 500 mg by mouth two (2) times a day. omeprazole (PRILOSEC) 20 mg capsule Take 20 mg by mouth daily. methenamine hippurate (HIPREX) 1 gram tablet Take 1 Tablet by mouth two (2) times daily (with meals). 180 Tablet 3    montelukast (SINGULAIR) 10 mg tablet take 1 tablet by mouth once daily 90 Tab 1    loperamide (IMODIUM) 2 mg capsule 4 mg as needed. BYSTOLIC 10 mg tablet Take 10 mg by mouth two (2) times a day. 2    sucralfate (CARAFATE) 1 gram tablet Take 1 g by mouth Before breakfast and dinner. fluticasone-salmeterol (ADVAIR) 500-50 mcg/dose diskus inhaler Take 1 Puff by inhalation two (2) times a day. 3 Inhaler 1    albuterol (PROVENTIL HFA, VENTOLIN HFA, PROAIR HFA) 90 mcg/actuation inhaler Take 2 Puffs by inhalation every four (4) hours as needed for Wheezing. 1 Inhaler 2    cholecalciferol (VITAMIN D3) 25 mcg (1,000 unit) cap Take 2,000 Units by mouth. CALCIUM CARBONATE (TUMS PO) Take  by mouth as needed.        Allergies   Allergen Reactions    Norberto Quivers Other (comments)     Blood testing    Cat Dander Other (comments)     Blood testing    Dog Dander Other (comments)     Blood testing    Elavil Other (comments)     imbalance    Iodine Shortness of Breath    [place] Other (comments)     Blood testing    Sulfa (Sulfonamide Antibiotics) Shortness of Breath       Family History   Problem Relation Age of Onset    Stroke Father     Heart Disease Father         angina    No Known Problems Mother     Cancer Brother         lung    Cancer Brother         lung     Social History     Tobacco Use    Smoking status: Former     Years: 20.00     Types: Cigarettes     Quit date:      Years since quittin.9    Smokeless tobacco: Never   Substance Use Topics    Alcohol use: No         Catherine Barnes NP

## 2022-11-10 ENCOUNTER — DOCUMENTATION ONLY (OUTPATIENT)
Dept: CASE MANAGEMENT | Age: 87
End: 2022-11-10

## 2022-11-10 NOTE — ACP (ADVANCE CARE PLANNING)
.Advance Care Planning   Ambulatory ACP Specialist Patient Outreach    Date:  11/10/2022; 11/15/2022; 11/22/2022    ACP Specialist:  Tavo King LMSW    Outreach call to patient in follow-up to ACP Specialist referral from:    [x] PCP  [] Provider   [] Ambulatory Care Management [] Other     For:                  [x] Advance Directive Assistance              [] Complete Portable DNR order              [] Complete POST/MOST              [] Code Status Discussion             [] Discuss Goals of Care             [] Early ACP Decision-Making              [] Other (Specify)    Date Referral Received:11/09/2022    Today's Outreach:  [x] First   [x] Second  [x] Third       Third outreach made by: [] Phone  [x] Email / mail    [] MyChart     Intervention:  [x] Spoke with Patient's dtr   [] Left VM requesting return call      Outcome:SW called and spoke with pt's dtr John Mccartney who asked for more info on ACP to review with pt. SW will e-mail info and follow up next week for next steps. 11/15  REJI called to try and follow up with pt's dtr to schedule an ACP appt. However, SW got pt's voice mail and had to leave a message. SW will attempt to reach pt/ dtr again next week. 11/22  Unable to get back in touch with pt/dtr e-mal sent with ACP teams info should they want to move forward in the future. Will move to close this referral at this time. Next Step:   [] ACP scheduled conversation  [] Outreach again in one week               [x] Email / Mail ACP Info Sheets  [x] Email / Mail Advance Directive   [x] Closing referral.  Routing closure to referring provider/staff and to ACP Specialist . [] Closure letter mailed to patient with invitation to contact ACP Specialist if / when ready.   Thank you for this referral.

## 2022-11-11 DIAGNOSIS — I10 ESSENTIAL HYPERTENSION: ICD-10-CM

## 2022-11-11 DIAGNOSIS — E78.5 HYPERLIPIDEMIA, UNSPECIFIED HYPERLIPIDEMIA TYPE: ICD-10-CM

## 2022-11-11 DIAGNOSIS — R73.02 IGT (IMPAIRED GLUCOSE TOLERANCE): Primary | ICD-10-CM

## 2022-11-15 ENCOUNTER — DOCUMENTATION ONLY (OUTPATIENT)
Dept: CASE MANAGEMENT | Age: 87
End: 2022-11-15

## 2022-11-16 ENCOUNTER — LAB ONLY (OUTPATIENT)
Dept: INTERNAL MEDICINE CLINIC | Age: 87
End: 2022-11-16

## 2022-11-16 DIAGNOSIS — E78.5 HYPERLIPIDEMIA, UNSPECIFIED HYPERLIPIDEMIA TYPE: ICD-10-CM

## 2022-11-16 DIAGNOSIS — N17.9 ACUTE RENAL FAILURE, UNSPECIFIED ACUTE RENAL FAILURE TYPE (HCC): ICD-10-CM

## 2022-11-16 DIAGNOSIS — R73.02 IGT (IMPAIRED GLUCOSE TOLERANCE): ICD-10-CM

## 2022-11-16 DIAGNOSIS — I10 ESSENTIAL HYPERTENSION: ICD-10-CM

## 2022-11-17 LAB
ALBUMIN SERPL-MCNC: 3.7 G/DL (ref 3.5–5)
ALBUMIN/GLOB SERPL: 1.1 {RATIO} (ref 1.1–2.2)
ALP SERPL-CCNC: 74 U/L (ref 45–117)
ALT SERPL-CCNC: 33 U/L (ref 12–78)
ANION GAP SERPL CALC-SCNC: 8 MMOL/L (ref 5–15)
AST SERPL-CCNC: 22 U/L (ref 15–37)
BASOPHILS # BLD: 0 K/UL (ref 0–0.1)
BASOPHILS NFR BLD: 1 % (ref 0–1)
BILIRUB SERPL-MCNC: 0.5 MG/DL (ref 0.2–1)
BUN SERPL-MCNC: 33 MG/DL (ref 6–20)
BUN/CREAT SERPL: 23 (ref 12–20)
CALCIUM SERPL-MCNC: 9.2 MG/DL (ref 8.5–10.1)
CHLORIDE SERPL-SCNC: 111 MMOL/L (ref 97–108)
CHOLEST SERPL-MCNC: 144 MG/DL
CO2 SERPL-SCNC: 22 MMOL/L (ref 21–32)
CREAT SERPL-MCNC: 1.46 MG/DL (ref 0.55–1.02)
DIFFERENTIAL METHOD BLD: ABNORMAL
EOSINOPHIL # BLD: 0.2 K/UL (ref 0–0.4)
EOSINOPHIL NFR BLD: 3 % (ref 0–7)
ERYTHROCYTE [DISTWIDTH] IN BLOOD BY AUTOMATED COUNT: 14.3 % (ref 11.5–14.5)
GLOBULIN SER CALC-MCNC: 3.5 G/DL (ref 2–4)
GLUCOSE SERPL-MCNC: 134 MG/DL (ref 65–100)
HCT VFR BLD AUTO: 39 % (ref 35–47)
HDLC SERPL-MCNC: 71 MG/DL
HDLC SERPL: 2 {RATIO} (ref 0–5)
HGB BLD-MCNC: 12.6 G/DL (ref 11.5–16)
IMM GRANULOCYTES # BLD AUTO: 0.1 K/UL (ref 0–0.04)
IMM GRANULOCYTES NFR BLD AUTO: 1 % (ref 0–0.5)
LDLC SERPL CALC-MCNC: 53 MG/DL (ref 0–100)
LYMPHOCYTES # BLD: 1.6 K/UL (ref 0.8–3.5)
LYMPHOCYTES NFR BLD: 22 % (ref 12–49)
MCH RBC QN AUTO: 28.3 PG (ref 26–34)
MCHC RBC AUTO-ENTMCNC: 32.3 G/DL (ref 30–36.5)
MCV RBC AUTO: 87.4 FL (ref 80–99)
MONOCYTES # BLD: 0.7 K/UL (ref 0–1)
MONOCYTES NFR BLD: 10 % (ref 5–13)
NEUTS SEG # BLD: 4.6 K/UL (ref 1.8–8)
NEUTS SEG NFR BLD: 63 % (ref 32–75)
NRBC # BLD: 0 K/UL (ref 0–0.01)
NRBC BLD-RTO: 0 PER 100 WBC
PLATELET # BLD AUTO: 163 K/UL (ref 150–400)
PMV BLD AUTO: 12.5 FL (ref 8.9–12.9)
POTASSIUM SERPL-SCNC: 5 MMOL/L (ref 3.5–5.1)
PROT SERPL-MCNC: 7.2 G/DL (ref 6.4–8.2)
RBC # BLD AUTO: 4.46 M/UL (ref 3.8–5.2)
SODIUM SERPL-SCNC: 141 MMOL/L (ref 136–145)
TRIGL SERPL-MCNC: 100 MG/DL (ref ?–150)
VLDLC SERPL CALC-MCNC: 20 MG/DL
WBC # BLD AUTO: 7.2 K/UL (ref 3.6–11)

## 2022-11-18 ENCOUNTER — HOSPITAL ENCOUNTER (OUTPATIENT)
Dept: MRI IMAGING | Age: 87
Discharge: HOME | End: 2022-11-18
Attending: PSYCHIATRY & NEUROLOGY
Payer: MEDICARE

## 2022-11-18 DIAGNOSIS — R41.3 MEMORY LOSS: ICD-10-CM

## 2022-11-18 PROCEDURE — 70551 MRI BRAIN STEM W/O DYE: CPT

## 2022-11-21 NOTE — PROGRESS NOTES
Sanaz - Please call pt: MRI brain 11/19/22 with age related changes, no explanation for memory loss.

## 2022-11-22 ENCOUNTER — TELEPHONE (OUTPATIENT)
Dept: NEUROLOGY | Age: 87
End: 2022-11-22

## 2022-11-22 ENCOUNTER — DOCUMENTATION ONLY (OUTPATIENT)
Dept: CASE MANAGEMENT | Age: 87
End: 2022-11-22

## 2022-11-22 NOTE — TELEPHONE ENCOUNTER
Called daughter. Informed her that the doctor went over the patient's MRI results stating, \"MRI brain 11/19/22 with age related changes, no explanation for memory loss. \"

## 2022-12-01 ENCOUNTER — TELEPHONE (OUTPATIENT)
Dept: NEUROLOGY | Age: 87
End: 2022-12-01

## 2022-12-01 NOTE — TELEPHONE ENCOUNTER
Patients daughter called because her mother is refusing to come to her appt today or for testing tomorrow. Patient has quit answering her phone when family tries to call and discuss the appts. After speaking with the daughter, we cancelled both appts and daughter was given the names for some therapist in the area as she feels her mom would benefit from seeing someone. They will call back if they can convince her to see Dr Giacomo Grayson.

## 2022-12-02 ENCOUNTER — PATIENT OUTREACH (OUTPATIENT)
Dept: CASE MANAGEMENT | Age: 87
End: 2022-12-02

## 2022-12-02 NOTE — PROGRESS NOTES
Ambulatory Care Management Note    Date/Time:  12/2/2022 11:49 AM    This patient was received as a referral from Provider. Ambulatory Care Manager outreached to patient today to offer care management services. Introduction to self and role of care manager provided. Patient accepted care management services at this time. Follow up call scheduled at this time. Patient has Ambulatory Care Manager's contact number for any questions or concerns. Ambulatory Care Management Note    Date/Time:  12/2/2022 11:50 AM    This Ambulatory Care Manager (ACYESICA) reviewed and updated the following screenings during this call; general assessment, self management assessment, and SDOH assessments    Patient's challenges to self-management identified:   depression, functional cognitive ability, lack of knowledge about disease, level of motivation, medical condition, medication management, multi health system providers, polypharmacy, support system, transportation, and utilization of services      Medication Management:  good understanding and poor adherence    Advance Care Planning:   Does patient have an Advance Directive:   dtr Reynaga Arslan is listed as primary HCDM, does not have [place], Referrals, and Durable Medical Equipment: none. Applied for Meals on Wheels for pt. Health Maintenance Due   Topic Date Due    DTaP/Tdap/Td series (1 - Tdap) Never done    Shingrix Vaccine Age 50> (1 of 2) Never done    COVID-19 Vaccine (4 - Booster for Pfizer series) 05/21/2022     Health Maintenance Reviewed: deferred    Pt is interested in someone to come to the house to help her out for a couple of hours a day - provided with contact info for Anna. Pt does live alone, she lives in a townhouse with steps which she said she tries to stay downstairs though is able to go upstairs. Her dtr helps her out with grocery shopping and fixing her medication box.  Pt eats a lot of prepared foods: frozen meals, soups, meals prepared by dtr. Pt admits she does not drink enough water. She has had no falls and does utilize a walker. Spoke with dtr with pt's permission; dtr did verify mother with name & . Dtr expressed concern over pt canceling 2 drs appointments this week, dtr has to take off work/change her schedule to accommodate pt's appointments. Pt does not drive & dtr Rya Richey takes her to appointments. There is no other family support. Son helps with aging father. Other dtr lives in [place]. Dtr was interested in [facility] and said she will call. Discussed Meals on Wheels - applied electronically for pt with pt & dtr's permission. Dtr will be scheduling appointment with Lynda Fam as well as missed appointments when she has her work schedule. Ambulatory Care Management Note      Date/Time:  2022 12:10 PM    This patient was received as a referral from Provider. Top Challenges reviewed with the provider   Pt lives alone, has steps  Poor appetite  Does not drink enough water  Socially isolated  Fall risk - does use walker  Pt cancels appointments       Ambulatory  contacted patient for discussion and case management of community resources   Summary of patients top problems:   ARF - Her diet consists of prepared foods that may be higher in sodium as opposed to cooked meals. She admits to not drinking enough water. She does have referral for Nephrology, appointment not yet made. Dementia - Pt enjoys living in her townhouse, lives alone; she does not drive. Dtr reports she has a tendency to cancel her specialist appointments at the last minute, which dtr has to take off of work for. Pt reports she enjoys the upkeep/cleaning of her home. She does not cook. She has had no recent falls, she does utilize a walker; she has fallen in the past and was found approximately 2 days later. She has a neighbor she is friendly with, no other support. Dtr prepares her medications in a pill box.      PCP/Specialist follow up:   Future Appointments   Date Time Provider Celina Chaparroi   2/14/2023  1:30 PM Frida Mullen NP [facility] BS AMB   3/7/2023  1:40 PM Samira Baker DO [facility] BS AMB           Goals        Self-schedules and keeps appointments       12/02/22  PCP has referred pt to Fabiola Timmons. Pt also needs to follow up with Cards & Pulm as well as appointments she missed this week. Pt/dtr will:  Schedule & attend appoint with Nephrologist  Schedule & attend appoint with Cards  Schedule & attend appoint with Daniel Santiago [address] to follow up with pt in 7-10 days. AUSTIN Martinez, RN - Ambulatory Care Manager - [phone]          Supportive resources in place to maintain patient in the community (ie. Home Health, DME equipment, refer to, medication assistant plan, etc.)      12/02/22  Pt has history of progressing dementia. She lives alone and does not drive. She does not cook much, she does heat food up or perhaps make a grilled cheese. She lives in a townhouse with an upstairs, she is interested in staying here. Discussed resources to help her. She was interested in receiving some help. 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  Pt will:  Avani Liu for light housework, errands, appointments, shopping, and meals  Eat 3 small meals a day  May substitute with CIB/Boost/Ensure if needed  Drink enough water to ensure at least 4 voids a day  [employer] - applied. Voice likes/dislikes to them  This [facility] plans to follow up with pt in 7-10 days. AUSTNI Martinez, RN - [address] [facility] - [phone]                  Patient verbalized understanding of all information discussed. Patient has this Ambulatory Care Manager's contact information for any further questions, concerns, or needs.

## 2022-12-14 ENCOUNTER — PATIENT OUTREACH (OUTPATIENT)
Dept: CASE MANAGEMENT | Age: 87
End: 2022-12-14

## 2022-12-14 NOTE — PROGRESS NOTES
Goals Addressed                   This Visit's Progress     Self-schedules and keeps appointments    On track     12/14/22  Spoke with pt & dtr (with pt's permission, she was with her mother)  Dtr has been busy with the holidays and family, she does plan on making necessary appointments for her mother soon. Will follow up with pt/dtr in 3 weeks. AUSTIN Andrade, RN - [address] [facility] - [phone]     12/02/22  PCP has referred pt to [address]. Pt also needs to follow up with Cards & Pulm as well as appointments she missed this week. Pt/dtr will:  Schedule & attend appoint with Nephrologist  Schedule & attend appoint with Cards  Schedule & attend appoint with Daniel Pina [place] [id number] to follow up with pt in 7-10 days. AUSTIN Andrade, RN - Ambulatory Care Manager - [phone]          Supportive resources in place to maintain patient in the community (ie. Home Health, DME equipment, refer to, medication assistant plan, etc.)   On track     12/14/22  Dtr does plan on calling [facility], she has been busy with holiday preparations  Pt voluntarily reports her breathing has been good, legs have been weak  Pt reports appetite is fair, she is trying to drink water  Plan to follow up in 3 weeks/after holidays. AUSTIN Andrade, RN - Ambulatory Care Manager - [phone]     12/02/22  Pt has history of progressing dementia. She lives alone and does not drive. She does not cook much, she does heat food up or perhaps make a grilled cheese. She lives in a townhouse with an upstairs, she is interested in staying here. Discussed resources to help her. She was interested in receiving some help.  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  Pt will:  [address] for light housework, errands, appointments, shopping, and meals  Eat 3 small meals a day  May substitute with CIB/Boost/Ensure if needed  Drink enough water to ensure at least 4 voids a day  [employer] Meals on Wheels - applied. Voice likes/dislikes to them  This ACM plans to follow up with pt in 7-10 days.   Monster Tavera, MSN, RN - Ambulatory Care Manager - [phone]

## 2022-12-29 DIAGNOSIS — G62.9 NEUROPATHY: ICD-10-CM

## 2022-12-30 RX ORDER — GABAPENTIN 100 MG/1
CAPSULE ORAL
Qty: 90 CAPSULE | Refills: 1 | Status: SHIPPED | OUTPATIENT
Start: 2022-12-30

## 2023-01-10 ENCOUNTER — PATIENT OUTREACH (OUTPATIENT)
Dept: CASE MANAGEMENT | Age: 88
End: 2023-01-10

## 2023-01-10 NOTE — PROGRESS NOTES
Goals Addressed                   This Visit's Progress     Self-schedules and keeps appointments    On track     01/10/23  Pt is unsure of any appoints with specialists  Pt does have appoint with PCP on 2/14  Pt asked about seeing PCP sooner than 2/14 for \"runny nose & tired\". Pt denies any CP, SOB, or edema. She reports appetite is fair, she admits she probably is not drinking enough water though; she reports occ diarrhea, no fever. She said she is taking all prescribed meds as directed. Offered to make appoint, pt declined, she needs to check with dtr for ride. Plan to follow up with pt in 2 weeks. AUSTIN Hurt, RN - [address] [facility] - [phone]     12/14/22  Spoke with pt & dtr (with pt's permission, she was with her mother)  Dtr has been busy with the holidays and family, she does plan on making necessary appointments for her mother soon. Will follow up with pt/dtr in 3 weeks. AUSTIN Hurt, RN - [address] [facility] - [phone]     12/02/22  PCP has referred pt to [address]. Pt also needs to follow up with Cards & Pulm as well as appointments she missed this week. Pt/dtr will:  Schedule & attend appoint with Nephrologist  Schedule & attend appoint with Cards  Schedule & attend appoint with Daniel Pina [place] [address] to follow up with pt in 7-10 days. AUSTIN Hurt, RN - Ambulatory Care Manager - [phone]          Supportive resources in place to maintain patient in the community (ie. Home Health, DME equipment, refer to, medication assistant plan, etc.)   On track     01/10/23  Meals on Wheels attempted to come out to meet with pt, pt was not feeling well at the time and declined. Plan to follow up with pt in 2 weeks.   AUSTIN Hurt, RN - [address] [facility] - [phone]     12/14/22  Dtr does plan on calling Anna, she has been busy with holiday preparations  Pt voluntarily reports her breathing has been good, legs have been weak  Pt reports appetite is fair, she is trying to drink water  Plan to follow up in 3 weeks/after holidays. Valery Bernstein MSN, RN - Ambulatory Care Manager - [phone]     12/02/22  Pt has history of progressing dementia. She lives alone and does not drive. She does not cook much, she does heat food up or perhaps make a grilled cheese. She lives in a townhouse with an upstairs, she is interested in staying here. Discussed resources to help her. She was interested in receiving some help. 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  Pt will:  Barbarann Coins for light housework, errands, appointments, shopping, and meals  Eat 3 small meals a day  May substitute with CIB/Boost/Ensure if needed  Drink enough water to ensure at least 4 voids a day  [employer] - applied. Voice likes/dislikes to them  This AC plans to follow up with pt in 7-10 days.   AUSTIN Odonnell, RN - Ambulatory Care Manager - [phone]

## 2023-01-25 ENCOUNTER — PATIENT OUTREACH (OUTPATIENT)
Dept: CASE MANAGEMENT | Age: 88
End: 2023-01-25

## 2023-01-25 NOTE — PROGRESS NOTES
Goals Addressed                   This Visit's Progress     Self-schedules and keeps appointments    Not on track     23  Dtr Dianna Baltazar called this ACM very upset with many concerns regarding her mother - she did verify pt with name &   Pt did attend Pulm appoint  Dtr reports when pt is not feeling well, which is often lately, she cancels her appointments. Pt has canceled Cards & Nephr. She has canceled Eye appoint & hair appoint as well  Pt has canceled previous appointments with Neuro psych Dr Evangelina Ahumada as well, pt does not want to participate in any neuro testing  Dtr has to take off work for these appointments and is very frustrated when pt cancels at the last minute  Deepa Souza, MSN, RN - [address] [facility] - [phone]     01/10/23  Pt is unsure of any appoints with specialists  Pt does have appoint with PCP on   Pt asked about seeing PCP sooner than  for \"runny nose & tired\". Pt denies any CP, SOB, or edema. She reports appetite is fair, she admits she probably is not drinking enough water though; she reports occ diarrhea, no fever. She said she is taking all prescribed meds as directed. Offered to make appoint, pt declined, she needs to check with dtr for ride. Plan to follow up with pt in 2 weeks. Deepa Souza, AUSTIN, RN - [address] [facility] - [phone]     22  Spoke with pt & dtr (with pt's permission, she was with her mother)  Dtr has been busy with the holidays and family, she does plan on making necessary appointments for her mother soon. Will follow up with pt/dtr in 3 weeks. Deepa Souza, AUSTIN, RN - [address] [facility] - [phone]     22  PCP has referred pt to [address] [facility]. Pt also needs to follow up with Cards & Pulm as well as appointments she missed this week.   Pt/dtr will:  Schedule & attend appoint with Nephrologist  Schedule & attend appoint with Cards  Schedule & attend appoint with Daniel Pina Do [facility] 1263 to follow up with pt in 7-10 days.   Anna Morales, MSN, RN - Ambulatory Care Manager - [phone]          Supportive resources in place to maintain patient in the community (ie. Home Health, DME equipment, refer to, medication assistant plan, etc.)   On track     01/25/23  Dtr reports concerns regarding her mother's decision making, memory, as well as possible hallucinations  Dtr reports pt often tells her that she sees people in her house and therefore it is not her house which she lives in. Pt has called 91 for this; she has also called 911 when she thought she did not have heat though she actually had turned it off. Dtr has tried a weekly med box which pt did not like. She now takes her meds out daily and puts in baggie with list of names of meds. Pt does have access to her med bottles as well. Pt will sometimes say to dtr \"these are your meds, not mine\", so dtr is unsure if pt is taking her meds  Pt will eat meals fixed for her, otherwise snacks. She does have plenty of food. She likes to drink coffee throughout the day as well  Dtr did try [employer] and the pt did have a good experience, though is afraid pt will turn the caregivers/neighbors away  Pt has no ACP docs, has been attempted. 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 Dtr admits she would like pt to be in [place] though she is unsure if pt will be compliant with this. Pt is socially isolated, dtr visits daily though this is becoming a burden on dtr. Pt did have MRI of brain on 11/19/22 & Yvrose Meyer reports on 11/21/22 \"age related changes, no explanation for memory loss\". No neuro psych testing has been done though. Last OV was 9/30/22. Pt does have PCP appoint on 2/14  Will consult SW to discuss options and next steps - please call dtr Jackson Mazariegos. Plan to follow up with dtr on Monday 1/30.   Anna Morales, MSN, RN - [address] [place] [address] - [phone] .    01/10/23  Meals on Wheels attempted to come out to meet with pt, pt was not feeling well at the time and declined. Plan to follow up with pt in 2 weeks. Christoph Ramon, MSN, RN - [address] [facility] - [phone]     12/14/22  Dtr does plan on calling [facility], she has been busy with holiday preparations  Pt voluntarily reports her breathing has been good, legs have been weak  Pt reports appetite is fair, she is trying to drink water  Plan to follow up in 3 weeks/after holidays. AUSTIN Sarmiento, RN - Ambulatory Care Manager - [phone]     12/02/22  Pt has history of progressing dementia. She lives alone and does not drive. She does not cook much, she does heat food up or perhaps make a grilled cheese. She lives in a townhouse with an upstairs, she is interested in staying here. Discussed resources to help her. She was interested in receiving some help. 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  Pt will:  Modi Zenia for light housework, errands, appointments, shopping, and meals  Eat 3 small meals a day  May substitute with CIB/Boost/Ensure if needed  Drink enough water to ensure at least 4 voids a day  [employer] - applied. Voice likes/dislikes to them  This [facility] plans to follow up with pt in 7-10 days.   AUSTIN Sarmiento, RN - Ambulatory Care Manager - [phone]

## 2023-01-27 ENCOUNTER — PATIENT OUTREACH (OUTPATIENT)
Dept: CASE MANAGEMENT | Age: 88
End: 2023-01-27

## 2023-01-27 NOTE — PROGRESS NOTES
Social Work Note  1/27/2023    Referral received from Neyda Mo, RN, Ambulatory Care Manager for assessment of community resource needs. Chart reviewed. Attempted to reach patient's daughter, Mariya Arriaga (on HIPAA). Unable to leave message, voicemail full. Attempted to reach patient. VM left. [employer] message sent introducing SW role and offering assistance. SW contact information included.      Nikki Lacy MSW, ACSW, [facility]    Ambulatory Care Management   [phone]

## 2023-02-01 ENCOUNTER — PATIENT OUTREACH (OUTPATIENT)
Dept: CASE MANAGEMENT | Age: 88
End: 2023-02-01

## 2023-02-01 NOTE — PROGRESS NOTES
Initial Contact Social Work Note - Ambulatory  2/1/2023      Date of referral: 01/25/2023   Referral received from: Chelsea Roe RN, [address]   Reason for referral: Community resources    Previous  Referral: No      Two Identifiers Verified: Yes    Insurance Provider: [employer]    Support System: Adult Child/Children      Status: N/A    Community Providers:  None at this time      ADL Assistance: Bathing, Dressing, and Transferring  Patient advised that she uses a walker and must have help to navigate the stairs. Bathing assistance needed as shower is on 2nd floor. Housing/Living Concerns or Home Modification Needs: TBD    Transportation Concern:  TBD. CoPatient provides transportation to medical appointments if needed. Medication Cost Concern: TBD    Medication Education or Adherence Concern: Per chart, concern noted for medication compliance. Patient stated that she \"sometimes\" has trouble taking her medications. She stated that her daughter arranges them for her. Financial Concern(s): TBD    Income (only if applicable): N/A     Ability to Read/Write: Yes    Advance Care Plan:   Deferred to future conversation    Other:   Referral received from [employer] for assistance with community resources. Chart reviewed. Attempted to reach patient's daughter. Unable to leave message -  full. Contacted patient for initial assessment. Introduced self, social work role/services, and purpose of call. Patient stated that she \"is not doing well at all\". She stated that she has difficulty\"getting around\" and feels \"lightheaded\" at times. SW asked patient how she handles the episodes of feeling lightheaded and she stated that she sits down. Patient denied falls in recent weeks. She stated that last fall was \"3-4 weeks ago\". She confirmed that she uses a walker for ambulation but sometimes \"forgets\" and leaves it in the kitchen. SW asked about support at home.   Patient stated that she and her daughter interviewed someone with [facility], but her daughter has not made decision re: services. Patient stated that she could use some assistance at home. SW advised of attempts to reach daughter. SW contact given to patient. Asked her to pass message on to daughter. Offered support with connecting to resources. Identified Needs: In home care  Discussion of available resources, long term care options  Patient/caregiver support    Social Work Plan:    Follow-up with daughter re: available resources     Method of Communication with Provider (if appropriate): chart routing        Goals Addressed                      This Visit's Progress       General      Supportive resources for in-home or community care (pt-stated)         02/01/23 - REJI Note  Will provide support to patient/family and assist with discussion of and connection to available community resources.    HARLEY Ziegler, MSW, ACSW, [facility]    Ambulatory Care Management   [phone]

## 2023-02-02 ENCOUNTER — PATIENT OUTREACH (OUTPATIENT)
Dept: CASE MANAGEMENT | Age: 88
End: 2023-02-02

## 2023-02-02 NOTE — PROGRESS NOTES
Social Work Note  2/2/2023    Received message from patient's daughter. Returned call. Unable to leave message as voicemail was full. Will reach out again. Patient status discussed with Samia Pickens RN, Ambulatory Care Manager.      EVA Gan, ACSW, [facility]    Ambulatory Care Management   [phone]

## 2023-02-02 NOTE — PROGRESS NOTES
Goals Addressed                   This Visit's Progress     Self-schedules and keeps appointments    On track     23  Spoke with dtr Handy Nj, she verified pt with name &   Pt does have upcoming appoint with PCP on  which dtr plans on attending, dtr may change appoint day to suit her schedule. Pt does trust her PCP and will attend that appoint  Plan to follow up with dtr in 1 week. AUSTIN London, RN - Ambulatory Care Manager - [phone]       23  Dtr Handy Nj called this ACM very upset with many concerns regarding her mother - she did verify pt with name &   Pt did attend Pulm appoint  Dtr reports when pt is not feeling well, which is often lately, she cancels her appointments. Pt has canceled Cards & Nephr. She has canceled Eye appoint & hair appoint as well  Pt has canceled previous appointments with Neuro psych Dr Michelle Marcelo as well, pt does not want to participate in any neuro testing  Dtr has to take off work for these appointments and is very frustrated when pt cancels at the last minute  AUSTIN London, RN - [facility] - [phone]     01/10/23  Pt is unsure of any appoints with specialists  Pt does have appoint with PCP on   Pt asked about seeing PCP sooner than  for \"runny nose & tired\". Pt denies any CP, SOB, or edema. She reports appetite is fair, she admits she probably is not drinking enough water though; she reports occ diarrhea, no fever. She said she is taking all prescribed meds as directed. Offered to make appoint, pt declined, she needs to check with dtr for ride. Plan to follow up with pt in 2 weeks. AUSTIN London, RN - [facility] - [phone]     22  Spoke with pt & dtr (with pt's permission, she was with her mother)  Dtr has been busy with the holidays and family, she does plan on making necessary appointments for her mother soon. Will follow up with pt/dtr in 3 weeks.    AUSTIN London, RN - Ambulatory Care Manager - [phone]     12/02/22  PCP has referred pt to [facility]. Pt also needs to follow up with Cards & Pulm as well as appointments she missed this week. Pt/dtr will:  Schedule & attend appoint with Nephrologist  Schedule & attend appoint with Cards  Schedule & attend appoint with Daniel Pina Do Raymon Plunkett 1263 to follow up with pt in 7-10 days. Irlanda Simmons, MSN, RN - Ambulatory Care Manager - [phone]          Supportive resources in place to maintain patient in the community (ie. Home Health, DME equipment, refer to, medication assistant plan, etc.)   On track     02/02/23  Dtr reports hallucinations have become worse. Pt believes that she has taken her places and left her there and won't let her back into her house  Pt is not trusting her dtr and is not taking her medications  Provided dtr with [facility]  Dtr did not have long to talk, she is at work. Did relay to dtr that SW is trying to reach her as well  Plan to follow up with dtr in 1 week. Irlanda Simmons, AUSTIN, RN - Ambulatory Care Manager - [phone]       01/25/23  Dtr reports concerns regarding her mother's decision making, memory, as well as possible hallucinations  Dtr reports pt often tells her that she sees people in her house and therefore it is not her house which she lives in. Pt has called 911 for this; she has also called 911 when she thought she did not have heat though she actually had turned it off. Dtr has tried a weekly med box which pt did not like. She now takes her meds out daily and puts in baggie with list of names of meds. Pt does have access to her med bottles as well. Pt will sometimes say to dtr \"these are your meds, not mine\", so dtr is unsure if pt is taking her meds  Pt will eat meals fixed for her, otherwise snacks. She does have plenty of food.  She likes to drink coffee throughout the day as well  Dtr did try [employer] and the pt did have a good experience, though is afraid pt will turn the caregivers/neighbors away  Pt has no ACP docs, has been attempted. 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 Dtr admits she would like pt to be in [place] though she is unsure if pt will be compliant with this. Pt is socially isolated, dtr visits daily though this is becoming a burden on dtr. Pt did have MRI of brain on 11/19/22 & Leora Opitz reports on 11/21/22 \"age related changes, no explanation for memory loss\". No neuro psych testing has been done though. Last OV was 9/30/22. Pt does have PCP appoint on 2/14  Will consult SW to discuss options and next steps - please call dtr Radha Crain. Plan to follow up with dtr on Monday 1/30. AUSTIN Parks, RN - [facility] - [phone] .    01/10/23  [employer] attempted to come out to meet with pt, pt was not feeling well at the time and declined. Plan to follow up with pt in 2 weeks. AUSTIN Parks, RN - [facility] - [phone]     12/14/22  Dtr does plan on calling Anna, she has been busy with holiday preparations  Pt voluntarily reports her breathing has been good, legs have been weak  Pt reports appetite is fair, she is trying to drink water  Plan to follow up in 3 weeks/after holidays. AUSTIN Parks, RN - Ambulatory Care Manager - [phone] 12/02/22  Pt has history of progressing dementia. She lives alone and does not drive. She does not cook much, she does heat food up or perhaps make a grilled cheese. She lives in a townhouse with an upstairs, she is interested in staying here. Discussed resources to help her. She was interested in receiving some help.  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  Pt will:  Mary [employer] for light housework, errands, appointments, shopping, and meals  Eat 3 small meals a day  May substitute with CIB/Boost/Ensure if needed  Drink enough water to ensure at least 4 voids a day  [employer] - applied. Voice likes/dislikes to them  This ACM plans to follow up with pt in 7-10 days.   Skye Trevino, MSN, RN - Ambulatory Care Manager - [phone]

## 2023-02-10 ENCOUNTER — PATIENT OUTREACH (OUTPATIENT)
Dept: CASE MANAGEMENT | Age: 88
End: 2023-02-10

## 2023-02-14 ENCOUNTER — PATIENT OUTREACH (OUTPATIENT)
Dept: CASE MANAGEMENT | Age: 88
End: 2023-02-14

## 2023-02-14 ENCOUNTER — OFFICE VISIT (OUTPATIENT)
Dept: INTERNAL MEDICINE CLINIC | Age: 88
End: 2023-02-14
Payer: MEDICARE

## 2023-02-14 VITALS
SYSTOLIC BLOOD PRESSURE: 180 MMHG | TEMPERATURE: 97.9 F | HEART RATE: 58 BPM | WEIGHT: 156.7 LBS | BODY MASS INDEX: 27.77 KG/M2 | OXYGEN SATURATION: 97 % | RESPIRATION RATE: 18 BRPM | DIASTOLIC BLOOD PRESSURE: 80 MMHG | HEIGHT: 63 IN

## 2023-02-14 DIAGNOSIS — G30.9 MILD ALZHEIMER'S DEMENTIA WITH OTHER BEHAVIORAL DISTURBANCE, UNSPECIFIED TIMING OF DEMENTIA ONSET (HCC): Primary | ICD-10-CM

## 2023-02-14 DIAGNOSIS — F41.9 ANXIETY AND DEPRESSION: ICD-10-CM

## 2023-02-14 DIAGNOSIS — F32.A ANXIETY AND DEPRESSION: ICD-10-CM

## 2023-02-14 DIAGNOSIS — F31.60 BIPOLAR AFFECTIVE DISORDER, CURRENT EPISODE MIXED, CURRENT EPISODE SEVERITY UNSPECIFIED (HCC): ICD-10-CM

## 2023-02-14 DIAGNOSIS — F51.04 PSYCHOPHYSIOLOGICAL INSOMNIA: ICD-10-CM

## 2023-02-14 DIAGNOSIS — Z74.09 IMPAIRED MOBILITY: ICD-10-CM

## 2023-02-14 DIAGNOSIS — G62.9 NEUROPATHY: ICD-10-CM

## 2023-02-14 DIAGNOSIS — J20.9 COPD WITH ACUTE BRONCHITIS (HCC): ICD-10-CM

## 2023-02-14 DIAGNOSIS — Z99.89 DEPENDENT ON WALKER FOR AMBULATION: ICD-10-CM

## 2023-02-14 DIAGNOSIS — I10 ESSENTIAL HYPERTENSION: ICD-10-CM

## 2023-02-14 DIAGNOSIS — J44.0 COPD WITH ACUTE BRONCHITIS (HCC): ICD-10-CM

## 2023-02-14 DIAGNOSIS — G47.33 OSA (OBSTRUCTIVE SLEEP APNEA): ICD-10-CM

## 2023-02-14 DIAGNOSIS — F02.A18 MILD ALZHEIMER'S DEMENTIA WITH OTHER BEHAVIORAL DISTURBANCE, UNSPECIFIED TIMING OF DEMENTIA ONSET (HCC): Primary | ICD-10-CM

## 2023-02-14 PROCEDURE — 99214 OFFICE O/P EST MOD 30 MIN: CPT | Performed by: NURSE PRACTITIONER

## 2023-02-14 PROCEDURE — 1101F PT FALLS ASSESS-DOCD LE1/YR: CPT | Performed by: NURSE PRACTITIONER

## 2023-02-14 PROCEDURE — G8536 NO DOC ELDER MAL SCRN: HCPCS | Performed by: NURSE PRACTITIONER

## 2023-02-14 PROCEDURE — 1090F PRES/ABSN URINE INCON ASSESS: CPT | Performed by: NURSE PRACTITIONER

## 2023-02-14 PROCEDURE — G8427 DOCREV CUR MEDS BY ELIG CLIN: HCPCS | Performed by: NURSE PRACTITIONER

## 2023-02-14 PROCEDURE — G8417 CALC BMI ABV UP PARAM F/U: HCPCS | Performed by: NURSE PRACTITIONER

## 2023-02-14 PROCEDURE — 1123F ACP DISCUSS/DSCN MKR DOCD: CPT | Performed by: NURSE PRACTITIONER

## 2023-02-14 PROCEDURE — G9717 DOC PT DX DEP/BP F/U NT REQ: HCPCS | Performed by: NURSE PRACTITIONER

## 2023-02-14 NOTE — PROGRESS NOTES
Lorraine Mon [place] is a 80 y.o. 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) (HCC)     FEV1 - 0.99, IgE high    Atherosclerosis of both carotid arteries     Bipolar affective disorder ([address]     Cataract, left eye     cataract - left    Closed fracture of lateral portion of left tibial plateau     Depression with anxiety     depression/anxiety    Esophageal ulcer     esophageal ulcer    Gastroparesis     GERD (gastroesophageal reflux disease)     Hepatic steatosis     History of fall     hx falls    Hyperlipidemia     increased cholesterol    Hypertension     IBS (irritable bowel syndrome)     IBS    IGT (impaired glucose tolerance) 09/15/2015    Liver disease     elevated liver enzymes - being evaluated/US done x 2 - inflammed liver/\"fat on liver\"    Menopause 1987    Neurological disorder     delayed thinking process? ??    OA (osteoarthritis) of knee     Osteoporosis     Seizures (HCC)     Sunburn, blistering     Unspecified adverse effect of anesthesia     was told by neurologist not to use anesthesia unless needed due to encephalopathy    Unspecified adverse effect of anesthesia     hallucinations after MRI with sedation    Vertigo     vertigo      Past Surgical History:   Procedure Laterality Date    COLONOSCOPY N/A 8/1/2017    COLONOS[facility] at [facility] ENDOSCOPY    HX BREAST BIOPSY Left     Surgical    HX GI      laser surgery for reflux and hernia repair    HX MOHS PROCEDURES  09/05/2017    BCC L nasal sidewall by Dr. Jai Traore  06/04/2018    BCC R mid forehead by Dr. Monica Gong ABDOMEN SURGERY PROC UNLISTED      GERD reflux        Allergies   Allergen Reactions   Rolin Grave Other (comments)     Blood testing    Cat Dander Other (comments)     Blood testing    Dog Dander Other (comments)     Blood testing    Elavil Other (comments)     imbalance    Iodine Shortness of Breath    [place] Other (comments)     Blood testing    Sulfa (Sulfonamide Antibiotics) Shortness of Breath        Current Outpatient Medications on File Prior to Visit   Medication Sig Dispense Refill    rosuvastatin (CRESTOR) 20 mg tablet Take 20 mg by mouth daily.  meloxicam (MOBIC) 15 mg tablet Take 15 mg by mouth daily.  inhalational spacing device 1 Each by Does Not Apply route as needed for Wheezing. 1 Each 1    spironolactone (ALDACTONE) 25 mg tablet Take 25 mg by mouth daily.  chlorthalidone (HYGROTON) 25 mg tablet TAKE 1 TABLET BY MOUTH EVERY DAY      diclofenac (VOLTAREN) 1 % gel APPLY 2 GRAMS TO INSTRUCTED SITE ON LEFT LEG TWICE DAILY AS NEEDED FOR PAIN      acetaminophen (Tylenol Extra Strength) 500 mg pwpk Take 500 mg by mouth two (2) times a day.  montelukast (SINGULAIR) 10 mg tablet take 1 tablet by mouth once daily 90 Tab 1    loperamide (IMODIUM) 2 mg capsule 4 mg as needed.  BYSTOLIC 10 mg tablet Take 10 mg by mouth two (2) times a day. 2    fluticasone-salmeterol (ADVAIR) 500-50 mcg/dose diskus inhaler Take 1 Puff by inhalation two (2) times a day. 3 Inhaler 1    albuterol (PROVENTIL HFA, VENTOLIN HFA, PROAIR HFA) 90 mcg/actuation inhaler Take 2 Puffs by inhalation every four (4) hours as needed for Wheezing. 1 Inhaler 2    gabapentin (NEURONTIN) 100 mg capsule TAKE 1 CAPSULE BY MOUTH THREE TIMES DAILY. MAX DAILY AMOUNT: 300 MG. AS NEEDED 90 Capsule 1    loratadine (Claritin) 10 mg tablet Take 10 mg by mouth.  clonazePAM (KlonoPIN) 0.5 mg tablet Take 0.5 Tablets by mouth two (2) times daily as needed for Anxiety.  Max Daily Amount: 0.5 mg. (Patient not taking: Reported on 2/14/2023) 15 Tablet 0    albuterol (PROVENTIL VENTOLIN) 2.5 mg /3 mL (0.083 %) nebu 3 mL by Nebulization route every four (4) hours as needed for Wheezing. 30 Nebule 3    nystatin-triamcinolone (MYCOLOG II) topical cream Apply  to affected area two (2) times a day. Apply thin layer 30 g 1    omeprazole (PRILOSEC) 20 mg capsule Take 20 mg by mouth daily.  methenamine hippurate (HIPREX) 1 gram tablet Take 1 Tablet by mouth two (2) times daily (with meals). 180 Tablet 3    sucralfate (CARAFATE) 1 gram tablet Take 1 g by mouth Before breakfast and dinner.  cholecalciferol (VITAMIN D3) 25 mcg (1,000 unit) cap Take 2,000 Units by mouth.  CALCIUM CARBONATE (TUMS PO) Take  by mouth as needed. No current facility-administered medications on file prior to visit. She is not taking because ankles swell  No BP medicaiton on Sunday and mOrning mornings   BP readings a little high when she takes mediations   's, DBP   Appointment with Jayashree JONES on April 4. She missed earlier appoitment 2 weeks ago    Feeling tired a low   She is working on drinkig more water   Feels dell when she drinks water   Up all nights sometimes per daughter   She is hesitant to restart Lamictal. She is afraid to take it     She suffers with depression.  Wants alternate medication to replace Lamictal   Labor force     PT referral to [facility]   She uses very little Clonazepam   She is dealing with a lot of anxiety and depression   She chose not to see Neuropsychological testing   Unable to get in with counselor   ROS    Objective  Physical Exam     Assessment & Plan  {ASSESSMENT/PLAN:13851}  Janessa Mcmillan NP Musculoskeletal:  Positive for falls and joint pain. Skin: Negative. Neurological:  Negative for dizziness. Objective  Visit Vitals  BP (!) 180/80   Pulse (!) 58   Temp 97.9 °F (36.6 °C) (Oral)   Resp 18   Ht 5' 3\" (1.6 m)   Wt 156 lb 11.2 oz (71.1 kg)   SpO2 97%   BMI 27.76 kg/m²      Physical Exam  Constitutional:       General: She is not in acute distress. Appearance: Normal appearance. She is not ill-appearing. HENT:      Head: Normocephalic and atraumatic. Cardiovascular:      Rate and Rhythm: Normal rate and regular rhythm. Pulses: Normal pulses. Heart sounds: Normal heart sounds. Pulmonary:      Effort: Pulmonary effort is normal.      Breath sounds: Normal breath sounds. Musculoskeletal:      Right lower leg: No edema. Left lower leg: No edema. Skin:     General: Skin is warm and dry. Findings: No bruising, erythema or rash. Neurological:      General: No focal deficit present. Mental Status: She is alert. Mental status is at baseline. Gait: Gait abnormal (antalgic, uses rollator). Psychiatric:         Attention and Perception: Attention normal.         Mood and Affect: Mood is anxious. Speech: Speech is tangential.         Behavior: Behavior is slowed. Thought Content: Thought content does not include suicidal ideation. Thought content does not include suicidal plan. Cognition and Memory: Memory is impaired. Assessment & Plan    ICD-10-CM ICD-9-CM    1. 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 A18 294.11       2. Essential hypertension  I10 401.9       3. Anxiety and depression  F41.9 300.00 REFERRAL TO PSYCHIATRY    F32.A 311       4. COPD with acute bronchitis ([facility]  J44.0 491.22     J20.9        5.  Bipolar affective disorder, current episode mixed, current episode severity unspecified ([facility]  F31.60 296.60 6. Neuropathy  G62.9 355.9       7. LELE (obstructive sleep apnea)  G47.33 327.23       8. Impaired mobility  Z74.09 799.89 REFERRAL TO PHYSICAL THERAPY      9. Psychophysiological insomnia  F51.04 307.42       10. Dependent on walker for ambulation  Z99.89 V46.8         Diagnoses and all orders for this visit:    1. 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 Essential hypertension    3. Anxiety and depression  -     REFERRAL TO PSYCHIATRY    4. COPD with acute bronchitis ([facility]    5. Bipolar affective disorder, current episode mixed, current episode severity unspecified ([facility]    6. Neuropathy    7. LELE (obstructive sleep apnea)    8. Impaired mobility  -     REFERRAL TO PHYSICAL THERAPY    9. Psychophysiological insomnia    10. Dependent on walker for ambulation    Follow-up and Dispositions    Return in about 3 months (around 5/14/2023) for insomnia, blood pressure, anxiety. Progressive dementia with hallucinations. ACM involved. Daughter fully engaged, but appears overwhelmed with patient care needs. BP elevated likely d/t missed medication. Encouraged adherence to medical management.    Refer to PT as requested   Review fall precautions   lab results and schedule of future lab studies reviewed with patient  reviewed medications and side effects in detail  Parul Kang NP

## 2023-02-14 NOTE — PROGRESS NOTES
Identified pt with two pt identifiers(name and ). Reviewed record in preparation for visit and have obtained necessary documentation. All patient medications has been reviewed. Chief Complaint   Patient presents with    Hypertension    Follow-up     Patient has questions about physical therapy she would referral  , she also has questions about Lamictal        3 most recent PHQ Screens 2023   Little interest or pleasure in doing things Not at all   Feeling down, depressed, irritable, or hopeless Several days   Total Score PHQ 2 1   Trouble falling or staying asleep, or sleeping too much -   Feeling tired or having little energy -   Poor appetite, weight loss, or overeating -   Feeling bad about yourself - or that you are a failure or have let yourself or your family down -   Trouble concentrating on things such as school, work, reading, or watching TV -   Moving or speaking so slowly that other people could have noticed; or the opposite being so fidgety that others notice -   Thoughts of being better off dead, or hurting yourself in some way -   PHQ 9 Score -   How difficult have these problems made it for you to do your work, take care of your home and get along with others -     Abuse Screening Questionnaire 2022   Do you ever feel afraid of your partner? N   Are you in a relationship with someone who physically or mentally threatens you? N   Is it safe for you to go home? Y       Health Maintenance Due   Topic    DTaP/Tdap/Td series (1 - Tdap)    Shingles Vaccine (1 of 2)    COVID-19 Vaccine (4 - Booster for Law Peter series)     Health Maintenance Review: Patient reminded of \"due or due soon\" health maintenance. I have asked the patient to contact his/her primary care provider (PCP) for follow-up on his/her health maintenance.     Vitals:    23 1401   BP: (!) 191/80   Pulse: (!) 58   Resp: 18   Temp: 97.9 °F (36.6 °C)   TempSrc: Oral   SpO2: 97%   Weight: 156 lb 11.2 oz (71.1 kg)   Height: 5' 3\" (1.6 m)       Wt Readings from Last 3 Encounters:   02/14/23 156 lb 11.2 oz (71.1 kg)   11/09/22 161 lb (73 kg)   09/30/22 150 lb (68 kg)     Temp Readings from Last 3 Encounters:   02/14/23 97.9 °F (36.6 °C) (Oral)   11/09/22 98 °F (36.7 °C) (Oral)   04/19/22 98 °F (36.7 °C)     BP Readings from Last 3 Encounters:   02/14/23 (!) 191/80   11/09/22 (!) 169/73   09/30/22 (!) 140/62     Pulse Readings from Last 3 Encounters:   02/14/23 (!) 58   11/09/22 (!) 58   09/30/22 64       1. \"Have you been to the ER, urgent care clinic since your last visit? Hospitalized since your last visit? \" No    2. \"Have you seen or consulted any other health care providers outside of the [address] [place] [address] since your last visit? \" No

## 2023-02-14 NOTE — PROGRESS NOTES
Social Work Note  2/14/2023    Contacted patient's daughter, Ms. Anthony Marte, for follow-up. Confirmed patient with identifiers. Daughter advised that she will be attending patient's PCP appointment today with patient. Patient needs discussed:    Long term care options:  Patient's daughter advised that she has been exploring options for more supportive care. Coverage for assisted living and nursing facility care services reviewed. Financial issues related to long term care:  Discussed Medicaid eligibility guidelines and application process. Advised that process takes 45 business days minimum and there is a 5 year look back period related to finances. Recommended that daughter keep all receipts, records for medical bills, supplies, transfers in/out of accounts, etc.   Advised that if patient has monies that exceed Medicaid amount, they then the monies will typically need to be used for patient's care prior to qualifying for benefits. Encouraged follow-up with long term care facility or Medicaid worker to discuss specific patient concerns. Advised that SW could not provide recommendations. Daughter advised that she has spoken with [place] [facility] in recent weeks. SW offered assistance with navigating long term care and Medicaid application process. Will follow-up with patient and daughter.      Sameera Morales, MSW, ACSW, [facility]    Ambulatory Care Management   [phone]

## 2023-02-16 ENCOUNTER — TELEPHONE (OUTPATIENT)
Dept: INTERNAL MEDICINE CLINIC | Age: 88
End: 2023-02-16

## 2023-02-16 DIAGNOSIS — R92.1 BREAST CALCIFICATIONS ON MAMMOGRAM: Primary | ICD-10-CM

## 2023-02-21 ENCOUNTER — TELEPHONE (OUTPATIENT)
Dept: BEHAVIORAL/MENTAL HEALTH CLINIC | Age: 88
End: 2023-02-21

## 2023-02-21 NOTE — TELEPHONE ENCOUNTER
Spoke with Payal Parikh with [facility] as a nurse was not available to the phone. Loree Vasquez will forward message to Dr. Gloria Thorne that [facility] is not equipped with case management at this time to assist in the care plan for patients with dementia and alzheimer's.

## 2023-02-22 ENCOUNTER — PATIENT OUTREACH (OUTPATIENT)
Dept: CASE MANAGEMENT | Age: 88
End: 2023-02-22

## 2023-02-22 NOTE — PROGRESS NOTES
Social Work Note  2/22/2023    Attempted to reach patient's daughter for follow-up. Unable to leave message - voicemail full. [place] message sent to patient/daughter requesting return call to SW.      EVA Stallings, ACSW, [facility]    Ambulatory Care Management   [phone]

## 2023-02-23 ENCOUNTER — PATIENT OUTREACH (OUTPATIENT)
Dept: CASE MANAGEMENT | Age: 88
End: 2023-02-23

## 2023-02-24 ENCOUNTER — PATIENT MESSAGE (OUTPATIENT)
Dept: INTERNAL MEDICINE CLINIC | Age: 88
End: 2023-02-24

## 2023-02-24 DIAGNOSIS — G62.9 NEUROPATHY: Primary | ICD-10-CM

## 2023-02-24 NOTE — TELEPHONE ENCOUNTER
Pt daughter Myriam Yoder) left a voice message requesting a return call regarding the cancellation of the Neurontin 100 mg as soon as possible. [phone]      Last visit 02/14/2023 KAREN Freitas   Next appointment 05/23/2023 KAREN Freitas   Previous refill encounter(s)   12/30/2022 Neurontin #90 (1tid) with 1 refill was discontinued on 12/14/2022. No access to [facility] Only    Program: Medication Refill  Intervention Detail: New Rx: 1, reason: Patient Preference  Time Spent (min): 5      Requested Prescriptions     Pending Prescriptions Disp Refills    gabapentin (NEURONTIN) 100 mg capsule 90 Capsule 0     Sig: Take 1 Capsule by mouth three (3) times daily. Max Daily Amount: 300 mg.

## 2023-02-25 RX ORDER — GABAPENTIN 100 MG/1
100 CAPSULE ORAL 3 TIMES DAILY
Qty: 90 CAPSULE | Refills: 0 | Status: SHIPPED | OUTPATIENT
Start: 2023-02-25

## 2023-03-09 ENCOUNTER — PATIENT OUTREACH (OUTPATIENT)
Dept: CASE MANAGEMENT | Age: 88
End: 2023-03-09

## 2023-03-14 ENCOUNTER — PATIENT OUTREACH (OUTPATIENT)
Dept: CASE MANAGEMENT | Age: 88
End: 2023-03-14

## 2023-03-14 ENCOUNTER — TRANSCRIBE ORDER (OUTPATIENT)
Dept: SCHEDULING | Age: 88
End: 2023-03-14

## 2023-03-14 DIAGNOSIS — R92.1 CALCIFICATION OF BREAST: Primary | ICD-10-CM

## 2023-03-20 ENCOUNTER — PATIENT OUTREACH (OUTPATIENT)
Dept: CASE MANAGEMENT | Age: 88
End: 2023-03-20

## 2023-03-20 NOTE — PROGRESS NOTES
Ambulatory Care Management Note    Date/Time:  3/20/2023 2:31 PM    Patient Current Location: [place]    Patient has graduated from the Complex Case Management  program on 23 . Unable to reach pt's dtr. Patient/family has the ability to self-manage at this time. Care management goals have been completed. No further Ambulatory Care Manager follow up scheduled. Goals Addressed                   This Visit's Progress     COMPLETED: Self-schedules and keeps appointments         23  Spoke with dtr Radha Bess, she verified pt with name &   Pt does have upcoming appoint with PCP on  which dtr plans on attending, dtr may change appoint day to suit her schedule. Pt does trust her PCP and will attend that appoint  Plan to follow up with dtr in 1 week. Viral Lawrence MSN, RN - Ambulatory Care Manager - [phone]       23  Dtr Radha Bess called this ACM very upset with many concerns regarding her mother - she did verify pt with name &   Pt did attend Pulm appoint  Dtr reports when pt is not feeling well, which is often lately, she cancels her appointments. Pt has canceled Cards & Nephr. She has canceled Eye appoint & hair appoint as well  Pt has canceled previous appointments with Neuro psych Dr Thanh Griffin as well, pt does not want to participate in any neuro testing  Dtr has to take off work for these appointments and is very frustrated when pt cancels at the last minute  AUSTIN Lerner, RN - [facility] - [phone]     01/10/23  Pt is unsure of any appoints with specialists  Pt does have appoint with PCP on   Pt asked about seeing PCP sooner than  for \"runny nose & tired\". Pt denies any CP, SOB, or edema. She reports appetite is fair, she admits she probably is not drinking enough water though; she reports occ diarrhea, no fever. She said she is taking all prescribed meds as directed. Offered to make appoint, pt declined, she needs to check with dtr for ride.   Plan to follow up with pt in 2 weeks. AUSTIN Robbins, RN - [address] [facility] - [phone]     12/14/22  Spoke with pt & dtr (with pt's permission, she was with her mother)  Dtr has been busy with the holidays and family, she does plan on making necessary appointments for her mother soon. Will follow up with pt/dtr in 3 weeks. AUSTIN Robbins, RN - [address] [facility] - [phone]     12/02/22  PCP has referred pt to [facility]. Pt also needs to follow up with Cards & Pulm as well as appointments she missed this week. Pt/dtr will:  Schedule & attend appoint with Nephrologist  Schedule & attend appoint with Cards  Schedule & attend appoint with Daniel Pina [facility] to follow up with pt in 7-10 days. AUSTIN Robbins, RN - Ambulatory Care Manager - [phone]          COMPLETED: Supportive resources in place to maintain patient in the community (ie. Home Health, DME equipment, refer to, medication assistant plan, etc.)        03/20/23  Attempted to outreach with dtr for CM follow up. LM to return this call. Will close this episode of care since unable to reach pt. AUSTIN Robbins, RN - Ambulatory Care Manager - [phone]     03/14/23   Attempted to outreach with dtr for CM follow up. LM to return this call. Also sent My Chart message. -MLL    03/09/23  Attempted to outreach with dtr for CM follow up. LM to return this call. -MLL    02/23/23  Unable to reach dtr, unable to leave message, MB is full. Will try back in 3-5 days if not heard from her. AUSTIN Robbins, RN - [address] [facility] - [phone]     02/10/23  Spoke with dtr briefly. She is at work and unable to talk. She will call this ACM on Monday afternoon. She does plan on attending PCP appoint with pt on Tues 2/14. AUSTIN Robbins, RN - [address] [facility] - [phone]     02/02/23  Dtr reports hallucinations have become worse.  Pt believes that she has taken her places and left her there and won't let her back into her house  Pt is not trusting her dtr and is not taking her medications  Provided dtr with info for [facility]  Dtr did not have long to talk, she is at work. Did relay to dtr that SW is trying to reach her as well  Plan to follow up with dtr in 1 week. Allen Rucker, MSN, RN - Ambulatory Care Manager - [phone]       01/25/23  Dtr reports concerns regarding her mother's decision making, memory, as well as possible hallucinations  Dtr reports pt often tells her that she sees people in her house and therefore it is not her house which she lives in. Pt has called 911 for this; she has also called 911 when she thought she did not have heat though she actually had turned it off. Dtr has tried a weekly med box which pt did not like. She now takes her meds out daily and puts in baggie with list of names of meds. Pt does have access to her med bottles as well. Pt will sometimes say to dtr \"these are your meds, not mine\", so dtr is unsure if pt is taking her meds  Pt will eat meals fixed for her, otherwise snacks. She does have plenty of food. She likes to drink coffee throughout the day as well  Dtr did try [employer] and the pt did have a good experience, though is afraid pt will turn the caregivers/neighbors away  Pt has no ACP docs, has been attempted. 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 Dtr admits she would like pt to be in [place] though she is unsure if pt will be compliant with this. Pt is socially isolated, dtr visits daily though this is becoming a burden on dtr. Pt did have MRI of brain on 11/19/22 & Joe Wadsworth reports on 11/21/22 \"age related changes, no explanation for memory loss\". No neuro psych testing has been done though. Last OV was 9/30/22. Pt does have PCP appoint on 2/14  Will consult SW to discuss options and next steps - please call dtr [employer].  Plan to follow up with dtr on Monday 1/30. AUSTIN Vega, RN - [facility] - [phone] .    01/10/23  [employer] attempted to come out to meet with pt, pt was not feeling well at the time and declined. Plan to follow up with pt in 2 weeks. AUSTIN Vega, RN - [facility] - [phone]     12/14/22  Dtr does plan on calling [facility], she has been busy with holiday preparations  Pt voluntarily reports her breathing has been good, legs have been weak  Pt reports appetite is fair, she is trying to drink water  Plan to follow up in 3 weeks/after holidays. AUSTIN Vega, RN - Ambulatory Care Manager - [phone]     12/02/22  Pt has history of progressing dementia. She lives alone and does not drive. She does not cook much, she does heat food up or perhaps make a grilled cheese. She lives in a townhouse with an upstairs, she is interested in staying here. Discussed resources to help her. She was interested in receiving some help. 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  Pt will:  [employer] for light housework, errands, appointments, shopping, and meals  Eat 3 small meals a day  May substitute with CIB/Boost/Ensure if needed  Drink enough water to ensure at least 4 voids a day  [employer] - applied. Voice likes/dislikes to them  This [facility] plans to follow up with pt in 7-10 days. AUSTIN Vega, RN - Ambulatory Care Manager - [phone]                 Patient has Ambulatory Care Manager's contact information for any further questions, concerns, or needs.   Patients upcoming visits:    Future Appointments   Date Time Provider Celina Hayes   4/7/2023  1:00 PM [id number] [facility] REG   4/7/2023  1:30 PM [address] [facility] 2 [facility] REG   5/23/2023 10:00 AM Bismark Meyer NP [facility] AMB

## 2023-03-21 ENCOUNTER — PATIENT OUTREACH (OUTPATIENT)
Dept: CASE MANAGEMENT | Age: 88
End: 2023-03-21

## 2023-03-21 NOTE — PROGRESS NOTES
Social Work Note  3/21/2023    Follow-up call with patient's daughter, Ms. Abigail Garcia. She expressed concern for patient as she has had periods of confusion and has experienced hallucinations. Per daughter, patient has contacted the police several times, including today. Daughter advised that behavioral health provider recommended by PCP was unable to care for patient. Discussed immediate needs for patient, particularly care what provides supervision, support 24/7. Daughter stated that she attempts to talk to her mother as she takes medications, but cannot confirm that patient has taken them correctly. Daughter expressed feeling overwhelmed with full time job, providing care for family and for patient. Provided psychosocial support related to caregiving. Discussed options for assistance with long term planing and recommended follow-up with a  who could assist with search for appropriate long term care community for patient. Information provided for [facility]. Also discussed options for accessing behavioral health. Provided information for [employer] (counseling and neuropsychology). Advised of program through [employer], [facility], that can assist with locating in network provider. SW to refer patient to service to assist with connection to psychiatrist and counselor. 3:50 pm  Call to patient for assessment. Re-introduced self, SW role. Patient stated that she was \"not doing too good today\" and stated that she has \"asthma and COPD\". Asked patient about meals. She stated that she was \"not doing ok with meals\" and stated that she thought that the \"lady was to come by and check on me\". Patient stated that she would like to have someone come by to assist once a day. Patient mentioned that she used to live in assisted living. SW asked if she would consider finding another assisted living community and she stated \"yes\".   She stated that she likes and \"wants to be around other people\". Patient appeared tangential in conversation. SW observed patient repeating questions and appeared to have irrational thinking. Patient stated that she \"walks to [employer] with stuffed animals and takes blankets with me\". She reported that she goes to [employer] [facility] and leaves out of the guest bathroom in the [place] and it takes 15 minutes and she is in her guest bathroom at home\". When asked how she gets to [employer], patient stated \"I walk\" [Note:  patient lives 20+ minutes by car from [address] [place] [address]. Patient reported that \"stuffed animals might not respond to everyone, but they do for me. \". She also stated that a  visited today after she called \"911\". No reason provided as to why patient contacted 911. Patient did not appear upset by visit. Patient voiced agreement to further calls from SW.      Goals Addressed                      This Visit's Progress       General      Supportive resources for in-home or community care (pt-stated)         03/21/23 -   Note  Follow-up call with daughter. Discussed patient status and current needs including psychiatrist appointment for medication management, counseling, and long term care services to provide 24/7 care/support for daily needs. Resources reviewed including [facility], [facility] (Counseling and Neuropsychology evaluations, and assistance from [facility] with location of behavioral health providers.  Plan:  Follow-up with patient and daughter re: long term care and mental health supports. AML    02/14/23 -  Note  Long term care options and Medicaid eligibility discussed with daughter. Offered assistance as needed with navigation of application processes. SW to follow up with patient/daughter. AML    02/01/23 -  Note  Will provide support to patient/family and assist with discussion of and connection to available community resources.    AML          EVA Olson, ACSW, [facility]    Ambulatory Care Management   [phone]

## 2023-04-21 DIAGNOSIS — G62.9 NEUROPATHY: ICD-10-CM

## 2023-04-23 DIAGNOSIS — R92.1 CALCIFICATION OF BREAST: Primary | ICD-10-CM

## 2023-04-26 ENCOUNTER — PATIENT OUTREACH (OUTPATIENT)
Dept: CASE MANAGEMENT | Age: 88
End: 2023-04-26

## 2023-04-26 RX ORDER — GABAPENTIN 100 MG/1
CAPSULE ORAL
Qty: 90 CAPSULE | Refills: 0 | Status: SHIPPED | OUTPATIENT
Start: 2023-04-26

## 2023-04-26 NOTE — PROGRESS NOTES
Social Work Note  4/26/2023   Goals Addressed                      This Visit's Progress       General      Supportive resources for in-home or community care (pt-stated)         04/26/23 -  Note  Attempted to reach patient's daughter for follow-up. Unable to leave message - voicemail full. [employer] message sent offering assistance as needed. AML    04/12/23 -  Note  Attempted to reach daughter for follow-up. VM left. Contacted patient for follow-up. Patient stated that she was \"still in bed\" and that \"I'm at [facility] visiting with people, not staying here\". SW asked patient if she was at home and she advised she was in [facility], but could not provide name of location. Patient appeared confused during conversation and stated that [place] mother is still not up yet. I was waiting for her to get up. \"  SW asked about medicines and food. Patient stated that her daughter gives her medications and that patient has tried to reach [employer] about restarting services, but hasn't heard from anyone. SW unable to confirm if patient was alone or with other persons. [employer] message sent to patient's daughter requesting call to discuss patient status and needs. SW Plan:  Follow-up with patient needs and status. Assist with coordination of services as needed. AML    03/21/23 -  SW Note  Follow-up call with daughter. Discussed patient status and current needs including psychiatrist appointment for medication management, counseling, and long term care services to provide 24/7 care/support for daily needs. Resources reviewed including [facility], [facility] (Counseling and Neuropsychology evaluations, and assistance from [facility] with location of behavioral health providers. SW Plan:  Follow-up with patient and daughter re: long term care and mental health supports. AML    02/14/23 -  Note  Long term care options and Medicaid eligibility discussed with daughter.  Offered assistance as needed with navigation of application processes. SW to follow up with patient/daughter. AML    02/01/23 - SW Note  Will provide support to patient/family and assist with discussion of and connection to available community resources.    HARLEY Seals Parents, MSW, ACSW, [facility]    Ambulatory Care Management   [phone]

## 2023-04-27 ENCOUNTER — PATIENT OUTREACH (OUTPATIENT)
Dept: CASE MANAGEMENT | Age: 88
End: 2023-04-27

## 2023-04-27 RX ORDER — NEBIVOLOL 10 MG/1
10 TABLET ORAL 2 TIMES DAILY
COMMUNITY
Start: 2019-07-17

## 2023-04-27 RX ORDER — ROSUVASTATIN CALCIUM 20 MG/1
20 TABLET, COATED ORAL DAILY
COMMUNITY

## 2023-04-27 RX ORDER — CLONAZEPAM 0.5 MG/1
0.25 TABLET ORAL 2 TIMES DAILY PRN
COMMUNITY
Start: 2022-09-07

## 2023-04-27 RX ORDER — LORATADINE 10 MG/1
10 TABLET ORAL
COMMUNITY

## 2023-04-27 RX ORDER — CHLORTHALIDONE 25 MG/1
1 TABLET ORAL DAILY
COMMUNITY
Start: 2021-08-05

## 2023-04-27 RX ORDER — METHENAMINE HIPPURATE 1000 MG/1
1 TABLET ORAL 2 TIMES DAILY WITH MEALS
COMMUNITY
Start: 2021-08-09

## 2023-04-27 RX ORDER — ALBUTEROL SULFATE 90 UG/1
2 AEROSOL, METERED RESPIRATORY (INHALATION) EVERY 4 HOURS PRN
COMMUNITY
Start: 2017-01-18

## 2023-04-27 RX ORDER — SPIRONOLACTONE 25 MG/1
25 TABLET ORAL DAILY
COMMUNITY
Start: 2021-11-10

## 2023-04-27 RX ORDER — LOPERAMIDE HYDROCHLORIDE 2 MG/1
4 CAPSULE ORAL PRN
COMMUNITY

## 2023-04-27 RX ORDER — MELOXICAM 15 MG/1
15 TABLET ORAL DAILY
COMMUNITY
Start: 2022-04-14

## 2023-04-27 RX ORDER — OMEPRAZOLE 20 MG/1
20 CAPSULE, DELAYED RELEASE ORAL DAILY
COMMUNITY

## 2023-04-27 RX ORDER — GABAPENTIN 100 MG/1
100 CAPSULE ORAL 3 TIMES DAILY
COMMUNITY
Start: 2023-02-25 | End: 2023-06-10

## 2023-04-27 RX ORDER — ALBUTEROL SULFATE 2.5 MG/3ML
2.5 SOLUTION RESPIRATORY (INHALATION) EVERY 4 HOURS PRN
COMMUNITY
Start: 2022-04-19

## 2023-04-27 RX ORDER — MONTELUKAST SODIUM 10 MG/1
1 TABLET ORAL DAILY
COMMUNITY
Start: 2019-11-22

## 2023-04-27 RX ORDER — FLUTICASONE PROPIONATE AND SALMETEROL 500; 50 UG/1; UG/1
1 POWDER RESPIRATORY (INHALATION) 2 TIMES DAILY
COMMUNITY
Start: 2017-03-10

## 2023-04-27 RX ORDER — SUCRALFATE 1 G/1
1 TABLET ORAL
COMMUNITY

## 2023-04-27 NOTE — PROGRESS NOTES
Social Work Note  4/27/2023     Goals Addressed                      This Visit's Progress       General      Supportive resources for in-home or community care (pt-stated)         04/27/23 -  Note  Attempted to reach daughter for follow-up. VM left requesting return call. AML    04/26/23 -  Note  Attempted to reach patient's daughter for follow-up. Unable to leave message - voicemail full. [employer] message sent offering assistance as needed. AML    04/12/23 -  Note  Attempted to reach daughter for follow-up. VM left. Contacted patient for follow-up. Patient stated that she was \"still in bed\" and that \"I'm at [facility] visiting with people, not staying here\". SW asked patient if she was at home and she advised she was in [facility], but could not provide name of location. Patient appeared confused during conversation and stated that [place] mother is still not up yet. I was waiting for her to get up. \"  SW asked about medicines and food. Patient stated that her daughter gives her medications and that patient has tried to reach [employer] about restarting services, but hasn't heard from anyone. SW unable to confirm if patient was alone or with other persons. [employer] message sent to patient's daughter requesting call to discuss patient status and needs. SW Plan:  Follow-up with patient needs and status. Assist with coordination of services as needed. [facility]    03/21/23 -   Note  Follow-up call with daughter. Discussed patient status and current needs including psychiatrist appointment for medication management, counseling, and long term care services to provide 24/7 care/support for daily needs. Resources reviewed including [facility], [facility] (Counseling and Neuropsychology evaluations, and assistance from [facility] with location of behavioral health providers. SW Plan:  Follow-up with patient and daughter re: long term care and mental health supports. AML    02/14/23 - SW Note  Long term care options and Medicaid eligibility discussed with daughter. Offered assistance as needed with navigation of application processes. SW to follow up with patient/daughter. AML    02/01/23 - SW Note  Will provide support to patient/family and assist with discussion of and connection to available community resources.    AML          EVA Woodall, ACSW, [facility]    Ambulatory Care Management   [phone]

## 2023-05-05 ENCOUNTER — PATIENT OUTREACH (OUTPATIENT)
Dept: CASE MANAGEMENT | Age: 88
End: 2023-05-05

## 2023-05-24 ENCOUNTER — CLINICAL DOCUMENTATION (OUTPATIENT)
Age: 88
End: 2023-05-24

## 2023-05-24 ENCOUNTER — TELEPHONE (OUTPATIENT)
Age: 88
End: 2023-05-24

## 2023-06-08 DIAGNOSIS — G62.9 POLYNEUROPATHY, UNSPECIFIED: Primary | ICD-10-CM

## 2023-06-08 NOTE — TELEPHONE ENCOUNTER
Last appointment: 02/14/2023 NP Nikki Noonan   Next appointment: 06/21/2023 RICHARD Noonan   Previous refill encounter(s):   04/26/2023 Neurontin #90 (1tid)     [facility] to [facility] Only    Program: Medication Refill  Intervention Detail: New Rx: 1, reason: Patient Preference  Time Spent (min): 5      Requested Prescriptions     Pending Prescriptions Disp Refills    gabapentin (NEURONTIN) 100 MG capsule [Pharmacy Med Name: GABAPENTIN 100MG CAPSULES] 90 capsule 0     Sig: TAKE 1 CAPSULE BY MOUTH THREE TIMES DAILY.  MAX DAILY AMOUNT: 300 MG

## 2023-06-10 RX ORDER — GABAPENTIN 100 MG/1
CAPSULE ORAL
Qty: 90 CAPSULE | Refills: 1 | Status: SHIPPED | OUTPATIENT
Start: 2023-06-10 | End: 2023-08-09

## 2023-08-24 DIAGNOSIS — G62.9 POLYNEUROPATHY, UNSPECIFIED: ICD-10-CM

## 2023-08-24 RX ORDER — GABAPENTIN 100 MG/1
CAPSULE ORAL
Qty: 21 CAPSULE | Refills: 0 | Status: SHIPPED | OUTPATIENT
Start: 2023-08-24 | End: 2023-10-06 | Stop reason: SDUPTHER

## 2023-08-24 NOTE — TELEPHONE ENCOUNTER
Reviewed PDMP. C.w medications. No evidence of aberrancy. RF given for 1 wk supply until upcoming appt to [facility] care with new PCP. PDMP Monitoring:    Last PDMP Jamie Obregon as Reviewed:  Review User Review Instant Review Result   Rick BOYER 8/24/2023 11:02 AM Reviewed PDMP [1]     [unfilled]  Urine Drug Screenings (1 yr)    No resulted procedures found.        Medication Contract and Consent for Opioid Use Documents Filed        No documents found

## 2023-08-24 NOTE — TELEPHONE ENCOUNTER
Last appointment: 02/14/2023 RICHARD Odom   Next appointment: 08/28/2023 MD Jackelyn Goldberg   Previous refill encounter(s):   06/10/2023 Neurontin #90 with 1 refill (1tid)     No access to [address] [place] [address]    Program: Medication Refill  Intervention Detail: New Rx: 1, reason: Patient Preference  Time Spent (min): 5      Requested Prescriptions     Pending Prescriptions Disp Refills    gabapentin (NEURONTIN) 100 MG capsule 90 capsule 0     Sig: TAKE 1 CAPSULE BY MOUTH THREE TIMES DAILY.  MAX DAILY AMOUNT: 300 MG

## 2023-10-06 ENCOUNTER — OFFICE VISIT (OUTPATIENT)
Age: 88
End: 2023-10-06
Payer: MEDICARE

## 2023-10-06 VITALS
BODY MASS INDEX: 27.46 KG/M2 | TEMPERATURE: 98.4 F | HEART RATE: 57 BPM | DIASTOLIC BLOOD PRESSURE: 75 MMHG | SYSTOLIC BLOOD PRESSURE: 195 MMHG | WEIGHT: 155 LBS | OXYGEN SATURATION: 99 %

## 2023-10-06 DIAGNOSIS — G62.9 POLYNEUROPATHY, UNSPECIFIED: ICD-10-CM

## 2023-10-06 DIAGNOSIS — F31.9 BIPOLAR AFFECTIVE DISORDER, REMISSION STATUS UNSPECIFIED (HCC): Primary | ICD-10-CM

## 2023-10-06 DIAGNOSIS — Z23 INFLUENZA VACCINATION ADMINISTERED AT CURRENT VISIT: ICD-10-CM

## 2023-10-06 PROCEDURE — G8419 CALC BMI OUT NRM PARAM NOF/U: HCPCS | Performed by: FAMILY MEDICINE

## 2023-10-06 PROCEDURE — 1090F PRES/ABSN URINE INCON ASSESS: CPT | Performed by: FAMILY MEDICINE

## 2023-10-06 PROCEDURE — PBSHW INFLUENZA, FLUAD, (AGE 65 Y+), IM, PF, 0.5 ML: Performed by: FAMILY MEDICINE

## 2023-10-06 PROCEDURE — 90694 VACC AIIV4 NO PRSRV 0.5ML IM: CPT | Performed by: FAMILY MEDICINE

## 2023-10-06 PROCEDURE — 1123F ACP DISCUSS/DSCN MKR DOCD: CPT | Performed by: FAMILY MEDICINE

## 2023-10-06 PROCEDURE — G8484 FLU IMMUNIZE NO ADMIN: HCPCS | Performed by: FAMILY MEDICINE

## 2023-10-06 PROCEDURE — 1036F TOBACCO NON-USER: CPT | Performed by: FAMILY MEDICINE

## 2023-10-06 PROCEDURE — 99214 OFFICE O/P EST MOD 30 MIN: CPT | Performed by: FAMILY MEDICINE

## 2023-10-06 PROCEDURE — G8427 DOCREV CUR MEDS BY ELIG CLIN: HCPCS | Performed by: FAMILY MEDICINE

## 2023-10-06 RX ORDER — GABAPENTIN 100 MG/1
200 CAPSULE ORAL NIGHTLY
Qty: 180 CAPSULE | Refills: 0 | Status: SHIPPED | OUTPATIENT
Start: 2023-10-06 | End: 2024-01-04

## 2023-10-06 RX ORDER — CLONAZEPAM 0.5 MG/1
0.25 TABLET ORAL DAILY PRN
Qty: 45 TABLET | Refills: 0 | Status: SHIPPED | OUTPATIENT
Start: 2023-10-06 | End: 2024-04-03

## 2023-10-06 RX ORDER — LAMOTRIGINE 25 MG/1
25 TABLET ORAL NIGHTLY
Qty: 90 TABLET | Refills: 0 | Status: SHIPPED | OUTPATIENT
Start: 2023-10-06 | End: 2024-01-04

## 2023-10-06 ASSESSMENT — PATIENT HEALTH QUESTIONNAIRE - PHQ9
3. TROUBLE FALLING OR STAYING ASLEEP: 1
SUM OF ALL RESPONSES TO PHQ QUESTIONS 1-9: 7
7. TROUBLE CONCENTRATING ON THINGS, SUCH AS READING THE NEWSPAPER OR WATCHING TELEVISION: 1
SUM OF ALL RESPONSES TO PHQ QUESTIONS 1-9: 7
10. IF YOU CHECKED OFF ANY PROBLEMS, HOW DIFFICULT HAVE THESE PROBLEMS MADE IT FOR YOU TO DO YOUR WORK, TAKE CARE OF THINGS AT HOME, OR GET ALONG WITH OTHER PEOPLE: 1
4. FEELING TIRED OR HAVING LITTLE ENERGY: 1
8. MOVING OR SPEAKING SO SLOWLY THAT OTHER PEOPLE COULD HAVE NOTICED. OR THE OPPOSITE, BEING SO FIGETY OR RESTLESS THAT YOU HAVE BEEN MOVING AROUND A LOT MORE THAN USUAL: 1
5. POOR APPETITE OR OVEREATING: 1
SUM OF ALL RESPONSES TO PHQ QUESTIONS 1-9: 7
6. FEELING BAD ABOUT YOURSELF - OR THAT YOU ARE A FAILURE OR HAVE LET YOURSELF OR YOUR FAMILY DOWN: 1
9. THOUGHTS THAT YOU WOULD BE BETTER OFF DEAD, OR OF HURTING YOURSELF: 0
SUM OF ALL RESPONSES TO PHQ QUESTIONS 1-9: 7
2. FEELING DOWN, DEPRESSED OR HOPELESS: 1

## 2023-10-06 NOTE — PROGRESS NOTES
After obtaining consent, and per orders of Dr. Yolande Rey, injection of Fluad given in Right deltoid by Barb Ricardo MA. Patient instructed to remain in clinic for 20 minutes afterwards, and to report any adverse reaction to me immediately. Vaccinations has been fully reviewed with the patient by provider and universal consent has been signed by patient/guardian. All YES responses reviewed with primary provider prior to administration of vaccine. 1.Are you allergic to eggs, bake's yeast,gelatin,streptomycin or neomycin?no  2. Are you allergic to any vaccine component(I.e Thimerosal-mercury based preservative? no  3. Have you had serious reaction to a previous vaccine?no  4. Do you have a fever or other acute infection at this time?no  5. Do you have a history of Guillain-Corvallis syndrome?no  6. Are you pregnant?no  7. Are you or anyone in your home being treated with chemotherapy,radiation for cancer;have HIV/AIDS,or any immune deficiency disease?no  8. Are you currently or have you recently taken Prednisone(steroids)?  no

## 2023-10-06 NOTE — PROGRESS NOTES
Cordell Merino (:  1935) is a 80 y.o. female,Established patient, here for evaluation of the following chief complaint(s):  Establish Care (Pt stated she would like a Refill for her Meds,also she would like a medicine helping her to sleep.)      Subjective   SUBJECTIVE/OBJECTIVE:  Pt presents as an est pt new to provider to est care. Pt presents with her daughter, Leandra Amor, who is also serving as a historian. For the anxiety, she has been on klonopin prn. She had been to psychiatry in the past to help wean off of it as well. She had also seen GI in the past who prescribed it in the past as well. However, recently she was restarted due to concern for sleep and insomnia. They are cutting the medication in half or so. Her anxiety also triggers her asthma. She had tried trazodone in the past which was discontinued due to side effects or ineffectiveness. However they are unsure if she took it long enough for it to be clinically effective. Also she tried prozac in the past per GI/internal medicine however, it was discontinued because she \"did not feel herself. \"  She states that she tried lexapro but \"that didn't work\" either. She states that she did not stay on that very long either. She also has a mild case of bipolar disorder and was on lamictal.  She was prescribed it through the psychiatry. She had some altered mental status on the lamictal wherein she was hallucinating. She discontinued it and then it was restarted at 25 mg of lamictal. It does help to stabilize her moods without changes in her mental status. She has some frustrations with her loss of independence. Unsure as to how the use of the klonopin changed with the introduction of the lamictal.     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 She also is on mobic for her hand arthritis per ortho. She typically does well on 200 mg nightly.       Specialists:   Ortho   Pulmonary   Cardiology   Optometry/ophthalmology         Past

## 2023-11-12 ENCOUNTER — HOSPITAL ENCOUNTER (INPATIENT)
Facility: HOSPITAL | Age: 88
LOS: 3 days | Discharge: HOME HEALTH | DRG: 689 | End: 2023-11-15
Attending: EMERGENCY MEDICINE | Admitting: STUDENT IN AN ORGANIZED HEALTH CARE EDUCATION/TRAINING PROGRAM
Payer: MEDICARE

## 2023-11-12 ENCOUNTER — APPOINTMENT (OUTPATIENT)
Facility: HOSPITAL | Age: 88
DRG: 689 | End: 2023-11-12
Payer: MEDICARE

## 2023-11-12 DIAGNOSIS — Z02.2 ENCOUNTER FOR EXAMINATION FOR ADMISSION TO NURSING HOME: ICD-10-CM

## 2023-11-12 DIAGNOSIS — R41.0 DELIRIUM: ICD-10-CM

## 2023-11-12 DIAGNOSIS — N39.0 COMPLICATED UTI (URINARY TRACT INFECTION): Primary | ICD-10-CM

## 2023-11-12 PROBLEM — N30.00 ACUTE INFECTIVE CYSTITIS: Status: ACTIVE | Noted: 2023-11-12

## 2023-11-12 LAB
ALBUMIN SERPL-MCNC: 3.8 G/DL (ref 3.5–5)
ALBUMIN/GLOB SERPL: 0.9 (ref 1.1–2.2)
ALP SERPL-CCNC: 87 U/L (ref 45–117)
ALT SERPL-CCNC: 17 U/L (ref 12–78)
ANION GAP SERPL CALC-SCNC: 1 MMOL/L (ref 5–15)
APPEARANCE UR: CLEAR
AST SERPL-CCNC: 10 U/L (ref 15–37)
BACTERIA URNS QL MICRO: ABNORMAL /HPF
BASOPHILS # BLD: 0 K/UL (ref 0–0.1)
BASOPHILS NFR BLD: 0 % (ref 0–1)
BILIRUB SERPL-MCNC: 0.5 MG/DL (ref 0.2–1)
BILIRUB UR QL: NEGATIVE
BUN SERPL-MCNC: 33 MG/DL (ref 6–20)
BUN/CREAT SERPL: 26 (ref 12–20)
CALCIUM SERPL-MCNC: 9.4 MG/DL (ref 8.5–10.1)
CHLORIDE SERPL-SCNC: 108 MMOL/L (ref 97–108)
CO2 SERPL-SCNC: 27 MMOL/L (ref 21–32)
COLOR UR: ABNORMAL
COMMENT:: NORMAL
CREAT SERPL-MCNC: 1.26 MG/DL (ref 0.55–1.02)
DIFFERENTIAL METHOD BLD: NORMAL
EOSINOPHIL # BLD: 0.1 K/UL (ref 0–0.4)
EOSINOPHIL NFR BLD: 1 % (ref 0–7)
EPITH CASTS URNS QL MICRO: ABNORMAL /LPF
ERYTHROCYTE [DISTWIDTH] IN BLOOD BY AUTOMATED COUNT: 13.5 % (ref 11.5–14.5)
GLOBULIN SER CALC-MCNC: 4.2 G/DL (ref 2–4)
GLUCOSE SERPL-MCNC: 123 MG/DL (ref 65–100)
GLUCOSE UR STRIP.AUTO-MCNC: NEGATIVE MG/DL
HCT VFR BLD AUTO: 41.6 % (ref 35–47)
HGB BLD-MCNC: 13.4 G/DL (ref 11.5–16)
HGB UR QL STRIP: NEGATIVE
IMM GRANULOCYTES # BLD AUTO: 0 K/UL (ref 0–0.04)
IMM GRANULOCYTES NFR BLD AUTO: 0 % (ref 0–0.5)
KETONES UR QL STRIP.AUTO: NEGATIVE MG/DL
LEUKOCYTE ESTERASE UR QL STRIP.AUTO: ABNORMAL
LYMPHOCYTES # BLD: 1.3 K/UL (ref 0.8–3.5)
LYMPHOCYTES NFR BLD: 17 % (ref 12–49)
MCH RBC QN AUTO: 27.4 PG (ref 26–34)
MCHC RBC AUTO-ENTMCNC: 32.2 G/DL (ref 30–36.5)
MCV RBC AUTO: 85.1 FL (ref 80–99)
MONOCYTES # BLD: 0.6 K/UL (ref 0–1)
MONOCYTES NFR BLD: 8 % (ref 5–13)
NEUTS SEG # BLD: 5.6 K/UL (ref 1.8–8)
NEUTS SEG NFR BLD: 74 % (ref 32–75)
NITRITE UR QL STRIP.AUTO: POSITIVE
NRBC # BLD: 0 K/UL (ref 0–0.01)
NRBC BLD-RTO: 0 PER 100 WBC
PH UR STRIP: 6.5 (ref 5–8)
PLATELET # BLD AUTO: 164 K/UL (ref 150–400)
PMV BLD AUTO: 11.9 FL (ref 8.9–12.9)
POTASSIUM SERPL-SCNC: 4.2 MMOL/L (ref 3.5–5.1)
PROT SERPL-MCNC: 8 G/DL (ref 6.4–8.2)
PROT UR STRIP-MCNC: NEGATIVE MG/DL
RBC # BLD AUTO: 4.89 M/UL (ref 3.8–5.2)
RBC #/AREA URNS HPF: ABNORMAL /HPF (ref 0–5)
SODIUM SERPL-SCNC: 136 MMOL/L (ref 136–145)
SP GR UR REFRACTOMETRY: 1.01 (ref 1–1.03)
SPECIMEN HOLD: NORMAL
SPECIMEN HOLD: NORMAL
T4 FREE SERPL-MCNC: 0.9 NG/DL (ref 0.8–1.5)
TSH SERPL DL<=0.05 MIU/L-ACNC: 1.81 UIU/ML (ref 0.36–3.74)
UROBILINOGEN UR QL STRIP.AUTO: 0.2 EU/DL (ref 0.2–1)
WBC # BLD AUTO: 7.6 K/UL (ref 3.6–11)
WBC URNS QL MICRO: ABNORMAL /HPF (ref 0–4)

## 2023-11-12 PROCEDURE — 36415 COLL VENOUS BLD VENIPUNCTURE: CPT

## 2023-11-12 PROCEDURE — 6360000002 HC RX W HCPCS: Performed by: STUDENT IN AN ORGANIZED HEALTH CARE EDUCATION/TRAINING PROGRAM

## 2023-11-12 PROCEDURE — 87804 INFLUENZA ASSAY W/OPTIC: CPT

## 2023-11-12 PROCEDURE — 87186 SC STD MICRODIL/AGAR DIL: CPT

## 2023-11-12 PROCEDURE — 2580000003 HC RX 258: Performed by: STUDENT IN AN ORGANIZED HEALTH CARE EDUCATION/TRAINING PROGRAM

## 2023-11-12 PROCEDURE — 84443 ASSAY THYROID STIM HORMONE: CPT

## 2023-11-12 PROCEDURE — 87086 URINE CULTURE/COLONY COUNT: CPT

## 2023-11-12 PROCEDURE — 70450 CT HEAD/BRAIN W/O DYE: CPT

## 2023-11-12 PROCEDURE — 81001 URINALYSIS AUTO W/SCOPE: CPT

## 2023-11-12 PROCEDURE — 99285 EMERGENCY DEPT VISIT HI MDM: CPT

## 2023-11-12 PROCEDURE — 1100000000 HC RM PRIVATE

## 2023-11-12 PROCEDURE — 80053 COMPREHEN METABOLIC PANEL: CPT

## 2023-11-12 PROCEDURE — 87077 CULTURE AEROBIC IDENTIFY: CPT

## 2023-11-12 PROCEDURE — 93005 ELECTROCARDIOGRAM TRACING: CPT | Performed by: STUDENT IN AN ORGANIZED HEALTH CARE EDUCATION/TRAINING PROGRAM

## 2023-11-12 PROCEDURE — 87635 SARS-COV-2 COVID-19 AMP PRB: CPT

## 2023-11-12 PROCEDURE — 85025 COMPLETE CBC W/AUTO DIFF WBC: CPT

## 2023-11-12 PROCEDURE — 6370000000 HC RX 637 (ALT 250 FOR IP): Performed by: STUDENT IN AN ORGANIZED HEALTH CARE EDUCATION/TRAINING PROGRAM

## 2023-11-12 PROCEDURE — 90791 PSYCH DIAGNOSTIC EVALUATION: CPT

## 2023-11-12 PROCEDURE — 84439 ASSAY OF FREE THYROXINE: CPT

## 2023-11-12 RX ORDER — AMLODIPINE BESYLATE 2.5 MG/1
2.5 TABLET ORAL DAILY
COMMUNITY
Start: 2023-08-21

## 2023-11-12 RX ORDER — CLONAZEPAM 0.5 MG/1
0.25 TABLET ORAL EVERY 12 HOURS PRN
Status: DISCONTINUED | OUTPATIENT
Start: 2023-11-12 | End: 2023-11-15 | Stop reason: HOSPADM

## 2023-11-12 RX ORDER — FEXOFENADINE HCL 180 MG/1
TABLET ORAL
COMMUNITY

## 2023-11-12 RX ORDER — ONDANSETRON 4 MG/1
4 TABLET, ORALLY DISINTEGRATING ORAL EVERY 8 HOURS PRN
Status: DISCONTINUED | OUTPATIENT
Start: 2023-11-12 | End: 2023-11-15 | Stop reason: HOSPADM

## 2023-11-12 RX ORDER — SPIRONOLACTONE 25 MG/1
25 TABLET ORAL DAILY
Status: DISCONTINUED | OUTPATIENT
Start: 2023-11-13 | End: 2023-11-15 | Stop reason: HOSPADM

## 2023-11-12 RX ORDER — IPRATROPIUM BROMIDE AND ALBUTEROL SULFATE 2.5; .5 MG/3ML; MG/3ML
1 SOLUTION RESPIRATORY (INHALATION) EVERY 4 HOURS PRN
Status: DISCONTINUED | OUTPATIENT
Start: 2023-11-12 | End: 2023-11-13

## 2023-11-12 RX ORDER — SODIUM CHLORIDE 0.9 % (FLUSH) 0.9 %
5-40 SYRINGE (ML) INJECTION PRN
Status: DISCONTINUED | OUTPATIENT
Start: 2023-11-12 | End: 2023-11-15 | Stop reason: HOSPADM

## 2023-11-12 RX ORDER — ONDANSETRON 2 MG/ML
4 INJECTION INTRAMUSCULAR; INTRAVENOUS EVERY 6 HOURS PRN
Status: DISCONTINUED | OUTPATIENT
Start: 2023-11-12 | End: 2023-11-15 | Stop reason: HOSPADM

## 2023-11-12 RX ORDER — HALOPERIDOL 5 MG/ML
3 INJECTION INTRAMUSCULAR ONCE
Status: DISCONTINUED | OUTPATIENT
Start: 2023-11-12 | End: 2023-11-13

## 2023-11-12 RX ORDER — ACETAMINOPHEN 650 MG/1
650 SUPPOSITORY RECTAL EVERY 6 HOURS PRN
Status: DISCONTINUED | OUTPATIENT
Start: 2023-11-12 | End: 2023-11-15 | Stop reason: HOSPADM

## 2023-11-12 RX ORDER — ROSUVASTATIN CALCIUM 10 MG/1
20 TABLET, COATED ORAL DAILY
Status: DISCONTINUED | OUTPATIENT
Start: 2023-11-13 | End: 2023-11-13

## 2023-11-12 RX ORDER — SODIUM CHLORIDE 9 MG/ML
INJECTION, SOLUTION INTRAVENOUS PRN
Status: DISCONTINUED | OUTPATIENT
Start: 2023-11-12 | End: 2023-11-15 | Stop reason: HOSPADM

## 2023-11-12 RX ORDER — GABAPENTIN 100 MG/1
200 CAPSULE ORAL NIGHTLY
Status: DISCONTINUED | OUTPATIENT
Start: 2023-11-12 | End: 2023-11-15 | Stop reason: HOSPADM

## 2023-11-12 RX ORDER — 0.9 % SODIUM CHLORIDE 0.9 %
1000 INTRAVENOUS SOLUTION INTRAVENOUS ONCE
Status: COMPLETED | OUTPATIENT
Start: 2023-11-12 | End: 2023-11-13

## 2023-11-12 RX ORDER — POLYETHYLENE GLYCOL 3350 17 G/17G
17 POWDER, FOR SOLUTION ORAL DAILY PRN
Status: DISCONTINUED | OUTPATIENT
Start: 2023-11-12 | End: 2023-11-15 | Stop reason: HOSPADM

## 2023-11-12 RX ORDER — SUCRALFATE 1 G/1
1 TABLET ORAL
Status: DISCONTINUED | OUTPATIENT
Start: 2023-11-13 | End: 2023-11-13

## 2023-11-12 RX ORDER — ACETAMINOPHEN 325 MG/1
650 TABLET ORAL EVERY 6 HOURS PRN
Status: DISCONTINUED | OUTPATIENT
Start: 2023-11-12 | End: 2023-11-15 | Stop reason: HOSPADM

## 2023-11-12 RX ORDER — SODIUM CHLORIDE 9 MG/ML
INJECTION, SOLUTION INTRAVENOUS CONTINUOUS
Status: DISPENSED | OUTPATIENT
Start: 2023-11-12 | End: 2023-11-14

## 2023-11-12 RX ORDER — METOPROLOL SUCCINATE 50 MG/1
50 TABLET, EXTENDED RELEASE ORAL DAILY
Status: DISCONTINUED | OUTPATIENT
Start: 2023-11-13 | End: 2023-11-15 | Stop reason: HOSPADM

## 2023-11-12 RX ORDER — ENOXAPARIN SODIUM 100 MG/ML
30 INJECTION SUBCUTANEOUS DAILY
Status: DISCONTINUED | OUTPATIENT
Start: 2023-11-13 | End: 2023-11-15 | Stop reason: HOSPADM

## 2023-11-12 RX ORDER — LAMOTRIGINE 25 MG/1
25 TABLET ORAL NIGHTLY
Status: DISCONTINUED | OUTPATIENT
Start: 2023-11-12 | End: 2023-11-15 | Stop reason: HOSPADM

## 2023-11-12 RX ORDER — AMLODIPINE BESYLATE 5 MG/1
2.5 TABLET ORAL DAILY
Status: DISCONTINUED | OUTPATIENT
Start: 2023-11-13 | End: 2023-11-15 | Stop reason: HOSPADM

## 2023-11-12 RX ORDER — PANTOPRAZOLE SODIUM 40 MG/1
40 TABLET, DELAYED RELEASE ORAL
Status: DISCONTINUED | OUTPATIENT
Start: 2023-11-13 | End: 2023-11-15 | Stop reason: HOSPADM

## 2023-11-12 RX ORDER — SODIUM CHLORIDE 0.9 % (FLUSH) 0.9 %
5-40 SYRINGE (ML) INJECTION EVERY 12 HOURS SCHEDULED
Status: DISCONTINUED | OUTPATIENT
Start: 2023-11-12 | End: 2023-11-15 | Stop reason: HOSPADM

## 2023-11-12 RX ORDER — HYDRALAZINE HYDROCHLORIDE 20 MG/ML
10 INJECTION INTRAMUSCULAR; INTRAVENOUS EVERY 6 HOURS PRN
Status: DISCONTINUED | OUTPATIENT
Start: 2023-11-12 | End: 2023-11-15 | Stop reason: HOSPADM

## 2023-11-12 RX ORDER — CHLORTHALIDONE 25 MG/1
25 TABLET ORAL DAILY
Status: DISCONTINUED | OUTPATIENT
Start: 2023-11-13 | End: 2023-11-15 | Stop reason: HOSPADM

## 2023-11-12 RX ORDER — METHENAMINE HIPPURATE 1000 MG/1
1 TABLET ORAL 2 TIMES DAILY WITH MEALS
Status: DISCONTINUED | OUTPATIENT
Start: 2023-11-13 | End: 2023-11-15 | Stop reason: HOSPADM

## 2023-11-12 RX ORDER — MONTELUKAST SODIUM 10 MG/1
10 TABLET ORAL DAILY
Status: DISCONTINUED | OUTPATIENT
Start: 2023-11-13 | End: 2023-11-13

## 2023-11-12 RX ADMIN — ACETAMINOPHEN 650 MG: 325 TABLET ORAL at 23:06

## 2023-11-12 RX ADMIN — HYDRALAZINE HYDROCHLORIDE 10 MG: 20 INJECTION, SOLUTION INTRAMUSCULAR; INTRAVENOUS at 23:09

## 2023-11-12 RX ADMIN — LAMOTRIGINE 25 MG: 25 TABLET ORAL at 22:52

## 2023-11-12 RX ADMIN — WATER 1000 MG: 1 INJECTION INTRAMUSCULAR; INTRAVENOUS; SUBCUTANEOUS at 22:56

## 2023-11-12 RX ADMIN — GABAPENTIN 200 MG: 100 CAPSULE ORAL at 22:50

## 2023-11-12 RX ADMIN — SODIUM CHLORIDE 1000 ML: 9 INJECTION, SOLUTION INTRAVENOUS at 23:16

## 2023-11-12 ASSESSMENT — PAIN DESCRIPTION - LOCATION: LOCATION: HEAD

## 2023-11-12 ASSESSMENT — PAIN SCALES - GENERAL
PAINLEVEL_OUTOF10: 0
PAINLEVEL_OUTOF10: 8

## 2023-11-12 NOTE — BSMART NOTE
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 has pulmonology Dr Stephany Le, treated with singulair, albuterol prn, advair 500/50    Atherosclerosis of both carotid arteries     Bipolar affective disorder ([address])     Cataract, left eye     cataract - left    Closed fracture of lateral portion of left tibial plateau     Depression with anxiety     depression/anxiety    Esophageal ulcer     esophageal ulcer    Gastroparesis     GERD (gastroesophageal reflux disease)     Hepatic steatosis     History of fall     hx falls    Hyperlipidemia     increased cholesterol    Hypertension     IBS (irritable bowel syndrome)     IBS    IGT (impaired glucose tolerance) 09/15/2015    Liver disease     elevated liver enzymes - being evaluated/US done x 2 - inflammed liver/\"fat on liver\"    Menopause 1987    Neurological disorder     delayed thinking process? ??    OA (osteoarthritis) of knee     Osteoporosis     Seizures (HCC)     Sunburn, blistering     Unspecified adverse effect of anesthesia     was told by neurologist not to use anesthesia unless needed due to encephalopathy    Unspecified adverse effect of anesthesia     hallucinations after MRI with sedation    Vertigo     vertigo     The Medical Doctor to Psychiatrist conference was not completed. The Medical Doctor is in agreement with Psychiatrist disposition. The plan is to discharge w/ neuropsychiatry resources pending medical clearance and possible case management consultation. The Physician consulted was Dr. Jessica Worley. The admitting Psychiatrist will be N/A  The admitting Diagnosis is N/A  The Payor source is [employer].       This writer reviewed the [facility] 2.41

## 2023-11-12 NOTE — BSMART NOTE
BSMART assessment completed, and suicide risk level noted to be no risk. Physician, Dr. Geoffrey Huynh notified. Concerns not observed.

## 2023-11-12 NOTE — CARE COORDINATION
THERESE received a call from Dr. Jefferson Alejandre in the ED asking this CM to assist with Medical TDO paperwork and process. CM confirmed that this pt lives in [facility]. THERESE called the [facility] ([phone]) and was told that the Medical Emergency TDO form will need to be completed by the attending and faxed along w/ the attending's name and contact number, to the 's office. The fax number is [phone]. Once the paperwork is completed, the  will contact the attending to discuss the case. If the  is in agreement with the Medical TDO and signs the petition, then law enforcement with physically bring the Medical TDO to the ED and it should be placed on the chart. Cm did fax the completed Medical TDO form to the [facility] office. THERESE briefly met with pt's daughter, Nathaniel Mukherjee ([phone]) to discuss the Medical TDO process. She was upset, but in agreement that this should take place. CM offered support. Emil Jacobo

## 2023-11-12 NOTE — ED PROVIDER NOTES
[facility]  EMERGENCY DEPARTMENT ENCOUNTER      Pt Name: Mauri Hunt  MRN: [id number]  [address] [place] 1935  Date of evaluation: 11/12/2023  Provider: iRchmond Ordaz MD    CHIEF COMPLAINT       Chief Complaint   Patient presents with    Altered Mental Status     ED visit d/t AMS / change in mentation - onset of sxs, today - daughter concerned with patient's change in behavior and mentation - pt has called 911 4 times today leading to further concerns from family - hx of dementia, lives alone with assistance from daughter - calm and cooperative at this time - hx of HTN - Denies fevers / N / V / D / fall nor trauma / sob / cp;;         HISTORY OF PRESENT ILLNESS   (Location/Symptom, Timing/Onset, Context/Setting, Quality, Duration, Modifying Factors, Severity)  Note limiting factors. 88F w/ hx dementia, HTN, asthma, AARON, HLD, p/w change in MS. Pt lives alone and brought to ED by police w/ her daughter. Pt reportedly called 911 multiple times today. Daughter has noticed that pt is increasingly argumentative, agitated and confused. Daughter states that pt is disheveled and having trouble caring for herself and taking her medications. Has also had multiple falls recently. Lives alone in a house w/ some help from family. Pt has no complaints including no pain anywhere. No new dyspnea or cough. No F/C, cough, N/V/D. No drugs/etoh or new/changes to meds. Review of External Medical Records:     Nursing Notes were reviewed. REVIEW OF SYSTEMS    (2-9 systems for level 4, 10 or more for level 5)     Review of Systems   Unable to perform ROS: Dementia       Except as noted above the remainder of the review of systems was reviewed and negative. 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  has pulmonology

## 2023-11-12 NOTE — ED NOTES
4:31 PM  I have evaluated the patient as the Provider in Rapid Medical Evaluation (RME). I have reviewed her vital signs and the triage nurse assessment. I have talked with the patient and any available family and advised that I am the provider in triage and have ordered the appropriate study to initiate their work up based on the clinical presentation during my assessment. I have advised that the patient will be accommodated in the Main ED as soon as possible. I have also requested to contact the triage nurse or myself immediately if the patient experiences any changes in their condition during this brief waiting period. 80 y.o. female presents to ED with concern for AMS. Patient arrives ambulatory with walker with daughter. Daughter reports that patient has been confused and agitated today. She has called the police three times and police escorted her to the ER to be evaluated. Daughter met them here. Patient lives alone in a house. Patient has been diagnosed with dementia and this has happened before. Denies any CP, SOB, fevers, chills, N/V/D. BP is elevated, daughter believes she has likely not been taking her medication. Patient's daughter reports significant safety concerns with patient living alone and also psychiatric concerns, patient has history of bipolar disorder.  Denies SI or HI.    BELL Frost, [place]  11/12/23 1055 [facility], [address] [place] [address]  11/12/23 8471

## 2023-11-13 LAB
EKG ATRIAL RATE: 69 BPM
EKG DIAGNOSIS: NORMAL
EKG P AXIS: 88 DEGREES
EKG P-R INTERVAL: 170 MS
EKG Q-T INTERVAL: 374 MS
EKG QRS DURATION: 82 MS
EKG QTC CALCULATION (BAZETT): 400 MS
EKG R AXIS: 60 DEGREES
EKG T AXIS: 100 DEGREES
EKG VENTRICULAR RATE: 69 BPM
FLUAV AG NPH QL IA: NEGATIVE
FLUBV AG NOSE QL IA: NEGATIVE
SARS-COV-2 RDRP RESP QL NAA+PROBE: NOT DETECTED
SOURCE: NORMAL

## 2023-11-13 PROCEDURE — 6370000000 HC RX 637 (ALT 250 FOR IP): Performed by: STUDENT IN AN ORGANIZED HEALTH CARE EDUCATION/TRAINING PROGRAM

## 2023-11-13 PROCEDURE — 94760 N-INVAS EAR/PLS OXIMETRY 1: CPT

## 2023-11-13 PROCEDURE — 97165 OT EVAL LOW COMPLEX 30 MIN: CPT

## 2023-11-13 PROCEDURE — 97530 THERAPEUTIC ACTIVITIES: CPT

## 2023-11-13 PROCEDURE — 2580000003 HC RX 258: Performed by: STUDENT IN AN ORGANIZED HEALTH CARE EDUCATION/TRAINING PROGRAM

## 2023-11-13 PROCEDURE — 94640 AIRWAY INHALATION TREATMENT: CPT

## 2023-11-13 PROCEDURE — 80175 DRUG SCREEN QUAN LAMOTRIGINE: CPT

## 2023-11-13 PROCEDURE — 97161 PT EVAL LOW COMPLEX 20 MIN: CPT

## 2023-11-13 PROCEDURE — 36415 COLL VENOUS BLD VENIPUNCTURE: CPT

## 2023-11-13 PROCEDURE — 1100000000 HC RM PRIVATE

## 2023-11-13 PROCEDURE — 97535 SELF CARE MNGMENT TRAINING: CPT

## 2023-11-13 PROCEDURE — 6360000002 HC RX W HCPCS: Performed by: STUDENT IN AN ORGANIZED HEALTH CARE EDUCATION/TRAINING PROGRAM

## 2023-11-13 RX ORDER — IPRATROPIUM BROMIDE AND ALBUTEROL SULFATE 2.5; .5 MG/3ML; MG/3ML
1 SOLUTION RESPIRATORY (INHALATION) EVERY 4 HOURS PRN
Status: DISCONTINUED | OUTPATIENT
Start: 2023-11-13 | End: 2023-11-15 | Stop reason: HOSPADM

## 2023-11-13 RX ORDER — ARFORMOTEROL TARTRATE 15 UG/2ML
15 SOLUTION RESPIRATORY (INHALATION)
Status: DISCONTINUED | OUTPATIENT
Start: 2023-11-13 | End: 2023-11-15 | Stop reason: HOSPADM

## 2023-11-13 RX ORDER — BUDESONIDE 0.5 MG/2ML
0.5 INHALANT ORAL
Status: DISCONTINUED | OUTPATIENT
Start: 2023-11-13 | End: 2023-11-15 | Stop reason: HOSPADM

## 2023-11-13 RX ADMIN — ENOXAPARIN SODIUM 30 MG: 100 INJECTION SUBCUTANEOUS at 08:16

## 2023-11-13 RX ADMIN — WATER 1000 MG: 1 INJECTION INTRAMUSCULAR; INTRAVENOUS; SUBCUTANEOUS at 22:48

## 2023-11-13 RX ADMIN — ARFORMOTEROL TARTRATE 15 MCG: 15 SOLUTION RESPIRATORY (INHALATION) at 07:56

## 2023-11-13 RX ADMIN — CHLORTHALIDONE 25 MG: 25 TABLET ORAL at 08:15

## 2023-11-13 RX ADMIN — METHENAMINE HIPPURATE 1 G: 1 TABLET ORAL at 16:30

## 2023-11-13 RX ADMIN — BUDESONIDE 500 MCG: 0.5 INHALANT RESPIRATORY (INHALATION) at 07:56

## 2023-11-13 RX ADMIN — AMLODIPINE BESYLATE 2.5 MG: 5 TABLET ORAL at 08:15

## 2023-11-13 RX ADMIN — LAMOTRIGINE 25 MG: 25 TABLET ORAL at 20:59

## 2023-11-13 RX ADMIN — METOPROLOL SUCCINATE 50 MG: 50 TABLET, EXTENDED RELEASE ORAL at 08:15

## 2023-11-13 RX ADMIN — GABAPENTIN 200 MG: 100 CAPSULE ORAL at 20:59

## 2023-11-13 RX ADMIN — METHENAMINE HIPPURATE 1 G: 1 TABLET ORAL at 06:15

## 2023-11-13 RX ADMIN — ARFORMOTEROL TARTRATE: 15 SOLUTION RESPIRATORY (INHALATION) at 00:13

## 2023-11-13 RX ADMIN — SPIRONOLACTONE 25 MG: 25 TABLET ORAL at 08:15

## 2023-11-13 RX ADMIN — ARFORMOTEROL TARTRATE 15 MCG: 15 SOLUTION RESPIRATORY (INHALATION) at 23:08

## 2023-11-13 RX ADMIN — SODIUM CHLORIDE, PRESERVATIVE FREE 10 ML: 5 INJECTION INTRAVENOUS at 22:48

## 2023-11-13 RX ADMIN — PANTOPRAZOLE SODIUM 40 MG: 40 TABLET, DELAYED RELEASE ORAL at 06:15

## 2023-11-13 RX ADMIN — BUDESONIDE 500 MCG: 0.5 INHALANT RESPIRATORY (INHALATION) at 23:08

## 2023-11-13 RX ADMIN — CLONAZEPAM 0.25 MG: 0.5 TABLET ORAL at 05:01

## 2023-11-13 RX ADMIN — WATER 10 MG: 1 INJECTION INTRAMUSCULAR; INTRAVENOUS; SUBCUTANEOUS at 02:32

## 2023-11-13 RX ADMIN — SODIUM CHLORIDE, PRESERVATIVE FREE 10 ML: 5 INJECTION INTRAVENOUS at 00:26

## 2023-11-13 ASSESSMENT — PAIN SCALES - GENERAL
PAINLEVEL_OUTOF10: 9
PAINLEVEL_OUTOF10: 0

## 2023-11-13 ASSESSMENT — PAIN DESCRIPTION - LOCATION: LOCATION: ANKLE

## 2023-11-13 NOTE — CONSULTS
[address] [facility]  PSYCHIATRY CONSULT NOTE:    Name: Amador Dent  MR#: [id number]  : 1935  ACCOUNT#: [de-identified]  ADMIT DATE: 2023    REASON FOR CONSULT: altered mental status    HISTORY OF PRESENTING COMPLAINT:  Amador Dent is a 80 y.o. female with PMH of HTN, asthma/COPD, PUD, HLD, MDD, and RACHELE who presented to the ED for family concerns of altered mental status. Per records, the family reported increasing agitation and confusion throughout the day and subsequently brought her to the ED for evaluation. Patient lives alone and family has noted dificulty with caring for herself; accompanied by multiple and repeated falls. She is currently admitted to the medical floor at [facility] for acute cystitis. Upon assessment, she is eating lunch. She denies depression and anxiety. She denies SI/HI/AVH. She has difficulty recalling the events that led to this admission, but was able to verbalize \"I fell at home\". PAST PSYCHIATRIC HISTORY: She denies any history of mental illness or psychiatric hospitalizations, but then contradicts herself and states \"I have bipolar\". She is prescribed Lamictal 25 mg QHS. It is unknown if she is compliant with these medications. Patient states, \"My daughter is a pharmacist and she keeps me on track\". SUBSTANCE ABUSE HISTORY: Denies    PSYCHOSOCIAL HISTORY: Lives alone, adult daughter is supportive    MENTAL STATUS EXAM:   Amador Dent is a 80 y.o. White (non-) female who appears her stated age. She is cooperative with assessment questions. She makes good eye contact. Her speech is normal in rate, tone, and volume. Her self-reported mood is \"good\". Her affect is bright/congruent with mood. She denies auditory and visual hallucinations. No paranoia or delusions are elicited with assessment. Her thought processes are somewhat circumstantial, but logical. She is able to directly answer questions. She denies suicidal and homicidal ideation.  She is

## 2023-11-13 NOTE — ED NOTES
Pt was compliant with going to bathroom and CT.  Ordered haloperidol not given     Yeimy Giron RN  11/12/23 1160

## 2023-11-13 NOTE — ED NOTES
Pt. IV alarming repeatedly as pt is unable to rest comfortably with (R) arm straight for fluid infusion. Pt. Very agitated, requesting fluids to be turned off. Pt. Has been drinking and eating PO without difficulty. IV fluids turned off per request, pt continues to be agitated for primary RN.         João Duenas, SLADE  11/13/23 0774

## 2023-11-13 NOTE — BSMART NOTE
BSMART Liaison Team Note     LOS:  1     Patient goal(s) for today: take prescribed medications, communicate needs to staff in appropriate manner, use coping skills  BSMART Liaison team focus/goals: assess MH needs, provide education and support    Progress note: Pt came to ED 11/12/23 c/o AMS. BSMART assessed Pt in ED and determined she does not meed criteria for [facility] admission. ED MD petitioned for medical TDO in ED. Psychiatry consulted 11/13/23 d/t Pt being under medical TDO with Dx of dementia and bipolar disorder. Liaison met with Pt face to face on medical unit. Pt was alert, oriented and calm. She denied SI, HI and AVH. She was pleasant but minimally engaged in assessment and wanted to finish her lunch rather than continue assessment. She denied any MH questions or concerns. Liaison agreed to follow up as available and appropriate, as Pt does not require psychiatric Tx at this time. Barriers to Discharge: medical  Guns in the home: No     Outpatient provider(s):  none reported  Insurance info/prescription coverage:  [employer]    Diagnosis: Dementia per Hx  Bipolar Disorder per Hx    Plan:  No indication for psychiatric admission; refer to psychiatric consult note for further assessment and recommendation. [facility]. .   Follow up Psych Consult placed? Yes . Psychiatrist updated?  Yes - discussed Pt with Marny Bernheim, PMHNP        Participating treatment team members: Kyle Parry MSW

## 2023-11-13 NOTE — ED NOTES
2254: Pt refused neb treatment and said that it would make it harder for her to breathe; This nurse educated her that it was filled with medication that would help open her airway and breathe; Pt still refused ; no respiratory distress noted    0000: Came into pt's room;  Pt explained that she was having a little difficulty breathing and wanted her breathing treatment; I told her about the neb treatment and she refused and wanted an inhaler; Teo Corona RN helped get a different mask for the neb treatment that pt agreed to      Kelley Gage RN  11/13/23 0012

## 2023-11-13 NOTE — H&P
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   DuoNebs and montelukast    Hypertension  -Home Norvasc spironolactone    Major depression with generalized anxiety  -Psychiatry consult          FEN/GI -  [email or web address]  Activity - as tolerated  DVT prophylaxis - Lovenox  GI prophylaxis -  none indicated  Disposition - home    CODE STATUS:   full code       Signed By: María Elena Griffin MD     November 12, 2023

## 2023-11-14 LAB
COMMENT:: NORMAL
LAMOTRIGINE SERPL-MCNC: <1 UG/ML (ref 2–20)
SPECIMEN HOLD: NORMAL

## 2023-11-14 PROCEDURE — 94760 N-INVAS EAR/PLS OXIMETRY 1: CPT

## 2023-11-14 PROCEDURE — 6360000002 HC RX W HCPCS: Performed by: STUDENT IN AN ORGANIZED HEALTH CARE EDUCATION/TRAINING PROGRAM

## 2023-11-14 PROCEDURE — 1100000000 HC RM PRIVATE

## 2023-11-14 PROCEDURE — 94640 AIRWAY INHALATION TREATMENT: CPT

## 2023-11-14 PROCEDURE — 2580000003 HC RX 258: Performed by: NURSE PRACTITIONER

## 2023-11-14 PROCEDURE — 6370000000 HC RX 637 (ALT 250 FOR IP): Performed by: STUDENT IN AN ORGANIZED HEALTH CARE EDUCATION/TRAINING PROGRAM

## 2023-11-14 PROCEDURE — 2580000003 HC RX 258: Performed by: STUDENT IN AN ORGANIZED HEALTH CARE EDUCATION/TRAINING PROGRAM

## 2023-11-14 PROCEDURE — 97535 SELF CARE MNGMENT TRAINING: CPT

## 2023-11-14 PROCEDURE — 36415 COLL VENOUS BLD VENIPUNCTURE: CPT

## 2023-11-14 RX ADMIN — PANTOPRAZOLE SODIUM 40 MG: 40 TABLET, DELAYED RELEASE ORAL at 06:53

## 2023-11-14 RX ADMIN — ENOXAPARIN SODIUM 30 MG: 100 INJECTION SUBCUTANEOUS at 08:59

## 2023-11-14 RX ADMIN — SODIUM CHLORIDE, PRESERVATIVE FREE 10 ML: 5 INJECTION INTRAVENOUS at 21:00

## 2023-11-14 RX ADMIN — METHENAMINE HIPPURATE 1 G: 1 TABLET ORAL at 06:53

## 2023-11-14 RX ADMIN — METHENAMINE HIPPURATE 1 G: 1 TABLET ORAL at 17:07

## 2023-11-14 RX ADMIN — SPIRONOLACTONE 25 MG: 25 TABLET ORAL at 08:58

## 2023-11-14 RX ADMIN — BUDESONIDE 500 MCG: 0.5 INHALANT RESPIRATORY (INHALATION) at 21:06

## 2023-11-14 RX ADMIN — METOPROLOL SUCCINATE 50 MG: 50 TABLET, EXTENDED RELEASE ORAL at 08:59

## 2023-11-14 RX ADMIN — ARFORMOTEROL TARTRATE 15 MCG: 15 SOLUTION RESPIRATORY (INHALATION) at 21:06

## 2023-11-14 RX ADMIN — BUDESONIDE 500 MCG: 0.5 INHALANT RESPIRATORY (INHALATION) at 08:02

## 2023-11-14 RX ADMIN — WATER 1000 MG: 1 INJECTION INTRAMUSCULAR; INTRAVENOUS; SUBCUTANEOUS at 21:56

## 2023-11-14 RX ADMIN — SODIUM CHLORIDE: 9 INJECTION, SOLUTION INTRAVENOUS at 03:50

## 2023-11-14 RX ADMIN — AMLODIPINE BESYLATE 2.5 MG: 5 TABLET ORAL at 08:58

## 2023-11-14 RX ADMIN — GABAPENTIN 200 MG: 100 CAPSULE ORAL at 20:19

## 2023-11-14 RX ADMIN — LAMOTRIGINE 25 MG: 25 TABLET ORAL at 20:20

## 2023-11-14 RX ADMIN — ARFORMOTEROL TARTRATE 15 MCG: 15 SOLUTION RESPIRATORY (INHALATION) at 08:02

## 2023-11-14 ASSESSMENT — PAIN SCALES - GENERAL
PAINLEVEL_OUTOF10: 0
PAINLEVEL_OUTOF10: 0

## 2023-11-14 NOTE — ADT AUTH CERT
UPDATED PROGRESS NOTES 23    Admitted - 2023 1648    Admitting provider: Yamileth Lucia MD   Total duration of encounter: 2d   Admitted to inpatient: 2023 2158   Inpatient length of stay: 2d     Current Department - [address]    Patient class: Inpatient   Level of care: Medical   Service: Internal Medicine   Time in unit: 1d     Discharge Plan     Expected discharge: Today    Discharge milestones:    1  2           Tammy Hassan  Physician Assistant  Internal Medicine  Progress Notes      Cosign Needed  Date of Service:  2023  2:36 PM     [address] Adult  Hospitalist Group                                                                                                                                                  Hospitalist Progress Note  Tammy Hassan  Answering service: [phone] from in house phone         Date of Service:  2023  NAME:  Venice Jennings  :  1935  MRN:  [id number]        Admission Summary:      As per H&P:     Venice Jennings is a 80 y.o. female with pmh of htn, asthma/copd, pud, dyslipidemia, mdd w/ justyna who presented to ed for family concerns of ams. \per chatt review, family reported increasing agitation and confusion throughout the day. She reporyedly lives alone and family has noted dificulty with caring for herself; accompaniedd by multiple and repeated falls. Patient is seen and examined at bedside. She is alert and oriented x3 and conversant. It is unclear if patient is at her baseline.   She does express frustration

## 2023-11-14 NOTE — CARE COORDINATION
Transition of Care Plan:    RUR: 13%   Prior Level of Functioning: Lives alone   The pt reported that her daughter visits daily to provide supports with ensuring the pt receives her daily meds, transporting to appointments and assisting with meals. The pt reported that she utilizes a RW at baseline   The pt reported that she typically utilizes the [employer] to prepare meals if needed  Disposition: Home with Home Health Services   HH: Accpepted   Brice Márquez; AVS Updated   HH: Declined    Milly   Follow up appointments: PCP   DME needed: None \"The pt owns the needed DME\"   Transportation at discharge: Family Kati Wyatt (Child) [phone]\"  IM/IMM Medicare/ letter given:   Caregiver Contact: Kati Wyatt (Child)   [phone]   Per conversation with the pt's daugther; she reported that she prefers to take the pt home with [address] [place] [address],[address] services rather than place her in SNF. She reported that she prefers this cm send a referral to [facility] to identify if they can accommodate. She reported that she would work from home and support the pt. Discharge Caregiver contacted prior to discharge? NO   Care Conference needed? No   Barriers to discharge: Medical, [facility] TDO issued on  has  and is no longer needed. 23 1124   Service Assessment   Patient Orientation Alert and Oriented;Person;Place; Self   Cognition Alert   History Provided By Patient; Child/Family   Primary Caregiver Family   Accompanied By/Relationship Kati Wyatt (Child)   [phone]   Support Systems Children   Patient's Healthcare Decision Maker is: Legal Next of [address] [place] [address]   PCP Verified by CM Yes   Last Visit to PCP Within last 3 months   Prior Functional Level Assistance with the following:;Housework;Cooking; Shopping   Current Functional Level Assistance with the following:;Housework;Cooking; Shopping   Can patient return to prior living arrangement Yes   Ability to make needs known: Fair   Family able to assist with home

## 2023-11-15 VITALS
DIASTOLIC BLOOD PRESSURE: 82 MMHG | HEART RATE: 67 BPM | SYSTOLIC BLOOD PRESSURE: 150 MMHG | RESPIRATION RATE: 22 BRPM | OXYGEN SATURATION: 98 % | HEIGHT: 63 IN | BODY MASS INDEX: 26.68 KG/M2 | WEIGHT: 150.57 LBS | TEMPERATURE: 97.7 F

## 2023-11-15 LAB
ANION GAP SERPL CALC-SCNC: 7 MMOL/L (ref 5–15)
BACTERIA SPEC CULT: ABNORMAL
BUN SERPL-MCNC: 31 MG/DL (ref 6–20)
BUN/CREAT SERPL: 24 (ref 12–20)
CALCIUM SERPL-MCNC: 8.9 MG/DL (ref 8.5–10.1)
CC UR VC: ABNORMAL
CHLORIDE SERPL-SCNC: 110 MMOL/L (ref 97–108)
CO2 SERPL-SCNC: 22 MMOL/L (ref 21–32)
CREAT SERPL-MCNC: 1.3 MG/DL (ref 0.55–1.02)
GLUCOSE SERPL-MCNC: 115 MG/DL (ref 65–100)
POTASSIUM SERPL-SCNC: 4.3 MMOL/L (ref 3.5–5.1)
SERVICE CMNT-IMP: ABNORMAL
SODIUM SERPL-SCNC: 139 MMOL/L (ref 136–145)

## 2023-11-15 PROCEDURE — 36415 COLL VENOUS BLD VENIPUNCTURE: CPT

## 2023-11-15 PROCEDURE — 97530 THERAPEUTIC ACTIVITIES: CPT

## 2023-11-15 PROCEDURE — 6360000002 HC RX W HCPCS: Performed by: STUDENT IN AN ORGANIZED HEALTH CARE EDUCATION/TRAINING PROGRAM

## 2023-11-15 PROCEDURE — 97116 GAIT TRAINING THERAPY: CPT

## 2023-11-15 PROCEDURE — 80048 BASIC METABOLIC PNL TOTAL CA: CPT

## 2023-11-15 PROCEDURE — 6370000000 HC RX 637 (ALT 250 FOR IP): Performed by: STUDENT IN AN ORGANIZED HEALTH CARE EDUCATION/TRAINING PROGRAM

## 2023-11-15 PROCEDURE — 2580000003 HC RX 258: Performed by: STUDENT IN AN ORGANIZED HEALTH CARE EDUCATION/TRAINING PROGRAM

## 2023-11-15 RX ADMIN — METHENAMINE HIPPURATE 1 G: 1 TABLET ORAL at 07:02

## 2023-11-15 RX ADMIN — ENOXAPARIN SODIUM 30 MG: 100 INJECTION SUBCUTANEOUS at 09:36

## 2023-11-15 RX ADMIN — SPIRONOLACTONE 25 MG: 25 TABLET ORAL at 09:36

## 2023-11-15 RX ADMIN — WATER 1000 MG: 1 INJECTION INTRAMUSCULAR; INTRAVENOUS; SUBCUTANEOUS at 11:51

## 2023-11-15 RX ADMIN — PANTOPRAZOLE SODIUM 40 MG: 40 TABLET, DELAYED RELEASE ORAL at 07:02

## 2023-11-15 RX ADMIN — METOPROLOL SUCCINATE 50 MG: 50 TABLET, EXTENDED RELEASE ORAL at 09:36

## 2023-11-15 RX ADMIN — AMLODIPINE BESYLATE 2.5 MG: 5 TABLET ORAL at 09:36

## 2023-11-15 ASSESSMENT — PAIN SCALES - GENERAL: PAINLEVEL_OUTOF10: 0

## 2023-11-15 NOTE — DISCHARGE SUMMARY
Poor    x Deconditioned     Independent      Cognition    x Lucid     Forgetful     Dementia      Catheters/lines (plus indication)    Shell     PICC     PEG    x None      Code status    x Full code     DNR      PHYSICAL EXAMINATION AT DISCHARGE:    General : alert x 3, awake, no acute distress,   HEENT: PEERL, EOMI, moist mucus membrane  Neck: supple, no JVD, no meningeal signs  Chest: Clear to auscultation bilaterally   CVS: S1 S2 heard, Capillary refill less than 2 seconds  Abd: soft/ Non tender, non distended, BS physiological,   Ext: no clubbing, no cyanosis, no edema, brisk 2+ DP pulses  Neuro/Psych: pleasant mood and affect, CN 2-12 grossly intact, sensory grossly within normal limit, moves all extremities  Skin: warm     CHRONIC MEDICAL DIAGNOSES:  Principal Problem:    Acute infective cystitis  Resolved Problems:    * No resolved hospital problems.  *        Greater than 31 minutes were spent with the patient on counseling and coordination of care    Signed:   Ana Laura Rueda PA-C  11/15/2023  12:03 PM

## 2023-11-15 NOTE — DISCHARGE INSTRUCTIONS
As needed anxiolytics  - Declined neurocognitive testing in the past      Asthma/COPD  - Stable. - Continue home meds at discharge     Hypertension  - Home amlodipine and spironolactone     Major depression with generalized anxiety  Hx of bipolar per daughter  - Psychiatry consult stating pt does not meet criteria for inpatient admission at this time  - Lamotrigine levels < 1     JING:  - improving, unclear baseline, unclear Hx of CKD  - hold chlorthalidone at DC due to JING    CONSULTATIONS: IP CONSULT TO BSMART  IP CONSULT TO CASE MANAGEMENT  IP CONSULT TO PSYCHIATRY  IP CONSULT HOME HEALTH    PROCEDURES/SURGERIES: * No surgery found *    PENDING TEST RESULTS:   At the time of discharge the following test results are still pending: None    FOLLOW UP APPOINTMENTS:   [unfilled]     ADDITIONAL CARE RECOMMENDATIONS: Follow up with PCP as scheduled    DIET: regular diet    ACTIVITY: activity as tolerated    WOUND CARE: N/A    EQUIPMENT needed: N/A      DISCHARGE MEDICATIONS:   See Medication Reconciliation Form    It is important that you take the medication exactly as they are prescribed. Keep your medication in the bottles provided by the pharmacist and keep a list of the medication names, dosages, and times to be taken in your wallet. Do not take other medications without consulting your doctor. NOTIFY YOUR PHYSICIAN FOR ANY OF THE FOLLOWING:   Fever over 101 degrees for 24 hours. Chest pain, shortness of breath, fever, chills, nausea, vomiting, diarrhea, change in mentation, falling, weakness, bleeding. Severe pain or pain not relieved by medications. Or, any other signs or symptoms that you may have questions about.       DISPOSITION:   x Home With:   OT  PT x HH  RN       SNF/Inpatient Rehab/LTAC    Independent/assisted living    Hospice    Other:     CDMP Checked:   Yes x     PROBLEM LIST Updated:  Yes x       Signed:   Miguel Nation PA-C  11/15/2023  12:27 PM

## 2023-11-15 NOTE — ADT AUTH CERT
Updated progress notes 23      Admitted - 2023 1648    Admitting provider: Mary Cordoba MD   Total duration of encounter: 3d   Admitted to inpatient: 2023 2158   Inpatient length of stay: 3d     Current Department - [address]    Patient class: Inpatient   Level of care: Medical   Service: Internal Medicine   Time in unit: 2d     Discharge Plan     Expected discharge: 2023 (1d ago)   Discharge milestones:    1            Minor, TIP Katz NP  Nurse Practitioner  Internal Medicine  Progress Notes      Cosign Needed  Date of Service:  2023  9:35 AM     Cosign Needed                                                                                                                                                                                                                 [address] Hospitalist Group                                                                                                                                                  Hospitalist Progress Note  TIP Jackson NP  Answering service: [phone] from in house phone         Date of Service:  2023  NAME:  Cheri Davis  :  1935  MRN:  [id number]        Admission Summary:   As per H&P:  Cheri Davis is a 80 y.o. female with pmh of htn, asthma/copd, pud, dyslipidemia, mdd w/ justyna who presented to ed for family concerns of ams. \per chatt review, family reported increasing agitation and confusion throughout the day. She reporyedly lives alone and family has noted dificulty with caring for herself; accompaniedd by multiple and repeated falls. Patient is seen and examined at bedside. She is alert and oriented x3 and conversant. It is unclear if patient is at her baseline.   She does express frustration at having to be in the hospital.The patient denies any fever, chills, chest or abdominal pain, nausea, vomiting, cough, congestion, recent illness,

## 2024-01-03 DIAGNOSIS — F31.9 BIPOLAR AFFECTIVE DISORDER, REMISSION STATUS UNSPECIFIED (HCC): ICD-10-CM

## 2024-01-03 RX ORDER — LAMOTRIGINE 25 MG/1
25 TABLET ORAL NIGHTLY
Qty: 30 TABLET | Refills: 0 | Status: SHIPPED | OUTPATIENT
Start: 2024-01-03

## 2024-01-03 NOTE — TELEPHONE ENCOUNTER
Please contact patient for scheduling. Thanks      Last appointment: 10/06/2023 MD Alvarado   Next appointment: Nothing scheduled   Previous refill encounter(s):   10/06/2023 Lamictal #60 with 1 refill.     For Pharmacy Admin Tracking Only    Program: Medication Refill  Intervention Detail: New Rx: 1, reason: Patient Preference  Time Spent (min): 5      Requested Prescriptions     Pending Prescriptions Disp Refills    lamoTRIgine (LAMICTAL) 25 MG tablet [Pharmacy Med Name: LAMOTRIGINE 25MG TABLETS] 30 tablet 0     Sig: Take 1 tablet by mouth at bedtime

## 2024-02-02 DIAGNOSIS — F31.9 BIPOLAR AFFECTIVE DISORDER, REMISSION STATUS UNSPECIFIED (HCC): ICD-10-CM

## 2024-02-02 NOTE — TELEPHONE ENCOUNTER
Please contact patient for scheduling. Thanks  Per Dr. Alvarado: Please advise pt to schedule an appt ASAP for medication review and refill.    Javitesy refill was given on 01/03/2024.     Last appointment: 10/06/2023 MD Alvarado   Next appointment: Nothing scheduled   Previous refill encounter(s):   01/03/2024 Lamictal #30     For Pharmacy Admin Tracking Only    Program: Medication Refill  Intervention Detail: New Rx: 1, reason: Patient Preference  Time Spent (min): 5      Requested Prescriptions     Pending Prescriptions Disp Refills    lamoTRIgine (LAMICTAL) 25 MG tablet [Pharmacy Med Name: LAMOTRIGINE 25MG TABLETS] 30 tablet 0     Sig: TAKE 1 TABLET BY MOUTH AT BEDTIME

## 2024-02-13 ENCOUNTER — TELEPHONE (OUTPATIENT)
Age: 89
End: 2024-02-13

## 2024-02-13 ENCOUNTER — OFFICE VISIT (OUTPATIENT)
Age: 89
End: 2024-02-13
Payer: MEDICARE

## 2024-02-13 VITALS
TEMPERATURE: 98.3 F | DIASTOLIC BLOOD PRESSURE: 81 MMHG | RESPIRATION RATE: 16 BRPM | HEIGHT: 63 IN | BODY MASS INDEX: 26.97 KG/M2 | OXYGEN SATURATION: 98 % | SYSTOLIC BLOOD PRESSURE: 156 MMHG | WEIGHT: 152.2 LBS | HEART RATE: 66 BPM

## 2024-02-13 DIAGNOSIS — G30.9 ALZHEIMER'S DISEASE, UNSPECIFIED (CODE) (HCC): ICD-10-CM

## 2024-02-13 DIAGNOSIS — G62.9 POLYNEUROPATHY, UNSPECIFIED: ICD-10-CM

## 2024-02-13 DIAGNOSIS — N39.0 RECURRENT UTI: ICD-10-CM

## 2024-02-13 DIAGNOSIS — F31.9 BIPOLAR AFFECTIVE DISORDER, REMISSION STATUS UNSPECIFIED (HCC): ICD-10-CM

## 2024-02-13 DIAGNOSIS — F31.60 BIPOLAR AFFECTIVE DISORDER, CURRENT EPISODE MIXED, CURRENT EPISODE SEVERITY UNSPECIFIED (HCC): Primary | ICD-10-CM

## 2024-02-13 PROCEDURE — 99214 OFFICE O/P EST MOD 30 MIN: CPT | Performed by: FAMILY MEDICINE

## 2024-02-13 PROCEDURE — 1090F PRES/ABSN URINE INCON ASSESS: CPT | Performed by: FAMILY MEDICINE

## 2024-02-13 PROCEDURE — G8484 FLU IMMUNIZE NO ADMIN: HCPCS | Performed by: FAMILY MEDICINE

## 2024-02-13 PROCEDURE — G8427 DOCREV CUR MEDS BY ELIG CLIN: HCPCS | Performed by: FAMILY MEDICINE

## 2024-02-13 PROCEDURE — G8419 CALC BMI OUT NRM PARAM NOF/U: HCPCS | Performed by: FAMILY MEDICINE

## 2024-02-13 PROCEDURE — 1036F TOBACCO NON-USER: CPT | Performed by: FAMILY MEDICINE

## 2024-02-13 PROCEDURE — 1123F ACP DISCUSS/DSCN MKR DOCD: CPT | Performed by: FAMILY MEDICINE

## 2024-02-13 RX ORDER — MONTELUKAST SODIUM 10 MG/1
10 TABLET ORAL DAILY
COMMUNITY
Start: 2024-02-02

## 2024-02-13 RX ORDER — BUSPIRONE HYDROCHLORIDE 5 MG/1
5 TABLET ORAL 3 TIMES DAILY PRN
Qty: 90 TABLET | Refills: 0 | Status: SHIPPED | OUTPATIENT
Start: 2024-02-13 | End: 2024-03-14

## 2024-02-13 RX ORDER — METHENAMINE HIPPURATE 1000 MG/1
500 TABLET ORAL 2 TIMES DAILY WITH MEALS
Qty: 90 TABLET | Refills: 3 | Status: SHIPPED | OUTPATIENT
Start: 2024-02-13

## 2024-02-13 RX ORDER — LAMOTRIGINE 25 MG/1
25 TABLET ORAL NIGHTLY
Qty: 30 TABLET | Refills: 0 | OUTPATIENT
Start: 2024-02-13

## 2024-02-13 RX ORDER — LAMOTRIGINE 25 MG/1
25 TABLET ORAL NIGHTLY
Qty: 90 TABLET | Refills: 0 | Status: SHIPPED | OUTPATIENT
Start: 2024-02-13 | End: 2024-05-13

## 2024-02-13 RX ORDER — GABAPENTIN 100 MG/1
200 CAPSULE ORAL NIGHTLY
Qty: 180 CAPSULE | Refills: 0 | Status: SHIPPED | OUTPATIENT
Start: 2024-02-13 | End: 2024-05-13

## 2024-02-13 RX ORDER — ROSUVASTATIN CALCIUM 20 MG/1
20 TABLET, COATED ORAL DAILY
COMMUNITY
Start: 2024-02-02

## 2024-02-13 NOTE — TELEPHONE ENCOUNTER
I contacted the pharmacy to let them know if the medication clarification. Pt is to take 3 capsules by mouth daily max amount of 300mg. And changed quantity from 180 to 270 capsules to make it a 90 day supply.

## 2024-02-13 NOTE — PROGRESS NOTES
Yohan Erazo (:  1935) is a 88 y.o. female,Established patient, here for evaluation of the following chief complaint(s):  Follow-up (Never did hospital follow from Dec.)         ASSESSMENT/PLAN:  1. Bipolar affective disorder, current episode mixed, current episode severity unspecified (HCC)  Assessment & Plan:  Borderline controlled but improving. Using less of the anxiolytics. Discussed with pt and family initiation of cymbalta. They are amenable to initiation of a low dose buspar at this time. Close clinical follow up. Cont lamictal at current dose.    Orders:  -     busPIRone (BUSPAR) 5 MG tablet; Take 1 tablet by mouth 3 times daily as needed (anxiety), Disp-90 tablet, R-0Normal  -     lamoTRIgine (LAMICTAL) 25 MG tablet; Take 1 tablet by mouth at bedtime, Disp-90 tablet, R-0Normal  2. Polyneuropathy, unspecified  Assessment & Plan:   At goal, continue current medications and personally reviewed PDMP which is c/w medications. Advised that gabapentin may also help with anxiety as well.  RF given today.  Orders:  -     gabapentin (NEURONTIN) 100 MG capsule; Take 2 capsules by mouth at bedtime for 90 days. TAKE 1 CAPSULE BY MOUTH THREE TIMES DAILY. MAX DAILY AMOUNT: 300 MG Max Daily Amount: 200 mg, Disp-180 capsule, R-0Normal  3. Alzheimer's disease, unspecified (CODE) (HCC)  Assessment & Plan:    Referred to neuropsych.  Previously was not interested in medication. Will eval with neuropsych further medication management. Discussed with pt and family that uncontrolled mood disorders can also impact memory changes as well in addition to discussing overall prognosis of the disease process.  Orders:  -     BS - Georgia Mccoy PsyD, Neuropsychology, [place]  4. Recurrent UTI  Assessment & Plan:   Borderline controlled, changes made today: changed methanamine to 500 mg BID for ppx. Repeat UA and Urine culture today.  Orders:  -     methenamine (HIPREX) 1 g tablet; Take 0.5 tablets by mouth 2 times

## 2024-02-13 NOTE — PATIENT INSTRUCTIONS
Call and schedule an appt with the neuropsychology.   You can continue the lamictal and you are being started on a low dose medication called buspar to help with the mood as well. You can take it three times daily.   Continue the medication for the urinary tract prevention, taking 0.5 tab twice daily.

## 2024-02-13 NOTE — TELEPHONE ENCOUNTER
gabapentin (NEURONTIN) 100 MG capsule [[id number]]    Order Details  Dose: 200 mg Route: Oral Frequency: Nightly   Dispense Quantity: 180 capsule Refills: 0    Note to Pharmacy: Pt to keep upcoming appt for additional refills         Sig: Take 2 capsules by mouth at bedtime for 90 days. TAKE 1 CAPSULE BY MOUTH THREE TIMES DAILY. MAX DAILY AMOUNT: 300 MG Max Daily Amount: 200 mg         Start Date: 02/13/24 End Date: 05/13/24 after 90 doses   Written Date: 02/13/24 Expiration Date: 02/12/25       Associated Diagnoses: Polyneuropathy, unspecified [G62.9]   Original Order: gabapentin (NEURONTIN) 100 MG capsule [[id number]]   Providers    Authorizing Provider: Dori Alvarado MD NPI: [id number]   Ordering User: Dori Alvarado MD          Pharmacy    [id number] - [place], [place] - [address] - P [phone] - F [phone]  [address] [facility] [address]

## 2024-02-13 NOTE — PROGRESS NOTES
Rm:02  Fasting -  Chief Complaint   Patient presents with    Follow-up     Never did hospital follow from Dec.    Talk about anxiety. Not really taking clonazpam possibly low dose of Buspar  Therapist referral - for her age group.     1. Have you been to the ER, urgent care clinic since your last visit?  Hospitalized since your last visit?no    2. Have you seen or consulted any other health care providers outside of the [facility] since your last visit?  Include any pap smears or colon screening. no        Social Determinants of Health     Tobacco Use: Medium Risk (10/31/2023)    Patient History     Smoking Tobacco Use: Former     Smokeless Tobacco Use: Never     Passive Exposure: Not on file   Alcohol Use: Not At Risk (12/2/2022)    AUDIT-C     Frequency of Alcohol Consumption: Never     Average Number of Drinks: Patient does not drink     Frequency of Binge Drinking: Never   Financial Resource Strain: Low Risk  (12/2/2022)    Overall Financial Resource Strain (CARDIA)     Difficulty of Paying Living Expenses: Not very hard   Food Insecurity: Not on file (8/17/2023)   Transportation Needs: No Transportation Needs (12/2/2022)    PRAPARE - Transportation     Lack of Transportation (Medical): No     Lack of Transportation (Non-Medical): No   Physical Activity: Inactive (12/2/2022)    Exercise Vital Sign     Days of Exercise per Week: 0 days     Minutes of Exercise per Session: 0 min   Stress: Stress Concern Present (12/2/2022)    [employer] - Occupational Stress Questionnaire     Feeling of Stress : To some extent   Social Connections: Socially Isolated (9/15/2023)    Social Connection and Isolation Panel [NHANES]     Frequency of Communication with Friends and Family: More than three times a week     Frequency of Social Gatherings with Friends and Family: Three times a week     Attends [employer] Services: Never     Active Member of Clubs or Organizations: No     Attends

## 2024-02-14 PROBLEM — G62.9 POLYNEUROPATHY, UNSPECIFIED: Status: ACTIVE | Noted: 2024-02-14

## 2024-02-14 LAB
APPEARANCE UR: ABNORMAL
BACTERIA URNS QL MICRO: ABNORMAL /HPF
BILIRUB UR QL: NEGATIVE
CAOX CRY URNS QL MICRO: ABNORMAL
COLOR UR: ABNORMAL
EPITH CASTS URNS QL MICRO: ABNORMAL /LPF
GLUCOSE UR STRIP.AUTO-MCNC: NEGATIVE MG/DL
HGB UR QL STRIP: NEGATIVE
KETONES UR QL STRIP.AUTO: NEGATIVE MG/DL
LEUKOCYTE ESTERASE UR QL STRIP.AUTO: ABNORMAL
NITRITE UR QL STRIP.AUTO: POSITIVE
PH UR STRIP: 5.5 (ref 5–8)
PROT UR STRIP-MCNC: NEGATIVE MG/DL
RBC #/AREA URNS HPF: ABNORMAL /HPF (ref 0–5)
SP GR UR REFRACTOMETRY: 1.02 (ref 1–1.03)
UROBILINOGEN UR QL STRIP.AUTO: 0.2 EU/DL (ref 0.2–1)

## 2024-02-14 NOTE — ASSESSMENT & PLAN NOTE
Referred to neuropsych.  Previously was not interested in medication. Will eval with neuropsych further medication management. Discussed with pt and family that uncontrolled mood disorders can also impact memory changes as well in addition to discussing overall prognosis of the disease process.

## 2024-02-14 NOTE — ASSESSMENT & PLAN NOTE
At goal, continue current medications and personally reviewed PDMP which is c/w medications. Advised that gabapentin may also help with anxiety as well.  RF given today.

## 2024-02-14 NOTE — ASSESSMENT & PLAN NOTE
Borderline controlled, changes made today: changed methanamine to 500 mg BID for ppx. Repeat UA and Urine culture today.

## 2024-02-14 NOTE — ASSESSMENT & PLAN NOTE
Borderline controlled but improving. Using less of the anxiolytics. Discussed with pt and family initiation of cymbalta. They are amenable to initiation of a low dose buspar at this time. Close clinical follow up. Cont lamictal at current dose.     Refill request form in MD mail box  Medications not on List please review and fax to pharmacy .

## 2024-02-16 DIAGNOSIS — N30.00 ACUTE INFECTIVE CYSTITIS: Primary | ICD-10-CM

## 2024-02-16 LAB
BACTERIA SPEC CULT: ABNORMAL
CC UR VC: ABNORMAL
SERVICE CMNT-IMP: ABNORMAL

## 2024-02-16 RX ORDER — CEFDINIR 300 MG/1
300 CAPSULE ORAL 2 TIMES DAILY
Qty: 14 CAPSULE | Refills: 0 | Status: SHIPPED | OUTPATIENT
Start: 2024-02-16 | End: 2024-02-23

## 2024-02-16 NOTE — RESULT ENCOUNTER NOTE
Call:  your urine culture is showing a UTI.  An antibiotic is being sent to the pharmacy. Take as prescribed and return 1 wk after completing the antibiotics to make sure that the infection has been treated. Have a great weekend.     (Treated with cefdinir. CKD stage 3 with GFR at 40. No dose adjustment needed).

## 2024-02-19 ENCOUNTER — TELEPHONE (OUTPATIENT)
Age: 89
End: 2024-02-19

## 2024-02-19 NOTE — TELEPHONE ENCOUNTER
I left a VM for the pt to call back. Dr. Alvarado would like the pt to schedule an Lab only appt on or after 03/01/2024 to make sure UTI infection is gone. Medication was sent to the pharmacy already.

## 2024-02-27 ENCOUNTER — TELEPHONE (OUTPATIENT)
Age: 89
End: 2024-02-27

## 2024-02-27 DIAGNOSIS — N39.0 RECURRENT UTI: Primary | ICD-10-CM

## 2024-02-27 NOTE — TELEPHONE ENCOUNTER
Please advise the pt and family that they may come by the office for UA and urine culture to assess for clearance.  If they are concerned for the ongoing pain, may need  urgent care evaluation for in person to further evaluate the pain.

## 2024-02-27 NOTE — TELEPHONE ENCOUNTER
Pt daughter is calling she started the the antibiotic on 02/19 and she finished the last does last night . She stated she is having a pain her side on wend , or thurs . No pain prior.   Pt daughter is wanting to bring pt in.   Need something later in the day pt daughter gets off at 330 . Even she just wants pt to come by and give another sample    Best contact [phone]

## 2024-02-28 ENCOUNTER — TELEPHONE (OUTPATIENT)
Age: 89
End: 2024-02-28

## 2024-02-28 NOTE — TELEPHONE ENCOUNTER
Talked to pt daughter 02/27/2024 and informed her that  sent put in order for a Urine Culture and Analysis. Daughter will stop by the office on 02/28/2024 to  a urine cup to get a sample from pt and drop it back off. Daughter also stated that she feels like her mother is not explaining her discomfort correctly and that it could be stomach gas. But would like Dr. Alvarado to do further evaluation. Informed that once results are received from sample pt /daughter will be contacted.

## 2024-07-26 ENCOUNTER — OFFICE VISIT (OUTPATIENT)
Age: 89
End: 2024-07-26
Payer: MEDICARE

## 2024-07-26 VITALS
DIASTOLIC BLOOD PRESSURE: 71 MMHG | RESPIRATION RATE: 16 BRPM | BODY MASS INDEX: 26.22 KG/M2 | TEMPERATURE: 98.9 F | HEART RATE: 61 BPM | SYSTOLIC BLOOD PRESSURE: 127 MMHG | OXYGEN SATURATION: 96 % | WEIGHT: 148 LBS | HEIGHT: 63 IN

## 2024-07-26 DIAGNOSIS — G62.9 POLYNEUROPATHY, UNSPECIFIED: ICD-10-CM

## 2024-07-26 DIAGNOSIS — F31.60 BIPOLAR AFFECTIVE DISORDER, CURRENT EPISODE MIXED, CURRENT EPISODE SEVERITY UNSPECIFIED (HCC): ICD-10-CM

## 2024-07-26 DIAGNOSIS — F31.9 BIPOLAR AFFECTIVE DISORDER, REMISSION STATUS UNSPECIFIED (HCC): ICD-10-CM

## 2024-07-26 DIAGNOSIS — F02.818 ALZHEIMER'S DEMENTIA WITH BEHAVIORAL DISTURBANCE (HCC): Primary | ICD-10-CM

## 2024-07-26 DIAGNOSIS — G30.9 ALZHEIMER'S DEMENTIA WITH BEHAVIORAL DISTURBANCE (HCC): Primary | ICD-10-CM

## 2024-07-26 PROCEDURE — G8427 DOCREV CUR MEDS BY ELIG CLIN: HCPCS | Performed by: FAMILY MEDICINE

## 2024-07-26 PROCEDURE — G8419 CALC BMI OUT NRM PARAM NOF/U: HCPCS | Performed by: FAMILY MEDICINE

## 2024-07-26 PROCEDURE — 1036F TOBACCO NON-USER: CPT | Performed by: FAMILY MEDICINE

## 2024-07-26 PROCEDURE — 1090F PRES/ABSN URINE INCON ASSESS: CPT | Performed by: FAMILY MEDICINE

## 2024-07-26 PROCEDURE — 1123F ACP DISCUSS/DSCN MKR DOCD: CPT | Performed by: FAMILY MEDICINE

## 2024-07-26 PROCEDURE — 99214 OFFICE O/P EST MOD 30 MIN: CPT | Performed by: FAMILY MEDICINE

## 2024-07-26 RX ORDER — GABAPENTIN 100 MG/1
200 CAPSULE ORAL NIGHTLY
Qty: 180 CAPSULE | Refills: 0 | Status: SHIPPED | OUTPATIENT
Start: 2024-07-26 | End: 2024-10-24

## 2024-07-26 RX ORDER — LAMOTRIGINE 25 MG/1
25 TABLET ORAL NIGHTLY
Qty: 90 TABLET | Refills: 3 | Status: SHIPPED | OUTPATIENT
Start: 2024-07-26 | End: 2025-07-26

## 2024-07-26 RX ORDER — DONEPEZIL HYDROCHLORIDE 5 MG/1
5 TABLET, FILM COATED ORAL NIGHTLY
Qty: 30 TABLET | Refills: 3 | Status: SHIPPED | OUTPATIENT
Start: 2024-07-26

## 2024-07-26 RX ORDER — BUSPIRONE HYDROCHLORIDE 5 MG/1
5 TABLET ORAL 3 TIMES DAILY
Qty: 270 TABLET | Refills: 0 | Status: SHIPPED | OUTPATIENT
Start: 2024-07-26 | End: 2024-10-24

## 2024-07-26 ASSESSMENT — ENCOUNTER SYMPTOMS
RESPIRATORY NEGATIVE: 1
GASTROINTESTINAL NEGATIVE: 1

## 2024-07-26 NOTE — ASSESSMENT & PLAN NOTE
At goal, continue current medications and personally reviewed PDMP which is c/w medications. Cont current dose of gabapentin. Pt denies any daytime somnolence.  Her treatment course is also complicated by primary OA for which she is not a surgical candidate and currently on long term NSAIDs prn. RF given today.

## 2024-07-26 NOTE — PROGRESS NOTES
Chief Complaint   Patient presents with    Follow-up         /71   Pulse 61   Temp 98.9 °F (37.2 °C)   Resp 16   Ht 1.6 m (5' 3\")   Wt 67.1 kg (148 lb)   SpO2 96%   BMI 26.22 kg/m²   \"Have you been to the ER, urgent care clinic since your last visit?  Hospitalized since your last visit?\"    YES    “Have you seen or consulted any other health care providers outside of [facility] since your last visit?”    NO            Click Here for Release of Records Request

## 2024-07-26 NOTE — ASSESSMENT & PLAN NOTE
Awaiting appt with neuropsych.  Pt currently amenable to starting medication. Will start with low dose aricept with clinical follow up.

## 2024-07-26 NOTE — ASSESSMENT & PLAN NOTE
Borderline controlled but improving currently complicated by concern for hallucinations. Encouraged therapy as pt seemed very hesitant to share her current experiences during the office visit. Cont lamictal. Increase buspar to TID.  Cont to taper use of klonopin.  Discussed risks with pt and family with resp depression given age with hx of COPD in addition to hallucinations.  She is currently using a very low dose very infrequently.  Close clinical follow up to discuss medications

## 2024-07-26 NOTE — PROGRESS NOTES
Yohan Erazo (:  1935) is a 88 y.o. female,Established patient, here for evaluation of the following chief complaint(s):  Follow-up      Assessment & Plan   ASSESSMENT/PLAN:  1. Alzheimer's dementia with behavioral disturbance (HCC)  Assessment & Plan:    Awaiting appt with neuropsych.  Pt currently amenable to starting medication. Will start with low dose aricept with clinical follow up.  Orders:  -     donepezil (ARICEPT) 5 MG tablet; Take 1 tablet by mouth nightly, Disp-30 tablet, R-3Normal  2. Bipolar affective disorder, current episode mixed, current episode severity unspecified (HCC)  Assessment & Plan:  Borderline controlled but improving currently complicated by concern for hallucinations. Encouraged therapy as pt seemed very hesitant to share her current experiences during the office visit. Cont lamictal. Increase buspar to TID.  Cont to taper use of klonopin.  Discussed risks with pt and family with resp depression given age with hx of COPD in addition to hallucinations.  She is currently using a very low dose very infrequently.  Close clinical follow up to discuss medications   Orders:  -     lamoTRIgine (LAMICTAL) 25 MG tablet; Take 1 tablet by mouth at bedtime, Disp-90 tablet, R-3Normal  -     busPIRone (BUSPAR) 5 MG tablet; Take 1 tablet by mouth 3 times daily, Disp-270 tablet, R-0Normal  3. Bipolar affective disorder, remission status unspecified (HCC)  Assessment & Plan:  Borderline controlled but improving currently complicated by concern for hallucinations. Encouraged therapy as pt seemed very hesitant to share her current experiences during the office visit. Cont lamictal. Increase buspar to TID.  Cont to taper use of klonopin.  Discussed risks with pt and family with resp depression given age with hx of COPD in addition to hallucinations.  She is currently using a very low dose very infrequently.  Close clinical follow up to discuss medications   Orders:  -     lamoTRIgine (LAMICTAL)

## 2024-07-26 NOTE — PATIENT INSTRUCTIONS
Your buspar is being increased to 3 times daily   Continue the lamictal.   You are being started on aricept for the alzheimer's  Follow up in 6 wks for medicare wellness and fasting labs

## 2024-09-17 ENCOUNTER — TELEPHONE (OUTPATIENT)
Age: 89
End: 2024-09-17

## 2025-01-04 ENCOUNTER — HOSPITAL ENCOUNTER (EMERGENCY)
Facility: HOSPITAL | Age: 89
Discharge: HOME | End: 2025-01-04
Attending: EMERGENCY MEDICINE
Payer: MEDICARE

## 2025-01-04 VITALS
OXYGEN SATURATION: 94 % | RESPIRATION RATE: 18 BRPM | SYSTOLIC BLOOD PRESSURE: 170 MMHG | DIASTOLIC BLOOD PRESSURE: 65 MMHG | HEART RATE: 66 BPM | TEMPERATURE: 97.8 F

## 2025-01-04 DIAGNOSIS — N30.00 ACUTE CYSTITIS WITHOUT HEMATURIA: Primary | ICD-10-CM

## 2025-01-04 LAB
ALBUMIN SERPL-MCNC: 3.6 G/DL (ref 3.5–5)
ALBUMIN/GLOB SERPL: 0.8 (ref 1.1–2.2)
ALP SERPL-CCNC: 107 U/L (ref 45–117)
ALT SERPL-CCNC: 16 U/L (ref 12–78)
ANION GAP SERPL CALC-SCNC: 4 MMOL/L (ref 2–12)
APPEARANCE UR: ABNORMAL
AST SERPL-CCNC: 13 U/L (ref 15–37)
BACTERIA URNS QL MICRO: ABNORMAL /HPF
BASOPHILS # BLD: 0 K/UL (ref 0–0.1)
BASOPHILS NFR BLD: 1 % (ref 0–1)
BILIRUB SERPL-MCNC: 0.5 MG/DL (ref 0.2–1)
BILIRUB UR QL: NEGATIVE
BUN SERPL-MCNC: 22 MG/DL (ref 6–20)
BUN/CREAT SERPL: 16 (ref 12–20)
CALCIUM SERPL-MCNC: 9.8 MG/DL (ref 8.5–10.1)
CHLORIDE SERPL-SCNC: 102 MMOL/L (ref 97–108)
CO2 SERPL-SCNC: 30 MMOL/L (ref 21–32)
COLOR UR: ABNORMAL
COMMENT:: NORMAL
CREAT SERPL-MCNC: 1.34 MG/DL (ref 0.55–1.02)
DIFFERENTIAL METHOD BLD: ABNORMAL
EOSINOPHIL # BLD: 0.2 K/UL (ref 0–0.4)
EOSINOPHIL NFR BLD: 3 % (ref 0–7)
EPITH CASTS URNS QL MICRO: ABNORMAL /LPF
ERYTHROCYTE [DISTWIDTH] IN BLOOD BY AUTOMATED COUNT: 13.5 % (ref 11.5–14.5)
GLOBULIN SER CALC-MCNC: 4.6 G/DL (ref 2–4)
GLUCOSE SERPL-MCNC: 113 MG/DL (ref 65–100)
GLUCOSE UR STRIP.AUTO-MCNC: NEGATIVE MG/DL
HCT VFR BLD AUTO: 43 % (ref 35–47)
HGB BLD-MCNC: 14 G/DL (ref 11.5–16)
HGB UR QL STRIP: NEGATIVE
IMM GRANULOCYTES # BLD AUTO: 0 K/UL (ref 0–0.04)
IMM GRANULOCYTES NFR BLD AUTO: 1 % (ref 0–0.5)
KETONES UR QL STRIP.AUTO: NEGATIVE MG/DL
LEUKOCYTE ESTERASE UR QL STRIP.AUTO: ABNORMAL
LYMPHOCYTES # BLD: 1.2 K/UL (ref 0.8–3.5)
LYMPHOCYTES NFR BLD: 18 % (ref 12–49)
MCH RBC QN AUTO: 27.9 PG (ref 26–34)
MCHC RBC AUTO-ENTMCNC: 32.6 G/DL (ref 30–36.5)
MCV RBC AUTO: 85.7 FL (ref 80–99)
MONOCYTES # BLD: 0.7 K/UL (ref 0–1)
MONOCYTES NFR BLD: 10 % (ref 5–13)
NEUTS SEG # BLD: 4.7 K/UL (ref 1.8–8)
NEUTS SEG NFR BLD: 67 % (ref 32–75)
NITRITE UR QL STRIP.AUTO: POSITIVE
NRBC # BLD: 0 K/UL (ref 0–0.01)
NRBC BLD-RTO: 0 PER 100 WBC
PH UR STRIP: 8.5 (ref 5–8)
PLATELET # BLD AUTO: 198 K/UL (ref 150–400)
PMV BLD AUTO: 12.2 FL (ref 8.9–12.9)
POTASSIUM SERPL-SCNC: 3.9 MMOL/L (ref 3.5–5.1)
PROT SERPL-MCNC: 8.2 G/DL (ref 6.4–8.2)
PROT UR STRIP-MCNC: 30 MG/DL
RBC # BLD AUTO: 5.02 M/UL (ref 3.8–5.2)
RBC #/AREA URNS HPF: ABNORMAL /HPF (ref 0–5)
SODIUM SERPL-SCNC: 136 MMOL/L (ref 136–145)
SP GR UR REFRACTOMETRY: 1.02 (ref 1–1.03)
SPECIMEN HOLD: NORMAL
TROPONIN I SERPL HS-MCNC: 10 NG/L (ref 0–51)
UROBILINOGEN UR QL STRIP.AUTO: 0.2 EU/DL (ref 0.2–1)
WBC # BLD AUTO: 6.9 K/UL (ref 3.6–11)
WBC URNS QL MICRO: ABNORMAL /HPF (ref 0–4)
YEAST URNS QL MICRO: PRESENT

## 2025-01-04 PROCEDURE — 87086 URINE CULTURE/COLONY COUNT: CPT

## 2025-01-04 PROCEDURE — 84484 ASSAY OF TROPONIN QUANT: CPT

## 2025-01-04 PROCEDURE — 81001 URINALYSIS AUTO W/SCOPE: CPT

## 2025-01-04 PROCEDURE — 80053 COMPREHEN METABOLIC PANEL: CPT

## 2025-01-04 PROCEDURE — 87088 URINE BACTERIA CULTURE: CPT

## 2025-01-04 PROCEDURE — 85025 COMPLETE CBC W/AUTO DIFF WBC: CPT

## 2025-01-04 PROCEDURE — 99284 EMERGENCY DEPT VISIT MOD MDM: CPT

## 2025-01-04 PROCEDURE — 93005 ELECTROCARDIOGRAM TRACING: CPT | Performed by: EMERGENCY MEDICINE

## 2025-01-04 PROCEDURE — 36415 COLL VENOUS BLD VENIPUNCTURE: CPT

## 2025-01-04 PROCEDURE — 87186 SC STD MICRODIL/AGAR DIL: CPT

## 2025-01-04 ASSESSMENT — PAIN - FUNCTIONAL ASSESSMENT: PAIN_FUNCTIONAL_ASSESSMENT: NONE - DENIES PAIN

## 2025-01-04 NOTE — ED TRIAGE NOTES
Pt arrives from home with cc of increasing confusion per daughter. Pt has no complaints.     Hx: COPD, dementia

## 2025-01-04 NOTE — ED PROVIDER NOTES
cholesterol    Hypertension     IBS (irritable bowel syndrome)     IBS    IGT (impaired glucose tolerance) 09/15/2015    Liver disease     elevated liver enzymes - being evaluated/US done x 2 - inflammed liver/\"fat on liver\"    Menopause 1987    Neurological disorder     delayed thinking process???    OA (osteoarthritis) of knee     Osteoporosis     Seizures (HCC)     Sunburn, blistering     Unspecified adverse effect of anesthesia     was told by neurologist not to use anesthesia unless needed due to encephalopathy    Unspecified adverse effect of anesthesia     hallucinations after MRI with sedation    Vertigo     vertigo         SURGICAL HISTORY       Past Surgical History:   Procedure Laterality Date    BREAST BIOPSY Left     Surgical    COLONOSCOPY N/A 8/1/2017    COLONOSCOPY performed by Lyle Bruno MD at [facility] ENDOSCOPY    GI      laser surgery for reflux and hernia repair    MOHS SURGERY  09/05/2017    BCC L nasal sidewall by Dr. Baumann     [facility]  06/[facility]2018    BCC R mid forehead by Dr. Baumann     VA UNLISTED PROCEDURE ABDOMEN PERITONEUM & OMENTUM      GERD reflux         CURRENT MEDICATIONS       Previous Medications    ACETAMINOPHEN (TYLENOL DISSOLVE PACKS) 500 MG PACK    Take 500 mg by mouth 2 times daily    ALBUTEROL (PROVENTIL) (2.5 MG/3ML) 0.083% NEBULIZER SOLUTION    Inhale 3 mLs into the lungs every 4 hours as needed    ALBUTEROL SULFATE HFA (PROVENTIL;VENTOLIN;PROAIR) 108 (90 BASE) MCG/ACT INHALER    Inhale 2 puffs into the lungs every 4 hours as needed    AMLODIPINE (NORVASC) 2.5 MG TABLET    Take 1 tablet by mouth daily    CLONAZEPAM (KLONOPIN) 0.5 MG TABLET    Take 0.5 tablets by mouth daily as needed for Anxiety for up to 180 days. Max Daily Amount: 0.25 mg    DICLOFENAC SODIUM (VOLTAREN) 1 % GEL    APPLY 2 GRAMS TO INSTRUCTED SITE ON LEFT LEG TWICE DAILY AS NEEDED FOR PAIN    DONEPEZIL (ARICEPT) 5 MG TABLET    Take 1 tablet by mouth nightly    FEXOFENADINE (ALLEGRA ALLERGY) 180 MG  Normocephalic and atraumatic.      Mouth/Throat:      Mouth: Mucous membranes are moist.   Eyes:      Conjunctiva/sclera: Conjunctivae normal.   Cardiovascular:      Rate and Rhythm: Normal rate and regular rhythm.      Heart sounds: No murmur heard.     No friction rub. No gallop.   Pulmonary:      Effort: Pulmonary effort is normal.      Breath sounds: Normal breath sounds.   Abdominal:      General: There is no distension.      Tenderness: There is no abdominal tenderness.   Musculoskeletal:         General: No swelling or deformity.      Cervical back: No rigidity.   Skin:     General: Skin is warm and dry.   Neurological:      General: No focal deficit present.      Mental Status: She is alert.   Psychiatric:         Mood and Affect: Mood normal.         DIAGNOSTIC RESULTS     EKG: All EKG's are interpreted by the Emergency Department Physician who either signs or Co-signs this chart in the absence of a cardiologist.    EKG: Normal sinus rhythm; rate of 63; artifact; nonspecific ST, T wave abnormalities.  Interpreted by me.  2:55 PM  Eliceo Harris MD        RADIOLOGY:   Non-plain film images such as CT, Ultrasound and MRI are read by the radiologist. Plain radiographic images are visualized and preliminarily interpreted by the emergency physician with the below findings:        Interpretation per the Radiologist below, if available at the time of this note:    No orders to display        LABS:  Labs Reviewed   CBC WITH AUTO DIFFERENTIAL - Abnormal; Notable for the following components:       Result Value    Immature Granulocytes % 1 (*)     All other components within normal limits   EXTRA TUBES HOLD   COMPREHENSIVE METABOLIC PANEL   TROPONIN   URINALYSIS WITH MICROSCOPIC       All other labs were within normal range or not returned as of this dictation.    EMERGENCY DEPARTMENT COURSE and DIFFERENTIAL DIAGNOSIS/MDM:   Vitals:    Vitals:    01/04/25 1415 01/04/25 1448   BP: (!) 141/69 (!) 150/71   Pulse: 66 68

## 2025-01-05 LAB
BACTERIA SPEC CULT: ABNORMAL
CC UR VC: ABNORMAL
EKG ATRIAL RATE: 63 BPM
EKG DIAGNOSIS: NORMAL
EKG P AXIS: 65 DEGREES
EKG P-R INTERVAL: 172 MS
EKG Q-T INTERVAL: 424 MS
EKG QRS DURATION: 74 MS
EKG QTC CALCULATION (BAZETT): 433 MS
EKG R AXIS: 9 DEGREES
EKG T AXIS: 133 DEGREES
EKG VENTRICULAR RATE: 63 BPM
SERVICE CMNT-IMP: ABNORMAL

## 2025-01-05 NOTE — ED NOTES
Per EMS pt's daughter called and states the pt has been more confused than normal x1-2 days. Pt's daughter states the pt has a hx of dementia however her current mental status is not the pt's baseline. VS en route 177/86, 100% RA, 78HR, 131 BG.   
Pt able to stand and transfer to bedside commode with this RN stand-by assisting. Urine sample obtained at this time.   
Pt's daughter here to transport pt home.   
This RN spoke with pt's daughter, Emily Hill regarding the pt's discharge and transport home. Pt's daughter states that she does not get off from work until 7pm, and that she \"told the paramedics\" to tell the ER staff this information when the pt was transported. This RN explained to the pt's daughter that the above information was not relayed to this RN upon the pt's arrival to the ED. Pt's daughter states, \"that's really nice.\" This RN asked the pt's daughter if the daughter would like to have medical transport set-up for the pt to which the pt's daughter states, \"you can't wait another hour and 15 minutes for me to get there?\" This RN placed the pt's daughter on a brief hold to speak with the Charge Nurse, regarding the pt holding in the ED until the pt's daughter is able to transport the pt home. Pt will hold in the ED until pt's daughter is able to transport the pt home.   
statement to which the pt's daughter shook her head. The pt's daughter states, \"I just need to get her home and get her settled and figure out what [facility] this is.\" Pt departed the dept via wheelchair with her daughter accompanying.

## 2025-01-07 LAB
BACTERIA SPEC CULT: ABNORMAL
CC UR VC: ABNORMAL
SERVICE CMNT-IMP: ABNORMAL

## 2025-01-08 ENCOUNTER — TELEPHONE (OUTPATIENT)
Age: 89
End: 2025-01-08

## 2025-01-08 NOTE — TELEPHONE ENCOUNTER
----- Message from Dr. Dori Alvarado MD sent at 1/6/2025 12:28 AM EST -----  Please call and schedule an appt for ED discharge follow up by end of the week.   Statement Selected

## 2025-01-08 NOTE — TELEPHONE ENCOUNTER
I have attempted without success to contact this patient by phone and I left a message on answering machine.  would like to see the patient in office for a ER discharge follow up appointment. If the patient calls back please schedule the patient appointment.

## 2025-01-13 ENCOUNTER — TELEPHONE (OUTPATIENT)
Age: 89
End: 2025-01-13

## 2025-01-13 NOTE — TELEPHONE ENCOUNTER
I have attempted without success to contact this patient by phone and I left a message on answering machine.  would like to see the patient in office for a Hospital discharge appointment in order to confirmed  services.     I called [facility] and informed Sandie that  will follow up with the patient. Sandie put in the patient chart to schedule an appointment with

## 2025-01-13 NOTE — TELEPHONE ENCOUNTER
Rep from [facility]  called to request if PCP will follow pt; as she was just discharged from hospital;DX Uti/asthma/issues related to dementia. ESSENCE [phone] - Sandie horta / [facility]

## 2025-01-21 DIAGNOSIS — G62.9 POLYNEUROPATHY, UNSPECIFIED: ICD-10-CM

## 2025-01-21 NOTE — TELEPHONE ENCOUNTER
Last appointment: 07/26/2024 MD Alvarado   Next appointment: 02/28/2025 MD Alvarado   Previous refill encounter(s):   07/26/2024 Neurontin #[id number]     No access to PDMP     For Pharmacy Admin Tracking Only    Program: Medication Refill  Intervention Detail: New Rx: 1, reason: Patient Preference  Time Spent (min): 5    Requested Prescriptions     Pending Prescriptions Disp Refills    gabapentin (NEURONTIN) 100 MG capsule [Pharmacy Med Name: GABAPENTIN 100 MG CAPSULE] 180 capsule 0     Sig: TAKE 2 CAPSULES BY MOUTH AT BEDTIME. MAX DAILY AMOUNT : 200 MG

## 2025-01-23 RX ORDER — GABAPENTIN 100 MG/1
CAPSULE ORAL
Qty: 72 CAPSULE | Refills: 0 | Status: SHIPPED | OUTPATIENT
Start: 2025-01-23 | End: 2025-02-28

## 2025-01-24 NOTE — TELEPHONE ENCOUNTER
PDMP personally reviewed.  Last refill done in July.  Pt has upcoming appt in 1 mo.  Will give short interim refill until upcoming appt to discuss medication compliance at that time.

## 2025-02-19 NOTE — PROGRESS NOTES
Received refill request for gabapentin from the pharmacy on .  Personally reviewed PDMP with last refill 2025 for 72 tabs which would be for 36 days.  Otherwise c/w medications.  Pt currently taking prn as since last refill was 2024 for 90 day supply which would have  Oct 2024.

## (undated) DEVICE — QUILTED PREMIUM COMFORT UNDERPAD,EXTRA HEAVY: Brand: WINGS

## (undated) DEVICE — NEEDLE HYPO 18GA L1.5IN PNK S STL HUB POLYPR SHLD REG BVL

## (undated) DEVICE — ENDO CARRY-ON PROCEDURE KIT INCLUDES ENZYMATIC SPONGE, GAUZE, BIOHAZARD LABEL, TRAY, LUBRICANT, DIRTY SCOPE LABEL, WATER LABEL, TRAY, DRAWSTRING PAD, AND DEFENDO 4-PIECE KIT.: Brand: ENDO CARRY-ON PROCEDURE KIT

## (undated) DEVICE — BITE BLK ENDOSCP AD 54FR GRN POLYETH ENDOSCP W STRP SLD

## (undated) DEVICE — AIRLIFE™ U/CONNECT-IT OXYGEN TUBING 7 FEET (2.1 M) CRUSH-RESISTANT OXYGEN TUBING, VINYL TIPPED: Brand: AIRLIFE™

## (undated) DEVICE — WRISTBAND ID AD W1XL11.5IN RED POLY ALRG PREPRINTED PERM

## (undated) DEVICE — CONTAINER SPEC 20 ML LID NEUT BUFF FORMALIN 10 % POLYPR STS

## (undated) DEVICE — BAG SPEC BIOHZD LF 2MIL 6X10IN -- CONVERT TO ITEM 357326

## (undated) DEVICE — BAG BELONG PT PERS CLEAR HANDL

## (undated) DEVICE — SOLIDIFIER FLUID 3000 CC ABSORB

## (undated) DEVICE — SET ADMIN 16ML TBNG L100IN 2 Y INJ SITE IV PIGGY BK DISP

## (undated) DEVICE — CANN NASAL O2 CAPNOGRAPHY AD -- FILTERLINE

## (undated) DEVICE — CONNECTOR TBNG AUX H2O JET DISP FOR OLY 160/180 SER

## (undated) DEVICE — CUFF BLD PRSS AD SM SZ 10 FOR 20-26CM LIMB VLY SFT W/O TUBE

## (undated) DEVICE — SYRINGE MED 20ML STD CLR PLAS LUERLOCK TIP N CTRL DISP

## (undated) DEVICE — KENDALL RADIOLUCENT FOAM MONITORING ELECTRODE -RECTANGULAR SHAPE: Brand: KENDALL

## (undated) DEVICE — CATH IV AUTOGRD BC BLU 22GA 25 -- INSYTE

## (undated) DEVICE — KIT IV STRT W CHLORAPREP PD 1ML

## (undated) DEVICE — Z DISCONTINUED USE 2751540 TUBING IRRIG L10IN DISP PMP ENDOGATOR

## (undated) DEVICE — 1200 GUARD II KIT W/5MM TUBE W/O VAC TUBE: Brand: GUARDIAN

## (undated) DEVICE — SET EXTN TBNG L BOR 4 W STPCOCK ST 32IN PRIMING VOL 6ML

## (undated) DEVICE — BW-412T DISP COMBO CLEANING BRUSH: Brand: SINGLE USE COMBINATION CLEANING BRUSH

## (undated) DEVICE — FORCEPS BX L240CM JAW DIA2.8MM L CAP W/ NDL MIC MESH TOOTH

## (undated) DEVICE — NEONATAL-ADULT SPO2 SENSOR: Brand: NELLCOR